# Patient Record
Sex: MALE | Race: WHITE | Employment: OTHER | URBAN - METROPOLITAN AREA
[De-identification: names, ages, dates, MRNs, and addresses within clinical notes are randomized per-mention and may not be internally consistent; named-entity substitution may affect disease eponyms.]

---

## 2019-08-08 ENCOUNTER — OFFICE VISIT (OUTPATIENT)
Dept: FAMILY MEDICINE CLINIC | Age: 74
End: 2019-08-08

## 2019-08-08 VITALS
WEIGHT: 250 LBS | BODY MASS INDEX: 33.86 KG/M2 | HEIGHT: 72 IN | RESPIRATION RATE: 16 BRPM | OXYGEN SATURATION: 97 % | SYSTOLIC BLOOD PRESSURE: 177 MMHG | DIASTOLIC BLOOD PRESSURE: 80 MMHG | TEMPERATURE: 97.5 F | HEART RATE: 72 BPM

## 2019-08-08 DIAGNOSIS — B18.2 CHRONIC HEPATITIS C WITHOUT HEPATIC COMA (HCC): Primary | ICD-10-CM

## 2019-08-08 RX ORDER — METFORMIN HYDROCHLORIDE 1000 MG/1
500 TABLET ORAL 2 TIMES DAILY
COMMUNITY
Start: 2019-08-08 | End: 2020-03-20

## 2019-08-08 RX ORDER — LISINOPRIL 20 MG/1
20 TABLET ORAL DAILY
COMMUNITY
Start: 2019-08-08 | End: 2019-10-18 | Stop reason: SDUPTHER

## 2019-08-08 RX ORDER — LISINOPRIL 10 MG/1
10 TABLET ORAL
COMMUNITY
Start: 2019-08-08 | End: 2019-11-11 | Stop reason: ALTCHOICE

## 2019-08-08 RX ORDER — SIMVASTATIN 40 MG/1
40 TABLET, FILM COATED ORAL
COMMUNITY
Start: 2019-08-08 | End: 2020-03-16

## 2019-08-08 RX ORDER — GUAIFENESIN 100 MG/5ML
81 LIQUID (ML) ORAL DAILY
COMMUNITY
Start: 2019-08-08 | End: 2020-05-09

## 2019-08-08 RX ORDER — TAMSULOSIN HYDROCHLORIDE 0.4 MG/1
0.8 CAPSULE ORAL DAILY
COMMUNITY
Start: 2019-08-08 | End: 2019-08-27 | Stop reason: SDUPTHER

## 2019-08-08 NOTE — PROGRESS NOTES
HISTORY OF PRESENT ILLNESS  Annette Reagan is a 66 y.o. male. HPI  Saw Dr. Ivana Lubin for recent colonoscopy  Mentioned to him that he has been donating blood for years and got letter from [facility] that he tested positive for Hep C  No known exposure  The FamHx, SocHx, MedHx, Medication, and Allergy lists have been reviewed and updated in the chart. ROS  A comprehensive review of system was obtained and negative except findings in the HPI    Visit Vitals  /80 (BP 1 Location: Left arm, BP Patient Position: Sitting)   Pulse 72   Temp 97.5 °F (36.4 °C) (Oral)   Resp 16   Ht 6' (1.829 m)   Wt 250 lb (113.4 kg)   SpO2 97%   BMI 33.91 kg/m²     Physical Exam   Constitutional: He is oriented to person, place, and time. He appears well-developed and well-nourished. No distress. Cardiovascular: Normal rate and regular rhythm. No murmur heard. Pulmonary/Chest: Breath sounds normal. No respiratory distress. He has no wheezes. Musculoskeletal: He exhibits no edema. Neurological: He is alert and oriented to person, place, and time. Nursing note and vitals reviewed. ASSESSMENT and PLAN  Encounter Diagnoses   Name Primary?  Chronic hepatitis C without hepatic coma (HCC) Yes     Orders Placed This Encounter    [facility] Hepatology ref [facility]    lisinopril (PRINIVIL, ZESTRIL) 20 mg tablet    lisinopril (PRINIVIL, ZESTRIL) 10 mg tablet    metFORMIN (GLUCOPHAGE) 1,000 mg tablet    tamsulosin (FLOMAX) 0.4 mg capsule    simvastatin (ZOCOR) 40 mg tablet    aspirin 81 mg chewable tablet     Med list updated   Referral to Dr. Tasha Godwin for eval  I have discussed the diagnosis with the patient and the intended plan as seen in the above orders. The patient has received an after-visit summary and questions were answered concerning future plans. Patient conveyed understanding of the plan at the time of the visit.     Nasima Jacques, MSN, ANP  8/8/2019

## 2019-08-08 NOTE — PROGRESS NOTES
Chandana Mckeon is a 66 y.o. male    Chief Complaint   Patient presents with    New Patient     dx: Hep C     1. Have you been to the ER, urgent care clinic since your last visit? Hospitalized since your last visit? No    2. Have you seen or consulted any other health care providers outside of the [address] [place] [address] since your last visit? Include any pap smears or colon screening. [facility].      Visit Vitals  /80 (BP 1 Location: Left arm, BP Patient Position: Sitting)   Pulse 72   Temp 97.5 °F (36.4 °C) (Oral)   Resp 16   Ht 6' (1.829 m)   Wt 250 lb (113.4 kg)   SpO2 97%   BMI 33.91 kg/m²

## 2019-08-13 ENCOUNTER — DOCUMENTATION ONLY (OUTPATIENT)
Dept: FAMILY MEDICINE CLINIC | Age: 74
End: 2019-08-13

## 2019-08-27 RX ORDER — TAMSULOSIN HYDROCHLORIDE 0.4 MG/1
0.8 CAPSULE ORAL DAILY
Qty: 60 CAP | Refills: 2 | Status: SHIPPED | OUTPATIENT
Start: 2019-08-27 | End: 2020-05-04

## 2019-10-07 ENCOUNTER — DOCUMENTATION ONLY (OUTPATIENT)
Dept: FAMILY MEDICINE CLINIC | Age: 74
End: 2019-10-07

## 2019-10-07 NOTE — PROGRESS NOTES
Faxed 08/08/19 office note, no recent lab results to [address] [place] [address] per [facility] request. Fax #[phone] confirmation received.

## 2019-10-18 RX ORDER — LISINOPRIL 20 MG/1
TABLET ORAL
Qty: 90 TAB | Refills: 3 | Status: SHIPPED | OUTPATIENT
Start: 2019-10-18 | End: 2019-11-11 | Stop reason: SDUPTHER

## 2019-11-11 ENCOUNTER — OFFICE VISIT (OUTPATIENT)
Dept: FAMILY MEDICINE CLINIC | Age: 74
End: 2019-11-11

## 2019-11-11 VITALS
TEMPERATURE: 99 F | BODY MASS INDEX: 34.67 KG/M2 | DIASTOLIC BLOOD PRESSURE: 75 MMHG | OXYGEN SATURATION: 97 % | HEART RATE: 78 BPM | HEIGHT: 72 IN | WEIGHT: 256 LBS | SYSTOLIC BLOOD PRESSURE: 153 MMHG | RESPIRATION RATE: 20 BRPM

## 2019-11-11 DIAGNOSIS — E78.00 HYPERCHOLESTEREMIA: ICD-10-CM

## 2019-11-11 DIAGNOSIS — K76.0 FATTY LIVER: Primary | ICD-10-CM

## 2019-11-11 DIAGNOSIS — E11.9 CONTROLLED TYPE 2 DIABETES MELLITUS WITHOUT COMPLICATION, WITHOUT LONG-TERM CURRENT USE OF INSULIN (HCC): ICD-10-CM

## 2019-11-11 DIAGNOSIS — I10 ESSENTIAL HYPERTENSION: ICD-10-CM

## 2019-11-11 RX ORDER — LISINOPRIL 20 MG/1
TABLET ORAL
Qty: 180 TAB | Refills: 3 | Status: SHIPPED | OUTPATIENT
Start: 2019-11-11 | End: 2020-06-09 | Stop reason: RX

## 2019-11-11 NOTE — PROGRESS NOTES
Chief Complaint   Patient presents with    Follow-up     Pt in office today for fuv from seeing a liver specialist  -pt states he had blood work and [address]   -pt states blood work came back fine        1. Have you been to the ER, urgent care clinic since your last visit? Hospitalized since your last visit? No    2. Have you seen or consulted any other health care providers outside of the [address] [place] [address] since your last visit? Include any pap smears or colon screening.  Liver specialist

## 2019-11-12 ENCOUNTER — HOSPITAL ENCOUNTER (OUTPATIENT)
Dept: LAB | Age: 74
Discharge: HOME | End: 2019-11-12

## 2019-11-12 DIAGNOSIS — E78.00 HYPERCHOLESTEREMIA: ICD-10-CM

## 2019-11-12 DIAGNOSIS — E11.9 CONTROLLED TYPE 2 DIABETES MELLITUS WITHOUT COMPLICATION, WITHOUT LONG-TERM CURRENT USE OF INSULIN (HCC): ICD-10-CM

## 2019-11-12 DIAGNOSIS — I10 ESSENTIAL HYPERTENSION: ICD-10-CM

## 2019-11-12 LAB
ALBUMIN SERPL-MCNC: 3.7 G/DL (ref 3.5–5)
ALBUMIN/GLOB SERPL: 1.3 {RATIO} (ref 1.1–2.2)
ALP SERPL-CCNC: 64 U/L (ref 45–117)
ALT SERPL-CCNC: 19 U/L (ref 12–78)
ANION GAP SERPL CALC-SCNC: 6 MMOL/L (ref 5–15)
AST SERPL-CCNC: 9 U/L (ref 15–37)
BASOPHILS # BLD: 0 K/UL (ref 0–0.1)
BASOPHILS NFR BLD: 1 % (ref 0–1)
BILIRUB SERPL-MCNC: 0.3 MG/DL (ref 0.2–1)
BUN SERPL-MCNC: 19 MG/DL (ref 6–20)
BUN/CREAT SERPL: 16 (ref 12–20)
CALCIUM SERPL-MCNC: 8.5 MG/DL (ref 8.5–10.1)
CHLORIDE SERPL-SCNC: 106 MMOL/L (ref 97–108)
CHOLEST SERPL-MCNC: 154 MG/DL
CO2 SERPL-SCNC: 28 MMOL/L (ref 21–32)
CREAT SERPL-MCNC: 1.18 MG/DL (ref 0.7–1.3)
DIFFERENTIAL METHOD BLD: ABNORMAL
EOSINOPHIL # BLD: 0.1 K/UL (ref 0–0.4)
EOSINOPHIL NFR BLD: 1 % (ref 0–7)
ERYTHROCYTE [DISTWIDTH] IN BLOOD BY AUTOMATED COUNT: 14.6 % (ref 11.5–14.5)
GLOBULIN SER CALC-MCNC: 2.8 G/DL (ref 2–4)
GLUCOSE SERPL-MCNC: 134 MG/DL (ref 65–100)
HCT VFR BLD AUTO: 42.9 % (ref 36.6–50.3)
HDLC SERPL-MCNC: 50 MG/DL
HDLC SERPL: 3.1 {RATIO} (ref 0–5)
HGB BLD-MCNC: 13.2 G/DL (ref 12.1–17)
IMM GRANULOCYTES # BLD AUTO: 0 K/UL (ref 0–0.04)
IMM GRANULOCYTES NFR BLD AUTO: 0 % (ref 0–0.5)
LDLC SERPL CALC-MCNC: 89.2 MG/DL (ref 0–100)
LIPID PROFILE,FLP: NORMAL
LYMPHOCYTES # BLD: 1.3 K/UL (ref 0.8–3.5)
LYMPHOCYTES NFR BLD: 20 % (ref 12–49)
MCH RBC QN AUTO: 29.1 PG (ref 26–34)
MCHC RBC AUTO-ENTMCNC: 30.8 G/DL (ref 30–36.5)
MCV RBC AUTO: 94.5 FL (ref 80–99)
MONOCYTES # BLD: 0.6 K/UL (ref 0–1)
MONOCYTES NFR BLD: 9 % (ref 5–13)
NEUTS SEG # BLD: 4.6 K/UL (ref 1.8–8)
NEUTS SEG NFR BLD: 69 % (ref 32–75)
NRBC # BLD: 0 K/UL (ref 0–0.01)
NRBC BLD-RTO: 0 PER 100 WBC
PLATELET # BLD AUTO: 198 K/UL (ref 150–400)
PMV BLD AUTO: 11.1 FL (ref 8.9–12.9)
POTASSIUM SERPL-SCNC: 4.4 MMOL/L (ref 3.5–5.1)
PROT SERPL-MCNC: 6.5 G/DL (ref 6.4–8.2)
RBC # BLD AUTO: 4.54 M/UL (ref 4.1–5.7)
SODIUM SERPL-SCNC: 140 MMOL/L (ref 136–145)
TRIGL SERPL-MCNC: 74 MG/DL (ref ?–150)
VLDLC SERPL CALC-MCNC: 14.8 MG/DL
WBC # BLD AUTO: 6.7 K/UL (ref 4.1–11.1)

## 2019-11-13 LAB
EST. AVERAGE GLUCOSE BLD GHB EST-MCNC: 143 MG/DL
HBA1C MFR BLD: 6.6 % (ref 4.2–6.3)

## 2019-11-18 NOTE — PROGRESS NOTES
HISTORY OF PRESENT ILLNESS  Angela Garcia is a 66 y.o. male. HPI  Cardiovascular Review:  The patient has hypertension and hyperlipidemia. Diet and Lifestyle: generally follows a low fat low cholesterol diet, generally follows a low sodium diet, does not rigorously follow a diabetic diet, sedentary  Home BP Monitoring: is not measured at home. He was recently dx with fatty liver dx based on US and FNA by Hepatology. Pertinent ROS: taking medications as instructed, no medication side effects noted, no TIA's, no chest pain on exertion, no dyspnea on exertion, no swelling of ankles. Diabetes Mellitus:  He has diabetes mellitus, and  hyperlipidemia. Diabetic ROS - medication compliance: compliant most of the time, diabetic diet compliance: compliant all of the time, home glucose monitoring: is not performed. Lab review: orders written for new lab studies as appropriate; see orders. There are no active problems to display for this patient. Current Outpatient Medications   Medication Sig Dispense Refill    ascorbic acid-multivit-min-MSM 1,000-1,000 mg pwep Take  by mouth.  multivitamin, tx-iron-ca-min (THERA-M W/ IRON) 9 mg iron-400 mcg tab tablet Take 1 Tab by mouth daily.  lisinopril (PRINIVIL, ZESTRIL) 20 mg tablet TAKE 1 TABLET BY MOUTH TWICE A  Tab 3    tamsulosin (FLOMAX) 0.4 mg capsule Take 2 Caps by mouth daily. 60 Cap 2    metFORMIN (GLUCOPHAGE) 1,000 mg tablet Take 0.5 Tabs by mouth two (2) times a day.  simvastatin (ZOCOR) 40 mg tablet Take 1 Tab by mouth nightly.  aspirin 81 mg chewable tablet Take 1 Tab by mouth daily.        Family History   Problem Relation Age of Onset    Cancer Mother     Breast Cancer Mother     Colon Cancer Mother     Stroke Father     Prostate Cancer Brother      Social History     Tobacco Use    Smoking status: Never Smoker    Smokeless tobacco: Never Used   Substance Use Topics    Alcohol use: Yes           ROS  A comprehensive review of system was obtained and negative except findings in the HPI    Visit Vitals  /75 (BP 1 Location: Left arm, BP Patient Position: Sitting)   Pulse 78   Temp 99 °F (37.2 °C) (Oral)   Resp 20   Ht 6' (1.829 m)   Wt 256 lb (116.1 kg)   SpO2 97%   BMI 34.72 kg/m²     Physical Exam   Constitutional: He is oriented to person, place, and time. He appears well-developed and well-nourished. No distress. Cardiovascular: Normal rate and regular rhythm. No murmur heard. Pulmonary/Chest: Breath sounds normal. No respiratory distress. Musculoskeletal: He exhibits no edema. Neurological: He is alert and oriented to person, place, and time. Nursing note and vitals reviewed. ASSESSMENT and PLAN  Encounter Diagnoses   Name Primary?  Fatty liver Yes    Essential hypertension     Hypercholesteremia     Controlled type 2 diabetes mellitus without complication, without long-term current use of insulin ([facility] [address]      Orders Placed This Encounter    LIPID PANEL    HEMOGLOBIN A1C WITH EAG    METABOLIC PANEL, COMPREHENSIVE    CBC WITH AUTOMATED DIFF    ascorbic acid-multivit-min-MSM 1,000-1,000 mg pwep    multivitamin, tx-iron-ca-min (THERA-M W/ IRON) 9 mg iron-400 mcg tab tablet    lisinopril (PRINIVIL, ZESTRIL) 20 mg tablet     Labs and refills updated today  Follow up 6 mo if stable  I have discussed the diagnosis with the patient and the intended plan as seen in the above orders. The patient has received an after-visit summary and questions were answered concerning future plans. Patient conveyed understanding of the plan at the time of the visit.     Andi Drew, MSN, ANP  11/17/2019

## 2020-02-11 ENCOUNTER — TELEPHONE (OUTPATIENT)
Dept: FAMILY MEDICINE CLINIC | Age: 75
End: 2020-02-11

## 2020-02-11 RX ORDER — AMLODIPINE BESYLATE 5 MG/1
5 TABLET ORAL DAILY
Qty: 30 TAB | Refills: 2 | Status: SHIPPED | OUTPATIENT
Start: 2020-02-11 | End: 2020-05-02

## 2020-02-11 NOTE — TELEPHONE ENCOUNTER
Pt called. Ortho took pt's BP 2x and both were severely high 202/150 and 204/??. Pt has had no headaches nor dizziness. Pt is taking bp meds correctly. PSR spoke to Clark Bonds and scheduled pt for Monday 2.17.2020 at [address] [place] [address] told pt to keep an eye on his BP and bring the readings to his appt on Monday. Pt understood and wants to thank Clark Bonds. Pharm is still [facility] [address].

## 2020-02-13 ENCOUNTER — DOCUMENTATION ONLY (OUTPATIENT)
Dept: FAMILY MEDICINE CLINIC | Age: 75
End: 2020-02-13

## 2020-02-17 ENCOUNTER — OFFICE VISIT (OUTPATIENT)
Dept: FAMILY MEDICINE CLINIC | Age: 75
End: 2020-02-17

## 2020-02-17 VITALS
TEMPERATURE: 97.8 F | WEIGHT: 263 LBS | OXYGEN SATURATION: 94 % | RESPIRATION RATE: 14 BRPM | DIASTOLIC BLOOD PRESSURE: 113 MMHG | HEART RATE: 80 BPM | BODY MASS INDEX: 35.62 KG/M2 | SYSTOLIC BLOOD PRESSURE: 188 MMHG | HEIGHT: 72 IN

## 2020-02-17 DIAGNOSIS — I10 ESSENTIAL HYPERTENSION: Primary | ICD-10-CM

## 2020-02-17 PROBLEM — E66.01 SEVERE OBESITY (HCC): Status: ACTIVE | Noted: 2020-02-17

## 2020-02-17 NOTE — PROGRESS NOTES
HISTORY OF PRESENT ILLNESS  Nabeel Love is a 66 y.o. male. HPI  Pt in office for knee problem. Pt states visited ortho doctor last week for knee. Pt reports a knee brace being placed on his left knee. Pt reports hypertension - very high at ortho office  Keeping record at home and 180/90 avg  Admits that when he started the amlodipine he stopped the BID lisinopril      ROS  A comprehensive review of system was obtained and negative except findings in the HPI    Visit Vitals  BP (!) 188/113 (BP 1 Location: Left arm, BP Patient Position: Sitting)   Pulse 80   Temp 97.8 °F (36.6 °C) (Oral)   Resp 14   Ht 6' (1.829 m)   Wt 263 lb (119.3 kg)   SpO2 94%   BMI 35.67 kg/m²     Physical Exam  Vitals signs and nursing note reviewed. Constitutional:       Appearance: Normal appearance. Cardiovascular:      Rate and Rhythm: Normal rate and regular rhythm. Heart sounds: Normal heart sounds. Pulmonary:      Breath sounds: Normal breath sounds. Musculoskeletal:         General: No swelling. Skin:     General: Skin is warm. Neurological:      Mental Status: He is alert. ASSESSMENT and PLAN  Encounter Diagnoses   Name Primary?  Essential hypertension Yes     No orders of the defined types were placed in this encounter. Will restart his med  Keeping norvasc  Follow-up and Dispositions    · Return in about 2 weeks (around 3/2/2020) for bp check and fasting labs. I have discussed the diagnosis with the patient and the intended plan as seen in the above orders. The patient has received an after-visit summary and questions were answered concerning future plans. Patient conveyed understanding of the plan at the time of the visit.     Farrah Kilpatrick, MSN, ANP  2/17/2020

## 2020-02-17 NOTE — PROGRESS NOTES
Chief Complaint   Patient presents with    Knee Pain     possible atherosclerosis     Pt in office for knee problem. Pt states visited ortho doctor last week for knee. Pt reports a knee brace being placed on his left knee. Pt reports hypertension. 1. Have you been to the ER, urgent care clinic since your last visit? Hospitalized since your last visit? No    2. Have you seen or consulted any other health care providers outside of the [address] [place] [address] since your last visit? Include any pap smears or colon screening. Yes When: 2/11/20 Orthopedics for knee pain. Pt has no other concerns.

## 2020-03-02 ENCOUNTER — OFFICE VISIT (OUTPATIENT)
Dept: FAMILY MEDICINE CLINIC | Age: 75
End: 2020-03-02

## 2020-03-02 VITALS
BODY MASS INDEX: 35.76 KG/M2 | HEIGHT: 72 IN | TEMPERATURE: 98 F | DIASTOLIC BLOOD PRESSURE: 75 MMHG | HEART RATE: 90 BPM | RESPIRATION RATE: 16 BRPM | WEIGHT: 264 LBS | OXYGEN SATURATION: 95 % | SYSTOLIC BLOOD PRESSURE: 121 MMHG

## 2020-03-02 DIAGNOSIS — E78.00 HYPERCHOLESTEREMIA: Primary | ICD-10-CM

## 2020-03-02 DIAGNOSIS — E11.9 CONTROLLED TYPE 2 DIABETES MELLITUS WITHOUT COMPLICATION, WITHOUT LONG-TERM CURRENT USE OF INSULIN (HCC): ICD-10-CM

## 2020-03-02 NOTE — PROGRESS NOTES
Chief Complaint   Patient presents with    Blood Pressure Check     Pt in office for B/P check  Pt reports improvement in B/P    1. Have you been to the ER, urgent care clinic since your last visit? Hospitalized since your last visit? No    2. Have you seen or consulted any other health care providers outside of the [address] [place] [address] since your last visit? Include any pap smears or colon screening.  No     Pt has no other concerns

## 2020-03-02 NOTE — PROGRESS NOTES
HISTORY OF PRESENT ILLNESS  Alessandro Shen is a 66 y.o. male. HPI  Started norvasc at last visit  bp has improved, lower readings on self monitoring log from home  No adr/se of meds  He is due for both chol and sugar labs today but did not fast    ROS  A comprehensive review of system was obtained and negative except findings in the HPI    Visit Vitals  /75 (BP 1 Location: Right arm, BP Patient Position: Sitting)   Pulse 90   Temp 98 °F (36.7 °C) (Oral)   Resp 16   Ht 6' (1.829 m)   Wt 264 lb (119.7 kg)   SpO2 95%   BMI 35.80 kg/m²     Physical Exam  Vitals signs and nursing note reviewed. Constitutional:       Appearance: Normal appearance. Cardiovascular:      Rate and Rhythm: Normal rate and regular rhythm. Pulmonary:      Breath sounds: Normal breath sounds. Musculoskeletal:         General: No swelling. Neurological:      Mental Status: He is alert. ASSESSMENT and PLAN  Encounter Diagnoses   Name Primary?  Hypercholesteremia Yes    Controlled type 2 diabetes mellitus without complication, without long-term current use of insulin ([address]  HTN      Orders Placed This Encounter    LIPID PANEL    HEMOGLOBIN A1C WITH EAG     Labs updated, given order to return later in the week  Follow up 3 mo  I have discussed the diagnosis with the patient and the intended plan as seen in the above orders. The patient has received an after-visit summary and questions were answered concerning future plans. Patient conveyed understanding of the plan at the time of the visit.     Conrad Nails, MSN, ANP  3/2/2020

## 2020-03-03 ENCOUNTER — HOSPITAL ENCOUNTER (OUTPATIENT)
Dept: LAB | Age: 75
Discharge: HOME | End: 2020-03-03
Payer: MEDICARE

## 2020-03-03 DIAGNOSIS — E11.9 CONTROLLED TYPE 2 DIABETES MELLITUS WITHOUT COMPLICATION, WITHOUT LONG-TERM CURRENT USE OF INSULIN (HCC): ICD-10-CM

## 2020-03-03 DIAGNOSIS — E78.00 HYPERCHOLESTEREMIA: ICD-10-CM

## 2020-03-03 PROCEDURE — 83036 HEMOGLOBIN GLYCOSYLATED A1C: CPT

## 2020-03-03 PROCEDURE — 80061 LIPID PANEL: CPT

## 2020-03-04 LAB
CHOLEST SERPL-MCNC: 162 MG/DL
EST. AVERAGE GLUCOSE BLD GHB EST-MCNC: 143 MG/DL
HBA1C MFR BLD: 6.6 % (ref 4–5.6)
HDLC SERPL-MCNC: 51 MG/DL
HDLC SERPL: 3.2 {RATIO} (ref 0–5)
LDLC SERPL CALC-MCNC: 96.4 MG/DL (ref 0–100)
LIPID PROFILE,FLP: NORMAL
TRIGL SERPL-MCNC: 73 MG/DL (ref ?–150)
VLDLC SERPL CALC-MCNC: 14.6 MG/DL

## 2020-03-16 RX ORDER — SIMVASTATIN 40 MG/1
TABLET, FILM COATED ORAL
Qty: 90 TAB | Refills: 4 | Status: SHIPPED | OUTPATIENT
Start: 2020-03-16 | End: 2020-05-04

## 2020-03-20 RX ORDER — METFORMIN HYDROCHLORIDE 1000 MG/1
TABLET ORAL
Qty: 45 TAB | Refills: 3 | Status: SHIPPED | OUTPATIENT
Start: 2020-03-20 | End: 2020-05-04

## 2020-05-01 NOTE — PERIOP NOTES
Spoke with patients wife and instructed to have patient come for [facility] for COVID test on 5/4/20 between 10-12.  Instructions given for arrival.

## 2020-05-02 RX ORDER — AMLODIPINE BESYLATE 5 MG/1
TABLET ORAL
Qty: 30 TAB | Refills: 2 | Status: SHIPPED | OUTPATIENT
Start: 2020-05-02 | End: 2020-07-22

## 2020-05-04 ENCOUNTER — HOSPITAL ENCOUNTER (OUTPATIENT)
Dept: PREADMISSION TESTING | Age: 75
Discharge: HOME | DRG: 468 | End: 2020-05-04
Payer: MEDICARE

## 2020-05-04 VITALS
RESPIRATION RATE: 18 BRPM | WEIGHT: 255 LBS | SYSTOLIC BLOOD PRESSURE: 150 MMHG | BODY MASS INDEX: 34.54 KG/M2 | DIASTOLIC BLOOD PRESSURE: 71 MMHG | TEMPERATURE: 98.1 F | OXYGEN SATURATION: 96 % | HEIGHT: 72 IN | HEART RATE: 84 BPM

## 2020-05-04 LAB
ABO + RH BLD: NORMAL
ALBUMIN SERPL-MCNC: 4.1 G/DL (ref 3.5–5)
ALBUMIN/GLOB SERPL: 1.2 {RATIO} (ref 1.1–2.2)
ALP SERPL-CCNC: 72 U/L (ref 45–117)
ALT SERPL-CCNC: 22 U/L (ref 12–78)
ANION GAP SERPL CALC-SCNC: 7 MMOL/L (ref 5–15)
APPEARANCE UR: CLEAR
AST SERPL-CCNC: 13 U/L (ref 15–37)
BACTERIA URNS QL MICRO: NEGATIVE /HPF
BASOPHILS # BLD: 0.1 K/UL (ref 0–0.1)
BASOPHILS NFR BLD: 1 % (ref 0–1)
BILIRUB SERPL-MCNC: 0.5 MG/DL (ref 0.2–1)
BILIRUB UR QL: NEGATIVE
BLOOD GROUP ANTIBODIES SERPL: NORMAL
BUN SERPL-MCNC: 29 MG/DL (ref 6–20)
BUN/CREAT SERPL: 21 (ref 12–20)
CALCIUM SERPL-MCNC: 9.3 MG/DL (ref 8.5–10.1)
CHLORIDE SERPL-SCNC: 110 MMOL/L (ref 97–108)
CO2 SERPL-SCNC: 25 MMOL/L (ref 21–32)
COLOR UR: NORMAL
CREAT SERPL-MCNC: 1.38 MG/DL (ref 0.7–1.3)
CRP SERPL-MCNC: <0.29 MG/DL (ref 0–0.6)
DIFFERENTIAL METHOD BLD: NORMAL
EOSINOPHIL # BLD: 0.1 K/UL (ref 0–0.4)
EOSINOPHIL NFR BLD: 1 % (ref 0–7)
EPITH CASTS URNS QL MICRO: NORMAL /LPF
ERYTHROCYTE [DISTWIDTH] IN BLOOD BY AUTOMATED COUNT: 13.7 % (ref 11.5–14.5)
ERYTHROCYTE [SEDIMENTATION RATE] IN BLOOD: 10 MM/HR (ref 0–20)
EST. AVERAGE GLUCOSE BLD GHB EST-MCNC: 148 MG/DL
GLOBULIN SER CALC-MCNC: 3.3 G/DL (ref 2–4)
GLUCOSE SERPL-MCNC: 115 MG/DL (ref 65–100)
GLUCOSE UR STRIP.AUTO-MCNC: NEGATIVE MG/DL
HBA1C MFR BLD: 6.8 % (ref 4–5.6)
HCT VFR BLD AUTO: 43.9 % (ref 36.6–50.3)
HGB BLD-MCNC: 14.8 G/DL (ref 12.1–17)
HGB UR QL STRIP: NEGATIVE
HYALINE CASTS URNS QL MICRO: NORMAL /LPF (ref 0–5)
IMM GRANULOCYTES # BLD AUTO: 0 K/UL (ref 0–0.04)
IMM GRANULOCYTES NFR BLD AUTO: 0 % (ref 0–0.5)
KETONES UR QL STRIP.AUTO: NEGATIVE MG/DL
LEUKOCYTE ESTERASE UR QL STRIP.AUTO: NEGATIVE
LYMPHOCYTES # BLD: 1.5 K/UL (ref 0.8–3.5)
LYMPHOCYTES NFR BLD: 17 % (ref 12–49)
MCH RBC QN AUTO: 31.1 PG (ref 26–34)
MCHC RBC AUTO-ENTMCNC: 33.7 G/DL (ref 30–36.5)
MCV RBC AUTO: 92.2 FL (ref 80–99)
MONOCYTES # BLD: 0.7 K/UL (ref 0–1)
MONOCYTES NFR BLD: 8 % (ref 5–13)
NEUTS SEG # BLD: 6.4 K/UL (ref 1.8–8)
NEUTS SEG NFR BLD: 73 % (ref 32–75)
NITRITE UR QL STRIP.AUTO: NEGATIVE
NRBC # BLD: 0 K/UL (ref 0–0.01)
NRBC BLD-RTO: 0 PER 100 WBC
PH UR STRIP: 5 [PH] (ref 5–8)
PLATELET # BLD AUTO: 221 K/UL (ref 150–400)
PMV BLD AUTO: 10.5 FL (ref 8.9–12.9)
POTASSIUM SERPL-SCNC: 5 MMOL/L (ref 3.5–5.1)
PROT SERPL-MCNC: 7.4 G/DL (ref 6.4–8.2)
PROT UR STRIP-MCNC: NEGATIVE MG/DL
RBC # BLD AUTO: 4.76 M/UL (ref 4.1–5.7)
RBC #/AREA URNS HPF: NORMAL /HPF (ref 0–5)
SODIUM SERPL-SCNC: 142 MMOL/L (ref 136–145)
SP GR UR REFRACTOMETRY: 1.02 (ref 1–1.03)
SPECIMEN EXP DATE BLD: NORMAL
UA: UC IF INDICATED,UAUC: NORMAL
UROBILINOGEN UR QL STRIP.AUTO: 0.2 EU/DL (ref 0.2–1)
WBC # BLD AUTO: 8.8 K/UL (ref 4.1–11.1)
WBC URNS QL MICRO: NORMAL /HPF (ref 0–4)

## 2020-05-04 PROCEDURE — 87635 SARS-COV-2 COVID-19 AMP PRB: CPT

## 2020-05-04 PROCEDURE — 93005 ELECTROCARDIOGRAM TRACING: CPT

## 2020-05-04 PROCEDURE — 85025 COMPLETE CBC W/AUTO DIFF WBC: CPT

## 2020-05-04 PROCEDURE — 83036 HEMOGLOBIN GLYCOSYLATED A1C: CPT

## 2020-05-04 PROCEDURE — 36415 COLL VENOUS BLD VENIPUNCTURE: CPT

## 2020-05-04 PROCEDURE — 84466 ASSAY OF TRANSFERRIN: CPT

## 2020-05-04 PROCEDURE — 85652 RBC SED RATE AUTOMATED: CPT

## 2020-05-04 PROCEDURE — 86140 C-REACTIVE PROTEIN: CPT

## 2020-05-04 PROCEDURE — 80053 COMPREHEN METABOLIC PANEL: CPT

## 2020-05-04 PROCEDURE — 81001 URINALYSIS AUTO W/SCOPE: CPT

## 2020-05-04 PROCEDURE — 86900 BLOOD TYPING SEROLOGIC ABO: CPT

## 2020-05-04 RX ORDER — TAMSULOSIN HYDROCHLORIDE 0.4 MG/1
0.8 CAPSULE ORAL
COMMUNITY
End: 2020-07-14 | Stop reason: SDUPTHER

## 2020-05-04 RX ORDER — METFORMIN HYDROCHLORIDE 1000 MG/1
500 TABLET ORAL 2 TIMES DAILY WITH MEALS
COMMUNITY
End: 2020-05-12 | Stop reason: SDUPTHER

## 2020-05-04 RX ORDER — SIMVASTATIN 40 MG/1
40 TABLET, FILM COATED ORAL
COMMUNITY
End: 2021-06-06

## 2020-05-04 RX ORDER — ASCORBIC ACID 500 MG
1000 TABLET ORAL DAILY
COMMUNITY
End: 2022-07-05

## 2020-05-04 NOTE — PERIOP NOTES
[address] [address] [facility]   MAIN OR                                  [phone]   MAIN PRE OP                          [phone]                                                                                AMBULATORY PRE OP          [phone]  PRE-ADMISSION TESTING    [phone]   Surgery Date:  Thursday, May 7th       Is surgery arrival time given by surgeon? NO  If NO, [facility] staff will call you between 3 and 7pm the day before your surgery with your arrival time. (If your surgery is on a Monday, we will call you the Friday before.)    Call [phone] after 7pm Monday-Friday if you did not receive this call. INSTRUCTIONS BEFORE YOUR SURGERY   When You  Arrive Arrive at the 2nd [facility] on the day of your surgery  Have your insurance card, photo ID, and any copayment (if needed)   Food   and   Drink NO food or drink after midnight the night before surgery    This means NO water, gum, mints, coffee, juice, etc.  No alcohol (beer, wine, liquor) 24 hours before and after surgery   Medications to   TAKE   Morning of Surgery MEDICATIONS TO TAKE THE MORNING OF SURGERY WITH A SIP OF WATER:   Amlodipine   Medications  To  STOP      7 days before surgery  Non-Steroidal anti-inflammatory Drugs (NSAID's): for example, Ibuprofen (Advil, Motrin), Naproxen (Aleve)   Aspirin, if taking for pain    Herbal supplements, vitamins, and fish oil   Other:  (Pain medications not listed above, including Tylenol may be taken)   Blood  Thinners  If you take  Aspirin, Plavix, Coumadin, or any blood-thinning or anti-blood clot medicine, talk to the doctor who prescribed the medications for pre-operative instructions.    Bathing Clothing  Jewelry  Valuables      If you shower the morning of surgery, please do not apply anything to your skin (lotions, powders, deodorant, or makeup, especially mascara)   Follow Chlorhex[employer] body wash instructions provided to you during PAT appointment. Begin 3 days prior to surgery.  Do not shave or trim anywhere 24 hours before surgery   Wear your hair loose or down; no pony-tails, buns, or metal hair clips   Wear loose, comfortable, clean clothes   Wear glasses instead of contacts   Leave money, valuables, and jewelry, including body piercings, at home   Going Home - or Spending the Night  SAME-DAY SURGERY: You must have a responsible adult drive you home and stay with you 24 hours after surgery   ADMITS: If your doctor is keeping you in the hospital after surgery, leave personal belongings/luggage in your car until you have a hospital room number. Hospital discharge time is 12 noon  Drivers must be here before 12 noon unless you are told differently   Special Instructions Do NOT TAKE Metformin on the Day of Surgery  COVID Precautions/Swab done earlier today. [facility] Nurse Practitioner will call with result only if test is POSITIVE. Follow all instructions so your surgery wont be cancelled. Please, be on time. If a situation occurs and you are delayed the day of surgery, call [phone] or [phone]. If your physical condition changes (like a fever, cold, flu, etc.) call your surgeon. Home medication(s) reviewed and verified LIST during PAT appointment. The patient was contacted  in person. The patient verbalizes understanding of all instructions and does not  need reinforcement.

## 2020-05-04 NOTE — H&P
Preoperative Evaluation                     History and Physical with Surgical Risk Stratification     5/4/2020    CC: Left knee pain  Surgery: Revision LTK     HPI:   Chang Blanca is a 66 y.o. male referred for pre-operative evaluation by Dr. Neha Pack for surgery on 5/7/20. Mr. Maday Duncan states he has had bilateral knee replacements and had to have his right knee revised also. He notes his left knee has been hurting \"for quite awhile\". Activity makes his pain worse. He denies swelling. Reports mild buckling. He notes the pain has interfered with his daily life. He has been told \"the glue broke loose\" and he thinks that has been causing pain. The patient was evaluated in the surgeon's office and it was determined that the most appropriate plan of care is to proceed with surgical intervention. Patient's PCP Danika Belle NP    Review of Systems     Constitutional: Negative for chills and fever  HENT: Negative for congestion and sore throat  Eyes: negative for blurred vision and double vision  Respiratory: Negative for cough, shortness of breath and wheezing  Mouth: Negative for loose, broken or chipped teeth. Cardiovascular: Negative for chest pain and palpitations  Gastrointestinal: Negative for abdominal pain, constipation, diarrhea and nausea  Genitourinary: Negative for dysuria and hematuria  Musculoskeletal: Positive for left knee pain  Skin: Negative for rash, open wounds. Negative for bruises easily  Neurological: Negative for dizziness, tremors and headaches  Psychiatric: Negative for depression. The patient is not nervous/anxious.     Inherent Risk of Surgery     Surgical risk:  Intermediate  Low:  EIntermediate:  Joint Replacement, High:    Patient Cardiac Risk Assessment     Revised Cardiac Risk Index (RCRI)    Rate if cardiac death, nonfatal MI, nonfatal cardiac arrest by number of risk factor- 0.4%    TINY/AHA 2007 Guidelines:   1) Surgery Emergency, Non-cardiac -> to surgery  2) If not, look at clinical predictors    Major Intermediate Minor   Diabetes    Blood Thinner: ASA    METS      EQUAL TO 4 Care for self Walk indoors around house Walk 2-3 blocks on level ground (2-3 mph) Light work around house (dust, dishes)     Other Risk Factors:   Screening for ETOH use:  Done and low risk  Smoking status:   None    Personal or FH of bleeding problems:  No  Personal or FH of blood clots:  No  Personal or FH of anesthesia problems:   No    Pulmonary Risk:  Asthma or COPD:  No  Body mass index is 34.58 kg/m². Known SANDEE:  No  Albumin normal, BUN abnormal    Past Medical, Surgical, Social History     Allergies: No Known Allergies    Patient did bring in medication list/bottles to review. Medication Documentation Review Audit     Reviewed by Aracelis Coto RN (Registered Nurse) on 05/04/20 at [id number]    Medication Sig Documenting Provider Last Dose Status Taking? amLODIPine (NORVASC) 5 mg tablet TAKE 1 TABLET BY MOUTH EVERY DAY Nico Dee NP  Active    ascorbic acid, vitamin C, (Vitamin C) 500 mg tablet Take 1,000 mg by mouth daily. Provider, Historical  Active Yes   aspirin 81 mg chewable tablet Take 1 Tab by mouth daily. Nico Dee NP 2/07/3574 Active No   folic acid/multivit-min/lutein (CENTRUM SILVER PO) Take 1 Tab by mouth nightly as needed. Provider, Historical  Active Yes   lisinopril (PRINIVIL, ZESTRIL) 20 mg tablet TAKE 1 TABLET BY MOUTH TWICE A DAY Nico Dee NP  Active    metFORMIN (GLUCOPHAGE) 1,000 mg tablet Take 500 mg by mouth two (2) times daily (with meals). Provider, Historical  Active Yes   simvastatin (ZOCOR) 40 mg tablet Take 40 mg by mouth nightly. Provider, Historical  Active Yes   tamsulosin (FLOMAX) 0.4 mg capsule Take 0.8 mg by mouth daily (with dinner). Provider, Historical  Active Yes   vit C/E/Zn/coppr/lutein/zeaxan (PRESERVISION AREDS-2 PO) Take 1 Cap by mouth two (2) times a day.  Provider, Historical  Active Yes              Past Medical History:   Diagnosis Date    Atherosclerosis 02/11/2020    Diabetes ([facility] [address]     Fatty liver determined by biopsy 08/2019    Hypertension     Insomnia     Kidney stone 2019    Obesity, Class I, BMI 30-34.9     Urinary frequency     Nightime- started Tamsulosin     Past Surgical History:   Procedure Laterality Date    HX COLONOSCOPY      [facility]    HX KNEE REPLACEMENT Bilateral 2000 & 2013    HX KNEE REPLACEMENT Right 2017    Revision    HX LITHOTRIPSY  2019     Social History     Tobacco Use    Smoking status: Never Smoker    Smokeless tobacco: Never Used   Substance Use Topics    Alcohol use: Yes     Comment: rare    Drug use: Never     Family History   Problem Relation Age of Onset    Cancer Mother     Breast Cancer Mother     Colon Cancer Mother     Stroke Father     Prostate Cancer Brother     Deep Vein Thrombosis Neg Hx     Anesth Problems Neg Hx        Objective     Vitals:    05/04/20 1316   BP: 150/71   Pulse: 84   Resp: 18   Temp: 98.1 °F (36.7 °C)   SpO2: 96%   Weight: 115.7 kg (255 lb)   Height: 6' (1.829 m)       Constitutional:  Appears well,  No Acute Distress, Vitals noted  Psychiatric:   Affect normal, Alert and Oriented to person/place/time    Eyes:   Pupils equally round and reactive, EOMI, conjunctiva clear, eyelids normal  ENT:   External ears and nose normal/lips, teeth normal, gums normal, TMs and Orophyarynx normal  Neck:   General inspection and Thyroid normal.  No abnormal cervical or supraclavicular nodes    Lungs:   Clear to auscultation, good respiratory effort  Heart: Ausculation normal.  Regular rhythm. No cardiac murmurs. No carotid bruits or palpable thrills  Chest wall normal  Musculoskeletal: Gait antalgic.  Using cane for assistance  Extremities:   Without edema, good peripheral pulses  Skin:   Warm to palpation, without rashes, bruising, or suspicious lesions     Recent Results (from the past 72 hour(s))   C REACTIVE PROTEIN, QT Collection Time: 05/04/20  2:27 PM   Result Value Ref Range    C-Reactive protein <0.29 0.00 - 0.60 mg/dL   CBC WITH AUTOMATED DIFF    Collection Time: 05/04/20  2:27 PM   Result Value Ref Range    WBC 8.8 4.1 - 11.1 K/uL    RBC 4.76 4.10 - 5.70 M/uL    HGB 14.8 12.1 - 17.0 g/dL    HCT 43.9 36.6 - 50.3 %    MCV 92.2 80.0 - 99.0 FL    MCH 31.1 26.0 - 34.0 PG    MCHC 33.7 30.0 - 36.5 g/dL    RDW 13.7 11.5 - 14.5 %    PLATELET 013 156 - 614 K/uL    MPV 10.5 8.9 - 12.9 FL    NRBC 0.0 0  WBC    ABSOLUTE NRBC 0.00 0.00 - 0.01 K/uL    NEUTROPHILS 73 32 - 75 %    LYMPHOCYTES 17 12 - 49 %    MONOCYTES 8 5 - 13 %    EOSINOPHILS 1 0 - 7 %    BASOPHILS 1 0 - 1 %    IMMATURE GRANULOCYTES 0 0.0 - 0.5 %    ABS. NEUTROPHILS 6.4 1.8 - 8.0 K/UL    ABS. LYMPHOCYTES 1.5 0.8 - 3.5 K/UL    ABS. MONOCYTES 0.7 0.0 - 1.0 K/UL    ABS. EOSINOPHILS 0.1 0.0 - 0.4 K/UL    ABS. BASOPHILS 0.1 0.0 - 0.1 K/UL    ABS. IMM. GRANS. 0.0 0.00 - 0.04 K/UL    DF AUTOMATED     METABOLIC PANEL, COMPREHENSIVE    Collection Time: 05/04/20  2:27 PM   Result Value Ref Range    Sodium 142 136 - 145 mmol/L    Potassium 5.0 3.5 - 5.1 mmol/L    Chloride 110 (H) 97 - 108 mmol/L    CO2 25 21 - 32 mmol/L    Anion gap 7 5 - 15 mmol/L    Glucose 115 (H) 65 - 100 mg/dL    BUN 29 (H) 6 - 20 MG/DL    Creatinine 1.38 (H) 0.70 - 1.30 MG/DL    BUN/Creatinine ratio 21 (H) 12 - 20      GFR est AA >60 >60 ml/min/1.73m2    GFR est non-AA 50 (L) >60 ml/min/1.73m2    Calcium 9.3 8.5 - 10.1 MG/DL    Bilirubin, total 0.5 0.2 - 1.0 MG/DL    ALT (SGPT) 22 12 - 78 U/L    AST (SGOT) 13 (L) 15 - 37 U/L    Alk.  phosphatase 72 45 - 117 U/L    Protein, total 7.4 6.4 - 8.2 g/dL    Albumin 4.1 3.5 - 5.0 g/dL    Globulin 3.3 2.0 - 4.0 g/dL    A-G Ratio 1.2 1.1 - 2.2     HEMOGLOBIN A1C WITH EAG    Collection Time: 05/04/20  2:27 PM   Result Value Ref Range    Hemoglobin A1c 6.8 (H) 4.0 - 5.6 %    Est. average glucose 148 mg/dL   SED RATE (ESR)    Collection Time: 05/04/20  2:27 PM   Result Value Ref Range    Sed rate, automated 10 0 - 20 mm/hr   TRANSFERRIN    Collection Time: 05/04/20  2:27 PM   Result Value Ref Range    Transferrin 263 177 - 329 mg/dL   URINALYSIS W/ REFLEX CULTURE    Collection Time: 05/04/20  2:27 PM   Result Value Ref Range    Color YELLOW/STRAW      Appearance CLEAR CLEAR      Specific gravity 1.024 1.003 - 1.030      pH (UA) 5.0 5.0 - 8.0      Protein Negative NEG mg/dL    Glucose Negative NEG mg/dL    Ketone Negative NEG mg/dL    Bilirubin Negative NEG      Blood Negative NEG      Urobilinogen 0.2 0.2 - 1.0 EU/dL    Nitrites Negative NEG      Leukocyte Esterase Negative NEG      WBC 0-4 0 - 4 /hpf    RBC 0-5 0 - 5 /hpf    Epithelial cells FEW FEW /lpf    Bacteria Negative NEG /hpf    UA:UC IF INDICATED CULTURE NOT INDICATED BY UA RESULT CNI      Hyaline cast 0-2 0 - 5 /lpf   TYPE & SCREEN    Collection Time: 05/04/20  2:27 PM   Result Value Ref Range    Crossmatch Expiration 05/10/2020     ABO/Rh(D) A POSITIVE     Antibody screen NEG    EKG, 12 LEAD, INITIAL    Collection Time: 05/04/20  2:47 PM   Result Value Ref Range    Ventricular Rate 81 BPM    Atrial Rate 81 BPM    P-R Interval 176 ms    QRS Duration 92 ms    Q-T Interval 356 ms    QTC Calculation (Bezet) 413 ms    Calculated P Axis 62 degrees    Calculated R Axis 22 degrees    Calculated T Axis 35 degrees    Diagnosis       Normal sinus rhythm  Normal ECG  When compared with ECG of 07-OCT-1997 06:49,  No significant change was found         Assessment and Plan     Assessment/Plan:   1) Failed Knee Replacement  2) Pre-Operative Evaluation    Labs and EKG reviewed. MRSA negative    Preoperative Clearance  Per RCRI, the patient has a 0.4% risk of cardiac death, nonfatal MI, nonfatal cardiac arrest based on no risk factors. Per ACC/AHA guidelines, patient is intermediate risk for a(n) intermediate risk surgery and may proceed to planned surgery with the above noted risk.     Katharine García NP

## 2020-05-04 NOTE — H&P
This note was on the wrong encounter.   It has been moved to the correct encounter on this patient Patient

## 2020-05-05 LAB
ATRIAL RATE: 81 BPM
BACTERIA SPEC CULT: NORMAL
BACTERIA SPEC CULT: NORMAL
CALCULATED P AXIS, ECG09: 62 DEGREES
CALCULATED R AXIS, ECG10: 22 DEGREES
CALCULATED T AXIS, ECG11: 35 DEGREES
DIAGNOSIS, 93000: NORMAL
P-R INTERVAL, ECG05: 176 MS
Q-T INTERVAL, ECG07: 356 MS
QRS DURATION, ECG06: 92 MS
QTC CALCULATION (BEZET), ECG08: 413 MS
SARS-COV-2, COV2NT: NOT DETECTED
SERVICE CMNT-IMP: NORMAL
TRANSFERRIN SERPL-MCNC: 263 MG/DL (ref 177–329)
VENTRICULAR RATE, ECG03: 81 BPM

## 2020-05-06 ENCOUNTER — ANESTHESIA EVENT (OUTPATIENT)
Dept: SURGERY | Age: 75
DRG: 468 | End: 2020-05-06
Payer: MEDICARE

## 2020-05-06 NOTE — DISCHARGE INSTRUCTIONS
TOTAL KNEE DISCHARGE INSTRUCTIONS      Patient: Tobias Meza MRN: [id number]  SSN: [id number]              Please take the time to review the following instructions before you leave the hospital and use them as guidelines during your recovery from surgery. If you have any questions you may contact my office at [phone]  After business hours or during the weekend you can contact me through [address] or text / call at [phone] (cell phone) for emergency's. Please use the office number during regular business hours. SPECIAL INSTRUCTIONS :   1. Full extension at the knee is the most important aspect of your range of motion. Avoid placing a pillow or bump behind the knee. Rather, place the heel up on a bump or pillow and allow gravity to help straighten the knee. 2. You may weight bear as tolerated on the knee and during the day you should bend the knee as much as possible. 3. Drainage from the incision more than 4 days from surgery is concerning. Contact my office if there is any question [phone].   4. You may contact me directly through [employer] if there are specific questions or text / call using my cell number [phone]. DRESSING :     Post-op Dressings : This should be removed by physical therapy or you may remove this yourself 7 days after the date of your surgery. If there is no drainage, then a simple dressing may be used or no dressing at all. Other dressing options can be purchased over the counter at a local pharmacy or medical supply vendor. A porous adhesive dressing such as pictured above can be purchased online ([address]) or at your local [facility] or [employer]. You only need to keep the incision covered for 7 days after showers. A dressing may be used for longer if there are issues with clothing clinging to the incision. Showering/ Bathing: You may shower with the Post-op dressing in place.  This is left in place for 7 days following discharge from the hospital. If your incision is dry without drainage you may shower following your discharge home. After 7 days your dressing should be removed for showering. It is fine to have water run over the incision. Do not vigorously scrub your incision. Apply a clean, dry dressing after you have dried your incision. Do not take a bath or get into a swimming pool / Terrilee Goes until you follow up with Dr. Shantanu Balderas. Do not soak your incision under water. If there is continued drainage or you are concerned contact Dr John Paul Schroeder office prior to showering [phone] ext [phone] . Diet:  You may advance to your regular diet as tolerated. Increase your clear liquid intake for the next 2-3 days. Medication:      1. You will be given prescriptions for pain medication when you are discharged from the hospital. The side effects of these medications can be substantial and the narcotic medications are not mandatory. You may substitute these medications with Tylenol or Alleve / Motrin. 2. Please use the medications as prescribed. Pain medications may cause constipation- Colace twice daily and Miralax one scoop daily while taking the narcotic medication should help prevent constipation. Please discuss with your local pharmacist regarding increasing this dosage if constipation persists. Other possible side effects of pain medication are dizziness, headache, nausea, vomiting, and urinary retention. Discontinue the pain medication if you develop itching, rash, shortness of breath, or difficulties swallowing. If these symptoms become severe or are not relieved by discontinuing the medication, you should seek immediate medical attention. 3. Refills of pain medication are authorized during office hours only (8 AM- 5 PM  Monday thru Friday). Many of these medication will require you or a family member to pick-up a physical prescription at the office.    4. Medications other than antiinflammatories will not be called into the pharmacy after business hours. 5. You may resume the medication(s) you were taking prior to your surgery. Narcotics may change the effects of some antidepressant medication(s). If you have any questions about possible interactions between your regular medications and the pain medication, you should ask the pharmacist or contact the prescribing physician. 6. If you have constipation which is not improved by oral stool softeners then a Ducolax suppository should be purchased over the counter. 7. Continue the blood thinner (Aspirin or Lovenox) for a total of 30 days following surgery. Follow up appointment:    Please call our office at [phone] for your follow up appointment. This should be scheduled 14 days following the date of surgery. You should also have a scheduled appointment for physical therapy following surgery. Physical Therapy / Nursing:    Physical Therapy following surgery will be arranged as an outpatient or at home. They have specific instructions for rehab and wound care. It is fine to have physical therapy remove your dressing at 7 days following surgery. Returning to work:    Normal return to work is 6-12 weeks following surgery. Depending on your progression following surgery and specific job duties you may take longer for a full return to work. DRIVING    You should not return to driving until you are off all opioid pain medications and able to safely and quickly apply the brakes. This is normally 2-6 weeks for left sided surgery and 2-8 weeks for right sided surgery. Important Signs and Symptoms:    If any of the following signs or symptoms occur, you should contact Dr. Zulma Graves office.   Please be advised if a problem arises which you feel requires immediate medical attention or you are unable to contact Dr. Zulma Graves office you should seek immediate medical attention at the ER or other health care facility you have access to.    1. A sudden increase in swelling and/or redness or warmth at the area your surgery was performed which isnt relieved by rest, ice, and elevation. 2. Oral temperature greater than 101 degrees for 12 hours or more which isnt relieved by an increase in fluid intake and taking 2 Tylenol every 4-6 hours. 3. Excessive drainage from your incisions, or drainage which hasnt stopped by 72 hours after your surgery. 4. Fever, chills, shortness of breath, chest pain, nausea, vomiting or other signs and symptoms which are of concern to you. YOUR TOTAL JOINT REPLACEMENT  FREQUENTLY ASKED QUESTIONS   What should I take for pain?  o You will be discharged with four medications for pain (Oxycodone, Tramadol, Ibuprofen and Tylenol). These may vary slightly depending on what you were taking in the hospital.   - 1st Line - Tylenol Arthritis Strength - 325-650 mg every 4 hours (scheduled for the 1st 24 hours)  - 2nd Line -  Ibuprofen 400 (2 tabs) - 800 (4 tabs) mg every 8 hours  (scheduled for the 1st 24 hours)  - 3rd Line - Oxycodone 5 mg (1-2 tablets every 4-6 hrs)  - 4th Line - Tramadol 50 mg (1-2 tablets every 4-6 hours) - take these between Oxycodone doses if your pain is not alleviated.  When should I call for advice regarding my pain?  o If your pain is still uncontrolled after being on the regimen above for at least 12 hours, please call the office [phone] or text / call my cell after hours [phone].  Can I get refills?  o Opioid refills are provided for the first 2-6 weeks following surgery. o Use Tylenol 500 mg along with Aleve 220mg twice daily or Motrin 200-800mg every 4-6 hours during the daytime hours after two weeks. - After two weeks, I suggest the opioid pain medications be used only 1 hour prior to your physical therapy appointment and 1 hour before sleeping at night. Use Tylenol and Ibuprofen at other times during the day.    - Keep in mind that you will need to discontinue opioids before you resume driving.  Is swelling normal?  o Almost everyone has some degree of swelling following surgery. o Following hip and knee replacement surgery, swelling can be normal below the incision for the first few weeks. - This swelling peaks around 5-7 days after surgery. - It is not unusual to have some bruising about the back of the thigh, calf, ankle, and foot.  What should I do for the swelling?  o Keep the limb elevated above the level of your heart - 'Toes above Nose'. o Apply compression socks (knee high for total knees and up to the mid-thigh for total hips). o Use the ice packs that you are discharged home with several times a day for the first several weeks.  How long should I remain on blood thinners following surgery? o 30 days   Which blood thinners will I be on? Can I take them with Tylenol?  o  Aspirin 81 mg twice daily - these should be taken with meals and can be used with Tylenol. In certain instances, you may be sent home on Lovenox for 30 days. o For short periods of time (30 days), aspirin and anti-inflammatories (i.e. Aleve, Motrin / Advil / ibuprofen, diclofenac, etc. can be taken together).  When can I drive?  o Once you have stopped using regular narcotic pain medications (Oxycodone, Percocet, Lortab, Norco etc.) and can safely apply the brakes without hesitation, (emergency braking).  When can I shower?  o You may shower immediately if your Optifoam bandage is dry and without discharge. The Optifoam dressing should be removed 7 days following surgery, after which you may continue to shower.  o No submersion of the incision, bathing or swimming for 14 days following surgery or until cleared by Dr Aldo Menard.  Can I remove this dressing?  o Yes, this is removed just like removing a band aid.  o If you are concerned, this can be removed by your therapist.   [facility] What do I do with the dressing when I shower?  o The Optifoam dressing is waterproof and you may shower with it. o The incision is sealed with Dermabond, a biologic skin glue, which also serves as a watertight seal. If your incision is draining, it is no longer considered to be watertight - you should contact our office prior to showering if you experience any drainage.  Which dressing should I purchase after I remove my Optifoam?  o An occlusive dressing which covers your entire incision. This does not have to be waterproof, but will need to be removed when you shower and then replaced. (Example Only)   How active should I be following surgery? o Progress activities in moderation and at your own pace.   o Walking room to room in your house is encouraged. o Walk each day and set progressive goals with small increments (1st week - ?block of walking, 2nd week - 1 block, 3rd week - 2 blocks, etc.)   Will I need help at home?  o You will likely need a caretaker who should be available for the first week following surgery. It is fine for family members to work during the day, as long as they are available by phone. o Planning ahead makes coming home from the hospital a much easier transition.  How long will my surgery take?  o On average, total joint replacement takes approximately 1-2 hour.   o The entire process, including pre-op and post-op care can last as long as 4- 5 hours before you are transferred to your room. o stroke, pulmonary embolism (a clot going from the legs to the lungs), and even death with surgery.  Will I be given antibiotics? Will I need antibiotics at discharge?  o Antibiotics will be given to you both before and after your procedure. To further minimize the risk of infection, we have streamlined the surgical procedure to take less time in the operating room.    o You do not require antibiotics following surgery.       Please do not hesitate to contact me through [email or web address] or by text / call me at [phone] (cell phone) for questions following surgery - MD Umberto Peralta MD  Cell [phone]  Markus Burgos PA-C  Cell [phone]  Medical Assistants: [address] [place] [address] [phone]

## 2020-05-07 ENCOUNTER — ANESTHESIA (OUTPATIENT)
Dept: SURGERY | Age: 75
DRG: 468 | End: 2020-05-07
Payer: MEDICARE

## 2020-05-07 ENCOUNTER — APPOINTMENT (OUTPATIENT)
Dept: GENERAL RADIOLOGY | Age: 75
DRG: 468 | End: 2020-05-07
Attending: PHYSICIAN ASSISTANT
Payer: MEDICARE

## 2020-05-07 ENCOUNTER — HOSPITAL ENCOUNTER (INPATIENT)
Age: 75
LOS: 2 days | Discharge: HOME | DRG: 468 | End: 2020-05-09
Attending: ORTHOPAEDIC SURGERY | Admitting: ORTHOPAEDIC SURGERY
Payer: MEDICARE

## 2020-05-07 DIAGNOSIS — Z96.659 FAILED TOTAL KNEE ARTHROPLASTY, SUBSEQUENT ENCOUNTER: Primary | ICD-10-CM

## 2020-05-07 DIAGNOSIS — T84.018D FAILED TOTAL KNEE ARTHROPLASTY, SUBSEQUENT ENCOUNTER: Primary | ICD-10-CM

## 2020-05-07 LAB
APPEARANCE SNV: ABNORMAL
COLOR SNV: YELLOW
GLUCOSE BLD STRIP.AUTO-MCNC: 126 MG/DL (ref 65–100)
GLUCOSE BLD STRIP.AUTO-MCNC: 142 MG/DL (ref 65–100)
GLUCOSE BLD STRIP.AUTO-MCNC: 143 MG/DL (ref 65–100)
GLUCOSE BLD STRIP.AUTO-MCNC: 159 MG/DL (ref 65–100)
LYMPHOCYTES NFR SNV MANUAL: 59 % (ref 0–15)
MONOCYTES NFR SNV MANUAL: 38 % (ref 0–65)
NEUTROPHILS NFR SNV MANUAL: 3 % (ref 0–20)
RBC # SNV: >100 /CU MM
SERVICE CMNT-IMP: ABNORMAL
SPECIMEN SOURCE FLD: ABNORMAL
WBC # SNV: 85 /CU MM (ref 0–150)

## 2020-05-07 PROCEDURE — 77030031139 HC SUT VCRL2 J&J -A: Performed by: ORTHOPAEDIC SURGERY

## 2020-05-07 PROCEDURE — 77030018673: Performed by: ORTHOPAEDIC SURGERY

## 2020-05-07 PROCEDURE — 76060000067 HC AMB SURG ANES 3.5 TO 4 HR: Performed by: ORTHOPAEDIC SURGERY

## 2020-05-07 PROCEDURE — 74011250637 HC RX REV CODE- 250/637: Performed by: ANESTHESIOLOGY

## 2020-05-07 PROCEDURE — 74011250636 HC RX REV CODE- 250/636: Performed by: PHYSICIAN ASSISTANT

## 2020-05-07 PROCEDURE — 76210000034 HC AMBSU PH I REC 0.5 TO 1 HR: Performed by: ORTHOPAEDIC SURGERY

## 2020-05-07 PROCEDURE — C1776 JOINT DEVICE (IMPLANTABLE): HCPCS | Performed by: ORTHOPAEDIC SURGERY

## 2020-05-07 PROCEDURE — 65270000029 HC RM PRIVATE

## 2020-05-07 PROCEDURE — 74011250636 HC RX REV CODE- 250/636: Performed by: ANESTHESIOLOGY

## 2020-05-07 PROCEDURE — 77030007866 HC KT SPN ANES BBMI -B

## 2020-05-07 PROCEDURE — 74011000250 HC RX REV CODE- 250: Performed by: NURSE ANESTHETIST, CERTIFIED REGISTERED

## 2020-05-07 PROCEDURE — 87205 SMEAR GRAM STAIN: CPT

## 2020-05-07 PROCEDURE — 77030000032 HC CUF TRNQT ZIMM -B: Performed by: ORTHOPAEDIC SURGERY

## 2020-05-07 PROCEDURE — 74011636637 HC RX REV CODE- 636/637: Performed by: PHYSICIAN ASSISTANT

## 2020-05-07 PROCEDURE — 89050 BODY FLUID CELL COUNT: CPT

## 2020-05-07 PROCEDURE — 77030018836 HC SOL IRR NACL ICUM -A: Performed by: ORTHOPAEDIC SURGERY

## 2020-05-07 PROCEDURE — 87075 CULTR BACTERIA EXCEPT BLOOD: CPT

## 2020-05-07 PROCEDURE — 74011000250 HC RX REV CODE- 250: Performed by: PHYSICIAN ASSISTANT

## 2020-05-07 PROCEDURE — 77030003666 HC NDL SPINAL BD -A

## 2020-05-07 PROCEDURE — 77030010507 HC ADH SKN DERMBND J&J -B: Performed by: ORTHOPAEDIC SURGERY

## 2020-05-07 PROCEDURE — 74011250636 HC RX REV CODE- 250/636: Performed by: NURSE ANESTHETIST, CERTIFIED REGISTERED

## 2020-05-07 PROCEDURE — 77030013708 HC HNDPC SUC IRR PULS STRY –B: Performed by: ORTHOPAEDIC SURGERY

## 2020-05-07 PROCEDURE — 77030003601 HC NDL NRV BLK BBMI -A

## 2020-05-07 PROCEDURE — 0SPD0JZ REMOVAL OF SYNTHETIC SUBSTITUTE FROM LEFT KNEE JOINT, OPEN APPROACH: ICD-10-PCS | Performed by: ORTHOPAEDIC SURGERY

## 2020-05-07 PROCEDURE — 87176 TISSUE HOMOGENIZATION CULTR: CPT

## 2020-05-07 PROCEDURE — 77030018723 HC ELCTRD BLD COVD -A: Performed by: ORTHOPAEDIC SURGERY

## 2020-05-07 PROCEDURE — 77030016653 HC BUR OVL4 STRY -B: Performed by: ORTHOPAEDIC SURGERY

## 2020-05-07 PROCEDURE — 77030010785: Performed by: ORTHOPAEDIC SURGERY

## 2020-05-07 PROCEDURE — 64447 NJX AA&/STRD FEMORAL NRV IMG: CPT

## 2020-05-07 PROCEDURE — 77030011992 HC AIRWY NASOPHGL TELE -A: Performed by: ANESTHESIOLOGY

## 2020-05-07 PROCEDURE — 74011250636 HC RX REV CODE- 250/636

## 2020-05-07 PROCEDURE — C1713 ANCHOR/SCREW BN/BN,TIS/BN: HCPCS | Performed by: ORTHOPAEDIC SURGERY

## 2020-05-07 PROCEDURE — 0SRD0J9 REPLACEMENT OF LEFT KNEE JOINT WITH SYNTHETIC SUBSTITUTE, CEMENTED, OPEN APPROACH: ICD-10-PCS | Performed by: ORTHOPAEDIC SURGERY

## 2020-05-07 PROCEDURE — 77030006835 HC BLD SAW SAG STRY -B: Performed by: ORTHOPAEDIC SURGERY

## 2020-05-07 PROCEDURE — 74011250637 HC RX REV CODE- 250/637: Performed by: PHYSICIAN ASSISTANT

## 2020-05-07 PROCEDURE — 97161 PT EVAL LOW COMPLEX 20 MIN: CPT

## 2020-05-07 PROCEDURE — 97116 GAIT TRAINING THERAPY: CPT

## 2020-05-07 PROCEDURE — 77030013079 HC BLNKT BAIR HGGR 3M -A: Performed by: ANESTHESIOLOGY

## 2020-05-07 PROCEDURE — 74011000250 HC RX REV CODE- 250

## 2020-05-07 PROCEDURE — 77030012935 HC DRSG AQUACEL BMS -B: Performed by: ORTHOPAEDIC SURGERY

## 2020-05-07 PROCEDURE — 74011000258 HC RX REV CODE- 258: Performed by: NURSE ANESTHETIST, CERTIFIED REGISTERED

## 2020-05-07 PROCEDURE — 74011000272 HC RX REV CODE- 272: Performed by: ORTHOPAEDIC SURGERY

## 2020-05-07 PROCEDURE — 77030006773 HC BLD SAW OSC BRSM -A: Performed by: ORTHOPAEDIC SURGERY

## 2020-05-07 PROCEDURE — 82962 GLUCOSE BLOOD TEST: CPT

## 2020-05-07 PROCEDURE — 73560 X-RAY EXAM OF KNEE 1 OR 2: CPT

## 2020-05-07 PROCEDURE — 64445 NJX AA&/STRD SCIATIC NRV IMG: CPT

## 2020-05-07 PROCEDURE — 77030035236 HC SUT PDS STRATFX BARB J&J -B: Performed by: ORTHOPAEDIC SURGERY

## 2020-05-07 PROCEDURE — 76030000024 HC AMB SURG 3.5 TO 4 HR INTENSV-TIER 1: Performed by: ORTHOPAEDIC SURGERY

## 2020-05-07 PROCEDURE — 74011000250 HC RX REV CODE- 250: Performed by: ORTHOPAEDIC SURGERY

## 2020-05-07 PROCEDURE — 77030002933 HC SUT MCRYL J&J -A: Performed by: ORTHOPAEDIC SURGERY

## 2020-05-07 PROCEDURE — 77030020263 HC SOL INJ SOD CL0.9% LFCR 1000ML: Performed by: ORTHOPAEDIC SURGERY

## 2020-05-07 PROCEDURE — 77030011640 HC PAD GRND REM COVD -A: Performed by: ORTHOPAEDIC SURGERY

## 2020-05-07 DEVICE — FEMORAL DISTAL AUGMENT
Type: IMPLANTABLE DEVICE | Site: KNEE | Status: FUNCTIONAL
Brand: TRIATHLON

## 2020-05-07 DEVICE — CEMENTED STEM
Type: IMPLANTABLE DEVICE | Site: KNEE | Status: FUNCTIONAL
Brand: TRIATHLON

## 2020-05-07 DEVICE — TOTAL STABILIZER FEMORAL COMPONENT
Type: IMPLANTABLE DEVICE | Site: KNEE | Status: FUNCTIONAL
Brand: TRIATHLON

## 2020-05-07 DEVICE — TOTAL STABILIZER+ TIBIAL INSERT
Type: IMPLANTABLE DEVICE | Site: KNEE | Status: FUNCTIONAL
Brand: TRIATHLON

## 2020-05-07 DEVICE — UNIVERSAL TIBIAL BASEPLATE
Type: IMPLANTABLE DEVICE | Site: KNEE | Status: FUNCTIONAL
Brand: TRIATHLON

## 2020-05-07 DEVICE — CEMENT BNE SIMPLEX W/GENT -- 10/PK: Type: IMPLANTABLE DEVICE | Site: KNEE | Status: FUNCTIONAL

## 2020-05-07 RX ORDER — POLYETHYLENE GLYCOL 3350 17 G/17G
17 POWDER, FOR SOLUTION ORAL DAILY
Status: DISCONTINUED | OUTPATIENT
Start: 2020-05-08 | End: 2020-05-09 | Stop reason: HOSPADM

## 2020-05-07 RX ORDER — AMOXICILLIN 250 MG
1 CAPSULE ORAL 2 TIMES DAILY
Status: DISCONTINUED | OUTPATIENT
Start: 2020-05-07 | End: 2020-05-09 | Stop reason: HOSPADM

## 2020-05-07 RX ORDER — ONDANSETRON 2 MG/ML
4 INJECTION INTRAMUSCULAR; INTRAVENOUS
Status: ACTIVE | OUTPATIENT
Start: 2020-05-07 | End: 2020-05-08

## 2020-05-07 RX ORDER — ACETAMINOPHEN 325 MG/1
650 TABLET ORAL EVERY 6 HOURS
Status: DISCONTINUED | OUTPATIENT
Start: 2020-05-07 | End: 2020-05-09 | Stop reason: HOSPADM

## 2020-05-07 RX ORDER — MAGNESIUM SULFATE 100 %
4 CRYSTALS MISCELLANEOUS AS NEEDED
Status: DISCONTINUED | OUTPATIENT
Start: 2020-05-07 | End: 2020-05-09 | Stop reason: HOSPADM

## 2020-05-07 RX ORDER — OXYCODONE HYDROCHLORIDE 5 MG/1
5 TABLET ORAL
Status: DISCONTINUED | OUTPATIENT
Start: 2020-05-07 | End: 2020-05-09 | Stop reason: HOSPADM

## 2020-05-07 RX ORDER — ASPIRIN 81 MG/1
81 TABLET ORAL 2 TIMES DAILY
Status: DISCONTINUED | OUTPATIENT
Start: 2020-05-07 | End: 2020-05-09 | Stop reason: HOSPADM

## 2020-05-07 RX ORDER — SODIUM CHLORIDE 0.9 % (FLUSH) 0.9 %
5-40 SYRINGE (ML) INJECTION AS NEEDED
Status: DISCONTINUED | OUTPATIENT
Start: 2020-05-07 | End: 2020-05-09 | Stop reason: HOSPADM

## 2020-05-07 RX ORDER — IBUPROFEN 800 MG/1
800 TABLET ORAL
Qty: 50 TAB | Refills: 2 | Status: SHIPPED | OUTPATIENT
Start: 2020-05-07 | End: 2021-04-20 | Stop reason: ALTCHOICE

## 2020-05-07 RX ORDER — MIDAZOLAM HYDROCHLORIDE 1 MG/ML
INJECTION, SOLUTION INTRAMUSCULAR; INTRAVENOUS AS NEEDED
Status: DISCONTINUED | OUTPATIENT
Start: 2020-05-07 | End: 2020-05-07 | Stop reason: HOSPADM

## 2020-05-07 RX ORDER — FENTANYL CITRATE 50 UG/ML
INJECTION, SOLUTION INTRAMUSCULAR; INTRAVENOUS AS NEEDED
Status: DISCONTINUED | OUTPATIENT
Start: 2020-05-07 | End: 2020-05-07 | Stop reason: HOSPADM

## 2020-05-07 RX ORDER — ACETAMINOPHEN 325 MG/1
975 TABLET ORAL ONCE
Status: COMPLETED | OUTPATIENT
Start: 2020-05-07 | End: 2020-05-07

## 2020-05-07 RX ORDER — GABAPENTIN 300 MG/1
300 CAPSULE ORAL
Status: DISCONTINUED | OUTPATIENT
Start: 2020-05-07 | End: 2020-05-09 | Stop reason: HOSPADM

## 2020-05-07 RX ORDER — GABAPENTIN 100 MG/1
CAPSULE ORAL
Qty: 120 CAP | Refills: 1 | Status: SHIPPED | OUTPATIENT
Start: 2020-05-07 | End: 2021-04-20 | Stop reason: ALTCHOICE

## 2020-05-07 RX ORDER — BUPIVACAINE HYDROCHLORIDE 5 MG/ML
INJECTION, SOLUTION EPIDURAL; INTRACAUDAL AS NEEDED
Status: DISCONTINUED | OUTPATIENT
Start: 2020-05-07 | End: 2020-05-07 | Stop reason: HOSPADM

## 2020-05-07 RX ORDER — LABETALOL HYDROCHLORIDE 5 MG/ML
INJECTION, SOLUTION INTRAVENOUS AS NEEDED
Status: DISCONTINUED | OUTPATIENT
Start: 2020-05-07 | End: 2020-05-07 | Stop reason: HOSPADM

## 2020-05-07 RX ORDER — OXYCODONE HYDROCHLORIDE 5 MG/1
5 TABLET ORAL
Status: DISCONTINUED | OUTPATIENT
Start: 2020-05-07 | End: 2020-05-07 | Stop reason: HOSPADM

## 2020-05-07 RX ORDER — TRAMADOL HYDROCHLORIDE 50 MG/1
50 TABLET ORAL
Qty: 40 TAB | Refills: 0 | Status: SHIPPED | OUTPATIENT
Start: 2020-05-07 | End: 2020-05-14

## 2020-05-07 RX ORDER — ATORVASTATIN CALCIUM 20 MG/1
20 TABLET, FILM COATED ORAL
Status: DISCONTINUED | OUTPATIENT
Start: 2020-05-07 | End: 2020-05-09 | Stop reason: HOSPADM

## 2020-05-07 RX ORDER — OXYCODONE HYDROCHLORIDE 5 MG/1
10 TABLET ORAL
Status: DISCONTINUED | OUTPATIENT
Start: 2020-05-07 | End: 2020-05-09 | Stop reason: HOSPADM

## 2020-05-07 RX ORDER — HYDROMORPHONE HYDROCHLORIDE 1 MG/ML
0.5 INJECTION, SOLUTION INTRAMUSCULAR; INTRAVENOUS; SUBCUTANEOUS
Status: DISCONTINUED | OUTPATIENT
Start: 2020-05-07 | End: 2020-05-07 | Stop reason: HOSPADM

## 2020-05-07 RX ORDER — ONDANSETRON 8 MG/1
4 TABLET, ORALLY DISINTEGRATING ORAL
Qty: 30 TAB | Refills: 0 | Status: SHIPPED | OUTPATIENT
Start: 2020-05-07 | End: 2021-04-20 | Stop reason: ALTCHOICE

## 2020-05-07 RX ORDER — DEXTROSE MONOHYDRATE 100 MG/ML
0-250 INJECTION, SOLUTION INTRAVENOUS AS NEEDED
Status: DISCONTINUED | OUTPATIENT
Start: 2020-05-07 | End: 2020-05-09 | Stop reason: HOSPADM

## 2020-05-07 RX ORDER — ONDANSETRON 2 MG/ML
4 INJECTION INTRAMUSCULAR; INTRAVENOUS AS NEEDED
Status: DISCONTINUED | OUTPATIENT
Start: 2020-05-07 | End: 2020-05-07 | Stop reason: HOSPADM

## 2020-05-07 RX ORDER — NALOXONE HYDROCHLORIDE 0.4 MG/ML
0.4 INJECTION, SOLUTION INTRAMUSCULAR; INTRAVENOUS; SUBCUTANEOUS AS NEEDED
Status: DISCONTINUED | OUTPATIENT
Start: 2020-05-07 | End: 2020-05-09 | Stop reason: HOSPADM

## 2020-05-07 RX ORDER — ASCORBIC ACID 500 MG
1000 TABLET ORAL DAILY
Status: DISCONTINUED | OUTPATIENT
Start: 2020-05-08 | End: 2020-05-09 | Stop reason: HOSPADM

## 2020-05-07 RX ORDER — SODIUM CHLORIDE 0.9 % (FLUSH) 0.9 %
5-40 SYRINGE (ML) INJECTION EVERY 8 HOURS
Status: DISCONTINUED | OUTPATIENT
Start: 2020-05-07 | End: 2020-05-09 | Stop reason: HOSPADM

## 2020-05-07 RX ORDER — METFORMIN HYDROCHLORIDE 500 MG/1
500 TABLET ORAL 2 TIMES DAILY WITH MEALS
Status: DISCONTINUED | OUTPATIENT
Start: 2020-05-07 | End: 2020-05-09 | Stop reason: HOSPADM

## 2020-05-07 RX ORDER — ACETAMINOPHEN 500 MG
500-1000 TABLET ORAL
Qty: 60 TAB | Refills: 0 | Status: SHIPPED | OUTPATIENT
Start: 2020-05-07 | End: 2022-07-05

## 2020-05-07 RX ORDER — FAMOTIDINE 20 MG/1
20 TABLET, FILM COATED ORAL 2 TIMES DAILY
Status: DISCONTINUED | OUTPATIENT
Start: 2020-05-07 | End: 2020-05-09 | Stop reason: HOSPADM

## 2020-05-07 RX ORDER — ROPIVACAINE HYDROCHLORIDE 2 MG/ML
INJECTION, SOLUTION EPIDURAL; INFILTRATION; PERINEURAL AS NEEDED
Status: DISCONTINUED | OUTPATIENT
Start: 2020-05-07 | End: 2020-05-07 | Stop reason: HOSPADM

## 2020-05-07 RX ORDER — SODIUM CHLORIDE, SODIUM LACTATE, POTASSIUM CHLORIDE, CALCIUM CHLORIDE 600; 310; 30; 20 MG/100ML; MG/100ML; MG/100ML; MG/100ML
150 INJECTION, SOLUTION INTRAVENOUS CONTINUOUS
Status: DISCONTINUED | OUTPATIENT
Start: 2020-05-07 | End: 2020-05-07 | Stop reason: HOSPADM

## 2020-05-07 RX ORDER — SODIUM CHLORIDE 9 MG/ML
125 INJECTION, SOLUTION INTRAVENOUS CONTINUOUS
Status: DISPENSED | OUTPATIENT
Start: 2020-05-07 | End: 2020-05-08

## 2020-05-07 RX ORDER — FACIAL-BODY WIPES
10 EACH TOPICAL DAILY PRN
Status: DISCONTINUED | OUTPATIENT
Start: 2020-05-09 | End: 2020-05-09 | Stop reason: HOSPADM

## 2020-05-07 RX ORDER — GABAPENTIN 100 MG/1
100 CAPSULE ORAL
Status: DISCONTINUED | OUTPATIENT
Start: 2020-05-08 | End: 2020-05-09 | Stop reason: HOSPADM

## 2020-05-07 RX ORDER — PROPOFOL 10 MG/ML
INJECTION, EMULSION INTRAVENOUS
Status: DISCONTINUED | OUTPATIENT
Start: 2020-05-07 | End: 2020-05-07 | Stop reason: HOSPADM

## 2020-05-07 RX ORDER — LIDOCAINE HYDROCHLORIDE 20 MG/ML
INJECTION, SOLUTION INFILTRATION; PERINEURAL AS NEEDED
Status: DISCONTINUED | OUTPATIENT
Start: 2020-05-07 | End: 2020-05-07 | Stop reason: HOSPADM

## 2020-05-07 RX ORDER — CELECOXIB 100 MG/1
200 CAPSULE ORAL 2 TIMES DAILY
Status: DISCONTINUED | OUTPATIENT
Start: 2020-05-07 | End: 2020-05-09 | Stop reason: HOSPADM

## 2020-05-07 RX ORDER — PREGABALIN 75 MG/1
75 CAPSULE ORAL ONCE
Status: COMPLETED | OUTPATIENT
Start: 2020-05-07 | End: 2020-05-07

## 2020-05-07 RX ORDER — LIDOCAINE HYDROCHLORIDE 10 MG/ML
0.1 INJECTION, SOLUTION EPIDURAL; INFILTRATION; INTRACAUDAL; PERINEURAL AS NEEDED
Status: DISCONTINUED | OUTPATIENT
Start: 2020-05-07 | End: 2020-05-07 | Stop reason: HOSPADM

## 2020-05-07 RX ORDER — SODIUM CHLORIDE, SODIUM LACTATE, POTASSIUM CHLORIDE, CALCIUM CHLORIDE 600; 310; 30; 20 MG/100ML; MG/100ML; MG/100ML; MG/100ML
125 INJECTION, SOLUTION INTRAVENOUS CONTINUOUS
Status: DISCONTINUED | OUTPATIENT
Start: 2020-05-07 | End: 2020-05-07 | Stop reason: HOSPADM

## 2020-05-07 RX ORDER — DIPHENHYDRAMINE HYDROCHLORIDE 50 MG/ML
12.5 INJECTION, SOLUTION INTRAMUSCULAR; INTRAVENOUS
Status: ACTIVE | OUTPATIENT
Start: 2020-05-07 | End: 2020-05-08

## 2020-05-07 RX ORDER — TAMSULOSIN HYDROCHLORIDE 0.4 MG/1
0.8 CAPSULE ORAL
Status: DISCONTINUED | OUTPATIENT
Start: 2020-05-07 | End: 2020-05-09 | Stop reason: HOSPADM

## 2020-05-07 RX ORDER — ALBUTEROL SULFATE 0.83 MG/ML
2.5 SOLUTION RESPIRATORY (INHALATION) AS NEEDED
Status: DISCONTINUED | OUTPATIENT
Start: 2020-05-07 | End: 2020-05-07 | Stop reason: HOSPADM

## 2020-05-07 RX ORDER — AMLODIPINE BESYLATE 5 MG/1
5 TABLET ORAL DAILY
Status: DISCONTINUED | OUTPATIENT
Start: 2020-05-08 | End: 2020-05-09 | Stop reason: HOSPADM

## 2020-05-07 RX ORDER — ASPIRIN 81 MG/1
81 TABLET ORAL 2 TIMES DAILY
Qty: 60 TAB | Refills: 0 | Status: ON HOLD | OUTPATIENT
Start: 2020-05-07 | End: 2021-11-09 | Stop reason: SDUPTHER

## 2020-05-07 RX ORDER — OXYCODONE HYDROCHLORIDE 5 MG/1
5 TABLET ORAL
Qty: 42 TAB | Refills: 0 | Status: SHIPPED | OUTPATIENT
Start: 2020-05-07 | End: 2020-05-14

## 2020-05-07 RX ORDER — HYDROMORPHONE HYDROCHLORIDE 1 MG/ML
0.5 INJECTION, SOLUTION INTRAMUSCULAR; INTRAVENOUS; SUBCUTANEOUS
Status: ACTIVE | OUTPATIENT
Start: 2020-05-07 | End: 2020-05-08

## 2020-05-07 RX ORDER — DIPHENHYDRAMINE HYDROCHLORIDE 50 MG/ML
12.5 INJECTION, SOLUTION INTRAMUSCULAR; INTRAVENOUS AS NEEDED
Status: DISCONTINUED | OUTPATIENT
Start: 2020-05-07 | End: 2020-05-07 | Stop reason: HOSPADM

## 2020-05-07 RX ADMIN — HUMAN INSULIN 2 UNITS: 100 INJECTION, SOLUTION SUBCUTANEOUS at 16:54

## 2020-05-07 RX ADMIN — Medication 10 ML: at 22:42

## 2020-05-07 RX ADMIN — TRANEXAMIC ACID 1 G: 100 INJECTION, SOLUTION INTRAVENOUS at 10:53

## 2020-05-07 RX ADMIN — PREGABALIN 75 MG: 75 CAPSULE ORAL at 09:57

## 2020-05-07 RX ADMIN — PROPOFOL 50 MCG/KG/MIN: 10 INJECTION, EMULSION INTRAVENOUS at 10:51

## 2020-05-07 RX ADMIN — CEFAZOLIN 2 G: 1 INJECTION, POWDER, FOR SOLUTION INTRAMUSCULAR; INTRAVENOUS at 16:55

## 2020-05-07 RX ADMIN — CELECOXIB 200 MG: 100 CAPSULE ORAL at 17:59

## 2020-05-07 RX ADMIN — MIDAZOLAM HYDROCHLORIDE 2 MG: 2 INJECTION, SOLUTION INTRAMUSCULAR; INTRAVENOUS at 10:05

## 2020-05-07 RX ADMIN — SODIUM CHLORIDE, SODIUM LACTATE, POTASSIUM CHLORIDE, AND CALCIUM CHLORIDE: 600; 310; 30; 20 INJECTION, SOLUTION INTRAVENOUS at 10:45

## 2020-05-07 RX ADMIN — TRANEXAMIC ACID 1 G: 100 INJECTION, SOLUTION INTRAVENOUS at 13:29

## 2020-05-07 RX ADMIN — CEFAZOLIN SODIUM 2 G: 1 POWDER, FOR SOLUTION INTRAMUSCULAR; INTRAVENOUS at 10:54

## 2020-05-07 RX ADMIN — SODIUM CHLORIDE 125 ML/HR: 900 INJECTION, SOLUTION INTRAVENOUS at 16:52

## 2020-05-07 RX ADMIN — METFORMIN HYDROCHLORIDE 500 MG: 500 TABLET ORAL at 16:54

## 2020-05-07 RX ADMIN — ROPIVACAINE HYDROCHLORIDE 20 ML: 2 INJECTION, SOLUTION EPIDURAL; INFILTRATION; PERINEURAL at 10:12

## 2020-05-07 RX ADMIN — ACETAMINOPHEN 650 MG: 325 TABLET ORAL at 17:59

## 2020-05-07 RX ADMIN — DOCUSATE SODIUM 50MG AND SENNOSIDES 8.6MG 1 TABLET: 8.6; 5 TABLET, FILM COATED ORAL at 17:59

## 2020-05-07 RX ADMIN — LABETALOL HYDROCHLORIDE 10 MG: 5 INJECTION INTRAVENOUS at 11:26

## 2020-05-07 RX ADMIN — FENTANYL CITRATE 50 MCG: 0.05 INJECTION, SOLUTION INTRAMUSCULAR; INTRAVENOUS at 10:14

## 2020-05-07 RX ADMIN — Medication 10 ML: at 17:05

## 2020-05-07 RX ADMIN — ASPIRIN 81 MG: 81 TABLET, COATED ORAL at 17:58

## 2020-05-07 RX ADMIN — FAMOTIDINE 20 MG: 20 TABLET, FILM COATED ORAL at 17:58

## 2020-05-07 RX ADMIN — OXYCODONE 5 MG: 5 TABLET ORAL at 16:54

## 2020-05-07 RX ADMIN — BUPIVACAINE HYDROCHLORIDE 2.4 ML: 5 INJECTION, SOLUTION EPIDURAL; INTRACAUDAL; PERINEURAL at 10:22

## 2020-05-07 RX ADMIN — SODIUM CHLORIDE, SODIUM LACTATE, POTASSIUM CHLORIDE, AND CALCIUM CHLORIDE: 600; 310; 30; 20 INJECTION, SOLUTION INTRAVENOUS at 13:09

## 2020-05-07 RX ADMIN — MIDAZOLAM HYDROCHLORIDE 2 MG: 2 INJECTION, SOLUTION INTRAMUSCULAR; INTRAVENOUS at 10:45

## 2020-05-07 RX ADMIN — TAMSULOSIN HYDROCHLORIDE 0.8 MG: 0.4 CAPSULE ORAL at 16:54

## 2020-05-07 RX ADMIN — SODIUM CHLORIDE, SODIUM LACTATE, POTASSIUM CHLORIDE, AND CALCIUM CHLORIDE 150 ML/HR: 600; 310; 30; 20 INJECTION, SOLUTION INTRAVENOUS at 09:53

## 2020-05-07 RX ADMIN — ROPIVACAINE HYDROCHLORIDE 20 ML: 2 INJECTION, SOLUTION EPIDURAL; INFILTRATION; PERINEURAL at 10:09

## 2020-05-07 RX ADMIN — GABAPENTIN 300 MG: 300 CAPSULE ORAL at 22:40

## 2020-05-07 RX ADMIN — LIDOCAINE HYDROCHLORIDE 40 MG: 20 INJECTION, SOLUTION INFILTRATION; PERINEURAL at 10:45

## 2020-05-07 RX ADMIN — FENTANYL CITRATE 50 MCG: 0.05 INJECTION, SOLUTION INTRAMUSCULAR; INTRAVENOUS at 10:05

## 2020-05-07 RX ADMIN — SODIUM CHLORIDE, SODIUM LACTATE, POTASSIUM CHLORIDE, AND CALCIUM CHLORIDE: 600; 310; 30; 20 INJECTION, SOLUTION INTRAVENOUS at 10:32

## 2020-05-07 RX ADMIN — ACETAMINOPHEN 975 MG: 325 TABLET ORAL at 09:57

## 2020-05-07 RX ADMIN — ATORVASTATIN CALCIUM 20 MG: 20 TABLET, FILM COATED ORAL at 22:40

## 2020-05-07 NOTE — PROGRESS NOTES
TRANSFER - IN REPORT:    Verbal report received from [facility] RN(name) on Carolin Caller  being received from ASU[facility]PACU(unit) for routine post - op      Report consisted of patients Situation, Background, Assessment and   Recommendations(SBAR). Information from the following report(s) SBAR, Kardex, OR Summary, Procedure Summary, Intake/Output, MAR and Recent Results was reviewed with the receiving nurse. Opportunity for questions and clarification was provided. Assessment completed upon patients arrival to unit and care assumed. Dual skin performed with [facility], RN.

## 2020-05-07 NOTE — PROGRESS NOTES
Verbal shift change report given to [address] [place] [address] [place], RN (oncoming nurse) by RG Ashby (offgoing nurse). Report included the following information SBAR, Kardex, OR Summary, Intake/Output and MAR.

## 2020-05-07 NOTE — ANESTHESIA PROCEDURE NOTES
Peripheral Block    Start time: 5/7/2020 10:05 AM  End time: 5/7/2020 10:12 AM  Performed by: Carol Alonzo MD  Authorized by: Carol Alonzo MD       Pre-procedure:    Indications: at surgeon's request and post-op pain management    Preanesthetic Checklist: patient identified, risks and benefits discussed, site marked, timeout performed, anesthesia consent given and patient being monitored    Timeout Time: 10:05          Block Type:   Block Type:  Popliteal and femoral single shot  Laterality:  Left  Monitoring:  Continuous pulse ox, frequent vital sign checks, heart rate and responsive to questions  Injection Technique:  Single shot  Procedures: ultrasound guided and nerve stimulator    Patient Position: supine  Prep: chlorhexidine    Location:  Upper thigh  Needle Type:  Stimuplex  Needle Gauge:  22 G  Needle Localization:  Nerve stimulator and ultrasound guidance    Assessment:  Number of attempts:  1  Injection Assessment:  Incremental injection every 5 mL, local visualized surrounding nerve on ultrasound, negative aspiration for blood, no paresthesia and no intravascular symptoms  Patient tolerance:  Patient tolerated the procedure well with no immediate complications  Popliteal block done under ultrasound guidance and nerve stimulation with incremental injection of Ropivacaine 0.2% 20 cc using a 21 G Stimuplex needle

## 2020-05-07 NOTE — ANESTHESIA PREPROCEDURE EVALUATION
Relevant Problems   No relevant active problems       Anesthetic History   No history of anesthetic complications            Review of Systems / Medical History  Patient summary reviewed, nursing notes reviewed and pertinent labs reviewed    Pulmonary  Within defined limits                 Neuro/Psych   Within defined limits           Cardiovascular    Hypertension                   GI/Hepatic/Renal           Liver disease (fatty liver)     Endo/Other    Diabetes    Obesity and arthritis     Other Findings                   Anesthetic Plan    ASA: 3  Anesthesia type: spinal            Anesthetic plan and risks discussed with: Patient

## 2020-05-07 NOTE — ANESTHESIA PROCEDURE NOTES
Spinal Block    Start time: 5/7/2020 10:14 AM  End time: 5/7/2020 10:24 AM  Performed by: Octavio Aragon MD  Authorized by: Sandra Keane MD     Pre-procedure:   Indications: at surgeon's request and primary anesthetic  Preanesthetic Checklist: patient identified, risks and benefits discussed, anesthesia consent, site marked, patient being monitored and timeout performed    Timeout Time: 10:14          Spinal Block:   Patient Position:  Seated  Prep Region:  Lumbar  Prep: DuraPrep      Location:  L3-4  Technique:  Single shot        Needle:   Needle Type:  Quincke  Needle Gauge:  22 G  Attempts:  2      Events: CSF confirmed, no blood with aspiration and no paresthesia        Assessment:  Insertion:  Uncomplicated  Patient tolerance:  Patient tolerated the procedure well with no immediate complications

## 2020-05-07 NOTE — PROGRESS NOTES
Problem: Mobility Impaired (Adult and Pediatric)  Goal: *Acute Goals and Plan of Care (Insert Text)  Description: FUNCTIONAL STATUS PRIOR TO ADMISSION: Patient was independent and active without use of DME.    HOME SUPPORT PRIOR TO ADMISSION: The patient lived with wife but did not require assist.    Physical Therapy Goals  Initiated 5/7/2020    1. Patient will scoot up and down in bed with independence within 4 days. 2. Patient will perform sit to stand with modified independence within 4 days. 3. Patient will ambulate with modified independence for 100 feet with the least restrictive device within 4 days. 4. Patient will ascend/descend 4 stairs with 1 handrail(s) with minimal assistance/contact guard assist within 4 days. 5. Patient will perform home exercise program per protocol with independence within 4 days. 6. Patient will demonstrate AROM 0-90 degrees in operative joint within 4 days. Outcome: Progressing Towards Goal   PHYSICAL THERAPY EVALUATION  Patient: Abiodun Fernando (1 y.o. 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 Patient is a bit impulsive and needs cues for safety. Wife present. Patient has all needed DME. He will benefit from OP PT upon discharge. Current Level of Function Impacting Discharge (mobility/balance): Educated patient regarding bed exercises and limit of flexion to 90 degrees on operative knee.  He was able to stand and ambulate 6 feet, then used urinal in standing with +2 min assist. Patient needs cues for safety. Vitals stable. Functional Outcome Measure: The patient scored 50/100 on the Barthel outcome measure. Other factors to consider for discharge: multiple knee surgeries     Patient will benefit from skilled therapy intervention to address the above noted impairments. PLAN :  Recommendations and Planned Interventions: bed mobility training, transfer training, gait training, and therapeutic exercises      Frequency/Duration: Patient will be followed by physical therapy:  twice daily to address goals. Recommendation for discharge: (in order for the patient to meet his/her long term goals)  Outpatient physical therapy follow up recommended for TKA     This discharge recommendation:  Has not yet been discussed the attending provider and/or case management    IF patient discharges home will need the following DME: patient owns DME required for discharge         SUBJECTIVE:   Patient stated I have been treated better at this hospital than any other I have been at.     OBJECTIVE DATA SUMMARY:   HISTORY:    Past Medical History:   Diagnosis Date    Atherosclerosis 02/11/2020    Diabetes ([facility] [address]     Fatty liver determined by biopsy 08/2019    Hypertension     Insomnia     Kidney stone 2019    Obesity, Class I, BMI 30-34.9     Urinary frequency     Nightime- started Tamsulosin     Past Surgical History:   Procedure Laterality Date    HX COLONOSCOPY      [facility]    HX KNEE REPLACEMENT Bilateral 2000 & 2013    HX KNEE REPLACEMENT Right 2017    Revision    HX LITHOTRIPSY  2019       Personal factors and/or comorbidities impacting plan of care: none    Home Situation  Rails to Enter: Yes  Current DME Used/Available at Home: Walker, rolling, Cane, quad, Grab bars    EXAMINATION/PRESENTATION/DECISION MAKING:   Critical Behavior:  Neurologic State: Alert  Orientation Level: Oriented X4     Safety/Judgement: Good awareness of safety precautions       Skin:  not fully observed    Range Of Motion:  AROM: Within functional limits        LLE AROM  L Knee Flexion: 70  L Knee Extension: 5              Strength:    Strength: Within functional limits(slightly less to LLE due to surgery)                    Tone & Sensation:   Tone: Normal              Sensation: Intact                Functional Mobility:  Bed Mobility:     Supine to Sit: Stand-by assistance  Sit to Supine: Contact guard assistance  Scooting: Stand-by assistance  Transfers:  Sit to Stand: Assist x2;Minimum assistance  Stand to Sit: Assist x2;Minimum assistance                       Balance:   Sitting: Intact  Standing: Intact; With support  Ambulation/Gait Training:  Distance (ft): 6 Feet (ft)  Assistive Device: Gait belt;Walker, rolling  Ambulation - Level of Assistance: Assist x2;Minimal assistance        Gait Abnormalities: Step to gait     Left Side Weight Bearing: As tolerated                                           Therapeutic Exercises: Ankle  pumps, quad and heel sets, heel slides, SLR    Functional Measure:  Barthel Index:    Bathin  Bladder: 10  Bowels: 10  Groomin  Dressin  Feeding: 10  Mobility: 0  Stairs: 0  Toilet Use: 5  Transfer (Bed to Chair and Back): 5  Total: 50/100       The Barthel ADL Index: Guidelines  1. The index should be used as a record of what a patient does, not as a record of what a patient could do. 2. The main aim is to establish degree of independence from any help, physical or verbal, however minor and for whatever reason. 3. The need for supervision renders the patient not independent. 4. A patient's performance should be established using the best available evidence. Asking the patient, friends/relatives and nurses are the usual sources, but direct observation and common sense are also important. However direct testing is not needed.   5. Usually the patient's performance over the preceding 24-48 hours is important, but occasionally longer periods will be relevant. 6. Middle categories imply that the patient supplies over 50 per cent of the effort. 7. Use of aids to be independent is allowed. Conni Cool., Barthel, D.W. (6368). Functional evaluation: the Barthel Index. [address] (14)2. MARCE Armando, Vonnie Hughes., Cristhian Rueda., Pete Tarango, [address] [facility] (). Measuring the change indisability after inpatient rehabilitation; comparison of the responsiveness of the Barthel Index and Functional [place] Measure. Journal of Neurology, Neurosurgery, and Psychiatry, 66(4), 839-588. Page Sorto, N.J.A, BABAK Cline, & Garrison Schmidt MCANDICE. (2004.) Assessment of post-stroke quality of life in cost-effectiveness studies: The usefulness of the Barthel Index and the EuroQoL-5D. Quality of Life Research, 15, 488-61           Physical Therapy Evaluation Charge Determination   History Examination Presentation Decision-Making   MEDIUM  Complexity : 1-2 comorbidities / personal factors will impact the outcome/ POC  LOW Complexity : 1-2 Standardized tests and measures addressing body structure, function, activity limitation and / or participation in recreation  LOW Complexity : Stable, uncomplicated  Other outcome measures Barthel  LOW       Based on the above components, the patient evaluation is determined to be of the following complexity level: LOW     Pain Ratin/10    Activity Tolerance:   Good  Please refer to the flowsheet for vital signs taken during this treatment. After treatment patient left in no apparent distress:   Supine in bed, Call bell within reach, Bed / chair alarm activated, Caregiver / family present, and Side rails x 3    COMMUNICATION/EDUCATION:   The patients plan of care was discussed with: Registered nurse. Fall prevention education was provided and the patient/caregiver indicated understanding. and Patient/family agree to work toward stated goals and plan of care.     Thank you for this referral.  Jammie Arguello, PT Time Calculation: 25 mins

## 2020-05-07 NOTE — OP NOTES
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, Z96.659]    Date of Procedure: 5/7/2020   Preoperative Diagnosis: HX OF LEFT KNEE REPLACEMENT, ACUTE PAIN OF LEFT KNEE  Postoperative Diagnosis: X OF LEFT KNEE REPLACEMENT, ACUTE PAIN OF LEFT KNEE    Procedure: Procedure(s):  REVISION LEFT TOTAL KNEE ARTHROPLASTY  Surgeon: Leonarda Delgado MD  Assistant(s): Enmanuel Bell PA-C  Anesthesia: Regional   Estimated Blood Loss: 300cc  Specimens:   ID Type Source Tests Collected by Time Destination   1 : left knee synovial fluid Joint Fluid Joint, Knee AEROBIC/ANAEROBIC CULTURE Malia Martinez MD 5/7/2020 1118 Microbiology   2 : patella Bone Knee, left AEROBIC/ANAEROBIC CULTURE Malia Martinez MD 5/7/2020 1131 Microbiology   3 : left tibial canal Bone  AEROBIC/ANAEROBIC CULTURE Malia Martinez MD 5/7/2020 1150 Microbiology   4 : Left tibial canal #2 Bone  AEROBIC/ANAEROBIC CULTURE Malia Martinez MD 5/7/2020 1201 Microbiology      Complications: None          INDICATIONS:     The patient is a 66 y.o., male who has a 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 The patient underwent the appropriate preoperative labs and was indicated for surgery. Informed consent obtained including a discussion of the risks and benefits, which include, but are not limited to, bleeding, infection, neurovascular damage, wound complications, pain and stiffness in the knee, periprosthetic loosening, fracture dislocation and DVT, the patient consented for the procedure. DESCRIPTION OF PROCEDURE:     The proper limb was identified and signed in the preoperative holding area. All questions were answered. After adequate anesthesia, the left lower extremity was prepped and draped in sterile fashion.  Examination of the knee under anesthesia demonstrated some medial JSW around 4mm with valgus stress applied. The patient was positioned supine on the operating room table. Using the old incision a mid-line parapatellar incision was used along with medial arthrotomy. Excess or residual scar was excised if non-viable. The medial and lateral gutters were debrided of scar and tissue. Soft tissue was removed from around the patellar component and notch. The MCL was mobilized and the tibia subluxed. The tibial poly-ethylene was removed. Osteotomes and a saw were used to disrupt the tibial baseplate which was removed. The tibial implant was loose medially and stable laterally. The free tibial guide was used to make the final tibial cut with the appropriate coronal angle and slope. The tibia was then prepared with the keel and reamers. The cement bone interface around the femoral component was disrupted with an osteotome and saw. The femoral component was then removed. The femoral component was stable. Using osteotomes and a high-speed sasha the cement was removed from the residual tibia and femur. Final debridement of all devitalized tissue was performed. Using sequential reaming the canal for the femur were reamed to the final stem size, obtaining good overall cortical chatter. A trial for the tibia was assembled and placed. The femoral trial cutting guide was assembled to the stem and set at the level of the medial epicondyle and pinned. Rotation was set based on the tibial with a trial in place. Clean up cuts were made as needed for distal and posterior augments. The femoral trial was assembled and the appropriate polyethylene was selected to balance both the flexion and extension gaps. Soft tissue balancing was done as needed. There was good overall balance to both the flexion and extension gaps. Final components were selected and assembled. All bony surfaces were washed and the cement was applied first to the tibia which was allowed to harden and then to the femur.  The stems of the components were cemented. The polyethylene was impacted in place and the knee brought out into full extension. Cement was removed and the knee ranged to verify alignment and tracking. The capsulotomy was closed w/ 0-vicryl suture and #2 Q-will suture. The sub-cutaneous tissue was closed with a 2-0 Vicryl and sutures. A sterile dressing was applied. The patient was awoken from anesthesia and taken to the recovery room in a stable condiition. OPERATIVE FINDINGS : Loose tibial implant. IMPLANTS :   Implant Name Type Inv.  Item Serial No.  Lot No. LRB No. Used Action   BASEPLT TIB REV UNIV SZ 6 R/L -- TRIATHLON - SNA  BASEPLT TIB REV UNIV SZ 6 R/L -- TRIATHLON NA [employer] HOW [id number] Left 1 Implanted   KNEE STEM TRICEMENTED [id number] --  - SNA  KNEE STEM TRICEMENTED [id number] --  NA [employer] HOW [id number] Left 1 Implanted   TRIATHLON STEM EXTENDER   NA [employer] [id number] Left 1 Implanted   CEMENT BNE SIMPLEX W/GENT -- 10/PK - SNA  CEMENT BNE SIMPLEX W/GENT -- 10/PK NA [employer] HOWM [id number] Left 4 Implanted   STEM [id number] -- TRIATHALON - SNA  STEM [id number] -- TRIATHALON NA [employer] HOW [id number] Left 1 Implanted   AUG FEM DSTL SZ 6 5MM LT COCR -- TRIATHLON - SNA  AUG FEM DSTL SZ 6 5MM LT COCR -- TRIATHLON NA [employer] HOWM [id number] Left 1 Implanted   AUG FEM DIST LT SZ 6X10MM --  - SNA  AUG FEM DIST LT SZ 6X10MM --  NA [employer] HOWM [id number] Left 1 Implanted   COMPNT FEM TRIATHLON SZ6 LFT --  - SNA  COMPNT FEM TRIATHLON SZ6 LFT --  NA [employer] HOWM [id number] Left 1 Implanted   INSERT TIB NBR 6 TS PLUS 16MM --  - SNA  INSERT TIB NBR 6 TS PLUS 16MM --  NA [employer] HOWM [id number] Left 1 Implanted       POST OPERATIVE CONSIDERATIONS :   none    JUSTIFICATION FOR SURGICAL ASSISTANT:   Surgical Assistant, was requried and necessary in this case, to help with soft tissue retraction, extremity positioning, equiment management, implant management, and wound closure.     Lisa Lutz MD

## 2020-05-07 NOTE — ANESTHESIA POSTPROCEDURE EVALUATION
Procedure(s):  REVISION LEFT TOTAL KNEE ARTHROPLASTY. spinal, MAC, regional    Anesthesia Post Evaluation      Multimodal analgesia: multimodal analgesia not used between 6 hours prior to anesthesia start to PACU discharge  Patient location during evaluation: PACU  Patient participation: complete - patient participated  Level of consciousness: awake  Pain management: adequate  Airway patency: patent  Anesthetic complications: no  Cardiovascular status: acceptable, blood pressure returned to baseline and hemodynamically stable  Respiratory status: acceptable  Hydration status: acceptable  Post anesthesia nausea and vomiting:  controlled      Vitals Value Taken Time   /100 5/7/2020  2:35 PM   Temp     Pulse 55 5/7/2020  2:37 PM   Resp 21 5/7/2020  2:37 PM   SpO2 99 % 5/7/2020  2:37 PM   Vitals shown include unvalidated device data.

## 2020-05-08 LAB
ANION GAP SERPL CALC-SCNC: 5 MMOL/L (ref 5–15)
BUN SERPL-MCNC: 21 MG/DL (ref 6–20)
BUN/CREAT SERPL: 18 (ref 12–20)
CALCIUM SERPL-MCNC: 7.6 MG/DL (ref 8.5–10.1)
CHLORIDE SERPL-SCNC: 111 MMOL/L (ref 97–108)
CO2 SERPL-SCNC: 26 MMOL/L (ref 21–32)
CREAT SERPL-MCNC: 1.15 MG/DL (ref 0.7–1.3)
GLUCOSE BLD STRIP.AUTO-MCNC: 112 MG/DL (ref 65–100)
GLUCOSE BLD STRIP.AUTO-MCNC: 112 MG/DL (ref 65–100)
GLUCOSE BLD STRIP.AUTO-MCNC: 138 MG/DL (ref 65–100)
GLUCOSE BLD STRIP.AUTO-MCNC: 172 MG/DL (ref 65–100)
GLUCOSE SERPL-MCNC: 106 MG/DL (ref 65–100)
HGB BLD-MCNC: 10.1 G/DL (ref 12.1–17)
POTASSIUM SERPL-SCNC: 4.2 MMOL/L (ref 3.5–5.1)
SERVICE CMNT-IMP: ABNORMAL
SODIUM SERPL-SCNC: 142 MMOL/L (ref 136–145)

## 2020-05-08 PROCEDURE — 97116 GAIT TRAINING THERAPY: CPT

## 2020-05-08 PROCEDURE — 97535 SELF CARE MNGMENT TRAINING: CPT

## 2020-05-08 PROCEDURE — 36415 COLL VENOUS BLD VENIPUNCTURE: CPT

## 2020-05-08 PROCEDURE — 74011000250 HC RX REV CODE- 250: Performed by: PHYSICIAN ASSISTANT

## 2020-05-08 PROCEDURE — 82962 GLUCOSE BLOOD TEST: CPT

## 2020-05-08 PROCEDURE — 80048 BASIC METABOLIC PNL TOTAL CA: CPT

## 2020-05-08 PROCEDURE — 94760 N-INVAS EAR/PLS OXIMETRY 1: CPT

## 2020-05-08 PROCEDURE — 74011636637 HC RX REV CODE- 636/637: Performed by: PHYSICIAN ASSISTANT

## 2020-05-08 PROCEDURE — 74011250637 HC RX REV CODE- 250/637: Performed by: PHYSICIAN ASSISTANT

## 2020-05-08 PROCEDURE — 97530 THERAPEUTIC ACTIVITIES: CPT

## 2020-05-08 PROCEDURE — 85018 HEMOGLOBIN: CPT

## 2020-05-08 PROCEDURE — 97165 OT EVAL LOW COMPLEX 30 MIN: CPT

## 2020-05-08 PROCEDURE — 65270000029 HC RM PRIVATE

## 2020-05-08 PROCEDURE — 74011250636 HC RX REV CODE- 250/636: Performed by: PHYSICIAN ASSISTANT

## 2020-05-08 PROCEDURE — 97110 THERAPEUTIC EXERCISES: CPT

## 2020-05-08 RX ADMIN — ACETAMINOPHEN 650 MG: 325 TABLET ORAL at 12:15

## 2020-05-08 RX ADMIN — ASPIRIN 81 MG: 81 TABLET, COATED ORAL at 09:02

## 2020-05-08 RX ADMIN — METFORMIN HYDROCHLORIDE 500 MG: 500 TABLET ORAL at 17:31

## 2020-05-08 RX ADMIN — Medication 10 ML: at 12:16

## 2020-05-08 RX ADMIN — OXYCODONE 5 MG: 5 TABLET ORAL at 22:46

## 2020-05-08 RX ADMIN — ATORVASTATIN CALCIUM 20 MG: 20 TABLET, FILM COATED ORAL at 22:46

## 2020-05-08 RX ADMIN — ACETAMINOPHEN 650 MG: 325 TABLET ORAL at 00:39

## 2020-05-08 RX ADMIN — FAMOTIDINE 20 MG: 20 TABLET, FILM COATED ORAL at 17:32

## 2020-05-08 RX ADMIN — ASPIRIN 81 MG: 81 TABLET, COATED ORAL at 17:33

## 2020-05-08 RX ADMIN — DOCUSATE SODIUM 50MG AND SENNOSIDES 8.6MG 1 TABLET: 8.6; 5 TABLET, FILM COATED ORAL at 09:01

## 2020-05-08 RX ADMIN — GABAPENTIN 100 MG: 100 CAPSULE ORAL at 09:03

## 2020-05-08 RX ADMIN — TAMSULOSIN HYDROCHLORIDE 0.8 MG: 0.4 CAPSULE ORAL at 17:33

## 2020-05-08 RX ADMIN — ACETAMINOPHEN 650 MG: 325 TABLET ORAL at 23:42

## 2020-05-08 RX ADMIN — AMLODIPINE BESYLATE 5 MG: 5 TABLET ORAL at 09:02

## 2020-05-08 RX ADMIN — FAMOTIDINE 20 MG: 20 TABLET, FILM COATED ORAL at 09:02

## 2020-05-08 RX ADMIN — DOCUSATE SODIUM 50MG AND SENNOSIDES 8.6MG 1 TABLET: 8.6; 5 TABLET, FILM COATED ORAL at 17:32

## 2020-05-08 RX ADMIN — ACETAMINOPHEN 650 MG: 325 TABLET ORAL at 06:17

## 2020-05-08 RX ADMIN — CELECOXIB 200 MG: 100 CAPSULE ORAL at 17:32

## 2020-05-08 RX ADMIN — ACETAMINOPHEN 650 MG: 325 TABLET ORAL at 17:31

## 2020-05-08 RX ADMIN — GABAPENTIN 300 MG: 300 CAPSULE ORAL at 22:00

## 2020-05-08 RX ADMIN — HUMAN INSULIN 2 UNITS: 100 INJECTION, SOLUTION SUBCUTANEOUS at 12:15

## 2020-05-08 RX ADMIN — Medication 10 ML: at 06:17

## 2020-05-08 RX ADMIN — CELECOXIB 200 MG: 100 CAPSULE ORAL at 09:02

## 2020-05-08 RX ADMIN — CEFAZOLIN 2 G: 1 INJECTION, POWDER, FOR SOLUTION INTRAMUSCULAR; INTRAVENOUS at 09:03

## 2020-05-08 RX ADMIN — OXYCODONE HYDROCHLORIDE AND ACETAMINOPHEN 1000 MG: 500 TABLET ORAL at 09:01

## 2020-05-08 RX ADMIN — CEFAZOLIN 2 G: 1 INJECTION, POWDER, FOR SOLUTION INTRAMUSCULAR; INTRAVENOUS at 00:40

## 2020-05-08 RX ADMIN — METFORMIN HYDROCHLORIDE 500 MG: 500 TABLET ORAL at 09:02

## 2020-05-08 RX ADMIN — POLYETHYLENE GLYCOL 3350 17 G: 17 POWDER, FOR SOLUTION ORAL at 09:01

## 2020-05-08 NOTE — PROGRESS NOTES
5/8/2020 12:24 PM Case management consult for discharge planning received. Met with pt and pt's wife at bedside. Charted address and phone numbers confirmed. Reason for Admission: Elective left total knee with Dr. Mancuso Plum:   [x]In person with pt and wife   []Via p/c with pt   []With family member in person. Who/Relation:     []With family member via p/c. Who/Relation:     RUR: 10%  Risk Level: [x]Low []Moderate []High  Value-based purchasing: [] Yes [x] No  Bundle patient: [] Yes [x] No   Specify:     Advance DirectiveAlfredo Haynes Spouse [phone]     Assessment:    Age: 66    Sex: [x] Male []Female     Residency: [x]Private residence []Apartment []Assisted Living []LTC []Other:   Exterior Steps:5  Interior Steps: 0    Lives With: [x]With spouse []Other family members []Underage children []Alone []Care provider []Other:    Prior functioning:  [x]Independent []Dependent []Partial dependence   Pt requires assistance with: []Bathing []Dressing []Toileting []Ambulation     Prior DME required:  [x]None []RW []Cane []Crutches []Bedside commode []CPAP []Home O2 (Liter/Provider: ) []Nebulizer   []Shower Chair []Wheelchair []Hospital Bed []Lelia []Stair lift []Rollator []Other:    DME available: []None [x]RW [x]Cane []Crutches [x]Bedside commode []CPAP []Home O2 (Liter/Provider: ) []Nebulizer   []Shower Chair []Wheelchair []Hospital Bed []Lelia []Stair lift []Rollator []Other:    Rehab history: [x]None []Outpatient PT []Home Health (Provider/Date: ) []SNF (Provider/Date: ) []IPR (Provider/Date: ) []LTC (Provider/Date: )    Discharge Concerns: []Yes [x]No []Unknown   Describe: Insurer: Medicare and [address]     PCP: Josemanuel Au   Name of Practice: [facility]   Current patient: [x]Yes []No   Approximate date of last visit: 3/2/2020    Pharmacy: [facility] on [address] Transport: Pt's wife        Transition of care plan:  [x] Home with Outpatient PT and outpatient follow-up   Pt aware of OP appt? [x]Yes, Provider: CM called and scheduled appt for 5/11 at [address] provider: Pt's wife  CM will follow. GUI Galvan  Care Management Interventions  PCP Verified by CM: Yes(Tasneem Paz Nikos )  Mode of Transport at Discharge:  Other (see comment)(Pt's wife)  Transition of Care Consult (CM Consult): Discharge Planning  MyChart Signup: No  Discharge Durable Medical Equipment: No  Physical Therapy Consult: Yes  Occupational Therapy Consult: Yes  Speech Therapy Consult: No  Current Support Network: Lives with Spouse  Confirm Follow Up Transport: Family  Discharge Location  Discharge Placement: Home with outpatient services

## 2020-05-08 NOTE — PROGRESS NOTES
Problem: Self Care Deficits Care Plan (Adult)  Goal: *Acute Goals and Plan of Care (Insert Text)  Description: FUNCTIONAL STATUS PRIOR TO ADMISSION: Patient was independent and active without use of DME. Patient was independent for basic and instrumental ADLs. HOME SUPPORT: The patient lived with wife but did not require assist.    Occupational Therapy Goals  Initiated 5/8/2020    1. Patient will perform lower body dressing at modified independence within 7 days. 2. Patient will perform toilet transfers at modified independence using Walkers, Type: Rolling Walker within 7 days. 3. Patient will perform all aspects of toileting at modified independence within 7 days. 4. Patient will tolerate greater than 10 minutes of standing without a rest break at modified independence using Walkers, Type: Rolling Walker during ADL's within 7 days. Outcome: Progressing Towards Goal     OCCUPATIONAL THERAPY EVALUATION  Patient: Allegra Blake (85 y.o. 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 Pt is POD 1 s/p revision L TKA and cleared to participate with therapy. Pt is alert and oriented and is receptive to all education. He talks and moves quickly and requires cues for safety and redirection throughout all tasks. Pt reports little pain at first, however after session, pt reports increase in pain. Ice pack placed at end of session and chair alarm activated.  Will continue to follow in acute setting to maximize performance, independence, and safety. Recommend discharge home with wife assist and no follow up OT needs. Current Level of Function Impacting Discharge (ADLs/self-care): overall CGA for mobility and ADLs    Functional Outcome Measure: The patient scored 55/100 on the Barthel outcome measure which is indicative of minimal impairment in ADLs and mobility. Other factors to consider for discharge: h/o knee surgeries; impulsive; minimal pain; WBAT; lives with wife who is able to assist PRN     Patient will benefit from skilled therapy intervention to address the above noted impairments. PLAN :  Recommendations and Planned Interventions: self care training, functional mobility training, therapeutic exercise, balance training, therapeutic activities, endurance activities, patient education, home safety training, and family training/education    Frequency/Duration: Patient will be followed by occupational therapy 5 times a week to address goals. Recommendation for discharge: (in order for the patient to meet his/her long term goals)  No skilled occupational therapy/ follow up rehabilitation needs identified at this time. This discharge recommendation:  Has been made in collaboration with the attending provider and/or case management    IF patient discharges home will need the following DME: patient owns DME required for discharge       SUBJECTIVE:   Patient stated You don't understand, I've been through this before and I feel great.     OBJECTIVE DATA SUMMARY:   HISTORY:   Past Medical History:   Diagnosis Date    Atherosclerosis 02/11/2020    Diabetes ([facility] [address]     Fatty liver determined by biopsy 08/2019    Hypertension     Insomnia     Kidney stone 2019    Obesity, Class I, BMI 30-34.9     Urinary frequency     Nightime- started Tamsulosin     Past Surgical History:   Procedure Laterality Date    [facility]    HX KNEE REPLACEMENT Bilateral 2000 & 2013    HX KNEE REPLACEMENT Right 2017 Revision    HX LITHOTRIPSY  2019       Expanded or extensive additional review of patient history:     Home Situation  Home Environment: Private residence  [place] [facility]: Yes  One/Two Story Residence: One story  Living Alone: No  Support Systems: Friends \ neighbors, Spouse/Significant Other/Partner  Patient Expects to be Discharged to[de-identified] Private residence  Current DME Used/Available at Home: Walker, rolling, Cane, quad, Grab bars, Shower chair  Tub or Shower Type: Tub/Shower combination    Hand dominance: Right    EXAMINATION OF PERFORMANCE DEFICITS:  Cognitive/Behavioral Status:  Neurologic State: Alert  Orientation Level: Oriented X4  Cognition: Appropriate for age attention/concentration; Appropriate decision making; Impulsive; Follows commands  Perception: Appears intact  Perseveration: No perseveration noted  Safety/Judgement: Awareness of environment; Fall prevention;Good awareness of safety precautions; Home safety; Insight into deficits    Hearing: Auditory  Auditory Impairment: None    Range of Motion:  AROM: Within functional limits(L LE limited to 90* flexion at knee per MD orders)    Strength:  Strength: Within functional limits    Coordination:  Coordination: Within functional limits  Fine Motor Skills-Upper: Left Intact; Right Intact    Gross Motor Skills-Upper: Left Intact; Right Intact    Tone & Sensation:  Tone: Normal  Sensation: Intact    Balance:  Sitting: Intact  Standing: With support    Functional Mobility and Transfers for ADLs:  Bed Mobility:  Supine to Sit: (Pt received OOB in chair)  Scooting: Stand-by assistance    Transfers:  Sit to Stand: Contact guard assistance;Assist x1  Stand to Sit: Contact guard assistance;Assist x1  Bathroom Mobility: Contact guard assistance  Toilet Transfer : Contact guard assistance;Minimum assistance(heavy grab bar use; increased assist to stand up from toilet)    ADL Assessment:  Feeding: Independent    Oral Facial Hygiene/Grooming: Contact guard assistance;Stand-by assistance(for standing tasks)    Bathing: Contact guard assistance(infer for standing bathing tasks)    Upper Body Dressing: Setup    Lower Body Dressing: Contact guard assistance(able to reach to distal LEs)    Toileting: Contact guard assistance(for standing tasks)     ADL Intervention and task modifications:    Grooming  Grooming Assistance: Stand-by assistance;Contact guard assistance  Position Performed: Standing(at sink with rolling walker)  Washing Face: Stand-by assistance  Washing Hands: Stand-by assistance  Brushing Teeth: Contact guard assistance  Cues: Verbal cues provided    Lower Body Dressing Assistance  Socks: Stand-by assistance  Leg Crossed Method Used: No  Position Performed: Seated in chair;Bending forward method  Cues: Don;Doff;Verbal cues provided    Toileting  Toileting Assistance: Contact guard assistance  Clothing Management: Contact guard assistance  Cues: Verbal cues provided    Cognitive Retraining  Safety/Judgement: Awareness of environment; Fall prevention;Good awareness of safety precautions; Home safety; Insight into deficits    Bathing: Patient instructed and indicated understanding when bathing to not submerge wound in water, stand to shower or sponge bathe, cover wound with plastic and tape to ensure no water reaches bandage/wound without cues. Instructed patient to perform shower transfer (when ready) dry for safety prior to attempting wet for safety and fall prevention. Instructed patient to have supervision from family member/significant other when performing wet shower transfer for safety. Instructed patient to use clean washcloth and towel to clean/pat dry area around incision for infection control. Patient verbalized understanding of same. Dressing joint: Patient instructed and demonstrated understanding to don/doff Left LE first/last with Stand-by assistance.   Patient instructed and demonstrated to don all clothing while sitting prior to standing, doff all clothing to knees while standing, then sit to doff clothing off from knees to feet in order to facilitate fall prevention, pain management, and energy conservation with Contact guard assistance. Dressing joint reach exercise: To increase independence with lower body dressing, patient instructed and demonstrated to reach down Left LE in a seated position slowly to prevent tearing/shearing until slight pull is felt, hold at end range for 10 seconds, then return to starting upright position with Supervision. Patient instructed to complete three sets of three repetitions each daily. Home safety: Patient instructed and indicated understanding on home modifications and safety (raise height of ADL objects, appropriate height of chair surfaces, recliner safety, change of floor surfaces, clear pathways) to increase independence and fall prevention. Standing: Patient instructed and demonstrated during ADLs to walk up to sink/counter top/surfaces, step into walker to increase safety of joint and fall prevention with Contact guard assistance. Patient educated about knee anatomy and educated to avoid rotation of Left LE. Instructed to apply concept to ADLs within the home (no twisting of knee during reaching across body, square off while using objects, slide objects along surfaces). Patient instructed and indicated understanding to increase amount of time standing, observe standing position during ADLs in order to increase even weight bearing through bilateral LEs in order to increase independence with ADLs. Goal to be reached 30 days post - op, per orthopedic surgeon or per PT. Tub/shower transfer: Patient instructed and indicated understanding regarding when it is safe to begin transfer into tub/shower (complete stairs with PT, advance exercises with PT high enough to clear tub/shower height).   Patient instructed to use the same technique as used with stairs when entering and exiting tub/shower (\"up with the non-surgical, down with the surgical leg\"). Rolling Walker Safety: Educated patient about importance of keeping hands free at all times when using rolling walker for fall prevention. Instructed patient on safe and appropriate hand placement during transfers (push up from sitting surface when standing up, reach back for sitting surface when sitting down, use grab bars, etc.), including never to pull up on rolling walker for fall prevention and safety. Instructed patient to push rolling walker up to surface (countertop, sink, etc.) and  it as opposed to abandoning rolling walker on the side for safety. Patient verbalized understanding of same. Functional Measure:  Barthel Index:    Bathin  Bladder: 10  Bowels: 10  Groomin  Dressin  Feeding: 10  Mobility: 0  Stairs: 0  Toilet Use: 5  Transfer (Bed to Chair and Back): 10  Total: 55/100        The Barthel ADL Index: Guidelines  1. The index should be used as a record of what a patient does, not as a record of what a patient could do. 2. The main aim is to establish degree of independence from any help, physical or verbal, however minor and for whatever reason. 3. The need for supervision renders the patient not independent. 4. A patient's performance should be established using the best available evidence. Asking the patient, friends/relatives and nurses are the usual sources, but direct observation and common sense are also important. However direct testing is not needed. 5. Usually the patient's performance over the preceding 24-48 hours is important, but occasionally longer periods will be relevant. 6. Middle categories imply that the patient supplies over 50 per cent of the effort. 7. Use of aids to be independent is allowed. Sujey Leonard., Barthel, D.W. (1074). Functional evaluation: the Barthel Index. [address] [facility] (14)2. Gianna Orellana primo MARCE Ram, Teto Esposito., Miriam Samson., Giorgio, [address] ().  Measuring the change indisability after inpatient rehabilitation; comparison of the responsiveness of the Barthel Index and Functional [place] Measure. Journal of Neurology, Neurosurgery, and Psychiatry, 66(4), 421-324. CAROLINE Deleon.MALU, BABAK Cline, & Mariam Calzada M.A. (2004.) Assessment of post-stroke quality of life in cost-effectiveness studies: The usefulness of the Barthel Index and the EuroQoL-5D. Quality of Life Research, 15, 178-90     Occupational Therapy Evaluation Charge Determination   History Examination Decision-Making   LOW Complexity : Brief history review  LOW Complexity : 1-3 performance deficits relating to physical, cognitive , or psychosocial skils that result in activity limitations and / or participation restrictions  LOW Complexity : No comorbidities that affect functional and no verbal or physical assistance needed to complete eval tasks       Based on the above components, the patient evaluation is determined to be of the following complexity level: LOW   Pain Rating:  Pt reporting 1/10 pain upon arrival, with increase after activity; Ice pack placed    Activity Tolerance:   Good and SpO2 stable on RA  Please refer to the flowsheet for vital signs taken during this treatment. After treatment patient left in no apparent distress:    Sitting in chair, Call bell within reach, Bed / chair alarm activated and RN notified    COMMUNICATION/EDUCATION:   The patients plan of care was discussed with: Physical therapist and Registered nurse. Home safety education was provided and the patient/caregiver indicated understanding., Patient/family have participated as able in goal setting and plan of care. and Patient/family agree to work toward stated goals and plan of care. This patients plan of care is appropriate for delegation to [facility].     Thank you for this referral.  Gilford Ivan, OT  Time Calculation: 23 mins

## 2020-05-08 NOTE — PROGRESS NOTES
Problem: Diabetes Self-Management  Goal: *Disease process and treatment process  Description: Define diabetes and identify own type of diabetes; list 3 options for treating diabetes. Outcome: Progressing Towards Goal  Goal: *Incorporating nutritional management into lifestyle  Description: Describe effect of type, amount and timing of food on blood glucose; list 3 methods for planning meals. Outcome: Progressing Towards Goal  Goal: *Incorporating physical activity into lifestyle  Description: State effect of exercise on blood glucose levels. Outcome: Progressing Towards Goal  Goal: *Developing strategies to promote health/change behavior  Description: Define the ABC's of diabetes; identify appropriate screenings, schedule and personal plan for screenings. Outcome: Progressing Towards Goal  Goal: *Using medications safely  Description: State effect of diabetes medications on diabetes; name diabetes medication taking, action and side effects. Outcome: Progressing Towards Goal  Goal: *Monitoring blood glucose, interpreting and using results  Description: Identify recommended blood glucose targets  and personal targets. Outcome: Progressing Towards Goal  Goal: *Prevention, detection, treatment of acute complications  Description: List symptoms of hyper- and hypoglycemia; describe how to treat low blood sugar and actions for lowering  high blood glucose level. Outcome: Progressing Towards Goal  Goal: *Prevention, detection and treatment of chronic complications  Description: Define the natural course of diabetes and describe the relationship of blood glucose levels to long term complications of diabetes.   Outcome: Progressing Towards Goal  Goal: *Developing strategies to address psychosocial issues  Description: Describe feelings about living with diabetes; identify support needed and support network  Outcome: Progressing Towards Goal  Goal: *Insulin pump training  Outcome: Progressing Towards Goal  Goal: *Sick day guidelines  Outcome: Progressing Towards Goal  Goal: *Patient Specific Goal (EDIT GOAL, INSERT TEXT)  Outcome: Progressing Towards Goal     Problem: Patient Education: Go to Patient Education Activity  Goal: Patient/Family Education  Outcome: Progressing Towards Goal     Problem: Patient Education: Go to Patient Education Activity  Goal: Patient/Family Education  Outcome: Progressing Towards Goal     Problem: Falls - Risk of  Goal: *Absence of Falls  Description: Document Saroj Love Fall Risk and appropriate interventions in the flowsheet.   Outcome: Progressing Towards Goal  Note: Fall Risk Interventions:  Mobility Interventions: Bed/chair exit alarm, Communicate number of staff needed for ambulation/transfer, OT consult for ADLs, Patient to call before getting OOB, PT Consult for mobility concerns, PT Consult for assist device competence, Strengthening exercises (ROM-active/passive), Utilize walker, cane, or other assistive device, Utilize gait belt for transfers/ambulation         Medication Interventions: Bed/chair exit alarm, Patient to call before getting OOB, Teach patient to arise slowly    Elimination Interventions: Call light in reach, Bed/chair exit alarm, Patient to call for help with toileting needs, Toilet paper/wipes in reach, Toileting schedule/hourly rounds              Problem: Patient Education: Go to Patient Education Activity  Goal: Patient/Family Education  Outcome: Progressing Towards Goal     Problem: Patient Education: Go to Patient Education Activity  Goal: Patient/Family Education  Outcome: Progressing Towards Goal

## 2020-05-08 NOTE — PROGRESS NOTES
Problem: Mobility Impaired (Adult and Pediatric)  Goal: *Acute Goals and Plan of Care (Insert Text)  Description: FUNCTIONAL STATUS PRIOR TO ADMISSION: Patient was independent and active without use of DME.    HOME SUPPORT PRIOR TO ADMISSION: The patient lived with wife but did not require assist.    Physical Therapy Goals  Initiated 5/7/2020    1. Patient will scoot up and down in bed with independence within 4 days. 2. Patient will perform sit to stand with modified independence within 4 days. 3. Patient will ambulate with modified independence for 100 feet with the least restrictive device within 4 days. 4. Patient will ascend/descend 4 stairs with 1 handrail(s) with minimal assistance/contact guard assist within 4 days. 5. Patient will perform home exercise program per protocol with independence within 4 days. 6. Patient will demonstrate AROM 0-90 degrees in operative joint within 4 days. 5/8/2020 1506 by Levon Calzada  Outcome: Progressing Towards Goal  Note:   PHYSICAL THERAPY TREATMENT  Patient: Trena Valenzuela (57 y.o. 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 ASSESSMENT  Patient continues with skilled PT services and progressing towards goals. Denies pain. reports he is staying until tomorrow morning. Impulsive with tranfers and gait training demonstrates decreased safety awareness with use of RW. Max verbal cues for sequencing stair training using single handrail and SBQC on L. Handout given for reference. Would benefit for additional session of stair training to reinforce safe technique. Wife present during session, but disengaged during session. Reviewed HEP with focus on knee extension exercises. Current Level of Function Impacting Discharge (mobility/balance): CGA/Min A - impulsive    Other factors to consider for discharge: decreased safety awareness         PLAN :  Patient continues to benefit from skilled intervention to address the above impairments. Continue treatment per established plan of care. to address goals. Recommendation for discharge: (in order for the patient to meet his/her long term goals)  Outpatient physical therapy follow up recommended for TKA     This discharge recommendation:  Has been made in collaboration with the attending provider and/or case management    IF patient discharges home will need the following DME: patient owns DME required for discharge       SUBJECTIVE:   Patient stated i have been bending my knee.     OBJECTIVE DATA SUMMARY:   Critical Behavior:  Neurologic State: Alert  Orientation Level: Oriented X4  Cognition: Appropriate decision making, Appropriate for age attention/concentration, Appropriate safety awareness, Follows commands  Safety/Judgement: Awareness of environment, Fall prevention, Good awareness of safety precautions, Home safety, Insight into deficits    Range of Motion:  AROM: Within functional limits(L LE limited to 90* flexion at knee per MD orders)                 LLE AROM  L Knee Flexion: 80  L Knee Extension: 10       Functional Mobility Training:  Bed Mobility:     Supine to Sit: Stand-by assistance  Sit to Supine: Stand-by assistance  Scooting: Stand-by assistance        Transfers:  Sit to Stand: Contact guard assistance  Stand to Sit: Contact guard assistance                             Balance:  Sitting: Intact  Standing: With support  Ambulation/Gait Training:  Distance (ft): 50 Feet (ft)  Assistive Device: Walker, rolling;Gait belt  Ambulation - Level of Assistance: Contact guard assistance        Gait Abnormalities: Decreased step clearance; Antalgic                                      Stairs:  Number of Stairs Trained: 8  Stairs - Level of Assistance: Minimum assistance; Additional time;Assist X1   Rail Use: Right (quad cane)    Therapeutic Exercises:     EXERCISE   Sets   Reps   Active Active Assist   Passive Self ROM   Comments   Ankle Pumps   [x]                                        []                                        []                                        []                                           Quad Sets   [x]                                        []                                        []                                        []                                           Hamstring Sets   []                                        []                                        []                                        []                                           Short Arc Quads   [x]                                        []                                        []                                        []                                           Knee Extension Stretch     []                                          []                                          [x]                                          []                                           Heel Slides   []                                        []                                        []                                        []                                           Long Arc Quads   []                                        []                                        []                                        []                                           Knee Flexion Stretch   []                                        []                                        []                                        []                                           Straight Leg Raises   [x]                                        []                                        [] []                                               Pain Ratin/10    Activity Tolerance:   Good  Please refer to the flowsheet for vital signs taken during this treatment. After treatment patient left in no apparent distress:   Supine in bed, Call bell within reach, Bed / chair alarm activated, and Caregiver / family present    COMMUNICATION/COLLABORATION:   The patients plan of care was discussed with: Registered nurse. Rigoberto Pope   Time Calculation: 38 mins            2020 1047 by Kristian Li  Outcome: Progressing Towards Goal  Note:   PHYSICAL THERAPY TREATMENT  Patient: Dede Barraza (11 y.o. 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 ASSESSMENT  Patient continues with skilled PT services and progressing towards goals. Pt reports 1/10 pain at rest. SBA to come to sitting EOB. Min A for LB dressing. CGA for functional transfers and gait training, progressing toward heel/toe gait pattern. No knee buckling noted during upright activity although lacks ~10-15 degrees of extension. Pt somewhat impulsive during session. May attempt stair training this afternoon. Current Level of Function Impacting Discharge (mobility/balance): CGA    Other factors to consider for discharge: impulsive         PLAN :  Patient continues to benefit from skilled intervention to address the above impairments. Continue treatment per established plan of care. to address goals.     Recommendation for discharge: (in order for the patient to meet his/her long term goals)  Outpatient physical therapy follow up recommended for TKA     This discharge recommendation:  Has been made in collaboration with the attending provider and/or case management    IF patient discharges home will need the following DME: patient owns DME required for discharge       SUBJECTIVE:   Patient stated this is my forth knee surgery.     OBJECTIVE DATA SUMMARY:   Critical Behavior:  Neurologic State: Alert  Orientation Level: Oriented X4  Cognition: Appropriate decision making, Appropriate for age attention/concentration, Appropriate safety awareness, Follows commands  Safety/Judgement: Good awareness of safety precautions    Range of Motion:                    LLE AROM  L Knee Flexion: 80  L Knee Extension: 10       Functional Mobility Training:  Bed Mobility:     Supine to Sit: Stand-by assistance     Scooting: Stand-by assistance        Transfers:  Sit to Stand: Contact guard assistance;Assist x2; Additional time  Stand to Sit: Contact guard assistance;Assist x2; Additional time                             Balance:  Sitting: Intact  Standing: With support  Ambulation/Gait Training:  Distance (ft): 50 Feet (ft)  Assistive Device: Walker, rolling;Gait belt  Ambulation - Level of Assistance: Contact guard assistance;Assist x2; Additional time        Gait Abnormalities: Decreased step clearance; Antalgic                                      Stairs:               Therapeutic Exercises:     EXERCISE   Sets   Reps   Active Active Assist   Passive Self ROM   Comments   Ankle Pumps   [x]                                        []                                        []                                        []                                           Quad Sets   [x]                                        []                                        []                                        []                                           Hamstring Sets   []                                        []                                        [] []                                           Short Arc Quads   [x]                                        []                                        []                                        []                                           Knee Extension Stretch     []                                          []                                          [x]                                          []                                           Heel Slides   [x]                                        []                                        []                                        []                                           Long Arc Quads   []                                        []                                        []                                        []                                           Knee Flexion Stretch   []                                        []                                        []                                        []                                           Straight Leg Raises   [x]                                        []                                        []                                        []                                               Pain Ratin/10    Activity Tolerance:   Good  Please refer to the flowsheet for vital signs taken during this treatment. After treatment patient left in no apparent distress:   Sitting in chair, Call bell within reach, and Bed / chair alarm activated    COMMUNICATION/COLLABORATION:   The patients plan of care was discussed with: Registered nurse.      Arnoldo Vanegas   Time Calculation: 30 mins

## 2020-05-08 NOTE — PROGRESS NOTES
Occupational Therapy:  05/08/20    Orders received, chart reviewed and patient evaluated by occupational therapy. Pending progression with skilled acute occupational therapy, recommend:  No skilled occupational therapy/ follow up rehabilitation needs identified at this time. Recommend with nursing patient to complete as able in order to maintain strength, endurance and independence: OOB to chair 3x/day with 1 person assist and functional mobility to the bathroom with 1 person assist. Thank you for your assistance. Full evaluation to follow.      Thank you,  Lyndsay Alfred OTR/L

## 2020-05-08 NOTE — PROGRESS NOTES
NUTRITION   RD Screen      Pt seen for:      []  MST for     [x]   MD Consult    []        Supplements  []   PO intake check   []        Food Allergies  []   Food Preferences/tolerances    []        Rescreen  []   Education    []        Diet order clarification []   Other   []  LOS                          Nutrition Prescription:     Increase protein intake for 30 days, via supplements or dietary sources, in addition to balanced meals. Encourage adequate intake of meals and supplements    Assessment:   Information obtained from:  Patient    5/8: 65 y/o M admitted with failed total knee arthroplasty, subsequent encounter. MD consult for general nutrition management and supplements. PMH - DM, HTN, obesity. S/p revision L total knee arthroplasty 5/7. Pt reports good appetite with 100% breakfast intake. Pt reports good appetite PTA, typically consuming x 2 meals/d + evening snack. BG/, 126, 106, 112, 172; on insulin regimen.  lb. BMI 34.6 c/w obesity. Pt reports 50 lb weight loss x 2 years after lost appetite during hospitalization; pt has had recent weight stability. Pt denies n/v. LBM 5/7, on bowel regimen. Skin incision leg L. Educated pt on increased protein s/p surgery. Discussed protein rich options and how to incorporate into diet. Pt agreeable to ONS x1/d in-house.      Labs Reviewed: [x] BUN 21 H, Ca 7.6 L, Hgb A1c 6.8   Medications Reviewed: [x] ascorbic acid, famotidine, insulin regular, metformin, polyethylene glycol, senna-docusate     Cultural, Orthodox and ethnic food preferences:   [x]  None   []  Identified and addressed    Diet:  Regular     PO Intake: [x]           Good     []           Fair      []           Poor     Patient Vitals for the past 100 hrs:   % Diet Eaten   05/08/20 1046 100 %       Wt Readings from Last 5 Encounters:   05/07/20 115.8 kg (255 lb 4.7 oz)   05/04/20 115.7 kg (255 lb)   03/02/20 119.7 kg (264 lb)   02/17/20 119.3 kg (263 lb)   11/11/19 116.1 kg (256 lb) ]    Body mass index is 34.62 kg/m². Skin: leg L incision  BM: 5/7, on bowel regimen   ABD: WDL    Estimated Daily Nutrition Requirements:  Kcals/day: 2988 Kcals/day(REE 1921 x 1.3 - 250 kcal)  Protein: 120 g(116 - 127 gm (1.0 - 1.2 gm/kg))  Fluid:   2247    (1mL/kcal)  Based On: Ambar Maher  Weight Used: Actual wt    Weight Changes:   [x]   Loss - 50 lb weight loss x2 years, recently stable   []   Gain  []   Stable    Nutrition Problems Identified  [x]     None  []     Specified food preferences   []     Dislikes supplements              []     Allergies  []     Difficulty chewing     []     Dentition   []     Nausea/Vomiting  []    Constipation  []    Diarrhea    Nutrition Diagnosis:      Increased nutrient needs related to increased protein needs with wound healing evidenced by impaired skin integrity with leg L incision.     Intervention:   [x]    Obtained/adjusted food preferences/tolerances and/or snacks options   []    Dislikes supplements will try a substitution   []    Modify diet for food allergies  []    Adjust texture due to difficulty chewing   []    Encourage Fresh Fruit, Activia yogurt, fluid   [x]    Educated patient - increased protein s/p surgery   []    Rescreen per screening protocol  [x]    Add Supplements - magic cup x1/d (290 kcal, 9 gm protein)     Goal: patient to consume 1 protein item with meals and 50% of meals and snacks in the next 5-7 days    Monitoring/Evaluation:   Education & Discharge Needs  [x] Nutrition related discharge needs addressed:   [x] Supplements (on d/c instruction &/or coupons provided)    [x] Education   [] No nutrition related discharge needs at this time    Monitor: intake, wt and wound healing    Rescreen: [x]  At Nutrition Risk          []  Not at [address] [facility], rescreen per screening protocol      Jaclynn Dubin, [address] [facility]  Pager [phone]   Office [phone]

## 2020-05-08 NOTE — PROGRESS NOTES
Problem: Mobility Impaired (Adult and Pediatric)  Goal: *Acute Goals and Plan of Care (Insert Text)  Description: FUNCTIONAL STATUS PRIOR TO ADMISSION: Patient was independent and active without use of DME.    HOME SUPPORT PRIOR TO ADMISSION: The patient lived with wife but did not require assist.    Physical Therapy Goals  Initiated 5/7/2020    1. Patient will scoot up and down in bed with independence within 4 days. 2. Patient will perform sit to stand with modified independence within 4 days. 3. Patient will ambulate with modified independence for 100 feet with the least restrictive device within 4 days. 4. Patient will ascend/descend 4 stairs with 1 handrail(s) with minimal assistance/contact guard assist within 4 days. 5. Patient will perform home exercise program per protocol with independence within 4 days. 6. Patient will demonstrate AROM 0-90 degrees in operative joint within 4 days. Outcome: Progressing Towards Goal  Note:   PHYSICAL THERAPY TREATMENT  Patient: Brie Kruger (34 y.o. 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 ASSESSMENT  Patient continues with skilled PT services and progressing towards goals. Pt reports 1/10 pain at rest. SBA to come to sitting EOB. Min A for LB dressing. CGA for functional transfers and gait training, progressing toward heel/toe gait pattern. No knee buckling noted during upright activity although lacks ~10-15 degrees of extension. Pt somewhat impulsive during session. May attempt stair training this afternoon.     Current Level of Function Impacting Discharge (mobility/balance): CGA    Other factors to consider for discharge: impulsive         PLAN :  Patient continues to benefit from skilled intervention to address the above impairments. Continue treatment per established plan of care. to address goals. Recommendation for discharge: (in order for the patient to meet his/her long term goals)  Outpatient physical therapy follow up recommended for TKA     This discharge recommendation:  Has been made in collaboration with the attending provider and/or case management    IF patient discharges home will need the following DME: patient owns DME required for discharge       SUBJECTIVE:   Patient stated this is my forth knee surgery.     OBJECTIVE DATA SUMMARY:   Critical Behavior:  Neurologic State: Alert  Orientation Level: Oriented X4  Cognition: Appropriate decision making, Appropriate for age attention/concentration, Appropriate safety awareness, Follows commands  Safety/Judgement: Good awareness of safety precautions    Range of Motion:                    LLE AROM  L Knee Flexion: 80  L Knee Extension: 10       Functional Mobility Training:  Bed Mobility:     Supine to Sit: Stand-by assistance     Scooting: Stand-by assistance        Transfers:  Sit to Stand: Contact guard assistance;Assist x2; Additional time  Stand to Sit: Contact guard assistance;Assist x2; Additional time                             Balance:  Sitting: Intact  Standing: With support  Ambulation/Gait Training:  Distance (ft): 50 Feet (ft)  Assistive Device: Walker, rolling;Gait belt  Ambulation - Level of Assistance: Contact guard assistance;Assist x2; Additional time        Gait Abnormalities: Decreased step clearance; Antalgic                                      Stairs:               Therapeutic Exercises:     EXERCISE   Sets   Reps   Active Active Assist   Passive Self ROM   Comments   Ankle Pumps   [x]                                        []                                        [] []                                           Quad Sets   [x]                                        []                                        []                                        []                                           Hamstring Sets   []                                        []                                        []                                        []                                           Short Arc Quads   [x]                                        []                                        []                                        []                                           Knee Extension Stretch     []                                          []                                          [x]                                          []                                           Heel Slides   [x]                                        []                                        []                                        []                                           Long Arc Quads   []                                        []                                        []                                        []                                           Knee Flexion Stretch   []                                        []                                        []                                        []                                           Straight Leg Raises   [x]                                        []                                        []                                        []                                               Pain Ratin/10    Activity Tolerance:   Good  Please refer to the flowsheet for vital signs taken during this treatment.     After treatment patient left in no apparent distress:   Sitting in chair, Call bell within reach, and Bed / chair alarm activated    COMMUNICATION/COLLABORATION:   The patients plan of care was discussed with: Registered nurse.      Kwasi Lung   Time Calculation: 30 mins

## 2020-05-08 NOTE — PROGRESS NOTES
TOTAL KNEE REVISION ARTHROPLASTY DAILY NOTE     ASSESSMENT / PLAN :   1. Pain Control : Doing quite well. 2. Overnight Issues : none  3. Wound or incisional issue : WHSS  4. Therapy / Weight Bearing Recommendations : WBAT  5. DVT Prophylaxis : Mechanical and ASA  6. Disposition : Home  7. Medical Concerns : Stable  8. Comments : Stable     POD  1 Day Post-Op s/p Procedure(s):  REVISION LEFT TOTAL KNEE ARTHROPLASTY     SUBJECTIVE :     Patient notes the following issues : Doing well. OBJECTIVE :     Vitals:    05/07/20 2345 05/08/20 0410 05/08/20 0823 05/08/20 1151   BP: 131/72 124/48 166/76 125/72   Pulse: (!) 59 (!) 56 66 68   Resp: 17 18 18 18   Temp: 97.4 °F (36.3 °C) 97.5 °F (36.4 °C) 98 °F (36.7 °C) 97.9 °F (36.6 °C)   SpO2: 100% 98% 98% 97%   Weight:       Height:               Alert and oriented x3. Examination of the left knee reveals that the dressing is clean, dry and intact. Motor Exam is intact and normal. Pt is able to perform a straight leg raise. Sensation is intact to light touch. No calf pain. Labs:  Recent Labs     05/08/20  0216   HGB 10.1*      K 4.2   *   CO2 26   BUN 21*   CREA 1.15   *       CULTURES :  No results found for: CHRIS  Lab Results   Component Value Date/Time    Culture result: PENDING 05/07/2020 12:01 PM    Culture result: PENDING 05/07/2020 11:50 AM    Culture result: PENDING 05/07/2020 11:31 AM    Culture result: PENDING 05/07/2020 11:18 AM    Culture result: MRSA NOT PRESENT 05/04/2020 02:27 PM    Culture result:  05/04/2020 02:27 PM         Screening of patient nares for MRSA is for surveillance purposes and, if positive, to facilitate isolation considerations in high risk settings. It is not intended for automatic decolonization interventions per se as regimens are not sufficiently effective to warrant routine use.         Patient mobility  Gait  Gait Abnormalities: Decreased step clearance, Antalgic  Ambulation - Level of Assistance: Contact guard assistance, Assist x2, Additional time  Distance (ft): 50 Feet (ft)  Assistive Device: Walker, rolling, Gait belt        MICROBIOLOGY :   All Micro Results     Procedure Component Value Units Date/Time    CULTURE, BODY FLUID Anatoly Sprout STAIN [[id number]] Collected:  05/07/20 1118    Order Status:  Completed Specimen:  Knee, left Updated:  05/07/20 1901     Special Requests: LOOK FOR ANEROBS     GRAM STAIN RARE WBCS SEEN         NO ORGANISMS SEEN        Culture result: PENDING    CULTURE, TISSUE Sharapollo Taye [[id number]] Collected:  05/07/20 1131    Order Status:  Completed Specimen:  Knee, left Updated:  05/07/20 1856     Special Requests: NO SPECIAL REQUESTS        GRAM STAIN OCCASIONAL WBCS SEEN         NO ORGANISMS SEEN        Culture result: PENDING    CULTURE, Mac Corpus STAIN [[id number]] Collected:  05/07/20 1150    Order Status:  Completed Specimen:  Surgical Specimen Updated:  05/07/20 1845     Special Requests: NO SPECIAL REQUESTS        GRAM STAIN OCCASIONAL WBCS SEEN         NO ORGANISMS SEEN        Culture result: PENDING    CULTURE, Mac Corpus STAIN [[id number]] Collected:  05/07/20 1201    Order Status:  Completed Specimen:  Surgical Specimen Updated:  05/07/20 1843     Special Requests: NO SPECIAL REQUESTS        GRAM STAIN RARE WBCS SEEN         NO ORGANISMS SEEN        Culture result: PENDING    CULTURE, ANAEROBIC [[id number]] Collected:  05/07/20 1201    Order Status:  Completed Updated:  05/07/20 1746    CULTURE, ANAEROBIC [[id number]] Collected:  05/07/20 1150    Order Status:  Completed Updated:  05/07/20 1746    Radha August [[id number]] Collected:  05/07/20 1131    Order Status:  Completed Updated:  05/07/20 Nhan Mendes MD  Cell [phone]  Brian Simmons PA-C  Cell [phone]  Office : [phone]  Medical Assistants: [address]  [phone]                 Surgery Scheduler: Real Gonzalez, [address] [place] [address]

## 2020-05-08 NOTE — FACE TO FACE
Rounded on patient, f/u from [facility] Pre-op Patient Education. Patient states information was valuable in preparing for surgery. Reviewed ice application, exercises and incentive spirometry use. Patient states their home space is prepared and safe. Questions answered.

## 2020-05-09 VITALS
HEIGHT: 72 IN | RESPIRATION RATE: 18 BRPM | WEIGHT: 255.29 LBS | TEMPERATURE: 98.2 F | DIASTOLIC BLOOD PRESSURE: 80 MMHG | BODY MASS INDEX: 34.58 KG/M2 | OXYGEN SATURATION: 98 % | HEART RATE: 84 BPM | SYSTOLIC BLOOD PRESSURE: 164 MMHG

## 2020-05-09 LAB
GLUCOSE BLD STRIP.AUTO-MCNC: 140 MG/DL (ref 65–100)
SERVICE CMNT-IMP: ABNORMAL

## 2020-05-09 PROCEDURE — 74011636637 HC RX REV CODE- 636/637: Performed by: PHYSICIAN ASSISTANT

## 2020-05-09 PROCEDURE — 82962 GLUCOSE BLOOD TEST: CPT

## 2020-05-09 PROCEDURE — 74011250637 HC RX REV CODE- 250/637: Performed by: PHYSICIAN ASSISTANT

## 2020-05-09 PROCEDURE — 97535 SELF CARE MNGMENT TRAINING: CPT | Performed by: OCCUPATIONAL THERAPIST

## 2020-05-09 PROCEDURE — 97110 THERAPEUTIC EXERCISES: CPT

## 2020-05-09 PROCEDURE — 97116 GAIT TRAINING THERAPY: CPT

## 2020-05-09 RX ADMIN — AMLODIPINE BESYLATE 5 MG: 5 TABLET ORAL at 08:02

## 2020-05-09 RX ADMIN — METFORMIN HYDROCHLORIDE 500 MG: 500 TABLET ORAL at 08:02

## 2020-05-09 RX ADMIN — Medication 10 ML: at 06:07

## 2020-05-09 RX ADMIN — OXYCODONE 5 MG: 5 TABLET ORAL at 03:45

## 2020-05-09 RX ADMIN — OXYCODONE 5 MG: 5 TABLET ORAL at 08:02

## 2020-05-09 RX ADMIN — CELECOXIB 200 MG: 100 CAPSULE ORAL at 08:02

## 2020-05-09 RX ADMIN — POLYETHYLENE GLYCOL 3350 17 G: 17 POWDER, FOR SOLUTION ORAL at 08:02

## 2020-05-09 RX ADMIN — ASPIRIN 81 MG: 81 TABLET, COATED ORAL at 08:02

## 2020-05-09 RX ADMIN — ACETAMINOPHEN 650 MG: 325 TABLET ORAL at 06:07

## 2020-05-09 RX ADMIN — HUMAN INSULIN 2 UNITS: 100 INJECTION, SOLUTION SUBCUTANEOUS at 08:02

## 2020-05-09 RX ADMIN — FAMOTIDINE 20 MG: 20 TABLET, FILM COATED ORAL at 08:02

## 2020-05-09 RX ADMIN — DOCUSATE SODIUM 50MG AND SENNOSIDES 8.6MG 1 TABLET: 8.6; 5 TABLET, FILM COATED ORAL at 08:02

## 2020-05-09 RX ADMIN — OXYCODONE HYDROCHLORIDE AND ACETAMINOPHEN 1000 MG: 500 TABLET ORAL at 08:02

## 2020-05-09 RX ADMIN — GABAPENTIN 100 MG: 100 CAPSULE ORAL at 08:03

## 2020-05-09 NOTE — PROGRESS NOTES
Problem: Diabetes Self-Management  Goal: *Disease process and treatment process  Description: Define diabetes and identify own type of diabetes; list 3 options for treating diabetes. Outcome: Progressing Towards Goal  Goal: *Incorporating nutritional management into lifestyle  Description: Describe effect of type, amount and timing of food on blood glucose; list 3 methods for planning meals. Outcome: Progressing Towards Goal  Goal: *Incorporating physical activity into lifestyle  Description: State effect of exercise on blood glucose levels. Outcome: Progressing Towards Goal  Goal: *Developing strategies to promote health/change behavior  Description: Define the ABC's of diabetes; identify appropriate screenings, schedule and personal plan for screenings. Outcome: Progressing Towards Goal  Goal: *Using medications safely  Description: State effect of diabetes medications on diabetes; name diabetes medication taking, action and side effects. Outcome: Progressing Towards Goal  Goal: *Monitoring blood glucose, interpreting and using results  Description: Identify recommended blood glucose targets  and personal targets. Outcome: Progressing Towards Goal  Goal: *Prevention, detection, treatment of acute complications  Description: List symptoms of hyper- and hypoglycemia; describe how to treat low blood sugar and actions for lowering  high blood glucose level. Outcome: Progressing Towards Goal  Goal: *Prevention, detection and treatment of chronic complications  Description: Define the natural course of diabetes and describe the relationship of blood glucose levels to long term complications of diabetes.   Outcome: Progressing Towards Goal  Goal: *Developing strategies to address psychosocial issues  Description: Describe feelings about living with diabetes; identify support needed and support network  Outcome: Progressing Towards Goal  Goal: *Insulin pump training  Outcome: Progressing Towards Goal  Goal: *Sick day guidelines  Outcome: Progressing Towards Goal  Goal: *Patient Specific Goal (EDIT GOAL, INSERT TEXT)  Outcome: Progressing Towards Goal     Problem: Patient Education: Go to Patient Education Activity  Goal: Patient/Family Education  Outcome: Progressing Towards Goal     Problem: Patient Education: Go to Patient Education Activity  Goal: Patient/Family Education  Outcome: Progressing Towards Goal     Problem: Falls - Risk of  Goal: *Absence of Falls  Description: Document Femi Champagne Fall Risk and appropriate interventions in the flowsheet.   Outcome: Progressing Towards Goal  Note: Fall Risk Interventions:  Mobility Interventions: Communicate number of staff needed for ambulation/transfer, Bed/chair exit alarm, OT consult for ADLs, Patient to call before getting OOB, PT Consult for mobility concerns, PT Consult for assist device competence, Strengthening exercises (ROM-active/passive), Utilize walker, cane, or other assistive device, Utilize gait belt for transfers/ambulation         Medication Interventions: Bed/chair exit alarm, Patient to call before getting OOB, Teach patient to arise slowly, Utilize gait belt for transfers/ambulation    Elimination Interventions: Bed/chair exit alarm, Call light in reach, Patient to call for help with toileting needs, Toilet paper/wipes in reach, Toileting schedule/hourly rounds              Problem: Patient Education: Go to Patient Education Activity  Goal: Patient/Family Education  Outcome: Progressing Towards Goal     Problem: Patient Education: Go to Patient Education Activity  Goal: Patient/Family Education  Outcome: Progressing Towards Goal

## 2020-05-09 NOTE — PROGRESS NOTES
Problem: Self Care Deficits Care Plan (Adult)  Goal: *Acute Goals and Plan of Care (Insert Text)  Description: FUNCTIONAL STATUS PRIOR TO ADMISSION: Patient was independent and active without use of DME. Patient was independent for basic and instrumental ADLs. HOME SUPPORT: The patient lived with wife but did not require assist.    Occupational Therapy Goals  Initiated 5/8/2020    1. Patient will perform lower body dressing at modified independence within 7 days. 2. Patient will perform toilet transfers at modified independence using Walkers, Type: Rolling Walker within 7 days. 3. Patient will perform all aspects of toileting at modified independence within 7 days. 4. Patient will tolerate greater than 10 minutes of standing without a rest break at modified independence using Walkers, Type: Rolling Walker during ADL's within 7 days. Outcome: Progressing Towards Goal     OCCUPATIONAL THERAPY TREATMENT  Patient: Paulina Crump (45 y.o. 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 ASSESSMENT  Patient continues with skilled OT services and is progressing towards goals. He demonstrated good safety awareness and no LOB this session. He is at an overall supervision and set-up level with LE ADLs, toileting and functional mobility using RW to amb. He c/o pain 2/10 at rest and 8/10 with amb. Pt has good support from family at home and all equipment needed for safety. Pt cleared for discharge from OT standpoint.      Current Level of Function Impacting Discharge (ADLs): supervision and set-up level with LE ADLs, toileting and functional mobility using RW to amb    Other factors to consider for discharge: none         PLAN :  Patient continues to benefit from skilled intervention to address the above impairments. Continue treatment per established plan of care. to address goals. Recommend with staff: Recommend with nursing patient to complete as able in order to maintain strength, endurance and independence: ADLs with supervision/setup and OOB to chair 3x/day and mobilizing to the bathroom for toileting with supervision assist. Thank you for your assistance. Recommend next OT session: standing ADLs    Recommendation for discharge: (in order for the patient to meet his/her long term goals)  No skilled occupational therapy/ follow up rehabilitation needs identified at this time. This discharge recommendation:  Has been made in collaboration with the attending provider and/or case management    IF patient discharges home will need the following DME: patient owns DME required for discharge       SUBJECTIVE:   Patient stated I feel better.     OBJECTIVE DATA SUMMARY:   Cognitive/Behavioral Status:  Neurologic State: Alert; Appropriate for age  Orientation Level: Oriented X4  Cognition: Appropriate decision making; Appropriate for age attention/concentration; Appropriate safety awareness; Follows commands  Perception: Appears intact  Perseveration: No perseveration noted  Safety/Judgement: Awareness of environment;Driving appropriateness; Fall prevention;Good awareness of safety precautions; Home safety; Insight into deficits    Functional Mobility and Transfers for ADLs:  Bed Mobility:  Supine to Sit: Supervision  Sit to Supine: Supervision  Scooting: Supervision    Transfers:  Sit to Stand: Supervision  Functional Transfers  Bathroom Mobility: Supervision/set up  Toilet Transfer : Supervision  Cues: Verbal cues provided  Adaptive Equipment: Grab bars; Walker (comment)(rolling)  Bed to Chair: Supervision; Additional time    Balance:  Sitting: Intact  Standing: Intact; With support(RW)    ADL Intervention:  Grooming  Grooming Assistance: Supervision  Position Performed: Standing  Washing Hands: Supervision  Cues: Verbal cues provided  Adaptive Equipment: (RW)    Lower Body Dressing Assistance  Dressing Assistance: Supervision;Set-up  Underpants: Supervision  Pants With Elastic Waist: Supervision  Socks: Supervision  Leg Crossed Method Used: No  Position Performed: Bending forward method;Seated edge of bed  Cues: Don;Verbal cues provided  Adaptive Equipment Used: Walker    Toileting  Toileting Assistance: Supervision  Bladder Hygiene: Modified independent  Bowel Hygiene: Modified indpendent  Clothing Management: Supervision  Cues: Verbal cues provided  Adaptive Equipment: Grab bars; Walker    Cognitive Retraining  Safety/Judgement: Awareness of environment;Driving appropriateness; Fall prevention;Good awareness of safety precautions; Home safety; Insight into deficits    Bathing: Patient instructed when bathing to not submerge wound in water, stand to shower or sponge bathe, cover wound with plastic and tape to ensure no water reaches bandage/wound without cues. Patient indicated understanding. Dressing joint: Patient instructed and demonstrated to don/doff Left LE first/last verbal cues. Patient instructed and demonstrated to don all clothing while sitting prior to standing, doff all clothing to knees while standing, then sit to doff clothing off from knees to feet in order to facilitate fall prevention, pain management, and energy conservation with Supervision. Dressing joint reach exercise: To increase independence with lower body dressing, patient instructed and demonstrated to reach down Left LE in a seated position slowly to prevent tearing/shearing until slight pull is felt, hold at end range for 10 seconds, then return to starting upright position with Supervision.   Patient instructed to complete three sets of three repetitions each daily. Home safety: Patient instructed on home modifications and safety (raise height of ADL objects, appropriate height of chair surfaces, recliner safety, change of floor surfaces, clear pathways) to increase independence and fall prevention. Patient indicated understanding. Standing: Patient instructed and demonstrated during ADLs to walk up to sink/counter top/surfaces, step into walker to increase safety of joint and fall prevention with Supervision. Patient educated about knee anatomy verbally and with pictures and educated to avoid rotation of Left LE. Instructed to apply concept to ADLs within the home (no twisting of knee during reaching across body, square off while using objects, slide objects along surfaces). Patient instructed to increase amount of time standing, observe standing position during ADLs in order to increase even weight bearing through bilateral LEs in order to increase independence with ADLs. Goal to be reached 30 days post - op, per orthopedic surgeon or per PT. Patient indicated understanding. Tub transfer: Patient instructed regarding when it is safe to begin transfer into tub (complete stairs with PT, advance exercises with PT high enough to clear tub height). Patient instructed to use the same technique as used with stairs when entering and exiting tub (\"up with the non-surgical, down with the surgical leg\"). Patient indicated understanding. Pain:  2/10 at rest and 8/10 with mobility    Activity Tolerance:   Good and requires rest breaks  Please refer to the flowsheet for vital signs taken during this treatment. After treatment patient left in no apparent distress:   Supine in bed and Call bell within reach, bed alarm activated    COMMUNICATION/COLLABORATION:   The patients plan of care was discussed with: Physical therapist and Registered nurse.      Kaylee Kayser, OT  Time Calculation: 11 mins

## 2020-05-09 NOTE — PROGRESS NOTES
TOTAL KNEE REVISION ARTHROPLASTY DAILY NOTE     ASSESSMENT / PLAN :   1. Pain Control : Good overall, some pain since yesterday when the block wore off.   2. Overnight Issues : none  3. Wound or incisional issue : WHSS  4. Therapy / Weight Bearing Recommendations : WBAT  5. DVT Prophylaxis : Mechanical and ASA  6. Disposition : Home  7. Medical Concerns : stable  8. Comments : Home today     POD  2 Days Post-Op s/p Procedure(s):  REVISION LEFT TOTAL KNEE ARTHROPLASTY     SUBJECTIVE :     Patient notes the following issues : none. OBJECTIVE :     Vitals:    05/08/20 2027 05/09/20 0035 05/09/20 0344 05/09/20 0745   BP: 154/70 149/89 147/67 181/77   Pulse:  85 89 82   Resp:  16 17 18   Temp:  98.2 °F (36.8 °C) 98.2 °F (36.8 °C) 97.7 °F (36.5 °C)   SpO2:  96% 99% 99%   Weight:       Height:               Alert and oriented x3. Examination of the left knee reveals that the dressing is clean, dry and intact. Motor Exam is intact and normal. Pt is able to perform a straight leg raise. Sensation is intact to light touch. No calf pain.      Labs:  Recent Labs     05/08/20  0216   HGB 10.1*      K 4.2   *   CO2 26   BUN 21*   CREA 1.15   *       CULTURES :  No results found for: [facility]  Lab Results   Component Value Date/Time    Culture result: NO GROWTH AFTER 20 HOURS 05/07/2020 12:01 PM    Culture result: NO GROWTH AFTER 20 HOURS 05/07/2020 11:50 AM    Culture result: NO GROWTH AFTER 19 HOURS 05/07/2020 11:31 AM    Culture result: Culture performed on Unspun Fluid 05/07/2020 11:18 AM    Culture result: NO GROWTH AFTER 19 HOURS 05/07/2020 11:18 AM        Patient mobility  Gait  Gait Abnormalities: Decreased step clearance, Antalgic  Ambulation - Level of Assistance: Contact guard assistance  Distance (ft): 50 Feet (ft)  Assistive Device: Walker, rolling, Gait belt  Rail Use: Right (quad cane)  Stairs - Level of Assistance: Minimum assistance, Additional time, Assist X1  Number of Stairs Trained: 8        MICROBIOLOGY :   All Micro Results     Procedure Component Value Units Date/Time    CULTURE, [facility] STAIN [[id number]] Collected:  05/07/20 1150    Order Status:  Completed Specimen:  Surgical Specimen Updated:  05/08/20 1352     Special Requests: NO SPECIAL REQUESTS        GRAM STAIN OCCASIONAL WBCS SEEN         NO ORGANISMS SEEN        Culture result: NO GROWTH AFTER 20 HOURS       CULTURE, [facility] STAIN [[id number]] Collected:  05/07/20 1201    Order Status:  Completed Specimen:  Surgical Specimen Updated:  05/08/20 1348     Special Requests: NO SPECIAL REQUESTS        GRAM STAIN RARE WBCS SEEN         NO ORGANISMS SEEN        Culture result: NO GROWTH AFTER 20 HOURS       CULTURE, TISSUE Ibarra Must [[id number]] Collected:  05/07/20 1131    Order Status:  Completed Specimen:  Knee, left Updated:  05/08/20 1344     Special Requests: NO SPECIAL REQUESTS        GRAM STAIN OCCASIONAL WBCS SEEN         NO ORGANISMS SEEN        Culture result: NO GROWTH AFTER 19 HOURS       CULTURE, BODY FLUID [address] [[id number]] Collected:  05/07/20 1118    Order Status:  Completed Specimen:  Knee, left Updated:  05/08/20 1341     Special Requests: LOOK FOR ANEROBS     GRAM STAIN RARE WBCS SEEN         NO ORGANISMS SEEN        Culture result:       Culture performed on Unspun Fluid            NO GROWTH AFTER 19 HOURS       CULTURE, ANAEROBIC [[id number]] Collected:  05/07/20 1201    Order Status:  Completed Updated:  05/07/20 1746    CULTURE, ANAEROBIC [[id number]] Collected:  05/07/20 1150    Order Status:  Completed Updated:  05/07/20 1746    CULTURE, ANAEROBIC [[id number]] Collected:  05/07/20 1131    Order Status:  Completed Updated:  05/07/20 Nhan Mendes MD  Cell [phone]  Wannetta Najjar, PA-C  Cell [phone]  Office : [phone]  Medical Assistants: [address]  [phone]                 Surgery Scheduler: Corey Richey, [address] [place] [id number] Medical

## 2020-05-09 NOTE — PROGRESS NOTES
Problem: Mobility Impaired (Adult and Pediatric)  Goal: *Acute Goals and Plan of Care (Insert Text)  Description: FUNCTIONAL STATUS PRIOR TO ADMISSION: Patient was independent and active without use of DME.    HOME SUPPORT PRIOR TO ADMISSION: The patient lived with wife but did not require assist.    Physical Therapy Goals  Initiated 5/7/2020    1. Patient will scoot up and down in bed with independence within 4 days. 2. Patient will perform sit to stand with modified independence within 4 days. 3. Patient will ambulate with modified independence for 100 feet with the least restrictive device within 4 days. 4. Patient will ascend/descend 4 stairs with 1 handrail(s) with minimal assistance/contact guard assist within 4 days. 5. Patient will perform home exercise program per protocol with independence within 4 days. 6. Patient will demonstrate AROM 0-90 degrees in operative joint within 4 days. Outcome: Progressing Towards Goal    PHYSICAL THERAPY TREATMENT  Patient: Paulina Crump (41 y.o. 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 ASSESSMENT  Patient continues with skilled PT services and is progressing towards goals. Pt ambulates increased distance and performs additional stair trials with decreased assistance today. He demonstrates improved pace throughout treatment with the exception of 1 instance when approaching chair. Reviewed home exercise program, with which pt reports compliance, safe mobility technique, and recommended CGA to SBA assistance for ambulation and stairs as below.  Pt cleared for discharge from PT perspective. RN notified. Current Level of Function Impacting Discharge (mobility/balance): up to CGA for stair negotiation and ambulation using RW. Other factors to consider for discharge: recommend 24-hour supervision;         PLAN :  Patient continues to benefit from skilled intervention to address the above impairments. Continue treatment per established plan of care. to address goals. Recommendation for discharge: (in order for the patient to meet his/her long term goals)  Outpatient physical therapy follow up recommended for post op rehab. This discharge recommendation:  Has been made in collaboration with the attending provider and/or case management    IF patient discharges home will need the following DME: RW has been delivered for discharge       SUBJECTIVE:   Patient stated I'm doing so much better after this surgery.     Pt received supine, agreeable to PT and cleared by RN. OBJECTIVE DATA SUMMARY:   Critical Behavior:  Neurologic State: Alert, Appropriate for age  Orientation Level: Oriented X4  Cognition: Appropriate decision making, Appropriate for age attention/concentration, Appropriate safety awareness, Follows commands  Safety/Judgement: Awareness of environment, Driving appropriateness, Fall prevention, Good awareness of safety precautions, Home safety, Insight into deficits    Range of Motion:                    LLE AROM  L Knee Flexion: 80  L Knee Extension: 10       Functional Mobility Training:  Bed Mobility:     Supine to Sit: Modified independent  Sit to Supine: Modified independent  Scooting: Modified independent        Transfers:  Sit to Stand: Supervision  Stand to Sit: Supervision        Bed to Chair: Supervision; Additional time                    Balance:  Sitting: Intact  Standing: With support  Standing - Static: Good  Standing - Dynamic : Good  Ambulation/Gait Training:  Distance (ft): 150 Feet (ft)  Assistive Device: Gait belt;Walker, rolling  Ambulation - Level of Assistance: Stand-by assistance;Contact guard assistance        Gait Abnormalities: Decreased step clearance        Base of Support: Widened  Stance: Left decreased  Speed/Katalina: Pace decreased (<100 feet/min)                     No loss of balance of evidence of LE instability. Occasional cues for upright posture, L knee extension during stance. Stairs:  Number of Stairs Trained: 4(x2)  Stairs - Level of Assistance: Contact guard assistance   Rail Use: Right (small based quad cane contralaterally)    Therapeutic Exercises: Pt reports performing x 4 sets today, thus deferred performance but reviewed techniques and frequency.     EXERCISE   Sets   Reps   Active Active Assist   Passive Self ROM   Comments   Ankle Pumps   []                                        []                                        []                                        []                                           Quad Sets   []                                        []                                        []                                        []                                           Hamstring Sets   []                                        []                                        []                                        []                                           Short Arc Quads   []                                        []                                        []                                        []                                           Knee Extension Stretch     []                                          []                                          []                                          []                                           Heel Slides   []                                        []                                        []                                        []                                           Long Arc Quads   []                                        [] []                                        []                                           Knee Flexion Stretch   []                                        []                                        []                                        []                                           Straight Leg Raises   []                                        []                                        []                                        []                                               Pain Ratin/10 L knee  Offered cold pack, elevation, education. Activity Tolerance:   Good  Please refer to the flowsheet for vital signs taken during this treatment. After treatment patient left in no apparent distress:   Supine in bed, Call bell within reach, Bed / chair alarm activated, Side rails x 3, and LLE elevated on blue wedge     COMMUNICATION/COLLABORATION:   The patients plan of care was discussed with: Occupational therapist and Registered nurse.      Chong Hendrix, PT, DPT   Time Calculation: 42 mins

## 2020-05-11 ENCOUNTER — PATIENT OUTREACH (OUTPATIENT)
Dept: CARDIOLOGY CLINIC | Age: 75
End: 2020-05-11

## 2020-05-12 RX ORDER — METFORMIN HYDROCHLORIDE 1000 MG/1
500 TABLET ORAL 2 TIMES DAILY WITH MEALS
Qty: 30 TAB | Refills: 5 | Status: SHIPPED | OUTPATIENT
Start: 2020-05-12 | End: 2020-10-28

## 2020-05-13 ENCOUNTER — VIRTUAL VISIT (OUTPATIENT)
Dept: FAMILY MEDICINE CLINIC | Age: 75
End: 2020-05-13

## 2020-05-13 DIAGNOSIS — G89.29 CHRONIC KNEE PAIN, UNSPECIFIED LATERALITY: Primary | ICD-10-CM

## 2020-05-13 DIAGNOSIS — M25.569 CHRONIC KNEE PAIN, UNSPECIFIED LATERALITY: Primary | ICD-10-CM

## 2020-05-13 NOTE — PROGRESS NOTES
Eli Landis is a 66 y.o. male evaluated via audio only technology on 5/13/2020. Consent: He and/or his health care decision maker is aware that he may receive a bill for this audio only encounter, depending on his insurance coverage, and has provided verbal consent to proceed: Yes  12  Subjective: Eli Landis is a 66 y.o. male who was seen for Hypertension  recent TKR with Dr. Celestine Ojeda  appt today to discuss surgery, doing great, no pain, has started rehab  No pain, no refills needed  bp did run high in patient    Prior to Admission medications    Medication Sig Start Date End Date Taking? Authorizing Provider   metFORMIN (GLUCOPHAGE) 1,000 mg tablet Take 0.5 Tabs by mouth two (2) times daily (with meals). 5/12/20   Bronson Ortega NP   aspirin delayed-release 81 mg tablet Take 1 Tab by mouth two (2) times a day. 5/7/20   Alley Wynne MD   gabapentin (NEURONTIN) 100 mg capsule T1 tablet at breakfast and 3 tablets at night. 5/7/20   Alley Wynne MD   ibuprofen (MOTRIN) 800 mg tablet Take 1 Tab by mouth every six (6) hours as needed for Pain. 5/7/20   Alley Wynne MD   oxyCODONE IR (ROXICODONE) 5 mg immediate release tablet Take 1 Tab by mouth every four (4) hours as needed for Pain for up to 7 days. Max Daily Amount: 30 mg. Indications: pain 5/7/20 5/14/20  Alley Wynne MD   traMADoL Sonali Mil[place]) 50 mg tablet Take 1 Tab by mouth every six (6) hours as needed for Pain (Take for breakthrough pain if Oxycodone is not working or when transitioning off Oxycodone) for up to 7 days. Max Daily Amount: 200 mg. Indications: pain, Post-op Pain, Diagnosis Hip and Knee Arthritis ICD 10 - M16.9 5/7/20 5/14/20  Alley Wynne MD   acetaminophen (Tylenol Extra Strength) 500 mg tablet Take 1-2 Tabs by mouth every six (6) hours as needed for Pain.  Not to exceed 4,000mg in any 24 hour period  Indications: pain 5/7/20   Alley Wynne MD   ondansetron [facility] ODT) 8 mg disintegrating tablet Take 0.5 Tabs by mouth every eight (8) hours as needed for Nausea. 5/7/20   Kristian Kennedy MD   folic acid/multivit-min/lutein (CENTRUM SILVER PO) Take 1 Tab by mouth nightly as needed. Provider, Historical   tamsulosin (FLOMAX) 0.4 mg capsule Take 0.8 mg by mouth daily (with dinner). Provider, Historical   ascorbic acid, vitamin C, (Vitamin C) 500 mg tablet Take 1,000 mg by mouth daily. Provider, Historical   simvastatin (ZOCOR) 40 mg tablet Take 40 mg by mouth nightly. Provider, Historical   vit C/E/Zn/coppr/lutein/zeaxan (PRESERVISION AREDS-2 PO) Take 1 Cap by mouth two (2) times a day. Provider, Historical   amLODIPine (NORVASC) 5 mg tablet TAKE 1 TABLET BY MOUTH EVERY DAY 5/2/20   Celeste Jimenez NP   lisinopril (PRINIVIL, ZESTRIL) 20 mg tablet TAKE 1 TABLET BY MOUTH TWICE A DAY 11/11/19   Celeste Jimenez NP     No Known Allergies        ROS   A comprehensive review of system was obtained and negative except findings in the HPI    Diagnoses and all orders for this visit:    1. Chronic knee pain, unspecified laterality      No changes at this time  Will cont to monitor bp and call if runs high    I affirm this is a Patient-Initiated Episode with a Patient who has not had a related appointment within my department in the past 7 days or scheduled within the next 24 hours.     Total Time: minutes: 5-10 minutes    Note: not billable if this call serves to triage the patient into an appointment for the relevant concern      Roddy Hernandez NP

## 2020-05-20 ENCOUNTER — HOSPITAL ENCOUNTER (OUTPATIENT)
Dept: VASCULAR SURGERY | Age: 75
Discharge: HOME | End: 2020-05-20
Attending: PHYSICIAN ASSISTANT
Payer: MEDICARE

## 2020-05-20 DIAGNOSIS — M79.662 PAIN OF LEFT LOWER LEG: ICD-10-CM

## 2020-05-20 DIAGNOSIS — M79.662 TENDERNESS OF LEFT CALF: ICD-10-CM

## 2020-05-20 DIAGNOSIS — Z96.652 PRESENCE OF LEFT ARTIFICIAL KNEE JOINT: ICD-10-CM

## 2020-05-20 PROCEDURE — 93971 EXTREMITY STUDY: CPT

## 2020-05-21 LAB
BACTERIA SPEC CULT: NORMAL
GRAM STN SPEC: NORMAL
SERVICE CMNT-IMP: NORMAL

## 2020-05-26 ENCOUNTER — PATIENT OUTREACH (OUTPATIENT)
Dept: CARDIOLOGY CLINIC | Age: 75
End: 2020-05-26

## 2020-05-26 NOTE — PROGRESS NOTES
Patient resolved from Transition of Care episode on 5/26/20. Discussed COVID-19 related testing which was available at this time. Test results were negative. Patient informed of results, if available? NA. Patient/family has been provided the following resources and education related to COVID-19:                         Signs, symptoms and red flags related to COVID-19            [facility] exposure and quarantine guidelines            [facility] exposure contact - [phone]            Contact for their local Department of Health                 Patient currently reports that the following symptoms have improved:  no new symptoms. No further outreach scheduled with this CTN/ACM/LPN/HC/ MA. Episode of Care resolved. Patient has this CTN/ACM/LPN/HC/MA contact information if future needs arise.

## 2020-06-09 RX ORDER — BENAZEPRIL HYDROCHLORIDE 20 MG/1
20 TABLET ORAL DAILY
Qty: 90 TAB | Refills: 3 | Status: SHIPPED | OUTPATIENT
Start: 2020-06-09 | End: 2020-06-11 | Stop reason: SDUPTHER

## 2020-06-11 ENCOUNTER — TELEPHONE (OUTPATIENT)
Dept: FAMILY MEDICINE CLINIC | Age: 75
End: 2020-06-11

## 2020-06-11 RX ORDER — BENAZEPRIL HYDROCHLORIDE 20 MG/1
20 TABLET ORAL 2 TIMES DAILY
Qty: 180 TAB | Refills: 3 | Status: SHIPPED | OUTPATIENT
Start: 2020-06-11 | End: 2021-04-20 | Stop reason: ALTCHOICE

## 2020-06-11 NOTE — TELEPHONE ENCOUNTER
His original lisinopril is on backorder. This is the same med but I will resend as twice a day.  [place]

## 2020-06-11 NOTE — TELEPHONE ENCOUNTER
Patient wants to clarify his new medication benazepril. Directions say to take 1 a day but when he had lisinopril he took twice a day & worked great. Why was medication changed & does he only take one benazepril a day.  Call him back

## 2020-07-14 RX ORDER — TAMSULOSIN HYDROCHLORIDE 0.4 MG/1
0.8 CAPSULE ORAL
Qty: 60 CAP | Refills: 5 | Status: SHIPPED | OUTPATIENT
Start: 2020-07-14 | End: 2020-11-29

## 2020-07-22 RX ORDER — AMLODIPINE BESYLATE 5 MG/1
TABLET ORAL
Qty: 30 TAB | Refills: 2 | Status: SHIPPED | OUTPATIENT
Start: 2020-07-22 | End: 2020-10-08

## 2020-09-29 ENCOUNTER — TRANSCRIBE ORDER (OUTPATIENT)
Dept: SCHEDULING | Age: 75
End: 2020-09-29

## 2020-09-29 DIAGNOSIS — M25.562 ACUTE PAIN OF LEFT KNEE: ICD-10-CM

## 2020-09-29 DIAGNOSIS — Z96.652 STATUS POST REVISION OF TOTAL KNEE, LEFT: Primary | ICD-10-CM

## 2020-09-29 DIAGNOSIS — Z91.81 HISTORY OF RECENT FALL: ICD-10-CM

## 2020-10-02 ENCOUNTER — HOSPITAL ENCOUNTER (OUTPATIENT)
Dept: CT IMAGING | Age: 75
Discharge: HOME | End: 2020-10-02
Attending: ORTHOPAEDIC SURGERY
Payer: MEDICARE

## 2020-10-02 DIAGNOSIS — M25.562 ACUTE PAIN OF LEFT KNEE: ICD-10-CM

## 2020-10-02 DIAGNOSIS — Z91.81 HISTORY OF RECENT FALL: ICD-10-CM

## 2020-10-02 DIAGNOSIS — Z96.652 STATUS POST REVISION OF TOTAL KNEE, LEFT: ICD-10-CM

## 2020-10-02 PROCEDURE — 73700 CT LOWER EXTREMITY W/O DYE: CPT

## 2020-10-08 ENCOUNTER — TRANSCRIBE ORDER (OUTPATIENT)
Dept: REGISTRATION | Age: 75
End: 2020-10-08

## 2020-10-08 DIAGNOSIS — Z96.652 STATUS POST LEFT PARTIAL KNEE REPLACEMENT: Primary | ICD-10-CM

## 2020-10-08 RX ORDER — AMLODIPINE BESYLATE 5 MG/1
TABLET ORAL
Qty: 30 TAB | Refills: 2 | Status: SHIPPED | OUTPATIENT
Start: 2020-10-08 | End: 2021-02-07

## 2020-10-20 ENCOUNTER — HOSPITAL ENCOUNTER (OUTPATIENT)
Dept: NUCLEAR MEDICINE | Age: 75
Discharge: HOME | End: 2020-10-20
Attending: ORTHOPAEDIC SURGERY
Payer: MEDICARE

## 2020-10-20 DIAGNOSIS — Z96.652 STATUS POST LEFT PARTIAL KNEE REPLACEMENT: ICD-10-CM

## 2020-10-20 PROCEDURE — 78300 BONE IMAGING LIMITED AREA: CPT

## 2020-10-28 RX ORDER — METFORMIN HYDROCHLORIDE 1000 MG/1
TABLET ORAL
Qty: 90 TAB | Refills: 1 | Status: SHIPPED | OUTPATIENT
Start: 2020-10-28 | End: 2021-05-17

## 2020-11-29 RX ORDER — TAMSULOSIN HYDROCHLORIDE 0.4 MG/1
0.8 CAPSULE ORAL
Qty: 60 CAP | Refills: 5 | Status: SHIPPED | OUTPATIENT
Start: 2020-11-29 | End: 2021-05-24

## 2020-12-11 RX ORDER — LISINOPRIL 20 MG/1
TABLET ORAL
Qty: 180 TAB | Refills: 3 | Status: SHIPPED | OUTPATIENT
Start: 2020-12-11 | End: 2021-11-28

## 2021-02-07 RX ORDER — AMLODIPINE BESYLATE 5 MG/1
TABLET ORAL
Qty: 90 TAB | Refills: 1 | Status: SHIPPED | OUTPATIENT
Start: 2021-02-07 | End: 2021-05-11

## 2021-04-20 ENCOUNTER — OFFICE VISIT (OUTPATIENT)
Dept: FAMILY MEDICINE CLINIC | Age: 76
End: 2021-04-20
Payer: MEDICARE

## 2021-04-20 VITALS
SYSTOLIC BLOOD PRESSURE: 138 MMHG | TEMPERATURE: 98.4 F | HEART RATE: 86 BPM | DIASTOLIC BLOOD PRESSURE: 68 MMHG | OXYGEN SATURATION: 97 % | WEIGHT: 254.6 LBS | BODY MASS INDEX: 34.48 KG/M2 | HEIGHT: 72 IN | RESPIRATION RATE: 17 BRPM

## 2021-04-20 DIAGNOSIS — Z00.00 WELL ADULT EXAM: Primary | ICD-10-CM

## 2021-04-20 DIAGNOSIS — E78.00 HYPERCHOLESTEREMIA: ICD-10-CM

## 2021-04-20 DIAGNOSIS — B18.2 CHRONIC HEPATITIS C WITHOUT HEPATIC COMA (HCC): ICD-10-CM

## 2021-04-20 DIAGNOSIS — Z12.5 SCREENING PSA (PROSTATE SPECIFIC ANTIGEN): ICD-10-CM

## 2021-04-20 DIAGNOSIS — I10 ESSENTIAL HYPERTENSION: ICD-10-CM

## 2021-04-20 DIAGNOSIS — E11.9 CONTROLLED TYPE 2 DIABETES MELLITUS WITHOUT COMPLICATION, WITHOUT LONG-TERM CURRENT USE OF INSULIN (HCC): ICD-10-CM

## 2021-04-20 DIAGNOSIS — E66.01 SEVERE OBESITY (HCC): ICD-10-CM

## 2021-04-20 PROCEDURE — G8754 DIAS BP LESS 90: HCPCS | Performed by: NURSE PRACTITIONER

## 2021-04-20 PROCEDURE — G8417 CALC BMI ABV UP PARAM F/U: HCPCS | Performed by: NURSE PRACTITIONER

## 2021-04-20 PROCEDURE — 99214 OFFICE O/P EST MOD 30 MIN: CPT | Performed by: NURSE PRACTITIONER

## 2021-04-20 PROCEDURE — G0463 HOSPITAL OUTPT CLINIC VISIT: HCPCS | Performed by: NURSE PRACTITIONER

## 2021-04-20 PROCEDURE — G0439 PPPS, SUBSEQ VISIT: HCPCS | Performed by: NURSE PRACTITIONER

## 2021-04-20 PROCEDURE — G8432 DEP SCR NOT DOC, RNG: HCPCS | Performed by: NURSE PRACTITIONER

## 2021-04-20 PROCEDURE — G8536 NO DOC ELDER MAL SCRN: HCPCS | Performed by: NURSE PRACTITIONER

## 2021-04-20 PROCEDURE — G8752 SYS BP LESS 140: HCPCS | Performed by: NURSE PRACTITIONER

## 2021-04-20 PROCEDURE — 1101F PT FALLS ASSESS-DOCD LE1/YR: CPT | Performed by: NURSE PRACTITIONER

## 2021-04-20 PROCEDURE — G8427 DOCREV CUR MEDS BY ELIG CLIN: HCPCS | Performed by: NURSE PRACTITIONER

## 2021-04-20 NOTE — PROGRESS NOTES
This is the Subsequent Medicare Annual Wellness Exam, performed 12 months or more after the Initial AWV or the last Subsequent AWV    I have reviewed the patient's medical history in detail and updated the computerized patient record. He is here today for follow up on his chronic medical conditions as well  Managed for htn, chol, and dm  Not monitoring his own bp or sugar  Has stopped going to the gym due to covid  Feels he is \"out of shape\"  No physical complaints of cp, sob, ha or edema  No bowel changes; has not seen GI for HepC exposure, was told he was fine and no additional work up needed    Assessment/Plan   Education and counseling provided:  Are appropriate based on today's review and evaluation    1. Well adult exam  -     LIPID PANEL; Future  -     CBC WITH AUTOMATED DIFF; Future  -     METABOLIC PANEL, COMPREHENSIVE; Future  2. Hypercholesteremia - stable   Labs updated  -     LIPID PANEL; Future  3. Controlled type 2 diabetes mellitus without complication, without long-term current use of insulin ([facility] [address] - stable   Labs updated   Encouraged blood sugar monitoring at home  -     HEMOGLOBIN A1C WITH EAG; Future  -     MICROALBUMIN, UR, RAND W/ MICROALB/CREAT RATIO; Future  4. Essential hypertension - stable   Labs updated  -     CBC WITH AUTOMATED DIFF; Future  -     METABOLIC PANEL, COMPREHENSIVE; Future  5. Severe obesity ([address] -  Discussed returning to the gym  6. Chronic hepatitis C without hepatic coma ([address] - work up is complete  7. Screening PSA (prostate specific antigen)   Labs updated  -     PSA SCREENING (SCREENING);  Future       Depression Risk Factor Screening     3 most recent PHQ Screens 4/20/2021   Little interest or pleasure in doing things Not at all   Feeling down, depressed, irritable, or hopeless Not at all   Total Score PHQ 2 0       Alcohol Risk Screen    Do you average more than 1 drink per night or more than 7 drinks a week: No    In the past three months have you have had more than 4 drinks containing alcohol on one occasion: No        Functional Ability and Level of Safety    Hearing: Hearing is good. Activities of Daily Living: The home contains: no safety equipment. Patient does total self care      Ambulation: with no difficulty     Fall Risk:  Fall Risk Assessment, last 12 mths 4/20/2021   Able to walk? Yes   Fall in past 12 months? 1   Do you feel unsteady? 1   Are you worried about falling 1   Is TUG test greater than 12 seconds? 0   Is the gait abnormal? 0   Number of falls in past 12 months 2      Abuse Screen:  Patient is not abused       Cognitive Screening    Has your family/caregiver stated any concerns about your memory: no     Cognitive Screening: Normal - Verbal Fluency Test    Health Maintenance Due     Health Maintenance Due   Topic Date Due    Foot Exam Q1  Never done    MICROALBUMIN Q1  Never done    Eye Exam Retinal or Dilated  Never done    COVID-19 Vaccine (1) Never done    DTaP/Tdap/Td series (1 - Tdap) Never done    Shingrix Vaccine Age 50> (1 of 2) Never done    Pneumococcal 65+ years (1 of 1 - PPSV23) Never done    Medicare Yearly Exam  Never done    Lipid Screen  03/03/2021       Patient Care Team   Patient Care Team:  Chucky Galindo NP as PCP - General (Family Medicine)  Chucky Galindo NP as PCP - Tracy Saeed Darlingled Provider    I have discussed the diagnosis with the patient and the intended plan as seen in the above orders. The patient has received an after-visit summary and questions were answered concerning future plans. Patient conveyed understanding of the plan at the time of the visit.     Lou Bautista, MSN, ANP  4/20/2021      History     Patient Active Problem List   Diagnosis Code    Severe obesity ([facility] [address] E66.01    Failed total knee arthroplasty, subsequent encounter T84.018D, Z96.659     Past Medical History:   Diagnosis Date    Atherosclerosis 02/11/2020    Diabetes ([facility] [address]     Fatty liver determined by biopsy 08/2019  Hypertension     Insomnia     Kidney stone 2019    Obesity, Class I, BMI 30-34.9     Urinary frequency     Nightime- started Tamsulosin      Past Surgical History:   Procedure Laterality Date    HX COLONOSCOPY      [facility]    HX KNEE REPLACEMENT Bilateral 2000 & 2013    HX KNEE REPLACEMENT Right 2017    Revision    HX KNEE REPLACEMENT Left 2020    Replacement    HX LITHOTRIPSY  2019     Current Outpatient Medications   Medication Sig Dispense Refill    amLODIPine (NORVASC) 5 mg tablet TAKE 1 TABLET BY MOUTH EVERY DAY 90 Tab 1    lisinopriL (PRINIVIL, ZESTRIL) 20 mg tablet TAKE 1 TABLET BY MOUTH TWICE A  Tab 3    tamsulosin (FLOMAX) 0.4 mg capsule TAKE 2 CAPS BY MOUTH DAILY (WITH DINNER). 60 Cap 5    metFORMIN (GLUCOPHAGE) 1,000 mg tablet TAKE 1/2 TABLET BY MOUTH TWICE A DAY WITH MEALS 90 Tab 1    aspirin delayed-release 81 mg tablet Take 1 Tab by mouth two (2) times a day. 60 Tab 0    acetaminophen (Tylenol Extra Strength) 500 mg tablet Take 1-2 Tabs by mouth every six (6) hours as needed for Pain. Not to exceed 4,000mg in any 24 hour period  Indications: pain 60 Tab 0    folic acid/multivit-min/lutein (CENTRUM SILVER PO) Take 1 Tab by mouth nightly as needed.  ascorbic acid, vitamin C, (Vitamin C) 500 mg tablet Take 1,000 mg by mouth daily.  simvastatin (ZOCOR) 40 mg tablet Take 40 mg by mouth nightly.  vit C/E/Zn/coppr/lutein/zeaxan (PRESERVISION AREDS-2 PO) Take 1 Cap by mouth two (2) times a day.        No Known Allergies    Family History   Problem Relation Age of Onset    Cancer Mother     Breast Cancer Mother     Colon Cancer Mother     Stroke Father     Prostate Cancer Brother     Deep Vein Thrombosis Neg Hx     Anesth Problems Neg Hx      Social History     Tobacco Use    Smoking status: Never Smoker    Smokeless tobacco: Never Used   Substance Use Topics    Alcohol use: Yes     Comment: noni Cabrera NP

## 2021-04-20 NOTE — PATIENT INSTRUCTIONS
Medicare Wellness Visit, Male The best way to live healthy is to have a lifestyle where you eat a well-balanced diet, exercise regularly, limit alcohol use, and quit all forms of tobacco/nicotine, if applicable. Regular preventive services are another way to keep healthy. Preventive services (vaccines, screening tests, monitoring & exams) can help personalize your care plan, which helps you manage your own care. Screening tests can find health problems at the earliest stages, when they are easiest to treat. [facility] follows the current, evidence-based guidelines published by the [facility] ([facility]) when recommending preventive services for our patients. Because we follow these guidelines, sometimes recommendations change over time as research supports it. (For example, a prostate screening blood test is no longer routinely recommended for men with no symptoms). Of course, you and your doctor may decide to screen more often for some diseases, based on your risk and co-morbidities (chronic disease you are already diagnosed with). Preventive services for you include: - Medicare offers their members a free annual wellness visit, which is time for you and your primary care provider to discuss and plan for your preventive service needs. Take advantage of this benefit every year! 
-All adults over age 72 should receive the recommended pneumonia vaccines. Current USPSTF guidelines recommend a series of two vaccines for the best pneumonia protection.  
-All adults should have a flu vaccine yearly and tetanus vaccine every 10 years. 
-All adults age 48 and older should receive the shingles vaccines (series of two vaccines).       
-All adults age 38-68 who are overweight should have a diabetes screening test once every three years.  
-Other screening tests & preventive services for persons with diabetes include: an eye exam to screen for diabetic retinopathy, a kidney function test, a foot exam, and stricter control over your cholesterol.  
-Cardiovascular screening for adults with routine risk involves an electrocardiogram (ECG) at intervals determined by the provider.  
-Colorectal cancer screening should be done for adults age 54-65 with no increased risk factors for colorectal cancer. There are a number of acceptable methods of screening for this type of cancer. Each test has its own benefits and drawbacks. Discuss with your provider what is most appropriate for you during your annual wellness visit. The different tests include: colonoscopy (considered the best screening method), a fecal occult blood test, a fecal DNA test, and sigmoidoscopy. 
-All adults born between [facility] should be screened once for Hepatitis C. 
-An Abdominal Aortic Aneurysm (AAA) Screening is recommended for men age 73-68 who has ever smoked in their lifetime. Here is a list of your current Health Maintenance items (your personalized list of preventive services) with a due date: 
Health Maintenance Due Topic Date Due  
 Diabetic Foot Care  Never done  Albumin Urine Test  Never done  Eye Exam  Never done  COVID-19 Vaccine (1) Never done  DTaP/Tdap/Td  (1 - Tdap) Never done  Shingles Vaccine (1 of 2) Never done  Pneumococcal Vaccine (1 of 1 - PPSV23) Never done  Annual Well Visit  Never done  Cholesterol Test   03/03/2021

## 2021-04-20 NOTE — PROGRESS NOTES
Chief Complaint   Patient presents with    Follow-up     Patient presents in office for a yearly check up. In case labs are needed, pt is fasting    Pt states both his arms have been turing purple. Pt also is unaware of his gabapentin and benazepril, what they are for and is not taking    Pt had both COvid shots, no card present. Had it done at [facility]    1. Have you been to the ER, urgent care clinic since your last visit? Hospitalized since your last visit? No    2. Have you seen or consulted any other health care providers outside of the [address] [place] [address] since your last visit? Include any pap smears or colon screening.  No

## 2021-04-21 LAB
ALBUMIN SERPL-MCNC: 3.7 G/DL (ref 3.5–5)
ALBUMIN/GLOB SERPL: 1.2 {RATIO} (ref 1.1–2.2)
ALP SERPL-CCNC: 71 U/L (ref 45–117)
ALT SERPL-CCNC: 20 U/L (ref 12–78)
ANION GAP SERPL CALC-SCNC: 6 MMOL/L (ref 5–15)
AST SERPL-CCNC: 13 U/L (ref 15–37)
BASOPHILS # BLD: 0.1 K/UL (ref 0–0.1)
BASOPHILS NFR BLD: 1 % (ref 0–1)
BILIRUB SERPL-MCNC: 0.4 MG/DL (ref 0.2–1)
BUN SERPL-MCNC: 17 MG/DL (ref 6–20)
BUN/CREAT SERPL: 17 (ref 12–20)
CALCIUM SERPL-MCNC: 8.8 MG/DL (ref 8.5–10.1)
CHLORIDE SERPL-SCNC: 106 MMOL/L (ref 97–108)
CHOLEST SERPL-MCNC: 150 MG/DL
CO2 SERPL-SCNC: 27 MMOL/L (ref 21–32)
CREAT SERPL-MCNC: 1.01 MG/DL (ref 0.7–1.3)
CREAT UR-MCNC: 133 MG/DL
DIFFERENTIAL METHOD BLD: NORMAL
EOSINOPHIL # BLD: 0 K/UL (ref 0–0.4)
EOSINOPHIL NFR BLD: 1 % (ref 0–7)
ERYTHROCYTE [DISTWIDTH] IN BLOOD BY AUTOMATED COUNT: 14 % (ref 11.5–14.5)
EST. AVERAGE GLUCOSE BLD GHB EST-MCNC: 143 MG/DL
GLOBULIN SER CALC-MCNC: 3 G/DL (ref 2–4)
GLUCOSE SERPL-MCNC: 130 MG/DL (ref 65–100)
HBA1C MFR BLD: 6.6 % (ref 4–5.6)
HCT VFR BLD AUTO: 44 % (ref 36.6–50.3)
HDLC SERPL-MCNC: 45 MG/DL
HDLC SERPL: 3.3 {RATIO} (ref 0–5)
HGB BLD-MCNC: 14.1 G/DL (ref 12.1–17)
IMM GRANULOCYTES # BLD AUTO: 0 K/UL (ref 0–0.04)
IMM GRANULOCYTES NFR BLD AUTO: 0 % (ref 0–0.5)
LDLC SERPL CALC-MCNC: 79.8 MG/DL (ref 0–100)
LIPID PROFILE,FLP: NORMAL
LYMPHOCYTES # BLD: 0.9 K/UL (ref 0.8–3.5)
LYMPHOCYTES NFR BLD: 15 % (ref 12–49)
MCH RBC QN AUTO: 30.5 PG (ref 26–34)
MCHC RBC AUTO-ENTMCNC: 32 G/DL (ref 30–36.5)
MCV RBC AUTO: 95 FL (ref 80–99)
MICROALBUMIN UR-MCNC: 2.97 MG/DL
MICROALBUMIN/CREAT UR-RTO: 22 MG/G (ref 0–30)
MONOCYTES # BLD: 0.5 K/UL (ref 0–1)
MONOCYTES NFR BLD: 9 % (ref 5–13)
NEUTS SEG # BLD: 4.6 K/UL (ref 1.8–8)
NEUTS SEG NFR BLD: 74 % (ref 32–75)
NRBC # BLD: 0 K/UL (ref 0–0.01)
NRBC BLD-RTO: 0 PER 100 WBC
PLATELET # BLD AUTO: 200 K/UL (ref 150–400)
PMV BLD AUTO: 11.5 FL (ref 8.9–12.9)
POTASSIUM SERPL-SCNC: 4.4 MMOL/L (ref 3.5–5.1)
PROT SERPL-MCNC: 6.7 G/DL (ref 6.4–8.2)
PSA SERPL-MCNC: 2 NG/ML (ref 0.01–4)
RBC # BLD AUTO: 4.63 M/UL (ref 4.1–5.7)
SODIUM SERPL-SCNC: 139 MMOL/L (ref 136–145)
TRIGL SERPL-MCNC: 126 MG/DL (ref ?–150)
VLDLC SERPL CALC-MCNC: 25.2 MG/DL
WBC # BLD AUTO: 6.2 K/UL (ref 4.1–11.1)

## 2021-05-11 RX ORDER — AMLODIPINE BESYLATE 5 MG/1
TABLET ORAL
Qty: 90 TAB | Refills: 1 | Status: SHIPPED | OUTPATIENT
Start: 2021-05-11 | End: 2021-10-29 | Stop reason: DRUGHIGH

## 2021-05-17 RX ORDER — METFORMIN HYDROCHLORIDE 1000 MG/1
TABLET ORAL
Qty: 45 TAB | Refills: 3 | Status: SHIPPED | OUTPATIENT
Start: 2021-05-17 | End: 2022-05-05 | Stop reason: SDUPTHER

## 2021-05-24 RX ORDER — TAMSULOSIN HYDROCHLORIDE 0.4 MG/1
0.8 CAPSULE ORAL
Qty: 180 CAPSULE | Refills: 1 | Status: SHIPPED | OUTPATIENT
Start: 2021-05-24 | End: 2021-11-21

## 2021-06-06 RX ORDER — SIMVASTATIN 40 MG/1
TABLET, FILM COATED ORAL
Qty: 90 TABLET | Refills: 4 | Status: SHIPPED | OUTPATIENT
Start: 2021-06-06 | End: 2022-05-05 | Stop reason: SDUPTHER

## 2021-07-08 ENCOUNTER — TRANSCRIBE ORDER (OUTPATIENT)
Dept: SCHEDULING | Age: 76
End: 2021-07-08

## 2021-07-08 DIAGNOSIS — Z96.651 S/P TOTAL KNEE REPLACEMENT, RIGHT: Primary | ICD-10-CM

## 2021-07-08 DIAGNOSIS — M19.90 ARTHRITIS: ICD-10-CM

## 2021-07-13 ENCOUNTER — HOSPITAL ENCOUNTER (OUTPATIENT)
Dept: CT IMAGING | Age: 76
Discharge: HOME | End: 2021-07-13
Attending: ORTHOPAEDIC SURGERY
Payer: MEDICARE

## 2021-07-13 DIAGNOSIS — Z96.651 S/P TOTAL KNEE REPLACEMENT, RIGHT: ICD-10-CM

## 2021-07-13 DIAGNOSIS — M19.90 ARTHRITIS: ICD-10-CM

## 2021-07-13 PROCEDURE — 73700 CT LOWER EXTREMITY W/O DYE: CPT

## 2021-10-27 ENCOUNTER — HOSPITAL ENCOUNTER (OUTPATIENT)
Dept: PREADMISSION TESTING | Age: 76
Discharge: HOME | End: 2021-10-27
Payer: MEDICARE

## 2021-10-27 VITALS
WEIGHT: 254.7 LBS | TEMPERATURE: 97.8 F | HEIGHT: 72 IN | SYSTOLIC BLOOD PRESSURE: 162 MMHG | OXYGEN SATURATION: 98 % | DIASTOLIC BLOOD PRESSURE: 77 MMHG | BODY MASS INDEX: 34.5 KG/M2 | RESPIRATION RATE: 93 BRPM

## 2021-10-27 LAB
ABO + RH BLD: NORMAL
ALBUMIN SERPL-MCNC: 3.7 G/DL (ref 3.5–5)
ALBUMIN/GLOB SERPL: 1.2 {RATIO} (ref 1.1–2.2)
ALP SERPL-CCNC: 74 U/L (ref 45–117)
ALT SERPL-CCNC: 22 U/L (ref 12–78)
ANION GAP SERPL CALC-SCNC: 7 MMOL/L (ref 5–15)
APPEARANCE UR: CLEAR
AST SERPL-CCNC: 10 U/L (ref 15–37)
ATRIAL RATE: 85 BPM
BACTERIA URNS QL MICRO: NEGATIVE /HPF
BASOPHILS # BLD: 0 K/UL (ref 0–0.1)
BASOPHILS NFR BLD: 1 % (ref 0–1)
BILIRUB SERPL-MCNC: 0.4 MG/DL (ref 0.2–1)
BILIRUB UR QL: NEGATIVE
BLOOD GROUP ANTIBODIES SERPL: NORMAL
BUN SERPL-MCNC: 18 MG/DL (ref 6–20)
BUN/CREAT SERPL: 16 (ref 12–20)
CALCIUM SERPL-MCNC: 9 MG/DL (ref 8.5–10.1)
CALCULATED P AXIS, ECG09: 64 DEGREES
CALCULATED R AXIS, ECG10: 28 DEGREES
CALCULATED T AXIS, ECG11: 41 DEGREES
CHLORIDE SERPL-SCNC: 107 MMOL/L (ref 97–108)
CO2 SERPL-SCNC: 26 MMOL/L (ref 21–32)
COLOR UR: ABNORMAL
CREAT SERPL-MCNC: 1.16 MG/DL (ref 0.7–1.3)
CRP SERPL-MCNC: <0.29 MG/DL (ref 0–0.6)
DIAGNOSIS, 93000: NORMAL
DIFFERENTIAL METHOD BLD: NORMAL
EOSINOPHIL # BLD: 0 K/UL (ref 0–0.4)
EOSINOPHIL NFR BLD: 1 % (ref 0–7)
EPITH CASTS URNS QL MICRO: ABNORMAL /LPF
ERYTHROCYTE [DISTWIDTH] IN BLOOD BY AUTOMATED COUNT: 13.4 % (ref 11.5–14.5)
ERYTHROCYTE [SEDIMENTATION RATE] IN BLOOD: 7 MM/HR (ref 0–20)
EST. AVERAGE GLUCOSE BLD GHB EST-MCNC: 143 MG/DL
GLOBULIN SER CALC-MCNC: 3.2 G/DL (ref 2–4)
GLUCOSE SERPL-MCNC: 165 MG/DL (ref 65–100)
GLUCOSE UR STRIP.AUTO-MCNC: NEGATIVE MG/DL
HBA1C MFR BLD: 6.6 % (ref 4–5.6)
HCT VFR BLD AUTO: 42.5 % (ref 36.6–50.3)
HGB BLD-MCNC: 14.2 G/DL (ref 12.1–17)
HGB UR QL STRIP: NEGATIVE
IMM GRANULOCYTES # BLD AUTO: 0 K/UL (ref 0–0.04)
IMM GRANULOCYTES NFR BLD AUTO: 0 % (ref 0–0.5)
KETONES UR QL STRIP.AUTO: ABNORMAL MG/DL
LEUKOCYTE ESTERASE UR QL STRIP.AUTO: ABNORMAL
LYMPHOCYTES # BLD: 1 K/UL (ref 0.8–3.5)
LYMPHOCYTES NFR BLD: 14 % (ref 12–49)
MCH RBC QN AUTO: 31.1 PG (ref 26–34)
MCHC RBC AUTO-ENTMCNC: 33.4 G/DL (ref 30–36.5)
MCV RBC AUTO: 93 FL (ref 80–99)
MONOCYTES # BLD: 0.6 K/UL (ref 0–1)
MONOCYTES NFR BLD: 9 % (ref 5–13)
NEUTS SEG # BLD: 5.3 K/UL (ref 1.8–8)
NEUTS SEG NFR BLD: 75 % (ref 32–75)
NITRITE UR QL STRIP.AUTO: NEGATIVE
NRBC # BLD: 0 K/UL (ref 0–0.01)
NRBC BLD-RTO: 0 PER 100 WBC
P-R INTERVAL, ECG05: 186 MS
PH UR STRIP: 5.5 [PH] (ref 5–8)
PLATELET # BLD AUTO: 202 K/UL (ref 150–400)
PMV BLD AUTO: 10.4 FL (ref 8.9–12.9)
POTASSIUM SERPL-SCNC: 4.6 MMOL/L (ref 3.5–5.1)
PROT SERPL-MCNC: 6.9 G/DL (ref 6.4–8.2)
PROT UR STRIP-MCNC: NEGATIVE MG/DL
Q-T INTERVAL, ECG07: 354 MS
QRS DURATION, ECG06: 92 MS
QTC CALCULATION (BEZET), ECG08: 421 MS
RBC # BLD AUTO: 4.57 M/UL (ref 4.1–5.7)
RBC #/AREA URNS HPF: ABNORMAL /HPF (ref 0–5)
SODIUM SERPL-SCNC: 140 MMOL/L (ref 136–145)
SP GR UR REFRACTOMETRY: 1.02 (ref 1–1.03)
SPECIMEN EXP DATE BLD: NORMAL
UA: UC IF INDICATED,UAUC: ABNORMAL
UROBILINOGEN UR QL STRIP.AUTO: 0.2 EU/DL (ref 0.2–1)
VENTRICULAR RATE, ECG03: 85 BPM
WBC # BLD AUTO: 7 K/UL (ref 4.1–11.1)
WBC URNS QL MICRO: ABNORMAL /HPF (ref 0–4)

## 2021-10-27 PROCEDURE — 80053 COMPREHEN METABOLIC PANEL: CPT

## 2021-10-27 PROCEDURE — 81001 URINALYSIS AUTO W/SCOPE: CPT

## 2021-10-27 PROCEDURE — 93005 ELECTROCARDIOGRAM TRACING: CPT

## 2021-10-27 PROCEDURE — 83036 HEMOGLOBIN GLYCOSYLATED A1C: CPT

## 2021-10-27 PROCEDURE — 86140 C-REACTIVE PROTEIN: CPT

## 2021-10-27 PROCEDURE — 85025 COMPLETE CBC W/AUTO DIFF WBC: CPT

## 2021-10-27 PROCEDURE — 86901 BLOOD TYPING SEROLOGIC RH(D): CPT

## 2021-10-27 PROCEDURE — 36415 COLL VENOUS BLD VENIPUNCTURE: CPT

## 2021-10-27 PROCEDURE — 84466 ASSAY OF TRANSFERRIN: CPT

## 2021-10-27 PROCEDURE — 85652 RBC SED RATE AUTOMATED: CPT

## 2021-10-27 RX ORDER — DICLOFENAC SODIUM 10 MG/G
GEL TOPICAL
COMMUNITY
End: 2022-07-05

## 2021-10-27 NOTE — PERIOP NOTES
[address] [facility] [address], [address] [facility]   MAIN OR                                  [phone]   MAIN PRE OP                          [phone]                                                                                AMBULATORY PRE OP          [phone]  PRE-ADMISSION TESTING    [phone]   Surgery Date:  Monday 11/8/21        Is surgery arrival time given by surgeon? NO  If NO, [address] [facility] staff will call you between 3 and 7pm the day before your surgery with your arrival time. (If your surgery is on a Monday, we will call you the Friday before.)    Call [phone] after 7pm Monday-Friday if you did not receive this call. INSTRUCTIONS BEFORE YOUR SURGERY   When You  Arrive Arrive at the 2[address] [facility] on the day of your surgery  Have your insurance card, photo ID, and any copayment (if needed)   Food   and   Drink NO food or drink after midnight the night before surgery    This means NO water, gum, mints, coffee, juice, etc.  No alcohol (beer, wine, liquor) 24 hours before and after surgery   Medications to   TAKE   Morning of Surgery MEDICATIONS TO TAKE THE MORNING OF SURGERY WITH A SIP OF WATER:    Amlodipine    Preservision   Tylenol    Contact your primary care provider to ask what she wants you to do with your evening before and day of surgery dose of metformin    Medications  To  STOP      7 days before surgery  Non-Steroidal anti-inflammatory Drugs (NSAID's): for example, Ibuprofen (Advil, Motrin), Naproxen (Aleve)   Aspirin, if taking for pain    Herbal supplements, vitamins, and fish oil   Other:  (Pain medications not listed above, including Tylenol may be taken)   Blood  Thinners  If you take  Aspirin, Plavix, Coumadin, or any blood-thinning or anti-blood clot medicine, talk to the doctor who prescribed the medications for pre-operative instructions.    Bathing Clothing  Jewelry  Valuables      If you shower the morning of surgery, please do not apply anything to your skin (lotions, powders, deodorant, or makeup, especially mascara)   Follow Chlorhexidine Care Fusion body wash instructions provided to you during PAT appointment. Begin 3 days prior to surgery.  Do not shave or trim anywhere 24 hours before surgery   Wear your hair loose or down; no pony-tails, buns, or metal hair clips   Wear loose, comfortable, clean clothes   Wear glasses instead of contacts  [employer] money, valuables, and jewelry, including body piercings, at home   If you were given an Mindflash, bring it on day of surgery. Going Home - or Spending the Night  SAME-DAY SURGERY: You must have a responsible adult drive you home and stay with you 24 hours after surgery   ADMITS: If your doctor is keeping you in the hospital after surgery, leave personal belongings/luggage in your car until you have a hospital room number. Hospital discharge time is 12 noon  Drivers must be here before 12 noon unless you are told differently   Special Instructions It is now mandated that all surgical patients be tested for COVID-19 prior to surgery. Testing has to be exactly 4 days prior to surgery. Your COVID test date is Thursday 11/4/21  between 8:00 am and 11:00 am.       COVID testing will be performed curbside at the [facility] [address]. There will be signs leading you to the testing site. You will need to bring a photo ID with you to be swabbed. Patients are advised to self-quarantine at home after testing and prior to your surgery date. You will be notified if your results are positive.     What to watch for:   Coronavirus (COVID-19) affects different people in different ways   It also appears with a wide range of symptoms from mild to severe   Signs usually appear 2-14 days after exposure     If you develop any of the following, notify your doctor immediately:  o Fever  o Chills, with or without a shiver  o Muscle pain  o Headache  o Sore throat  o Dry cough  o New loss of taste or smell  o Tiredness      If you develop any of the following, call 668:  o Shortness of breath  o Difficulty breathing  o Chest pain  o New confusion  o Blueness of fingers and/or lips       Follow all instructions so your surgery wont be cancelled. Please, be on time. If a situation occurs and you are delayed the day of surgery, call  [phone]. If your physical condition changes (like a fever, cold, flu, etc.) call your surgeon. Home medication(s) reviewed and verified via   LIST   VERBAL   during PAT appointment. The patient was contacted by    IN-PERSON  The patient verbalizes understanding of all instructions and DOES NOT   need reinforcement.

## 2021-10-27 NOTE — PERIOP NOTES
BP at start of visit 163/78 and at end of visit 162/77. Requested pt to take bp at home x 3 days and call us with readings to see if bp normalizes while at home. Pt states he was very nervous about being here and having surgery again. Explained this was normal and often bps improved when at home.  Reviewed office phone number for pt to call with readings

## 2021-10-27 NOTE — H&P
History and Physical    Patient: Lalo Gray MRN: [id number]  SSN: [id number]    YOB: 1945  Age: 64 y.o. Sex: male      CC: Right leg pain    Subjective: Lalo Gray is a 64 y.o. male referred for pre-operative evaluation by Dr. Mariano Dobbs for surgery on 11/8/21. Geena Gaston notes his pain has been present for about a year. He had a right knee replacement in 2018. He has pain with walking. He has leg weakness. He had a fall within the last 3 months where he tripped over something. He notes his CT shows new bone formation distal sahaf of the femur as well as the proximal posterior margia of tiba, subluxation of the patella. Muslcular atrophy. The patient was evaluated in the surgeon's office and it was determined that the most appropriate plan of care is to proceed with surgical intervention.   Patient's PCP Vladislav Templeton NP    Past Medical History:   Diagnosis Date    Atherosclerosis 02/11/2020    Diabetes ([facility] [address]     Fatty liver determined by biopsy 08/2019    Hypertension     Insomnia     Kidney stone 2019 & 2021    Obesity, Class I, BMI 30-34.9     Urinary frequency     Nightime- started Tamsulosin     Past Surgical History:   Procedure Laterality Date    [facility]      [facility]    HX KNEE REPLACEMENT Bilateral 2000 & 2013    HX KNEE REPLACEMENT Right 2017    Revision    HX KNEE REPLACEMENT Left 2020    Replacement    HX LITHOTRIPSY  2019    IR BX LIVER PERCUTANEOUS  2019    Normal      Family History   Problem Relation Age of Onset    Cancer Mother     Breast Cancer Mother     Colon Cancer Mother     Stroke Father     Prostate Cancer Brother     Deep Vein Thrombosis Neg Hx     Anesth Problems Neg Hx      Social History     Tobacco Use    Smoking status: Never Smoker    Smokeless tobacco: Never Used   Vaping Use    Vaping Use: Never used   Substance Use Topics    Alcohol use: Yes     Comment: rare    Drug use: Never      Prior to Admission medications    Medication Sig Start Date End Date Taking? Authorizing Provider   diclofenac (Voltaren) 1 % gel Apply  to affected area four (4) times daily as needed for Pain. Yes Provider, Historical   simvastatin (ZOCOR) 40 mg tablet TAKE 1 TABLET BY MOUTH EVERY DAY IN THE EVENING 6/6/21  Yes Yulisa Gutierrez NP   tamsulosin (FLOMAX) 0.4 mg capsule TAKE 2 CAPS BY MOUTH DAILY (WITH DINNER). 5/24/21  Yes Yulisa Gutierrez NP   metFORMIN (GLUCOPHAGE) 1,000 mg tablet TAKE 1/2 TABLET BY MOUTH WITH FOOD DAILY 5/17/21  Yes Yulisa Gutierrez NP   lisinopriL (PRINIVIL, ZESTRIL) 20 mg tablet TAKE 1 TABLET BY MOUTH TWICE A DAY 12/11/20  Yes Yulisa Gutierrez NP   aspirin delayed-release 81 mg tablet Take 1 Tab by mouth two (2) times a day. 5/7/20  Yes Joaquin Crain MD   acetaminophen (Tylenol Extra Strength) 500 mg tablet Take 1-2 Tabs by mouth every six (6) hours as needed for Pain. Not to exceed 4,000mg in any 24 hour period  Indications: pain 5/7/20  Yes Joaquin Crain MD   folic acid/multivit-min/lutein (CENTRUM SILVER PO) Take 1 Tab by mouth nightly as needed. Yes Provider, Historical   ascorbic acid, vitamin C, (Vitamin C) 500 mg tablet Take 1,000 mg by mouth daily. Yes Provider, Historical   vit C/E/Zn/coppr/lutein/zeaxan (PRESERVISION AREDS-2 PO) Take 1 Cap by mouth two (2) times a day. Yes Provider, Historical   amLODIPine (NORVASC) 10 mg tablet Take 1 Tablet by mouth daily. 10/29/21   Dimitrios Powell NP   hydroCHLOROthiazide (HYDRODIURIL) 12.5 mg tablet Take 1 Tablet by mouth daily. 10/29/21   Wes Wood NP        No Known Allergies    Review of Systems:  Constitutional: Negative for chills and fever  HENT: Negative for congestion and sore throat  Eyes: negative for blurred vision and double vision  Respiratory: Negative for cough, shortness of breath and wheezing  Mouth: Negative for loose, broken or chipped teeth.    Cardiovascular: Negative for chest pain and palpitations  Gastrointestinal: Negative for abdominal pain, constipation, diarrhea and nausea  Genitourinary: Negative for dysuria and hematuria  Musculoskeletal: Right knee pain  Skin: Negative for rash, open wounds.    Neurological: Negative for dizziness, tremors and headaches  Psychiatric: Negative for anxiety    Objective:     Vitals:    10/27/21 0804 10/27/21 0844   BP: (!) 163/78 (!) 162/77   Resp: (!) 93    Temp: 97.8 °F (36.6 °C)    SpO2: 98%    Weight: 115.5 kg (254 lb 11.2 oz)    Height: 6' (1.829 m)         Physical Exam:  General Appearance: Alert, Well Appearing and in no distress  Mental Status: Normal mood, behavior, speech and dress  Neck: Normal appearance externally  Ears: External canal no drainage  Nose: Normal external appearance and no drainage   Chest: Clear to auscultation, no wheezes, rales or rhonchi  Heart: Normal rate, regular rhythm, no murmurs, rubs, clicks or gallops  Skin: Normal color, no lesions externally  Abdomen: Not examined  Neuro: Not examined  Musculoskeletal: Gait antalgic    Recent Results (from the past 168 hour(s))   TYPE & SCREEN    Collection Time: 10/27/21  8:53 AM   Result Value Ref Range    Crossmatch Expiration 11/10/2021,2359     ABO/Rh(D) A POSITIVE     Antibody screen NEG    C REACTIVE PROTEIN, QT    Collection Time: 10/27/21  8:53 AM   Result Value Ref Range    C-Reactive protein <0.29 0.00 - 0.60 mg/dL   CBC WITH AUTOMATED DIFF    Collection Time: 10/27/21  8:53 AM   Result Value Ref Range    WBC 7.0 4.1 - 11.1 K/uL    RBC 4.57 4.10 - 5.70 M/uL    HGB 14.2 12.1 - 17.0 g/dL    HCT 42.5 36.6 - 50.3 %    MCV 93.0 80.0 - 99.0 FL    MCH 31.1 26.0 - 34.0 PG    MCHC 33.4 30.0 - 36.5 g/dL    RDW 13.4 11.5 - 14.5 %    PLATELET 822 230 - 727 K/uL    MPV 10.4 8.9 - 12.9 FL    NRBC 0.0 0  WBC    ABSOLUTE NRBC 0.00 0.00 - 0.01 K/uL    NEUTROPHILS 75 32 - 75 %    LYMPHOCYTES 14 12 - 49 %    MONOCYTES 9 5 - 13 %    EOSINOPHILS 1 0 - 7 %    BASOPHILS 1 0 - 1 % IMMATURE GRANULOCYTES 0 0.0 - 0.5 %    ABS. NEUTROPHILS 5.3 1.8 - 8.0 K/UL    ABS. LYMPHOCYTES 1.0 0.8 - 3.5 K/UL    ABS. MONOCYTES 0.6 0.0 - 1.0 K/UL    ABS. EOSINOPHILS 0.0 0.0 - 0.4 K/UL    ABS. BASOPHILS 0.0 0.0 - 0.1 K/UL    ABS. IMM. GRANS. 0.0 0.00 - 0.04 K/UL    DF AUTOMATED     METABOLIC PANEL, COMPREHENSIVE    Collection Time: 10/27/21  8:53 AM   Result Value Ref Range    Sodium 140 136 - 145 mmol/L    Potassium 4.6 3.5 - 5.1 mmol/L    Chloride 107 97 - 108 mmol/L    CO2 26 21 - 32 mmol/L    Anion gap 7 5 - 15 mmol/L    Glucose 165 (H) 65 - 100 mg/dL    BUN 18 6 - 20 MG/DL    Creatinine 1.16 0.70 - 1.30 MG/DL    BUN/Creatinine ratio 16 12 - 20      GFR est AA >60 >60 ml/min/1.73m2    GFR est non-AA >60 >60 ml/min/1.73m2    Calcium 9.0 8.5 - 10.1 MG/DL    Bilirubin, total 0.4 0.2 - 1.0 MG/DL    ALT (SGPT) 22 12 - 78 U/L    AST (SGOT) 10 (L) 15 - 37 U/L    Alk. phosphatase 74 45 - 117 U/L    Protein, total 6.9 6.4 - 8.2 g/dL    Albumin 3.7 3.5 - 5.0 g/dL    Globulin 3.2 2.0 - 4.0 g/dL    A-G Ratio 1.2 1.1 - 2.2     HEMOGLOBIN A1C WITH EAG    Collection Time: 10/27/21  8:53 AM   Result Value Ref Range    Hemoglobin A1c 6.6 (H) 4.0 - 5.6 %    Est. average glucose 143 mg/dL   CULTURE, MRSA    Collection Time: 10/27/21  8:53 AM    Specimen: Nares; Nasal   Result Value Ref Range    Special Requests: NO SPECIAL REQUESTS      Culture result: MRSA NOT PRESENT      Culture result:        Screening of patient nares for MRSA is for surveillance purposes and, if positive, to facilitate isolation considerations in high risk settings. It is not intended for automatic decolonization interventions per se as regimens are not sufficiently effective to warrant routine use.    SED RATE (ESR)    Collection Time: 10/27/21  8:53 AM   Result Value Ref Range    Sed rate, automated 7 0 - 20 mm/hr   TRANSFERRIN    Collection Time: 10/27/21  8:53 AM   Result Value Ref Range    Transferrin 262 177 - 329 mg/dL   URINALYSIS W/ REFLEX CULTURE    Collection Time: 10/27/21  8:53 AM    Specimen: Urine   Result Value Ref Range    Color YELLOW/STRAW      Appearance CLEAR CLEAR      Specific gravity 1.025 1.003 - 1.030      pH (UA) 5.5 5.0 - 8.0      Protein Negative NEG mg/dL    Glucose Negative NEG mg/dL    Ketone TRACE (A) NEG mg/dL    Bilirubin Negative NEG      Blood Negative NEG      Urobilinogen 0.2 0.2 - 1.0 EU/dL    Nitrites Negative NEG      Leukocyte Esterase SMALL (A) NEG      WBC 0-4 0 - 4 /hpf    RBC 0-5 0 - 5 /hpf    Epithelial cells FEW FEW /lpf    Bacteria Negative NEG /hpf    UA:UC IF INDICATED CULTURE NOT INDICATED BY UA RESULT CNI     EKG, 12 LEAD, INITIAL    Collection Time: 10/27/21  9:20 AM   Result Value Ref Range    Ventricular Rate 85 BPM    Atrial Rate 85 BPM    P-R Interval 186 ms    QRS Duration 92 ms    Q-T Interval 354 ms    QTC Calculation (Bezet) 421 ms    Calculated P Axis 64 degrees    Calculated R Axis 28 degrees    Calculated T Axis 41 degrees    Diagnosis       Normal sinus rhythm  Normal ekg  When compared with ECG of 04-MAY-2020 14:47,  No significant change was found  Confirmed by Stephan Score ([id number]) on 10/27/2021 4:08:48 PM           Assessment:     Right knee pain  Pre-Operative Evaluation    Plan:     Right femoral revision with lateral release  Labs and EKG reviewed.    MRSA negative      Signed By: Ada Maravilla NP     November 1, 2021

## 2021-10-28 LAB
BACTERIA SPEC CULT: NORMAL
BACTERIA SPEC CULT: NORMAL
SERVICE CMNT-IMP: NORMAL
TRANSFERRIN SERPL-MCNC: 262 MG/DL (ref 177–329)

## 2021-10-28 NOTE — PROGRESS NOTES
Returned pt's call. Pt states his bp today is 160/80, pt is very anxious about surgery potentially getting cancelled. Reminded pt to obtain bp's over several days and then call on Monday. Pt fixating on the diastolic number and asking if his surgery will be cancelled with this blood pressure reading. Informed pt that anesthesia makes that decision. Informed him  collecting these bp readings allows time to make changes in his bp medications if needed that would lower his bp readings before surgery. Encouraged pt to rest/relax as much as possible over the weekend. If bp readings were more elevated than todays readings then to be seen at pt first or the ER. Pt states he will call on Monday, that he is just very stressed about this surgery.  Encouraged pt to call the primary care provider's office if needed

## 2021-10-29 ENCOUNTER — OFFICE VISIT (OUTPATIENT)
Dept: FAMILY MEDICINE CLINIC | Age: 76
End: 2021-10-29
Payer: MEDICARE

## 2021-10-29 VITALS
WEIGHT: 252 LBS | HEIGHT: 73 IN | OXYGEN SATURATION: 98 % | BODY MASS INDEX: 33.4 KG/M2 | HEART RATE: 109 BPM | SYSTOLIC BLOOD PRESSURE: 164 MMHG | RESPIRATION RATE: 16 BRPM | TEMPERATURE: 98.9 F | DIASTOLIC BLOOD PRESSURE: 91 MMHG

## 2021-10-29 DIAGNOSIS — M25.561 CHRONIC PAIN OF RIGHT KNEE: ICD-10-CM

## 2021-10-29 DIAGNOSIS — T84.018D FAILED TOTAL KNEE ARTHROPLASTY, SUBSEQUENT ENCOUNTER: ICD-10-CM

## 2021-10-29 DIAGNOSIS — I10 ESSENTIAL HYPERTENSION: Primary | ICD-10-CM

## 2021-10-29 DIAGNOSIS — Z96.659 FAILED TOTAL KNEE ARTHROPLASTY, SUBSEQUENT ENCOUNTER: ICD-10-CM

## 2021-10-29 DIAGNOSIS — G89.29 CHRONIC PAIN OF RIGHT KNEE: ICD-10-CM

## 2021-10-29 PROBLEM — E11.9 TYPE 2 DIABETES MELLITUS (HCC): Status: ACTIVE | Noted: 2021-10-29

## 2021-10-29 PROCEDURE — G8536 NO DOC ELDER MAL SCRN: HCPCS | Performed by: NURSE PRACTITIONER

## 2021-10-29 PROCEDURE — 99214 OFFICE O/P EST MOD 30 MIN: CPT | Performed by: NURSE PRACTITIONER

## 2021-10-29 PROCEDURE — G8432 DEP SCR NOT DOC, RNG: HCPCS | Performed by: NURSE PRACTITIONER

## 2021-10-29 PROCEDURE — G8417 CALC BMI ABV UP PARAM F/U: HCPCS | Performed by: NURSE PRACTITIONER

## 2021-10-29 PROCEDURE — G8427 DOCREV CUR MEDS BY ELIG CLIN: HCPCS | Performed by: NURSE PRACTITIONER

## 2021-10-29 PROCEDURE — G0463 HOSPITAL OUTPT CLINIC VISIT: HCPCS | Performed by: NURSE PRACTITIONER

## 2021-10-29 PROCEDURE — G8755 DIAS BP > OR = 90: HCPCS | Performed by: NURSE PRACTITIONER

## 2021-10-29 PROCEDURE — 1101F PT FALLS ASSESS-DOCD LE1/YR: CPT | Performed by: NURSE PRACTITIONER

## 2021-10-29 PROCEDURE — G8753 SYS BP > OR = 140: HCPCS | Performed by: NURSE PRACTITIONER

## 2021-10-29 RX ORDER — AMLODIPINE BESYLATE 10 MG/1
5 TABLET ORAL DAILY
Qty: 30 TABLET | Refills: 1 | Status: CANCELLED | OUTPATIENT
Start: 2021-10-29

## 2021-10-29 RX ORDER — HYDROCHLOROTHIAZIDE 12.5 MG/1
12.5 TABLET ORAL DAILY
Qty: 90 TABLET | Refills: 0 | Status: SHIPPED | OUTPATIENT
Start: 2021-10-29 | End: 2022-01-18

## 2021-10-29 RX ORDER — AMLODIPINE BESYLATE 10 MG/1
10 TABLET ORAL DAILY
Qty: 90 TABLET | Refills: 0 | Status: SHIPPED | OUTPATIENT
Start: 2021-10-29 | End: 2022-01-20

## 2021-10-29 NOTE — PERIOP NOTES
Patient phoned and notified me that he went to see his PCP today for BP check and BP medication adjustment. Patient stated he was instructed to take Amlodipine 10 mg every AM and HCTZ 12.5mg was added to his morning dose of medications. Patient instructed to take the Amlodipine the morning of surgery with a sip of water,no HCTZ the morning of surgery and no PM dose of Lisinopril the evening before surgery. Patient verbalized understanding regarding the medications. Patient states he is to return to PCP 11/1/2021 for repeat BP check after starting ne BP medication regimen.

## 2021-10-29 NOTE — PROGRESS NOTES
Subjective: Ton Kolb is a 64 y.o. male who presents for follow up of hypertension. Patient notes at pre-op appt earlier this week for upcoming knee replacement surgery scheduled on 11/8 at [facility] with Dr. Ermias Munguia, was noted to have 's over 70's and was advised that this is too high for surgery. F/u with pcp was recommended. He report good compliance with lisinopril 20 mg BID and amlodipine 20 mg daily. Has been waiting for his surgery since July and wants to make sure BP is controlled so that it can proceed as planned. Diet and Lifestyle: generally follows a low fat low cholesterol diet, generally follows a low sodium diet, sedentary, nonsmoker  Home BP Monitoring: is not well controlled at home for the past week, ranging 160's/70's. He brought his home cuff today and it correlated well with our machine. Cardiovascular ROS: taking medications as instructed, no medication side effects noted, no TIA's, no chest pain on exertion, no dyspnea on exertion, no swelling of ankles, no orthostatic dizziness or lightheadedness, no orthopnea or paroxysmal nocturnal dyspnea, no palpitations, no intermittent claudication symptoms. New concerns: none.      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, Z96.659    Type 2 diabetes mellitus E11.9     No Known Allergies  Past Medical History:   Diagnosis Date    Atherosclerosis 02/11/2020    Diabetes ([address]     Fatty liver determined by biopsy 08/2019    Hypertension     Insomnia     Kidney stone 2019 & 2021    Obesity, Class I, BMI 30-34.9     Urinary frequency     Nightime- started Tamsulosin     Past Surgical History:   Procedure Laterality Date    [facility]    HX KNEE REPLACEMENT Bilateral 2000 & 2013    HX KNEE REPLACEMENT Right 2017    Revision    HX KNEE REPLACEMENT Left 2020    Replacement    HX LITHOTRIPSY 2019    IR BX LIVER PERCUTANEOUS  2019    Normal     Family History   Problem Relation Age of Onset    Cancer Mother     Breast Cancer Mother     Colon Cancer Mother     Stroke Father     Prostate Cancer Brother     Deep Vein Thrombosis Neg Hx     Anesth Problems Neg Hx      Social History     Tobacco Use    Smoking status: Never Smoker    Smokeless tobacco: Never Used   Substance Use Topics    Alcohol use: Yes     Comment: rare        Lab Results   Component Value Date/Time    WBC 7.0 10/27/2021 08:53 AM    HGB 14.2 10/27/2021 08:53 AM    HCT 42.5 10/27/2021 08:53 AM    PLATELET 017 11/67/5787 08:53 AM    MCV 93.0 10/27/2021 08:53 AM     Lab Results   Component Value Date/Time    Hemoglobin A1c 6.6 (H) 10/27/2021 08:53 AM    Hemoglobin A1c 6.6 (H) 04/20/2021 10:46 AM    Hemoglobin A1c 6.8 (H) 05/04/2020 02:27 PM    Glucose 165 (H) 10/27/2021 08:53 AM    Glucose (POC) 140 (H) 05/09/2020 06:43 AM    Microalbumin/Creat ratio (mg/g creat) 22 04/20/2021 10:46 AM    Microalbumin,urine random 2.97 04/20/2021 10:46 AM    LDL, calculated 79.8 04/20/2021 10:46 AM    Creatinine 1.16 10/27/2021 08:53 AM      Lab Results   Component Value Date/Time    Cholesterol, total 150 04/20/2021 10:46 AM    HDL Cholesterol 45 04/20/2021 10:46 AM    LDL, calculated 79.8 04/20/2021 10:46 AM    Triglyceride 126 04/20/2021 10:46 AM    CHOL/HDL Ratio 3.3 04/20/2021 10:46 AM     Lab Results   Component Value Date/Time    ALT (SGPT) 22 10/27/2021 08:53 AM    Alk.  phosphatase 74 10/27/2021 08:53 AM    Bilirubin, total 0.4 10/27/2021 08:53 AM    Albumin 3.7 10/27/2021 08:53 AM    Protein, total 6.9 10/27/2021 08:53 AM    PLATELET 408 66/52/2022 08:53 AM     Lab Results   Component Value Date/Time    GFR est non-AA >60 10/27/2021 08:53 AM    GFR est AA >60 10/27/2021 08:53 AM    Creatinine 1.16 10/27/2021 08:53 AM    BUN 18 10/27/2021 08:53 AM    Sodium 140 10/27/2021 08:53 AM    Potassium 4.6 10/27/2021 08:53 AM    Chloride 107 10/27/2021 08:53 AM    CO2 26 10/27/2021 08:53 AM     No results found for: TSH, TSH2, TSH3, TSHP, TSHELE, TSHEXT, TT3, T3U, T3UP, FRT3, FT3, FT4, FT4P, T4, T4P, FT4T, TT7, TSHEXT, TSHEXT      Review of Systems, additional:  A comprehensive review of systems was negative except for that written in the HPI. Objective:     Visit Vitals  BP (!) 164/91 (BP 1 Location: Right arm, BP Patient Position: Sitting, BP Cuff Size: Adult)   Pulse (!) 109   Temp 98.9 °F (37.2 °C) (Oral)   Resp 16   Ht 6' 1\" (1.854 m)   Wt 252 lb (114.3 kg)   SpO2 98%   BMI 33.25 kg/m²     Physical Examination: General appearance - alert, well appearing, and in no distress and oriented to person, place, and time  Mental status - normal mood, behavior, speech, dress, motor activity, and thought processes  Eyes - sclera anicteric  Neck - supple, no significant adenopathy, thyroid exam: thyroid is normal in size without nodules or tenderness  Chest - clear to auscultation, no wheezes, rales or rhonchi, symmetric air entry  Heart - normal rate, regular rhythm, normal S1, S2, no murmurs, rubs, clicks or gallops, normal bilateral carotid upstroke without bruits, no JVD  Abdomen - soft, nontender, nondistended, no masses or organomegaly  bowel sounds normal  Neurological - alert, oriented, normal speech, no focal findings or movement disorder noted  Extremities - no pedal edema noted, intact peripheral pulses, no edema, redness or tenderness in the calves or thighs  Skin - normal coloration and turgor, no rashes    Assessment/Plan:       reviewed diet, exercise and weight control  copy of written low fat low cholesterol diet provided and reviewed  cardiovascular risk and specific lipid/LDL goals reviewed  reviewed medications and side effects in detail  reviewed potential future medication changes and side effects. Diagnoses and all orders for this visit:    1.  Essential hypertension  Above goal, needs to achieve control quickly so that he can proceed with upcoming surgery, which he has been anxiously awaiting since July  Will increase amlodipine to 10 mg daily and add small dose of hctz  Continue lisinopril 20 mg BID  Low sodium diet recommended   F/u on Monday- appt scheduled for patient with Paula Alvarez PAC  -     amLODIPine (NORVASC) 10 mg tablet; Take 1 Tablet by mouth daily. -     hydroCHLOROthiazide (HYDRODIURIL) 12.5 mg tablet; Take 1 Tablet by mouth daily. 2. Chronic right knee pain  3. Failed total knee replacement  Revision is planned 11/8    Follow-up and Dispositions    · Return in about 3 days (around 11/1/2021) for blood pressure. I have discussed the diagnosis with the patient and the intended plan as seen in the above orders. The patient has received an after-visit summary and questions were answered concerning future plans. Patient conveyed understanding of the plan at the time of the visit.     Steve Turpin NP  10/29/2021

## 2021-10-29 NOTE — PROGRESS NOTES
Daquan Gregory is a 64 y.o. male    Chief Complaint   Patient presents with    Blood Pressure Check    Follow-up     1. Have you been to the ER, urgent care clinic since your last visit? Hospitalized since your last visit? No    2. Have you seen or consulted any other health care providers outside of the [address] [place] [address] since your last visit? Include any pap smears or colon screening.  No

## 2021-11-01 ENCOUNTER — OFFICE VISIT (OUTPATIENT)
Dept: FAMILY MEDICINE CLINIC | Age: 76
End: 2021-11-01
Payer: MEDICARE

## 2021-11-01 VITALS
SYSTOLIC BLOOD PRESSURE: 121 MMHG | DIASTOLIC BLOOD PRESSURE: 69 MMHG | BODY MASS INDEX: 33.53 KG/M2 | WEIGHT: 253 LBS | HEIGHT: 73 IN | OXYGEN SATURATION: 98 % | HEART RATE: 84 BPM | TEMPERATURE: 98.5 F

## 2021-11-01 DIAGNOSIS — I10 ESSENTIAL HYPERTENSION: Primary | ICD-10-CM

## 2021-11-01 PROCEDURE — 1101F PT FALLS ASSESS-DOCD LE1/YR: CPT | Performed by: FAMILY MEDICINE

## 2021-11-01 PROCEDURE — G8417 CALC BMI ABV UP PARAM F/U: HCPCS | Performed by: FAMILY MEDICINE

## 2021-11-01 PROCEDURE — G8752 SYS BP LESS 140: HCPCS | Performed by: FAMILY MEDICINE

## 2021-11-01 PROCEDURE — 99214 OFFICE O/P EST MOD 30 MIN: CPT | Performed by: FAMILY MEDICINE

## 2021-11-01 PROCEDURE — G8432 DEP SCR NOT DOC, RNG: HCPCS | Performed by: FAMILY MEDICINE

## 2021-11-01 PROCEDURE — G8427 DOCREV CUR MEDS BY ELIG CLIN: HCPCS | Performed by: FAMILY MEDICINE

## 2021-11-01 PROCEDURE — G0463 HOSPITAL OUTPT CLINIC VISIT: HCPCS | Performed by: FAMILY MEDICINE

## 2021-11-01 PROCEDURE — G8754 DIAS BP LESS 90: HCPCS | Performed by: FAMILY MEDICINE

## 2021-11-01 PROCEDURE — G8536 NO DOC ELDER MAL SCRN: HCPCS | Performed by: FAMILY MEDICINE

## 2021-11-01 NOTE — PROGRESS NOTES
Wilfrid Matthews is a 64 y.o. male , id x 2(name and ). Reviewed record, history, and  medications. Chief Complaint   Patient presents with    Follow-up    Hypertension       Vitals:    21 1321   BP: (!) 147/71   Pulse: 84   Temp: 98.5 °F (36.9 °C)   TempSrc: Oral   SpO2: 98%   Weight: 253 lb (114.8 kg)   Height: 6' 1\" (1.854 m)       Coordination of Care Questionnaire:   1) Have you been to an emergency room, urgent care, or hospitalized since your last visit?   no       2. Have seen or consulted any other health care provider since your last visit? NO      3 most recent PHQ Screens 2021   Little interest or pleasure in doing things Not at all   Feeling down, depressed, irritable, or hopeless Not at all   Total Score PHQ 2 0       Patient is accompanied by self I have received verbal consent from Wilfrid Matthews to discuss any/all medical information while they are present in the room.

## 2021-11-01 NOTE — PATIENT INSTRUCTIONS
Take your medications the morning of your surgery as directed from Dr. Aldo Menard and his staff. Signs of low blood pressure include dizziness, lightheadedness, fainting, blurred vision. Low blood pressure is systolic (top number) <50 or diastolic (bottom number) <67. Let your doctor know if you feel symptoms of low blood pressure or your blood pressure is <100/60.

## 2021-11-01 NOTE — PROGRESS NOTES
Trena Valenzuela (: 1945) is a 64 y.o. male, established patient, here for evaluation of the following chief complaint(s):  Follow-up and Hypertension         ASSESSMENT/PLAN:  Below is the assessment and plan developed based on review of pertinent history, physical exam, labs, studies, and medications. 1. Essential hypertension  BP at goal in office today. Home blood pressure log reviewed, Patient has not had any low blood pressure readings and he denies symptoms of hypotension. Continue lisinopril 20mg, amlodipine 10mg, and hctz 12.5mg. Low sodium diet recommended. Instructed patient to take medication the morning of his surgery as instructed by Dr. Madhav Jeffers and his staff. Check BMP for electrolytes today after starting diuretic.   -     METABOLIC PANEL, BASIC; Future      No follow-ups on file. SUBJECTIVE/OBJECTIVE:  Chief Complaint   Patient presents with    Follow-up    Hypertension     Patient presents to office to follow up blood pressure. Patient is scheduled for knee replacement with Dr. Madhav Jeffers on 2021. When he was seen for his preop appointment they noted his blood pressure was very elevated, above 170/70. He saw Elke Nicolas NP in office 10/29/2021 for blood pressure evaluation. He was previously taking Lisinopril 20mg and amlodipine 10mg. At this visit his blood pressure was 164/91. Amlodipine was increased to 10mg and hctz 12.5mg was added. Lisinopril kept at 20mg. Patient reports he has been taking medications for last 3 days. In AM prior to medication BP is 154/85, after medication 121/75. Denies dizziness, lightheadedness, blurry vision, nausea, chest pain, palpitations, SOB, lower extremity edema. Review of systems negative other than mentioned in HPI    Current Outpatient Medications on File Prior to Visit   Medication Sig Dispense Refill    amLODIPine (NORVASC) 10 mg tablet Take 1 Tablet by mouth daily.  90 Tablet 0    hydroCHLOROthiazide (HYDRODIURIL) 12.5 mg tablet Take 1 Tablet by mouth daily. 90 Tablet 0    simvastatin (ZOCOR) 40 mg tablet TAKE 1 TABLET BY MOUTH EVERY DAY IN THE EVENING 90 Tablet 4    tamsulosin (FLOMAX) 0.4 mg capsule TAKE 2 CAPS BY MOUTH DAILY (WITH DINNER). 180 Capsule 1    metFORMIN (GLUCOPHAGE) 1,000 mg tablet TAKE 1/2 TABLET BY MOUTH WITH FOOD DAILY 45 Tab 3    lisinopriL (PRINIVIL, ZESTRIL) 20 mg tablet TAKE 1 TABLET BY MOUTH TWICE A  Tab 3    acetaminophen (Tylenol Extra Strength) 500 mg tablet Take 1-2 Tabs by mouth every six (6) hours as needed for Pain. Not to exceed 4,000mg in any 24 hour period  Indications: pain 60 Tab 0    diclofenac (Voltaren) 1 % gel Apply  to affected area four (4) times daily as needed for Pain. (Patient not taking: Reported on 11/1/2021)      aspirin delayed-release 81 mg tablet Take 1 Tab by mouth two (2) times a day. (Patient not taking: Reported on 11/1/2021) 60 Tab 0    folic acid/multivit-min/lutein (CENTRUM SILVER PO) Take 1 Tab by mouth nightly as needed. (Patient not taking: Reported on 11/1/2021)      ascorbic acid, vitamin C, (Vitamin C) 500 mg tablet Take 1,000 mg by mouth daily. (Patient not taking: Reported on 11/1/2021)      vit C/E/Zn/coppr/lutein/zeaxan (PRESERVISION AREDS-2 PO) Take 1 Cap by mouth two (2) times a day. (Patient not taking: Reported on 11/1/2021)       No current facility-administered medications on file prior to visit. Vitals:    11/01/21 1321 11/01/21 1343   BP: (!) 147/71 121/69   Pulse: 84    Temp: 98.5 °F (36.9 °C)    TempSrc: Oral    SpO2: 98%    Weight: 253 lb (114.8 kg)    Height: 6' 1\" (1.854 m)    PainSc:   0 - No pain         Physical Exam  Constitutional:       Appearance: Normal appearance. Eyes:      Extraocular Movements: Extraocular movements intact. Pupils: Pupils are equal, round, and reactive to light. Cardiovascular:      Rate and Rhythm: Normal rate and regular rhythm. Pulses: Normal pulses.       Heart sounds: Normal heart sounds. Pulmonary:      Effort: Pulmonary effort is normal.      Breath sounds: Normal breath sounds. Abdominal:      General: Abdomen is flat. Bowel sounds are normal.      Palpations: Abdomen is soft. Musculoskeletal:      Right lower leg: No edema. Left lower leg: No edema. Neurological:      General: No focal deficit present. Mental Status: He is alert and oriented to person, place, and time. Psychiatric:         Mood and Affect: Mood normal.         Behavior: Behavior normal.         An electronic signature was used to authenticate this note.   -- Iona Parish PA-C

## 2021-11-02 LAB
ANION GAP SERPL CALC-SCNC: 3 MMOL/L (ref 5–15)
BUN SERPL-MCNC: 26 MG/DL (ref 6–20)
BUN/CREAT SERPL: 21 (ref 12–20)
CALCIUM SERPL-MCNC: 9 MG/DL (ref 8.5–10.1)
CHLORIDE SERPL-SCNC: 109 MMOL/L (ref 97–108)
CO2 SERPL-SCNC: 25 MMOL/L (ref 21–32)
CREAT SERPL-MCNC: 1.26 MG/DL (ref 0.7–1.3)
GLUCOSE SERPL-MCNC: 127 MG/DL (ref 65–100)
POTASSIUM SERPL-SCNC: 4.1 MMOL/L (ref 3.5–5.1)
SODIUM SERPL-SCNC: 137 MMOL/L (ref 136–145)

## 2021-11-03 ENCOUNTER — TELEPHONE (OUTPATIENT)
Dept: FAMILY MEDICINE CLINIC | Age: 76
End: 2021-11-03

## 2021-11-03 NOTE — PROGRESS NOTES
Spoke with patient and advised his electrolytes all look good after starting the patient on hctz. BUN is elevated, patient is likely dehydrated. Advised to hydrate well, drink water instead of sweet tea. Plan for patient to proceed with knee replacement and instructed him to take his blood pressure medications the day of and before his procedure as instructed by his orthopedic team.  Follow up with PCP after procedure.

## 2021-11-03 NOTE — TELEPHONE ENCOUNTER
Spoke with patient regarding his blood pressure. States it has been elevated prior to taking his morning blood pressure medications (160/80) and after medications 130/60s. This AM his blood pressure was 180/90 and decreased to 135/66 directly after taking his medications. In office 11/1/2021 his blood pressure was 121/75. Patient denies blurry vision, chest pain, shortness of breath. Discussed with patient his blood pressure was well controlled in our office and he should continue his medications as prescribed. With such variation in his blood pressure cuff measurements at home it is likely the cuff is inacurate. Patient is scheduled for knee replacement 11/8/2021 and I instructed patient to take his blood pressure medication as the day before and day of his procedure as instructed by orthopedic team. Patient will bring his cuff with him to a follow up appointment with PCP following surgery.

## 2021-11-04 ENCOUNTER — HOSPITAL ENCOUNTER (OUTPATIENT)
Dept: PREADMISSION TESTING | Age: 76
Discharge: HOME | End: 2021-11-04
Payer: MEDICARE

## 2021-11-04 PROCEDURE — U0005 INFEC AGEN DETEC AMPLI PROBE: HCPCS

## 2021-11-05 ENCOUNTER — ANESTHESIA EVENT (OUTPATIENT)
Dept: SURGERY | Age: 76
End: 2021-11-05
Payer: MEDICARE

## 2021-11-05 LAB
SARS-COV-2, XPLCVT: NOT DETECTED
SOURCE, COVRS: NORMAL

## 2021-11-08 ENCOUNTER — APPOINTMENT (OUTPATIENT)
Dept: GENERAL RADIOLOGY | Age: 76
End: 2021-11-08
Attending: PHYSICIAN ASSISTANT
Payer: MEDICARE

## 2021-11-08 ENCOUNTER — HOSPITAL ENCOUNTER (OUTPATIENT)
Age: 76
Setting detail: OBSERVATION
Discharge: HOME | End: 2021-11-10
Attending: ORTHOPAEDIC SURGERY | Admitting: ORTHOPAEDIC SURGERY
Payer: MEDICARE

## 2021-11-08 ENCOUNTER — ANESTHESIA (OUTPATIENT)
Dept: SURGERY | Age: 76
End: 2021-11-08
Payer: MEDICARE

## 2021-11-08 DIAGNOSIS — S83.001D PATELLAR SUBLUXATION, RIGHT, SUBSEQUENT ENCOUNTER: Primary | ICD-10-CM

## 2021-11-08 LAB
APPEARANCE SNV: ABNORMAL
COLOR SNV: YELLOW
GLUCOSE BLD STRIP.AUTO-MCNC: 112 MG/DL (ref 65–117)
GLUCOSE BLD STRIP.AUTO-MCNC: 159 MG/DL (ref 65–117)
GLUCOSE BLD STRIP.AUTO-MCNC: 178 MG/DL (ref 65–117)
LYMPHOCYTES NFR SNV MANUAL: 34 % (ref 0–15)
MONOCYTES NFR SNV MANUAL: 12 % (ref 0–65)
NEUTROPHILS NFR SNV MANUAL: 54 % (ref 0–20)
RBC # SNV: >100 /CU MM
SERVICE CMNT-IMP: ABNORMAL
SERVICE CMNT-IMP: ABNORMAL
SERVICE CMNT-IMP: NORMAL
SPECIMEN SOURCE FLD: ABNORMAL
WBC # SNV: 175 /CU MM (ref 0–150)

## 2021-11-08 PROCEDURE — G0378 HOSPITAL OBSERVATION PER HR: HCPCS

## 2021-11-08 PROCEDURE — 77030028907 HC WRP KNEE WO BGS SOLM -B

## 2021-11-08 PROCEDURE — 2709999900 HC NON-CHARGEABLE SUPPLY: Performed by: ORTHOPAEDIC SURGERY

## 2021-11-08 PROCEDURE — 76060000064 HC AMB SURG ANES 2 TO 2.5 HR: Performed by: ORTHOPAEDIC SURGERY

## 2021-11-08 PROCEDURE — 77030018547 HC SUT ETHBND1 J&J -B: Performed by: ORTHOPAEDIC SURGERY

## 2021-11-08 PROCEDURE — 74011000258 HC RX REV CODE- 258: Performed by: NURSE ANESTHETIST, CERTIFIED REGISTERED

## 2021-11-08 PROCEDURE — 77030030163 HC BN WAX J&J -A: Performed by: ORTHOPAEDIC SURGERY

## 2021-11-08 PROCEDURE — 74011250637 HC RX REV CODE- 250/637: Performed by: PHYSICIAN ASSISTANT

## 2021-11-08 PROCEDURE — 74011000250 HC RX REV CODE- 250: Performed by: PHYSICIAN ASSISTANT

## 2021-11-08 PROCEDURE — 76210000036 HC AMBSU PH I REC 1.5 TO 2 HR: Performed by: ORTHOPAEDIC SURGERY

## 2021-11-08 PROCEDURE — 74011000250 HC RX REV CODE- 250: Performed by: ANESTHESIOLOGY

## 2021-11-08 PROCEDURE — 76030000021 HC AMB SURG 2 TO 2.5 HR INTENSV-TIER 1: Performed by: ORTHOPAEDIC SURGERY

## 2021-11-08 PROCEDURE — 89050 BODY FLUID CELL COUNT: CPT

## 2021-11-08 PROCEDURE — 77030035236 HC SUT PDS STRATFX BARB J&J -B: Performed by: ORTHOPAEDIC SURGERY

## 2021-11-08 PROCEDURE — 77030041680 HC PNCL ELECSURG SMK EVAC CNMD -B: Performed by: ORTHOPAEDIC SURGERY

## 2021-11-08 PROCEDURE — 77030031139 HC SUT VCRL2 J&J -A: Performed by: ORTHOPAEDIC SURGERY

## 2021-11-08 PROCEDURE — 87075 CULTR BACTERIA EXCEPT BLOOD: CPT

## 2021-11-08 PROCEDURE — 74011000250 HC RX REV CODE- 250: Performed by: NURSE ANESTHETIST, CERTIFIED REGISTERED

## 2021-11-08 PROCEDURE — 77030011992 HC AIRWY NASOPHGL TELE -A: Performed by: ANESTHESIOLOGY

## 2021-11-08 PROCEDURE — 74011250636 HC RX REV CODE- 250/636: Performed by: ORTHOPAEDIC SURGERY

## 2021-11-08 PROCEDURE — 74011250636 HC RX REV CODE- 250/636: Performed by: ANESTHESIOLOGY

## 2021-11-08 PROCEDURE — 87205 SMEAR GRAM STAIN: CPT

## 2021-11-08 PROCEDURE — 73560 X-RAY EXAM OF KNEE 1 OR 2: CPT

## 2021-11-08 PROCEDURE — 74011250637 HC RX REV CODE- 250/637: Performed by: ANESTHESIOLOGY

## 2021-11-08 PROCEDURE — 74011250636 HC RX REV CODE- 250/636: Performed by: PHYSICIAN ASSISTANT

## 2021-11-08 PROCEDURE — 74011250636 HC RX REV CODE- 250/636: Performed by: NURSE ANESTHETIST, CERTIFIED REGISTERED

## 2021-11-08 PROCEDURE — 74011000250 HC RX REV CODE- 250: Performed by: ORTHOPAEDIC SURGERY

## 2021-11-08 PROCEDURE — 77030018723 HC ELCTRD BLD COVD -A: Performed by: ORTHOPAEDIC SURGERY

## 2021-11-08 PROCEDURE — 77030040361 HC SLV COMPR DVT MDII -B

## 2021-11-08 PROCEDURE — 82962 GLUCOSE BLOOD TEST: CPT

## 2021-11-08 PROCEDURE — 77030007866 HC KT SPN ANES BBMI -B

## 2021-11-08 PROCEDURE — 77030040922 HC BLNKT HYPOTHRM STRY -A

## 2021-11-08 PROCEDURE — 94760 N-INVAS EAR/PLS OXIMETRY 1: CPT

## 2021-11-08 PROCEDURE — 77030040393 HC DRSG OPTIFOAM GENT MDII -B: Performed by: ORTHOPAEDIC SURGERY

## 2021-11-08 RX ORDER — OXYCODONE HYDROCHLORIDE 5 MG/1
5 TABLET ORAL
Status: DISCONTINUED | OUTPATIENT
Start: 2021-11-08 | End: 2021-11-08 | Stop reason: HOSPADM

## 2021-11-08 RX ORDER — SODIUM CHLORIDE, SODIUM LACTATE, POTASSIUM CHLORIDE, CALCIUM CHLORIDE 600; 310; 30; 20 MG/100ML; MG/100ML; MG/100ML; MG/100ML
125 INJECTION, SOLUTION INTRAVENOUS CONTINUOUS
Status: DISCONTINUED | OUTPATIENT
Start: 2021-11-08 | End: 2021-11-08 | Stop reason: HOSPADM

## 2021-11-08 RX ORDER — DEXTROSE 50 % IN WATER (D50W) INTRAVENOUS SYRINGE
12.5-25 AS NEEDED
Status: DISCONTINUED | OUTPATIENT
Start: 2021-11-08 | End: 2021-11-10 | Stop reason: HOSPADM

## 2021-11-08 RX ORDER — DIPHENHYDRAMINE HYDROCHLORIDE 50 MG/ML
12.5 INJECTION, SOLUTION INTRAMUSCULAR; INTRAVENOUS
Status: ACTIVE | OUTPATIENT
Start: 2021-11-08 | End: 2021-11-09

## 2021-11-08 RX ORDER — NALOXONE HYDROCHLORIDE 0.4 MG/ML
0.4 INJECTION, SOLUTION INTRAMUSCULAR; INTRAVENOUS; SUBCUTANEOUS AS NEEDED
Status: DISCONTINUED | OUTPATIENT
Start: 2021-11-08 | End: 2021-11-10 | Stop reason: HOSPADM

## 2021-11-08 RX ORDER — DIPHENHYDRAMINE HYDROCHLORIDE 50 MG/ML
12.5 INJECTION, SOLUTION INTRAMUSCULAR; INTRAVENOUS AS NEEDED
Status: DISCONTINUED | OUTPATIENT
Start: 2021-11-08 | End: 2021-11-08 | Stop reason: HOSPADM

## 2021-11-08 RX ORDER — FAMOTIDINE 10 MG/ML
INJECTION INTRAVENOUS AS NEEDED
Status: DISCONTINUED | OUTPATIENT
Start: 2021-11-08 | End: 2021-11-08 | Stop reason: HOSPADM

## 2021-11-08 RX ORDER — PROPOFOL 10 MG/ML
INJECTION, EMULSION INTRAVENOUS
Status: DISCONTINUED | OUTPATIENT
Start: 2021-11-08 | End: 2021-11-08 | Stop reason: HOSPADM

## 2021-11-08 RX ORDER — ASPIRIN 81 MG/1
81 TABLET ORAL 2 TIMES DAILY
Status: DISCONTINUED | OUTPATIENT
Start: 2021-11-08 | End: 2021-11-10 | Stop reason: HOSPADM

## 2021-11-08 RX ORDER — SODIUM CHLORIDE 9 MG/ML
125 INJECTION, SOLUTION INTRAVENOUS CONTINUOUS
Status: DISPENSED | OUTPATIENT
Start: 2021-11-08 | End: 2021-11-09

## 2021-11-08 RX ORDER — ASCORBIC ACID 500 MG
1000 TABLET ORAL DAILY
Status: DISCONTINUED | OUTPATIENT
Start: 2021-11-09 | End: 2021-11-10 | Stop reason: HOSPADM

## 2021-11-08 RX ORDER — ATORVASTATIN CALCIUM 20 MG/1
20 TABLET, FILM COATED ORAL
Status: DISCONTINUED | OUTPATIENT
Start: 2021-11-08 | End: 2021-11-10 | Stop reason: HOSPADM

## 2021-11-08 RX ORDER — HYDROMORPHONE HYDROCHLORIDE 1 MG/ML
.25-1 INJECTION, SOLUTION INTRAMUSCULAR; INTRAVENOUS; SUBCUTANEOUS
Status: DISCONTINUED | OUTPATIENT
Start: 2021-11-08 | End: 2021-11-08 | Stop reason: HOSPADM

## 2021-11-08 RX ORDER — OXYCODONE HYDROCHLORIDE 5 MG/1
5 TABLET ORAL
Status: DISCONTINUED | OUTPATIENT
Start: 2021-11-08 | End: 2021-11-10 | Stop reason: HOSPADM

## 2021-11-08 RX ORDER — POVIDONE-IODINE 10 %
SOLUTION, NON-ORAL TOPICAL AS NEEDED
Status: DISCONTINUED | OUTPATIENT
Start: 2021-11-08 | End: 2021-11-08 | Stop reason: HOSPADM

## 2021-11-08 RX ORDER — LIDOCAINE HYDROCHLORIDE 10 MG/ML
0.1 INJECTION, SOLUTION EPIDURAL; INFILTRATION; INTRACAUDAL; PERINEURAL AS NEEDED
Status: DISCONTINUED | OUTPATIENT
Start: 2021-11-08 | End: 2021-11-08 | Stop reason: HOSPADM

## 2021-11-08 RX ORDER — SODIUM CHLORIDE 0.9 % (FLUSH) 0.9 %
5-40 SYRINGE (ML) INJECTION AS NEEDED
Status: DISCONTINUED | OUTPATIENT
Start: 2021-11-08 | End: 2021-11-08 | Stop reason: HOSPADM

## 2021-11-08 RX ORDER — TAMSULOSIN HYDROCHLORIDE 0.4 MG/1
0.8 CAPSULE ORAL
Status: DISCONTINUED | OUTPATIENT
Start: 2021-11-08 | End: 2021-11-10 | Stop reason: HOSPADM

## 2021-11-08 RX ORDER — AMLODIPINE BESYLATE 5 MG/1
10 TABLET ORAL DAILY
Status: DISCONTINUED | OUTPATIENT
Start: 2021-11-09 | End: 2021-11-10 | Stop reason: HOSPADM

## 2021-11-08 RX ORDER — ACETAMINOPHEN 325 MG/1
975 TABLET ORAL ONCE
Status: COMPLETED | OUTPATIENT
Start: 2021-11-08 | End: 2021-11-08

## 2021-11-08 RX ORDER — SODIUM CHLORIDE, SODIUM LACTATE, POTASSIUM CHLORIDE, CALCIUM CHLORIDE 600; 310; 30; 20 MG/100ML; MG/100ML; MG/100ML; MG/100ML
75 INJECTION, SOLUTION INTRAVENOUS CONTINUOUS
Status: DISCONTINUED | OUTPATIENT
Start: 2021-11-08 | End: 2021-11-08 | Stop reason: HOSPADM

## 2021-11-08 RX ORDER — ACETAMINOPHEN 325 MG/1
650 TABLET ORAL EVERY 6 HOURS
Status: DISCONTINUED | OUTPATIENT
Start: 2021-11-08 | End: 2021-11-10 | Stop reason: HOSPADM

## 2021-11-08 RX ORDER — ONDANSETRON 2 MG/ML
INJECTION INTRAMUSCULAR; INTRAVENOUS AS NEEDED
Status: DISCONTINUED | OUTPATIENT
Start: 2021-11-08 | End: 2021-11-08 | Stop reason: HOSPADM

## 2021-11-08 RX ORDER — FENTANYL CITRATE 50 UG/ML
INJECTION, SOLUTION INTRAMUSCULAR; INTRAVENOUS AS NEEDED
Status: DISCONTINUED | OUTPATIENT
Start: 2021-11-08 | End: 2021-11-08 | Stop reason: HOSPADM

## 2021-11-08 RX ORDER — SODIUM CHLORIDE 0.9 % (FLUSH) 0.9 %
5-40 SYRINGE (ML) INJECTION AS NEEDED
Status: DISCONTINUED | OUTPATIENT
Start: 2021-11-08 | End: 2021-11-10 | Stop reason: HOSPADM

## 2021-11-08 RX ORDER — HYDROCHLOROTHIAZIDE 25 MG/1
12.5 TABLET ORAL DAILY
Status: DISCONTINUED | OUTPATIENT
Start: 2021-11-09 | End: 2021-11-10 | Stop reason: HOSPADM

## 2021-11-08 RX ORDER — SODIUM CHLORIDE, SODIUM LACTATE, POTASSIUM CHLORIDE, CALCIUM CHLORIDE 600; 310; 30; 20 MG/100ML; MG/100ML; MG/100ML; MG/100ML
100 INJECTION, SOLUTION INTRAVENOUS CONTINUOUS
Status: DISCONTINUED | OUTPATIENT
Start: 2021-11-08 | End: 2021-11-08 | Stop reason: HOSPADM

## 2021-11-08 RX ORDER — HYDROMORPHONE HYDROCHLORIDE 1 MG/ML
0.5 INJECTION, SOLUTION INTRAMUSCULAR; INTRAVENOUS; SUBCUTANEOUS
Status: ACTIVE | OUTPATIENT
Start: 2021-11-08 | End: 2021-11-09

## 2021-11-08 RX ORDER — AMOXICILLIN 250 MG
1 CAPSULE ORAL 2 TIMES DAILY
Status: DISCONTINUED | OUTPATIENT
Start: 2021-11-08 | End: 2021-11-10 | Stop reason: HOSPADM

## 2021-11-08 RX ORDER — OXYCODONE HYDROCHLORIDE 5 MG/1
10 TABLET ORAL
Status: DISCONTINUED | OUTPATIENT
Start: 2021-11-08 | End: 2021-11-10 | Stop reason: HOSPADM

## 2021-11-08 RX ORDER — ONDANSETRON 2 MG/ML
4 INJECTION INTRAMUSCULAR; INTRAVENOUS
Status: ACTIVE | OUTPATIENT
Start: 2021-11-08 | End: 2021-11-09

## 2021-11-08 RX ORDER — SODIUM CHLORIDE 0.9 % (FLUSH) 0.9 %
5-40 SYRINGE (ML) INJECTION EVERY 8 HOURS
Status: DISCONTINUED | OUTPATIENT
Start: 2021-11-08 | End: 2021-11-10 | Stop reason: HOSPADM

## 2021-11-08 RX ORDER — FAMOTIDINE 20 MG/1
20 TABLET, FILM COATED ORAL 2 TIMES DAILY
Status: DISCONTINUED | OUTPATIENT
Start: 2021-11-08 | End: 2021-11-10 | Stop reason: HOSPADM

## 2021-11-08 RX ORDER — SODIUM CHLORIDE 0.9 % (FLUSH) 0.9 %
5-40 SYRINGE (ML) INJECTION EVERY 8 HOURS
Status: DISCONTINUED | OUTPATIENT
Start: 2021-11-08 | End: 2021-11-08 | Stop reason: HOSPADM

## 2021-11-08 RX ORDER — FENTANYL CITRATE 50 UG/ML
25 INJECTION, SOLUTION INTRAMUSCULAR; INTRAVENOUS
Status: DISCONTINUED | OUTPATIENT
Start: 2021-11-08 | End: 2021-11-08 | Stop reason: HOSPADM

## 2021-11-08 RX ORDER — LIDOCAINE HYDROCHLORIDE 20 MG/ML
INJECTION, SOLUTION EPIDURAL; INFILTRATION; INTRACAUDAL; PERINEURAL AS NEEDED
Status: DISCONTINUED | OUTPATIENT
Start: 2021-11-08 | End: 2021-11-08 | Stop reason: HOSPADM

## 2021-11-08 RX ORDER — MAGNESIUM SULFATE 100 %
4 CRYSTALS MISCELLANEOUS AS NEEDED
Status: DISCONTINUED | OUTPATIENT
Start: 2021-11-08 | End: 2021-11-10 | Stop reason: HOSPADM

## 2021-11-08 RX ORDER — ONDANSETRON 2 MG/ML
4 INJECTION INTRAMUSCULAR; INTRAVENOUS AS NEEDED
Status: DISCONTINUED | OUTPATIENT
Start: 2021-11-08 | End: 2021-11-08 | Stop reason: HOSPADM

## 2021-11-08 RX ORDER — POLYETHYLENE GLYCOL 3350 17 G/17G
17 POWDER, FOR SOLUTION ORAL DAILY
Status: DISCONTINUED | OUTPATIENT
Start: 2021-11-09 | End: 2021-11-10 | Stop reason: HOSPADM

## 2021-11-08 RX ORDER — MIDAZOLAM HYDROCHLORIDE 1 MG/ML
INJECTION, SOLUTION INTRAMUSCULAR; INTRAVENOUS AS NEEDED
Status: DISCONTINUED | OUTPATIENT
Start: 2021-11-08 | End: 2021-11-08 | Stop reason: HOSPADM

## 2021-11-08 RX ORDER — FACIAL-BODY WIPES
10 EACH TOPICAL DAILY PRN
Status: DISCONTINUED | OUTPATIENT
Start: 2021-11-10 | End: 2021-11-10 | Stop reason: HOSPADM

## 2021-11-08 RX ORDER — INSULIN LISPRO 100 [IU]/ML
INJECTION, SOLUTION INTRAVENOUS; SUBCUTANEOUS
Status: DISCONTINUED | OUTPATIENT
Start: 2021-11-08 | End: 2021-11-10 | Stop reason: HOSPADM

## 2021-11-08 RX ORDER — METFORMIN HYDROCHLORIDE 500 MG/1
500 TABLET ORAL DAILY
Status: DISCONTINUED | OUTPATIENT
Start: 2021-11-09 | End: 2021-11-10 | Stop reason: HOSPADM

## 2021-11-08 RX ORDER — BUPIVACAINE HYDROCHLORIDE 5 MG/ML
INJECTION, SOLUTION EPIDURAL; INTRACAUDAL AS NEEDED
Status: DISCONTINUED | OUTPATIENT
Start: 2021-11-08 | End: 2021-11-08 | Stop reason: HOSPADM

## 2021-11-08 RX ADMIN — FAMOTIDINE 20 MG: 10 INJECTION INTRAVENOUS at 14:05

## 2021-11-08 RX ADMIN — LIDOCAINE HYDROCHLORIDE 60 MG: 20 INJECTION, SOLUTION INTRAVENOUS at 14:07

## 2021-11-08 RX ADMIN — SODIUM CHLORIDE, POTASSIUM CHLORIDE, SODIUM LACTATE AND CALCIUM CHLORIDE: 600; 310; 30; 20 INJECTION, SOLUTION INTRAVENOUS at 15:27

## 2021-11-08 RX ADMIN — CEFAZOLIN SODIUM 2 G: 1 POWDER, FOR SOLUTION INTRAMUSCULAR; INTRAVENOUS at 14:18

## 2021-11-08 RX ADMIN — DOCUSATE SODIUM 50MG AND SENNOSIDES 8.6MG 1 TABLET: 8.6; 5 TABLET, FILM COATED ORAL at 18:58

## 2021-11-08 RX ADMIN — FENTANYL CITRATE 50 MCG: 50 INJECTION, SOLUTION INTRAMUSCULAR; INTRAVENOUS at 13:41

## 2021-11-08 RX ADMIN — SODIUM CHLORIDE 125 ML/HR: 9 INJECTION, SOLUTION INTRAVENOUS at 17:59

## 2021-11-08 RX ADMIN — FAMOTIDINE 20 MG: 20 TABLET, FILM COATED ORAL at 18:58

## 2021-11-08 RX ADMIN — MIDAZOLAM 1 MG: 1 INJECTION, SOLUTION INTRAMUSCULAR; INTRAVENOUS at 13:43

## 2021-11-08 RX ADMIN — PROPOFOL 70 MCG/KG/MIN: 10 INJECTION, EMULSION INTRAVENOUS at 14:07

## 2021-11-08 RX ADMIN — TAMSULOSIN HYDROCHLORIDE 0.8 MG: 0.4 CAPSULE ORAL at 18:57

## 2021-11-08 RX ADMIN — BUPIVACAINE HYDROCHLORIDE 2 ML: 5 INJECTION, SOLUTION EPIDURAL; INTRACAUDAL; PERINEURAL at 13:58

## 2021-11-08 RX ADMIN — ACETAMINOPHEN 650 MG: 325 TABLET ORAL at 23:55

## 2021-11-08 RX ADMIN — PROPOFOL 20 MG: 10 INJECTION, EMULSION INTRAVENOUS at 14:20

## 2021-11-08 RX ADMIN — MIDAZOLAM 1 MG: 1 INJECTION, SOLUTION INTRAMUSCULAR; INTRAVENOUS at 13:41

## 2021-11-08 RX ADMIN — TRANEXAMIC ACID 1 G: 100 INJECTION, SOLUTION INTRAVENOUS at 15:37

## 2021-11-08 RX ADMIN — CEFAZOLIN 2 G: 1 INJECTION, POWDER, FOR SOLUTION INTRAMUSCULAR; INTRAVENOUS at 22:15

## 2021-11-08 RX ADMIN — Medication 10 ML: at 23:55

## 2021-11-08 RX ADMIN — ASPIRIN 81 MG: 81 TABLET, COATED ORAL at 18:58

## 2021-11-08 RX ADMIN — ATORVASTATIN CALCIUM 20 MG: 20 TABLET, FILM COATED ORAL at 22:15

## 2021-11-08 RX ADMIN — ONDANSETRON HYDROCHLORIDE 4 MG: 2 SOLUTION INTRAMUSCULAR; INTRAVENOUS at 14:05

## 2021-11-08 RX ADMIN — SODIUM CHLORIDE, POTASSIUM CHLORIDE, SODIUM LACTATE AND CALCIUM CHLORIDE 100 ML/HR: 600; 310; 30; 20 INJECTION, SOLUTION INTRAVENOUS at 12:31

## 2021-11-08 RX ADMIN — FENTANYL CITRATE 50 MCG: 50 INJECTION, SOLUTION INTRAMUSCULAR; INTRAVENOUS at 13:46

## 2021-11-08 RX ADMIN — ACETAMINOPHEN 650 MG: 325 TABLET ORAL at 18:58

## 2021-11-08 RX ADMIN — TRANEXAMIC ACID 1 G: 100 INJECTION, SOLUTION INTRAVENOUS at 14:22

## 2021-11-08 RX ADMIN — ACETAMINOPHEN 975 MG: 325 TABLET ORAL at 12:32

## 2021-11-08 NOTE — DISCHARGE INSTRUCTIONS
TOTAL KNEE DISCHARGE INSTRUCTIONS      Patient: Luciano Hernandes MRN: [id number]  SSN: [id number]              Please take the time to review the following instructions before you leave the hospital and use them as guidelines during your recovery from surgery. If you have any questions you may contact my office at [phone]  After business hours or during the weekend you can contact me through [address] or text / call at [phone] (cell phone) for emergency's. Please use the office number during regular business hours. SPECIAL INSTRUCTIONS :   1. Full extension at the knee is the most important aspect of your range of motion. Avoid placing a pillow or bump behind the knee. Rather, place the heel up on a bump or pillow and allow gravity to help straighten the knee. 2. You may weight bear as tolerated on the knee and during the day you should bend the knee as much as possible. 3. Drainage from the incision more than 4 days from surgery is concerning. Contact my office if there is any question [phone].   4. You may contact me directly through [employer] if there are specific questions or text / call using my cell number [phone]. DRESSING :     Post-op Dressings : This should be removed by physical therapy or you may remove this yourself 7 days after the date of your surgery. If there is no drainage, then a simple dressing may be used or no dressing at all. Other dressing options can be purchased over the counter at a local pharmacy or medical supply vendor. A porous adhesive dressing such as pictured above can be purchased online ([address] [facility] [address]) or at your local [facility] or [employer]. You only need to keep the incision covered for 7 days after showers. A dressing may be used for longer if there are issues with clothing clinging to the incision. Showering/ Bathing: You may shower with the Post-op dressing in place.  This is left in place for 7 days following discharge from the hospital. If your incision is dry without drainage you may shower following your discharge home. After 7 days your dressing should be removed for showering. It is fine to have water run over the incision. Do not vigorously scrub your incision. Apply a clean, dry dressing after you have dried your incision. Do not take a bath or get into a swimming pool / [place] until you follow up with Dr. Guerrero Goins. Do not soak your incision under water. If there is continued drainage or you are concerned contact Dr Mare Fischer office prior to showering [phone] ext [phone] . Diet:  You may advance to your regular diet as tolerated. Increase your clear liquid intake for the next 2-3 days. Medication:      1. You will be given prescriptions for pain medication when you are discharged from the hospital. The side effects of these medications can be substantial and the narcotic medications are not mandatory. You may substitute these medications with Tylenol or Alleve / Motrin. 2. Please use the medications as prescribed. Pain medications may cause constipation- Colace twice daily and Miralax one scoop daily while taking the narcotic medication should help prevent constipation. Please discuss with your local pharmacist regarding increasing this dosage if constipation persists. Other possible side effects of pain medication are dizziness, headache, nausea, vomiting, and urinary retention. Discontinue the pain medication if you develop itching, rash, shortness of breath, or difficulties swallowing. If these symptoms become severe or are not relieved by discontinuing the medication, you should seek immediate medical attention. 3. Refills of pain medication are authorized during office hours only (8 AM- 5 PM  Monday thru Friday). Many of these medication will require you or a family member to pick-up a physical prescription at the office.    4. Medications other than antiinflammatories will not be called into the pharmacy after business hours. 5. You may resume the medication(s) you were taking prior to your surgery. Narcotics may change the effects of some antidepressant medication(s). If you have any questions about possible interactions between your regular medications and the pain medication, you should ask the pharmacist or contact the prescribing physician. 6. If you have constipation which is not improved by oral stool softeners then a Ducolax suppository should be purchased over the counter. 7. Continue the blood thinner (Aspirin or Lovenox) for a total of 30 days following surgery. Follow up appointment:    Please call our office at [phone] for your follow up appointment. This should be scheduled 14 days following the date of surgery. You should also have a scheduled appointment for physical therapy following surgery. Physical Therapy / Nursing:    Physical Therapy following surgery will be arranged as an outpatient or at home. They have specific instructions for rehab and wound care. It is fine to have physical therapy remove your dressing at 7 days following surgery. Returning to work:    Normal return to work is 6-12 weeks following surgery. Depending on your progression following surgery and specific job duties you may take longer for a full return to work. DRIVING    You should not return to driving until you are off all opioid pain medications and able to safely and quickly apply the brakes. This is normally 2-6 weeks for left sided surgery and 2-8 weeks for right sided surgery. Important Signs and Symptoms:    If any of the following signs or symptoms occur, you should contact Dr. Aida De La Fuente office.   Please be advised if a problem arises which you feel requires immediate medical attention or you are unable to contact Dr. Aida De La Fuente office you should seek immediate medical attention at the ER or other health care facility you have access to.    1. A sudden increase in swelling and/or redness or warmth at the area your surgery was performed which isnt relieved by rest, ice, and elevation. 2. Oral temperature greater than 101 degrees for 12 hours or more which isnt relieved by an increase in fluid intake and taking 2 Tylenol every 4-6 hours. 3. Excessive drainage from your incisions, or drainage which hasnt stopped by 72 hours after your surgery. 4. Fever, chills, shortness of breath, chest pain, nausea, vomiting or other signs and symptoms which are of concern to you. YOUR TOTAL JOINT REPLACEMENT  FREQUENTLY ASKED QUESTIONS   What should I take for pain?  o You will be discharged with four medications for pain (Oxycodone, Tramadol, Ibuprofen and Tylenol). These may vary slightly depending on what you were taking in the hospital.   - 1st Line - Tylenol Arthritis Strength - 325-650 mg every 4 hours (scheduled for the 1st 24 hours)  - 2nd Line -  Ibuprofen 400 (2 tabs) - 800 (4 tabs) mg every 8 hours  (scheduled for the 1st 24 hours)  - 3rd Line - Oxycodone 5 mg (1-2 tablets every 4-6 hrs)  - 4th Line - Tramadol 50 mg (1-2 tablets every 4-6 hours) - take these between Oxycodone doses if your pain is not alleviated.  When should I call for advice regarding my pain?  o If your pain is still uncontrolled after being on the regimen above for at least 12 hours, please call the office [phone] or text / call my cell after hours [phone].  Can I get refills?  o Opioid refills are provided for the first 2-6 weeks following surgery. o Use Tylenol 500 mg along with Aleve 220mg twice daily or Motrin 200-800mg every 4-6 hours during the daytime hours after two weeks. - After two weeks, I suggest the opioid pain medications be used only 1 hour prior to your physical therapy appointment and 1 hour before sleeping at night. Use Tylenol and Ibuprofen at other times during the day.    - Keep in mind that you will need to discontinue opioids before you resume driving.  Is swelling normal?  o Almost everyone has some degree of swelling following surgery. o Following hip and knee replacement surgery, swelling can be normal below the incision for the first few weeks. - This swelling peaks around 5-7 days after surgery. - It is not unusual to have some bruising about the back of the thigh, calf, ankle, and foot.  What should I do for the swelling?  o Keep the limb elevated above the level of your heart - 'Toes above Nose'. o Apply compression socks (knee high for total knees and up to the mid-thigh for total hips). o Use the ice packs that you are discharged home with several times a day for the first several weeks.  How long should I remain on blood thinners following surgery? o 30 days   Which blood thinners will I be on? Can I take them with Tylenol?  o  Aspirin 81 mg twice daily - these should be taken with meals and can be used with Tylenol. In certain instances, you may be sent home on Lovenox for 30 days. o For short periods of time (30 days), aspirin and anti-inflammatories (i.e. Aleve, Motrin / Advil / ibuprofen, diclofenac, etc. can be taken together).  When can I drive?  o Once you have stopped using regular narcotic pain medications (Oxycodone, Percocet, Lortab, Norco etc.) and can safely apply the brakes without hesitation, (emergency braking).  When can I shower?  o You may shower immediately if your Optifoam bandage is dry and without discharge. The Optifoam dressing should be removed 7 days following surgery, after which you may continue to shower.  o No submersion of the incision, bathing or swimming for 14 days following surgery or until cleared by Dr Celestine Ojeda.  Can I remove this dressing?  o Yes, this is removed just like removing a band aid.  o If you are concerned, this can be removed by your therapist.   [facility] What do I do with the dressing when I shower?  o The Optifoam dressing is waterproof and you may shower with it. o The incision is sealed with Dermabond, a biologic skin glue, which also serves as a watertight seal. If your incision is draining, it is no longer considered to be watertight - you should contact our office prior to showering if you experience any drainage.  Which dressing should I purchase after I remove my Optifoam?  o An occlusive dressing which covers your entire incision. This does not have to be waterproof, but will need to be removed when you shower and then replaced. (Example Only)   How active should I be following surgery? o Progress activities in moderation and at your own pace.   o Walking room to room in your house is encouraged. o Walk each day and set progressive goals with small increments (1st week - ?block of walking, 2nd week - 1 block, 3rd week - 2 blocks, etc.)   Will I need help at home?  o You will likely need a caretaker who should be available for the first week following surgery. It is fine for family members to work during the day, as long as they are available by phone. o Planning ahead makes coming home from the hospital a much easier transition.  How long will my surgery take?  o On average, total joint replacement takes approximately 1-2 hour.   o The entire process, including pre-op and post-op care can last as long as 4- 5 hours before you are transferred to your room. o stroke, pulmonary embolism (a clot going from the legs to the lungs), and even death with surgery.  Will I be given antibiotics? Will I need antibiotics at discharge?  o Antibiotics will be given to you both before and after your procedure. To further minimize the risk of infection, we have streamlined the surgical procedure to take less time in the operating room.    o You do not require antibiotics following surgery.       Please do not hesitate to contact me through [employer] or by text / call me at [phone] (cell phone) for questions following surgery - MD Radha Shipman, MD  Cell [phone]  Toño Ochoa PA-C  Cell [phone]  Medical Assistants: [facility] [phone]

## 2021-11-08 NOTE — PROGRESS NOTES
Problem: Patient Education: Go to Patient Education Activity  Goal: Patient/Family Education  Outcome: Progressing Towards Goal     Problem: Falls - Risk of  Goal: *Absence of Falls  Description: Document Jeff Giraldo Fall Risk and appropriate interventions in the flowsheet.   Outcome: Progressing Towards Goal  Note: Fall Risk Interventions:

## 2021-11-08 NOTE — ANESTHESIA PREPROCEDURE EVALUATION
Relevant Problems   ENDOCRINE   (+) Severe obesity (HCC)   (+) Type 2 diabetes mellitus       Anesthetic History   No history of anesthetic complications            Review of Systems / Medical History  Patient summary reviewed, nursing notes reviewed and pertinent labs reviewed    Pulmonary  Within defined limits                 Neuro/Psych         Psychiatric history     Cardiovascular    Hypertension          Hyperlipidemia    Exercise tolerance: >4 METS     GI/Hepatic/Renal         Renal disease: stones  Liver disease (fatty liver)     Endo/Other    Diabetes    Obesity and arthritis     Other Findings              Physical Exam    Airway  Mallampati: II  TM Distance: 4 - 6 cm  Neck ROM: normal range of motion   Mouth opening: Normal     Cardiovascular    Rhythm: regular  Rate: normal         Dental    Dentition: Upper dentition intact and Lower dentition intact     Pulmonary  Breath sounds clear to auscultation               Abdominal         Other Findings            Anesthetic Plan    ASA: 3  Anesthesia type: spinal and regional - femoral single shot and popliteal fossa block          Induction: Intravenous  Anesthetic plan and risks discussed with: Patient

## 2021-11-08 NOTE — OP NOTES
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 The patient underwent the appropriate preoperative labs and was indicated for surgery. Informed consent obtained including a discussion of the risks and benefits, which include, but are not limited to, bleeding, infection, neurovascular damage, wound complications, pain and stiffness in the knee, periprosthetic loosening, fracture dislocation and DVT, the patient consented for the procedure. DESCRIPTION OF PROCEDURE:     The proper limb was identified and signed in the preoperative holding area. All questions were answered. After adequate anesthesia, the right lower extremity was prepped and draped in sterile fashion. The patient was positioned supine on the operating room table. Using the old incision a mid-line parapatellar incision was used and an aspiration of the knee was performed. A medial arthrotomy was used.  Excess or residual scar was excised to provide for a medial reefing. A large amount of HO was removed from the distal femur to allow for improved patellar tracking. The medial and lateral gutters were debrided of scar and tissue. Marked Soft tissue was removed from around the patellar component and notch. All tissue which appeared reactive was removed from the posterior aspect of the knee. Evaluation of the femoral and tibial component. A lateral release was performed. The arthrotomy was closed w/ 0-Prolene suture, and 2-0 Prolene for the subcutaneus tissue and staples. A sterile dressing was applied. The patient was awoken from anesthesia and taken to the recovery room in a stable condiition. OPERATIVE FINDINGS : Stable femoral and tibial implants    IMPLANTS :   * No implants in log *    POST OPERATIVE CONSIDERATIONS :   Keep the knee in extension x 6 weeks and then progress at 15 degree increments. JUSTIFICATION FOR SURGICAL ASSISTANT:   Surgical Assistant, was requried and necessary in this case, to help with soft tissue retraction, extremity positioning, equiment management, implant management, and wound closure.     Madisyn Hanson MD

## 2021-11-08 NOTE — ANESTHESIA POSTPROCEDURE EVALUATION
Procedure(s):  ISOLATED FEMORAL REVISION WITH LATERAL RELEASE AND MEDIAL REEFING, RIGHT (REGIONAL BLOCK). spinal, regional    Anesthesia Post Evaluation      Multimodal analgesia: multimodal analgesia not used between 6 hours prior to anesthesia start to PACU discharge  Patient location during evaluation: PACU  Patient participation: complete - patient participated  Level of consciousness: awake  Pain management: adequate  Airway patency: patent  Anesthetic complications: no  Cardiovascular status: acceptable, blood pressure returned to baseline and hemodynamically stable  Respiratory status: acceptable  Hydration status: acceptable  Post anesthesia nausea and vomiting:  controlled  Final Post Anesthesia Temperature Assessment:  Normothermia (36.0-37.5 degrees C)      INITIAL Post-op Vital signs:   Vitals Value Taken Time   /73 11/08/21 1810   Temp 36.8 °C (98.2 °F) 11/08/21 1635   Pulse 64 11/08/21 1812   Resp 12 11/08/21 1812   SpO2 98 % 11/08/21 1812   Vitals shown include unvalidated device data.

## 2021-11-08 NOTE — ANESTHESIA PROCEDURE NOTES
Spinal Block    Start time: 11/8/2021 1:41 PM  End time: 11/8/2021 1:58 PM  Performed by: Elan Garcia CRNA  Authorized by: Jesenia Conner MD     Pre-procedure:   Indications: at surgeon's request and primary anesthetic  Preanesthetic Checklist: patient identified, risks and benefits discussed, anesthesia consent, site marked, patient being monitored and timeout performed    Timeout Time: 13:41 EST          Spinal Block:   Patient Position:  Seated  Prep Region:  Lumbar  Prep: chlorhexidine      Location:  L3-4  Technique:  Single shot        Needle:   Needle Type:  Pencan  Needle Gauge:  25 G  Attempts:  1      Events: CSF confirmed, no blood with aspiration and no paresthesia        Assessment:  Insertion:  Uncomplicated  Patient tolerance:  Patient tolerated the procedure well with no immediate complications

## 2021-11-08 NOTE — ROUTINE PROCESS
TRANSFER - IN REPORT:    Verbal report received from Pietro RN(name) on Dieudonne Posada  being received from 421(unit) for routine progression of care      Report consisted of patients Situation, Background, Assessment and   Recommendations(SBAR). Information from the following report(s) SBAR was reviewed with the receiving nurse. Opportunity for questions and clarification was provided. Assessment completed upon patients arrival to unit and care assumed.

## 2021-11-09 LAB
ANION GAP SERPL CALC-SCNC: 6 MMOL/L (ref 5–15)
BUN SERPL-MCNC: 30 MG/DL (ref 6–20)
BUN/CREAT SERPL: 23 (ref 12–20)
CALCIUM SERPL-MCNC: 8.3 MG/DL (ref 8.5–10.1)
CHLORIDE SERPL-SCNC: 110 MMOL/L (ref 97–108)
CO2 SERPL-SCNC: 26 MMOL/L (ref 21–32)
CREAT SERPL-MCNC: 1.3 MG/DL (ref 0.7–1.3)
GLUCOSE BLD STRIP.AUTO-MCNC: 116 MG/DL (ref 65–117)
GLUCOSE BLD STRIP.AUTO-MCNC: 120 MG/DL (ref 65–117)
GLUCOSE BLD STRIP.AUTO-MCNC: 123 MG/DL (ref 65–117)
GLUCOSE BLD STRIP.AUTO-MCNC: 127 MG/DL (ref 65–117)
GLUCOSE BLD STRIP.AUTO-MCNC: 131 MG/DL (ref 65–117)
GLUCOSE SERPL-MCNC: 130 MG/DL (ref 65–100)
HGB BLD-MCNC: 12 G/DL (ref 12.1–17)
POTASSIUM SERPL-SCNC: 4.2 MMOL/L (ref 3.5–5.1)
SERVICE CMNT-IMP: ABNORMAL
SERVICE CMNT-IMP: NORMAL
SODIUM SERPL-SCNC: 142 MMOL/L (ref 136–145)

## 2021-11-09 PROCEDURE — 74011250636 HC RX REV CODE- 250/636: Performed by: PHYSICIAN ASSISTANT

## 2021-11-09 PROCEDURE — 74011000250 HC RX REV CODE- 250: Performed by: PHYSICIAN ASSISTANT

## 2021-11-09 PROCEDURE — 97161 PT EVAL LOW COMPLEX 20 MIN: CPT

## 2021-11-09 PROCEDURE — 36415 COLL VENOUS BLD VENIPUNCTURE: CPT

## 2021-11-09 PROCEDURE — 80048 BASIC METABOLIC PNL TOTAL CA: CPT

## 2021-11-09 PROCEDURE — G0378 HOSPITAL OBSERVATION PER HR: HCPCS

## 2021-11-09 PROCEDURE — 82962 GLUCOSE BLOOD TEST: CPT

## 2021-11-09 PROCEDURE — 97535 SELF CARE MNGMENT TRAINING: CPT

## 2021-11-09 PROCEDURE — 97116 GAIT TRAINING THERAPY: CPT

## 2021-11-09 PROCEDURE — 2709999900 HC NON-CHARGEABLE SUPPLY

## 2021-11-09 PROCEDURE — 85018 HEMOGLOBIN: CPT

## 2021-11-09 PROCEDURE — 97165 OT EVAL LOW COMPLEX 30 MIN: CPT

## 2021-11-09 PROCEDURE — 74011250637 HC RX REV CODE- 250/637: Performed by: PHYSICIAN ASSISTANT

## 2021-11-09 PROCEDURE — 97530 THERAPEUTIC ACTIVITIES: CPT

## 2021-11-09 RX ORDER — OXYCODONE HYDROCHLORIDE 5 MG/1
5 TABLET ORAL
Qty: 40 TABLET | Refills: 0 | Status: SHIPPED | OUTPATIENT
Start: 2021-11-09 | End: 2021-11-16

## 2021-11-09 RX ORDER — DOCUSATE SODIUM 100 MG/1
100 CAPSULE, LIQUID FILLED ORAL 2 TIMES DAILY
Qty: 60 CAPSULE | Refills: 0 | Status: SHIPPED | OUTPATIENT
Start: 2021-11-09 | End: 2022-07-05

## 2021-11-09 RX ORDER — ASPIRIN 81 MG/1
81 TABLET ORAL 2 TIMES DAILY
Qty: 60 TABLET | Refills: 0 | Status: SHIPPED | OUTPATIENT
Start: 2021-11-09 | End: 2021-11-10 | Stop reason: SDUPTHER

## 2021-11-09 RX ORDER — TRAMADOL HYDROCHLORIDE 50 MG/1
50 TABLET ORAL
Qty: 20 TABLET | Refills: 0 | Status: SHIPPED | OUTPATIENT
Start: 2021-11-09 | End: 2021-11-16

## 2021-11-09 RX ADMIN — ATORVASTATIN CALCIUM 20 MG: 20 TABLET, FILM COATED ORAL at 22:03

## 2021-11-09 RX ADMIN — Medication 10 ML: at 14:27

## 2021-11-09 RX ADMIN — HYDROCHLOROTHIAZIDE 12.5 MG: 25 TABLET ORAL at 09:20

## 2021-11-09 RX ADMIN — CEFAZOLIN 2 G: 1 INJECTION, POWDER, FOR SOLUTION INTRAMUSCULAR; INTRAVENOUS at 06:26

## 2021-11-09 RX ADMIN — Medication 10 ML: at 22:04

## 2021-11-09 RX ADMIN — ASPIRIN 81 MG: 81 TABLET, COATED ORAL at 17:07

## 2021-11-09 RX ADMIN — FAMOTIDINE 20 MG: 20 TABLET, FILM COATED ORAL at 09:21

## 2021-11-09 RX ADMIN — ASPIRIN 81 MG: 81 TABLET, COATED ORAL at 09:21

## 2021-11-09 RX ADMIN — DOCUSATE SODIUM 50MG AND SENNOSIDES 8.6MG 1 TABLET: 8.6; 5 TABLET, FILM COATED ORAL at 09:21

## 2021-11-09 RX ADMIN — CEFAZOLIN 2 G: 1 INJECTION, POWDER, FOR SOLUTION INTRAMUSCULAR; INTRAVENOUS at 14:24

## 2021-11-09 RX ADMIN — METFORMIN HYDROCHLORIDE 500 MG: 500 TABLET ORAL at 09:21

## 2021-11-09 RX ADMIN — POLYETHYLENE GLYCOL 3350 17 G: 17 POWDER, FOR SOLUTION ORAL at 09:20

## 2021-11-09 RX ADMIN — ACETAMINOPHEN 650 MG: 325 TABLET ORAL at 17:06

## 2021-11-09 RX ADMIN — AMLODIPINE BESYLATE 10 MG: 5 TABLET ORAL at 09:21

## 2021-11-09 RX ADMIN — OXYCODONE 10 MG: 5 TABLET ORAL at 14:24

## 2021-11-09 RX ADMIN — ACETAMINOPHEN 650 MG: 325 TABLET ORAL at 06:28

## 2021-11-09 RX ADMIN — SODIUM CHLORIDE 125 ML/HR: 9 INJECTION, SOLUTION INTRAVENOUS at 04:51

## 2021-11-09 RX ADMIN — ACETAMINOPHEN 650 MG: 325 TABLET ORAL at 11:41

## 2021-11-09 RX ADMIN — FAMOTIDINE 20 MG: 20 TABLET, FILM COATED ORAL at 17:07

## 2021-11-09 RX ADMIN — TAMSULOSIN HYDROCHLORIDE 0.8 MG: 0.4 CAPSULE ORAL at 17:06

## 2021-11-09 RX ADMIN — DOCUSATE SODIUM 50MG AND SENNOSIDES 8.6MG 1 TABLET: 8.6; 5 TABLET, FILM COATED ORAL at 17:06

## 2021-11-09 RX ADMIN — OXYCODONE HYDROCHLORIDE AND ACETAMINOPHEN 1000 MG: 500 TABLET ORAL at 09:21

## 2021-11-09 RX ADMIN — OXYCODONE 10 MG: 5 TABLET ORAL at 09:21

## 2021-11-09 RX ADMIN — OXYCODONE 10 MG: 5 TABLET ORAL at 22:03

## 2021-11-09 RX ADMIN — Medication 10 ML: at 06:26

## 2021-11-09 NOTE — PROGRESS NOTES
Verbal bedside shift report given to Swati Casas RN on Dieudonne Posada  for routine progression of care       Report consisted of patients Situation, Background, Assessment and   Recommendations(SBAR).    Information from the following report(s) SBAR was reviewed with the receiving nurse.     Opportunity for questions and clarification was provided.

## 2021-11-09 NOTE — PROGRESS NOTES
Problem: Mobility Impaired (Adult and Pediatric)  Goal: *Acute Goals and Plan of Care (Insert Text)  Description: FUNCTIONAL STATUS PRIOR TO ADMISSION: Patient was independent and active without use of DME. Admits to using a cane when out in community. HOME SUPPORT PRIOR TO ADMISSION: The patient lived with wife but did not require assist.    Physical Therapy Goals  Initiated 11/9/2021  1. Patient will move from supine to sit and sit to supine  in bed with independence within 7 day(s). 2.  Patient will transfer from bed to chair and chair to bed with supervision/set-up using the least restrictive device within 7 day(s). 3.  Patient will perform sit to stand with supervision/set-up within 7 day(s). 4.  Patient will ambulate with supervision/set-up for 75 feet with the least restrictive device within 7 day(s). 5.  Patient will ascend/descend 4 stairs with 1 handrail(s) with minimal assistance/contact guard assist within 7 day(s). 11/9/2021 1526 by Brittni Mitchell, PT  Outcome: Progressing Towards Goal  11/9/2021 1131 by Brittni Mitchell, PT  Outcome: Progressing Towards Goal   PHYSICAL THERAPY TREATMENT  Patient: Mark Wagner (75 y.o. 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 ASSESSMENT  Patient continues with skilled PT services and is progressing towards goals. Patient continues to have increased pain with gait, fatigues easily and needs min/mod assist to stand and ambulate. Wife present this session and is elderly and frail.   Recommend one more night to continue to address pain issues, achieve greater independence,  and attempt steps in AM.     Current Level of Function Impacting Discharge (mobility/balance): MIN/MOD assist to stand; ambulated 60 feet with rolling walker and min assist.     Other factors to consider for discharge: fall risk         PLAN :  Patient continues to benefit from skilled intervention to address the above impairments. Continue treatment per established plan of care. to address goals. Recommendation for discharge: (in order for the patient to meet his/her long term goals)  Physical therapy at least 2 days/week in the home     This discharge recommendation:  Has been made in collaboration with the attending provider and/or case management    IF patient discharges home will need the following DME: patient owns DME required for discharge       SUBJECTIVE:   Patient stated I think I am doing better than this morning but it hurts when I move.     OBJECTIVE DATA SUMMARY:   Critical Behavior:  Neurologic State: Alert  Orientation Level: Oriented X4  Cognition: Appropriate for age attention/concentration, Follows commands  Safety/Judgement: Decreased insight into deficits  Functional Mobility Training:  Bed Mobility:      Supine to sit: Additional time; CGA              Transfers:  Sit to Stand: Additional time; Moderate assistance  Stand to Sit: Additional time; Minimum assistance                             Balance:  Sitting: Intact  Standing: Intact; With support  Standing - Static: Fair  Standing - Dynamic : Fair  Ambulation/Gait Training:  Distance (ft): 60 Feet (ft)  Assistive Device: Gait belt; Walker, rolling  Ambulation - Level of Assistance: Minimal assistance        Gait Abnormalities: Antalgic; Step to gait; Decreased step clearance  Right Side Weight Bearing: As tolerated           Speed/Katalina: Pace decreased (<100 feet/min)  Step Length: Left shortened; Right shortened                             Therapeutic Exercises:    Ankle pumps  Pain Ratin/10 at rest; 6/10 with activity    Activity Tolerance:   Fair    After treatment patient left in no apparent distress:   Sitting in chair, Caregiver / family present, and working with OT    COMMUNICATION/COLLABORATION:   The patients plan of care was discussed with: Occupational therapist and Registered nurse.      Meredith Campbell, PT   Time Calculation: 23 mins Mamie Espana  (RN)  2019 02:06:19

## 2021-11-09 NOTE — PROGRESS NOTES
Problem: Mobility Impaired (Adult and Pediatric)  Goal: *Acute Goals and Plan of Care (Insert Text)  Description: FUNCTIONAL STATUS PRIOR TO ADMISSION: Patient was independent and active without use of DME. Admits to using a cane when out in community. HOME SUPPORT PRIOR TO ADMISSION: The patient lived with wife but did not require assist.    Physical Therapy Goals  Initiated 11/9/2021  1. Patient will move from supine to sit and sit to supine  in bed with independence within 7 day(s). 2.  Patient will transfer from bed to chair and chair to bed with supervision/set-up using the least restrictive device within 7 day(s). 3.  Patient will perform sit to stand with supervision/set-up within 7 day(s). 4.  Patient will ambulate with supervision/set-up for 75 feet with the least restrictive device within 7 day(s). 5.  Patient will ascend/descend 4 stairs with 1 handrail(s) with minimal assistance/contact guard assist within 7 day(s). Outcome: Progressing Towards Goal   PHYSICAL THERAPY EVALUATION  Patient: Luciano Hernandes (58 y.o. 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 Patient has history of multiple orthopedic surgeries. PT spoke to NP Lorelei Jacobson who advised ankle pumps only and WBAT. Patient has right knee flexion contracture and flexed posture overall with ambulation.  He fatigues easily and has increased pain with gait. Needs cues for safety practices. Not yet ready for discharge from a PT standpoint. Current Level of Function Impacting Discharge (mobility/balance): +2 min/mod assist to stand; +2 min assist for ambulation    Functional Outcome Measure: The patient scored 50/100 on the Barthel outcome measure. Other factors to consider for discharge: medical history; fall risk     Patient will benefit from skilled therapy intervention to address the above noted impairments. PLAN :  Recommendations and Planned Interventions: bed mobility training, transfer training, and gait training      Frequency/Duration: Patient will be followed by physical therapy:  twice daily to address goals. Recommendation for discharge: (in order for the patient to meet his/her long term goals)  Outpatient physical therapy follow up recommended. This discharge recommendation:  Has been made in collaboration with the attending provider and/or case management    IF patient discharges home will need the following DME: patient owns DME required for discharge         SUBJECTIVE:   Patient stated This is harder than I thought it would be.     OBJECTIVE DATA SUMMARY:   HISTORY:    Past Medical History:   Diagnosis Date    Atherosclerosis 02/11/2020    Diabetes ([facility] [address]     Fatty liver determined by biopsy 08/2019    Hypertension     Insomnia     Kidney stone 2019 & 2021    Obesity, Class I, BMI 30-34.9     Urinary frequency     Nightime- started Tamsulosin     Past Surgical History:   Procedure Laterality Date    HX COLONOSCOPY      [facility]    HX KNEE REPLACEMENT Bilateral 2000 & 2013    HX KNEE REPLACEMENT Right 2017    Revision    HX KNEE REPLACEMENT Left 2020    Replacement    HX LITHOTRIPSY  2019    IR BX LIVER PERCUTANEOUS  2019    Normal       Personal factors and/or comorbidities impacting plan of care: pain, fall risk    Home Situation  Home Environment: Private residence  # Steps to Enter: 4  Rails to Enter: Yes  One/Two Story Residence: One story  Living Alone: No  Support Systems: Spouse/Significant Other  Patient Expects to be Discharged to[de-identified] House  Current DME Used/Available at Home: Walker, rolling, Cane, straight, Raised toilet seat, Shower chair, Grab bars    EXAMINATION/PRESENTATION/DECISION MAKING:   Critical Behavior:  Neurologic State: Alert  Orientation Level: Oriented X4  Cognition: Appropriate for age attention/concentration, Follows commands  Safety/Judgement: Decreased insight into deficits  Hearing: Auditory  Auditory Impairment: None  Skin:  not observed    Range Of Motion:  AROM: Generally decreased, functional (RLE less due to surgery)                       Strength:    Strength: Within functional limits (RLE less due to surgery)                    Tone & Sensation:   Tone: Normal              Sensation: Intact                         Functional Mobility:  Bed Mobility:      Sit to supine: +2 min assist        Transfers:  Sit to Stand: Assist x2; Moderate assistance; Additional time  Stand to Sit: Assist x2; Minimum assistance                       Balance:   Sitting: Intact  Standing: Impaired  Standing - Static: Fair  Standing - Dynamic : Fair  Ambulation/Gait Training:  Distance (ft): 30 Feet (ft)  Assistive Device: Gait belt; Walker, rolling  Ambulation - Level of Assistance: Assist x2; Minimal assistance        Gait Abnormalities: Decreased step clearance; Step to gait  Right Side Weight Bearing: As tolerated           Speed/Katalina: Pace decreased (<100 feet/min)  Step Length: Left shortened; Right shortened                            Therapeutic Exercises: Ankle pumps    Functional Measure:  Barthel Index:    Bathin  Bladder: 10  Bowels: 10  Groomin  Dressin  Feeding: 10  Mobility: 0  Stairs: 0  Toilet Use: 5  Transfer (Bed to Chair and Back): 5  Total: 50/100       The Barthel ADL Index: Guidelines  1.  The index should be used as a record of what a patient does, not as a record of what a patient could do. 2. The main aim is to establish degree of independence from any help, physical or verbal, however minor and for whatever reason. 3. The need for supervision renders the patient not independent. 4. A patient's performance should be established using the best available evidence. Asking the patient, friends/relatives and nurses are the usual sources, but direct observation and common sense are also important. However direct testing is not needed. 5. Usually the patient's performance over the preceding 24-48 hours is important, but occasionally longer periods will be relevant. 6. Middle categories imply that the patient supplies over 50 per cent of the effort. 7. Use of aids to be independent is allowed. Kevon Sun., Barthel, D.W. (8728). Functional evaluation: the Barthel Index. [facility] (14)2. MARCE Rivera Se, Chris Seth., Sussy Brunson., [facility], [address] [facility] (1999). Measuring the change indisability after inpatient rehabilitation; comparison of the responsiveness of the Barthel Index and Functional Barron Measure. Journal of Neurology, Neurosurgery, and Psychiatry, 66(4), 158-870. Pedro Luis Vargas, N.J.A, Mary Guillaume,  W.J.M, & Sherie Vidales, M.A. (2004.) Assessment of post-stroke quality of life in cost-effectiveness studies: The usefulness of the Barthel Index and the EuroQoL-5D.  Quality of Life Research, 15, 802-95           Physical Therapy Evaluation Charge Determination   History Examination Presentation Decision-Making   MEDIUM  Complexity : 1-2 comorbidities / personal factors will impact the outcome/ POC  LOW Complexity : 1-2 Standardized tests and measures addressing body structure, function, activity limitation and / or participation in recreation  MEDIUM Complexity : Evolving with changing characteristics  Other outcome measures Barthel  LOW       Based on the above components, the patient evaluation is determined to be of the following complexity level: LOW     Pain Ratin/10 at rest; 5/10 during gait    Activity Tolerance:   Fair    After treatment patient left in no apparent distress:   Supine in bed, Call bell within reach, Bed / chair alarm activated, and Side rails x 3    COMMUNICATION/EDUCATION:   The patients plan of care was discussed with: Occupational therapist and Registered nurse. Fall prevention education was provided and the patient/caregiver indicated understanding. and Patient/family agree to work toward stated goals and plan of care.     Thank you for this referral.  Renaldo Floyd, PT   Time Calculation: 32 mins

## 2021-11-09 NOTE — PROGRESS NOTES
Problem: Self Care Deficits Care Plan (Adult)  Goal: *Acute Goals and Plan of Care (Insert Text)  Description: FUNCTIONAL STATUS PRIOR TO ADMISSION: Patient was independent and active without use of DME. Admits to using a cane when out in community. HOME SUPPORT PRIOR TO ADMISSION: The patient lived with wife but did not require assist    Occupational Therapy Goals  Initiated 11/9/2021  1. Patient will perform lower body dressing and bathing with supervision/setup within 7 day(s). 2.  Patient will perform toilet transfers with supervision/set-up within 7 day(s). 3.  Patient will perform all aspects of toileting with supervision/setup within 7 day(s). 4.  Patient will participate in upper extremity therapeutic exercise/activities with modified independence for 10 minutes within 7 day(s). 5.  Patient will utilize energy conservation techniques during functional activities with verbal cues within 7 day(s). 11/9/2021 1653 by Anshu Haynes OT  Outcome: Progressing Towards Goal  11/9/2021 1649 by Anshu Haynes OT  Outcome: Progressing Towards Goal   OCCUPATIONAL THERAPY EVALUATION  Patient: Harry Pinzon (61 y.o. 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 Patient's wife present and confirms she is able to provide some assistance at home but limited in the amount of physical assistance. Patient performed transfers with up to mod A for initial sit to stand.   He needs cues for improved transfer technique and direction. Patient engaged in ADLs with fair tolerance; Increased assistance needed for LB and standing ADLs. Patient would benefit from continued skilled OT to progress towards goals and improve overall independence. Current Level of Function Impacting Discharge (ADLs/self-care): Patient required min to mod A for functional mobility. Patient is independent to mod I level for UB ADLs and required mod to max A for LB ADLs. Functional Outcome Measure: The patient scored Total: 45/100 on the Barthel Index outcome measure. Patient will benefit from skilled therapy intervention to address the above noted impairments. PLAN :  Recommendations and Planned Interventions: self care training, functional mobility training, therapeutic exercise, balance training, therapeutic activities, endurance activities, patient education, home safety training, and family training/education    Frequency/Duration: Patient will be followed by occupational therapy 5 times a week to address goals. Recommendation for discharge: (in order for the patient to meet his/her long term goals)  To be determined: per MD    This discharge recommendation:  Has been made in collaboration with the attending provider and/or case management    IF patient discharges home will need the following DME: none       SUBJECTIVE:   Patient stated It keeps shocking me with the pain when I'm walking on it.  re: R LE pain    OBJECTIVE DATA SUMMARY:   HISTORY:   Past Medical History:   Diagnosis Date    Atherosclerosis 02/11/2020    Diabetes ([facility] [address]     Fatty liver determined by biopsy 08/2019    Hypertension     Insomnia     Kidney stone 2019 & 2021    Obesity, Class I, BMI 30-34.9     Urinary frequency     Nightime- started Tamsulosin     Past Surgical History:   Procedure Laterality Date    HX COLONOSCOPY      [facility]    HX KNEE REPLACEMENT Bilateral 2000 & 2013    HX KNEE REPLACEMENT Right 2017 Revision    HX KNEE REPLACEMENT Left 2020    Replacement    HX LITHOTRIPSY  2019    IR BX LIVER PERCUTANEOUS  2019    Normal       Expanded or extensive additional review of patient history:     Home Situation  Home Environment: Private residence  # Steps to Enter: 4  Rails to Enter: Yes  One/Two Story Residence: One story  Living Alone: No  Support Systems: Spouse/Significant Other  Patient Expects to be Discharged to[de-identified] [employer]  Current DME Used/Available at Home: Walker, rolling, Cane, straight, Raised toilet seat, Shower chair, Grab bars    Hand dominance: Right    EXAMINATION OF PERFORMANCE DEFICITS:  Cognitive/Behavioral Status:  Neurologic State: Alert  Orientation Level: Oriented X4  Cognition: Appropriate decision making; Appropriate for age attention/concentration; Appropriate safety awareness  Perception: Appears intact  Perseveration: No perseveration noted  Safety/Judgement: Awareness of environment; Good awareness of safety precautions    Skin: Intact in the uppers    Edema: None noted in the uppers    Hearing: Auditory  Auditory Impairment: None    Vision/Perceptual:    Tracking: Able to track stimulus in all quadrants w/o difficulty    Diplopia: No    Acuity: Within Defined Limits       Range of Motion:  WDL In the uppers    Strength:  WDL in the uppers    Coordination:  Fine Motor Skills-Upper: Left Intact; Right Intact    Gross Motor Skills-Upper: Left Intact; Right Intact    Tone & Sensation:  Tone: Normal  Sensation: Intact    Balance:  Sitting: Intact  Standing: Intact; With support  Standing - Static: Fair  Standing - Dynamic : Fair    Functional Mobility and Transfers for ADLs:  Bed Mobility:  Rolling:  (pt was received up and remained up)  Scooting: Minimum assistance; Assist x1; Additional time (from the chair)    Transfers:  Sit to Stand:  Moderate assistance; Assist x1; Additional time  Stand to Sit: Minimum assistance; Assist x1; Additional time  Bed to Chair: Minimum assistance; Assist x1; Additional time  Bathroom Mobility: Minimum assistance  Toilet Transfer : Minimum assistance; Assist x1; Additional time    ADL Assessment:  Feeding: Independent    Oral Facial Hygiene/Grooming: Independent    Bathing: Moderate assistance    Upper Body Dressing: Modified independent    Lower Body Dressing: Maximum assistance    Toileting: Maximum assistance    Cognitive Retraining  Safety/Judgement: Awareness of environment; Good awareness of safety precautions    Functional Measure:    Barthel Index:  Bathin  Bladder: 10  Bowels: 10  Groomin  Dressin  Feeding: 10  Mobility: 0  Stairs: 0  Toilet Use: 0  Transfer (Bed to Chair and Back): 5  Total: 45/100      The Barthel ADL Index: Guidelines  1. The index should be used as a record of what a patient does, not as a record of what a patient could do. 2. The main aim is to establish degree of independence from any help, physical or verbal, however minor and for whatever reason. 3. The need for supervision renders the patient not independent. 4. A patient's performance should be established using the best available evidence. Asking the patient, friends/relatives and nurses are the usual sources, but direct observation and common sense are also important. However direct testing is not needed. 5. Usually the patient's performance over the preceding 24-48 hours is important, but occasionally longer periods will be relevant. 6. Middle categories imply that the patient supplies over 50 per cent of the effort. 7. Use of aids to be independent is allowed. Score Interpretation (from [address] [facility] 83)    Independent   60-79 Minimally independent   40-59 Partially dependent   20-39 Very dependent   <20 Totally dependent     -Linda Orr., Barthel, D.W. (1965). Functional evaluation: the Barthel Index. [address] ([address] (1997).  The Barthel activities of daily living index: self-reporting versus actual performance in the old (> or = 75 years). Journal of [address] [place] [address] 45(5), [address] [facility], GHAZALA, Santos Yang., Nelsy Schaffer. (1999). Measuring the change in disability after inpatient rehabilitation; comparison of the responsiveness of the Barthel Index and Functional [place] Measure. Journal of Neurology, Neurosurgery, and Psychiatry, 66(4), 154-466. JOSÉ Moore, BABAK Cline, & Meliton Streeter M.A. (2004) Assessment of post-stroke quality of life in cost-effectiveness studies: The usefulness of the Barthel Index and the EuroQoL-5D. Quality of Life Research, 15, 191-71     Occupational Therapy Evaluation Charge Determination   History Examination Decision-Making   LOW Complexity : Brief history review  LOW Complexity : 1-3 performance deficits relating to physical, cognitive , or psychosocial skils that result in activity limitations and / or participation restrictions  LOW Complexity : No comorbidities that affect functional and no verbal or physical assistance needed to complete eval tasks       Based on the above components, the patient evaluation is determined to be of the following complexity level: LOW     Activity Tolerance:   Good    After treatment patient left in no apparent distress:    Sitting in chair, Call bell within reach, Bed / chair alarm activated, and Caregiver / family present    COMMUNICATION/EDUCATION:   The patients plan of care was discussed with: Physical therapist, Registered nurse, and patient . Home safety education was provided and the patient/caregiver indicated understanding., Patient/family have participated as able in goal setting and plan of care. , and Patient/family agree to work toward stated goals and plan of care. This patients plan of care is appropriate for delegation to [facility].     Thank you for this referral.  Esthela Vallejo OTR/NIDA  Time Calculation: 38 mins

## 2021-11-09 NOTE — PROGRESS NOTES
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 Observation notice provided in writing to patient and/or caregiver as well as verbal explanation of the policy. Patients who are in outpatient status also receive the Observation notice. Patient has received notice and or patient representative has received via secure email, fax, or certified mail based on patient representative's preference. RUR:  N/a under obs  Risk Level: [x]Low []Moderate []High  Value-based purchasing: [] Yes [x] No  Bundle patient: [] Yes [x] No   Specify:     Advance Directive: Prior. [] No AD on file. [] AD on file. [x] Current AD not on file. Copy requested. [] Requests AD, and referral submitted to [facility].      Healthcare Decision Maker:   Primary Decision Maker: Mariaa Mortonzayra - Spouse - [phone]    Secondary Decision Maker: Mpzayra Gilberto - Brother - [phone]        Assessment:    Age: 64    Sex: [x] Male []Female     Residency: [x]Private residence []Apartment []Assisted Living []LTC []Other:   Exterior Steps: 5  Interior Steps: 0    Lives With: [x]With spouse []Other family members []Underage children []Alone []Care provider []Other:    Prior functioning:  [x]Independent with ADLs and iADLS []Dependent with ADLs and iADLs []Partial dependence, Specify:     Prior DME required:  []None []RW [x]Cane []Crutches []Bedside commode []CPAP []Home O2 (Liter/Provider: ) []Nebulizer   []Shower Chair []Wheelchair []Hospital Bed []Lleia []Stair lift []Rollator []Other:    DME available: []None [x]RW [x]Cane []Crutches [x]Bedside commode []CPAP []Home O2 (Liter/Provider: ) []Nebulizer   [x]Shower Chair []Wheelchair []Hospital Bed []Lelia []Stair lift []Rollator []Other:    Rehab history: []None [x]Outpatient PT []Home Health (Provider/Date: ) []SNF (Provider/Date: ) []IPR (Provider/Date: ) []LTC (Provider/Date: ) []Hospice (Provider/Date: )  []Other:     Discharge Concerns: []Yes [x]No []Unknown   Describe:    Comments: Insurer:   Insurance Information                [facility] [address] [facility]IC[facility] PART A & B Phone: [phone]    Subscriber: Tobias Dickson Subscriber#: [id number]    Group#: -- Precert#: --        [employer] [facility] Phone: --    SubscriberRclaude Mohanr Subscriber#: [id number]    Group#: PLAN [id number] Precert#: --          PCP: Quentin Martinez   Address: [address] [place] [address]   Phone number: [phone]   Current patient: [x]Yes []No   Approximate date of last visit: 11/1/2021   Access to virtual PCP visits: []Yes [x]No    Pharmacy:  [facility] Transport: Pt's wife       Transition of care plan:    [x] Home with Outpatient PT and outpatient follow-up   Scheduled prior to admission   NOV 12 2021 12:00 PM - Office Visit  Tabby De Leon DPT      Transport provider: Pt's wife   Care Management Interventions  PCP Verified by CM:  Yes  Transition of Care Consult (CM Consult): Discharge Planning  MyChart Signup: No  Discharge Durable Medical Equipment: No  Physical Therapy Consult: Yes  Occupational Therapy Consult: Yes  Speech Therapy Consult: No  Support Systems: Spouse/Significant Other  Confirm Follow Up Transport: Family  Discharge Location  Discharge Placement: Home with outpatient services

## 2021-11-09 NOTE — PROGRESS NOTES
Bedside shift change report given to Bharti Torre (oncoming nurse) by Dakota Monae (offgoing nurse). Report included the following information SBAR, Kardex, Procedure Summary, Intake/Output, MAR and Recent Results.

## 2021-11-10 VITALS
RESPIRATION RATE: 21 BRPM | BODY MASS INDEX: 33.32 KG/M2 | WEIGHT: 246.03 LBS | TEMPERATURE: 99.1 F | SYSTOLIC BLOOD PRESSURE: 138 MMHG | HEART RATE: 76 BPM | OXYGEN SATURATION: 97 % | DIASTOLIC BLOOD PRESSURE: 85 MMHG | HEIGHT: 72 IN

## 2021-11-10 LAB
GLUCOSE BLD STRIP.AUTO-MCNC: 123 MG/DL (ref 65–117)
GLUCOSE BLD STRIP.AUTO-MCNC: 141 MG/DL (ref 65–117)
SERVICE CMNT-IMP: ABNORMAL
SERVICE CMNT-IMP: ABNORMAL

## 2021-11-10 PROCEDURE — G0378 HOSPITAL OBSERVATION PER HR: HCPCS

## 2021-11-10 PROCEDURE — 82962 GLUCOSE BLOOD TEST: CPT

## 2021-11-10 PROCEDURE — 97530 THERAPEUTIC ACTIVITIES: CPT

## 2021-11-10 PROCEDURE — 97535 SELF CARE MNGMENT TRAINING: CPT

## 2021-11-10 PROCEDURE — 97116 GAIT TRAINING THERAPY: CPT

## 2021-11-10 PROCEDURE — 74011250637 HC RX REV CODE- 250/637: Performed by: PHYSICIAN ASSISTANT

## 2021-11-10 PROCEDURE — 74011636637 HC RX REV CODE- 636/637: Performed by: PHYSICIAN ASSISTANT

## 2021-11-10 RX ORDER — ASPIRIN 81 MG/1
81 TABLET ORAL 2 TIMES DAILY
Qty: 60 TABLET | Refills: 0 | Status: SHIPPED | OUTPATIENT
Start: 2021-11-10 | End: 2022-07-05

## 2021-11-10 RX ADMIN — FAMOTIDINE 20 MG: 20 TABLET, FILM COATED ORAL at 09:26

## 2021-11-10 RX ADMIN — OXYCODONE HYDROCHLORIDE AND ACETAMINOPHEN 1000 MG: 500 TABLET ORAL at 09:28

## 2021-11-10 RX ADMIN — POLYETHYLENE GLYCOL 3350 17 G: 17 POWDER, FOR SOLUTION ORAL at 09:27

## 2021-11-10 RX ADMIN — ACETAMINOPHEN 650 MG: 325 TABLET ORAL at 06:00

## 2021-11-10 RX ADMIN — OXYCODONE 10 MG: 5 TABLET ORAL at 10:18

## 2021-11-10 RX ADMIN — HYDROCHLOROTHIAZIDE 12.5 MG: 25 TABLET ORAL at 09:28

## 2021-11-10 RX ADMIN — OXYCODONE 10 MG: 5 TABLET ORAL at 02:09

## 2021-11-10 RX ADMIN — DOCUSATE SODIUM 50MG AND SENNOSIDES 8.6MG 1 TABLET: 8.6; 5 TABLET, FILM COATED ORAL at 09:28

## 2021-11-10 RX ADMIN — METFORMIN HYDROCHLORIDE 500 MG: 500 TABLET ORAL at 09:00

## 2021-11-10 RX ADMIN — INSULIN LISPRO 2 UNITS: 100 INJECTION, SOLUTION INTRAVENOUS; SUBCUTANEOUS at 12:46

## 2021-11-10 RX ADMIN — ASPIRIN 81 MG: 81 TABLET, COATED ORAL at 09:27

## 2021-11-10 RX ADMIN — AMLODIPINE BESYLATE 10 MG: 5 TABLET ORAL at 09:28

## 2021-11-10 RX ADMIN — Medication 10 ML: at 06:00

## 2021-11-10 RX ADMIN — ACETAMINOPHEN 650 MG: 325 TABLET ORAL at 12:47

## 2021-11-10 RX ADMIN — ACETAMINOPHEN 650 MG: 325 TABLET ORAL at 01:11

## 2021-11-10 NOTE — PROGRESS NOTES
Discussed discharge instructions and summary with patient. Patient verbalized understanding of instructions and medications. Patient aware that prescriptions were sent electronically to patient's usual pharmacy. Patient signed discharge paperwork via e-sign. Opportunity for questions/clarification provided. VSS. IV removed with no complication. Pt took all belongings with them.

## 2021-11-10 NOTE — PROGRESS NOTES
Referral received for home health follow up per therapy recommendations. Referral sent to [facility] and patient is in agreement. Transition of care:  RUR - Observation patient    1- CM following for any further needs  2- [facility] has referral and added to AVS  3- family to transport home. Care Management Interventions  PCP Verified by CM:  Yes  Transition of Care Consult (CM Consult): Discharge Planning  MyChart Signup: No  Discharge Durable Medical Equipment: No  Physical Therapy Consult: Yes  Occupational Therapy Consult: Yes  Speech Therapy Consult: No  Support Systems: Spouse/Significant Other  Confirm Follow Up Transport: Family  Discharge Location  Discharge Placement: Home with home health

## 2021-11-10 NOTE — PROGRESS NOTES
Problem: Self Care Deficits Care Plan (Adult)  Goal: *Acute Goals and Plan of Care (Insert Text)  Description: FUNCTIONAL STATUS PRIOR TO ADMISSION: Patient was independent and active without use of DME. Admits to using a cane when out in community. HOME SUPPORT PRIOR TO ADMISSION: The patient lived with wife but did not require assist    Occupational Therapy Goals  Initiated 11/9/2021  1. Patient will perform lower body dressing and bathing with supervision/setup within 7 day(s). 2.  Patient will perform toilet transfers with supervision/set-up within 7 day(s). 3.  Patient will perform all aspects of toileting with supervision/setup within 7 day(s). 4.  Patient will participate in upper extremity therapeutic exercise/activities with modified independence for 10 minutes within 7 day(s). 5.  Patient will utilize energy conservation techniques during functional activities with verbal cues within 7 day(s). Outcome: Progressing Towards Goal     OCCUPATIONAL THERAPY TREATMENT  Patient: Flakita Granda (18 y.o. 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 ASSESSMENT  Patient continues with skilled OT services and is progressing towards goals. Patient with noted improvement this session with activity tolerance, strength, and balance, but still with increased pain to R knee. Pt is received in bed with supportive wife present, is pleasant and agreeable to participate. He requires min A for bed mobility to transition to EOB in prep for ADL tasks.  He participates in AE training for distal LB dressing, but due to pain continues to require increased assistance. He politely declines need for  AE at home, reporting wife will be able to assist as needed for R sock/shoe mgmt. Pt is receptive to additional education regarding ADL modifications, safety with ADL transfers, and activity pacing in prep for transition home. All questions answered and pt reporting feeling ready for d/c. Current Level of Function Impacting Discharge (ADLs): up to min  A for ADLs    Other factors to consider for discharge: pain control; supportive wife; h/o multiple surgeries on both knees         PLAN :  Patient continues to benefit from skilled intervention to address the above impairments. Continue treatment per established plan of care to address goals. Recommend with staff: OOB to chair for meals; ADLs as needed with assistance; mobility to bathroom for toileting    Recommend next OT session: bathroom ADLs    Recommendation for discharge: (in order for the patient to meet his/her long term goals)  Per MD    This discharge recommendation:  Has been made in collaboration with the attending provider and/or case management    IF patient discharges home will need the following DME: pt declined need for [facility] AE       SUBJECTIVE:   Patient stated I think I just prefer to have my wife help me. Thank you for showing me how it works, though.     OBJECTIVE DATA SUMMARY:   Cognitive/Behavioral Status:  Neurologic State: Alert  Orientation Level: Oriented X4  Cognition: Follows commands  Perception: Appears intact  Perseveration: No perseveration noted  Safety/Judgement: Awareness of environment; Fall prevention; Good awareness of safety precautions; Home safety;  Insight into deficits    Functional Mobility and Transfers for ADLs:  Bed Mobility:  Supine to Sit: Minimum assistance (for R LE)  Sit to Supine: Minimum assistance (for R LE)    Transfers:  Sit to Stand: Contact guard assistance    Balance:  Sitting: Intact  Standing: With support  Standing - Static: Fair; Good    ADL Intervention:  Lower Body Dressing Assistance  Socks: Moderate assistance; Compensatory technique training (reacher to doff; sock aid to don)  Slip on Shoes with Back: Moderate assistance; Compensatory technique training (with [address] shoe horn)  Position Performed: Seated edge of bed  Cues: Solomon Abt; Physical assistance; Tactile cues provided; Verbal cues provided; Visual cues provided  Adaptive Equipment Used: Reacher; Long handled shoe horn; Walker    Cognitive Retraining  Safety/Judgement: Awareness of environment; Fall prevention; Good awareness of safety precautions; Home safety; Insight into deficits    Bathing: Patient instructed when bathing to not submerge wound in water, stand to shower or sponge bathe, cover wound with plastic and tape to ensure no water reaches bandage/wound without cues. Patient indicated understanding. Instructed patient to perform shower transfer (when ready) dry for safety prior to attempting wet for safety and fall prevention. Instructed patient to have supervision from family member/significant other when performing wet shower transfer for safety. Instructed patient to use clean washcloth and towel to clean/pat dry area around incision for infection control. Patient verbalized understanding of same. Dressing joint: Patient instructed to don/doff Right LE first/las. Patient instructed to don all clothing while sitting prior to standing, doff all clothing to knees while standing, then sit to doff clothing off from knees to feet in order to facilitate fall prevention, pain management, and energy conservation. Home safety: Patient instructed on home modifications and safety (raise height of ADL objects, appropriate height of chair surfaces, recliner safety, change of floor surfaces, clear pathways) to increase independence and fall prevention. Patient indicated understanding.     Pain:  Pt reporting increased pain with activity; ice pack in place at end of session    Activity Tolerance:   Fair    After treatment patient left in no apparent distress:   Supine in bed, Call bell within reach, Caregiver / family present and Side rails x 3    COMMUNICATION/COLLABORATION:   The patients plan of care was discussed with: Physical therapist and Registered nurse.      Janis Fuentes OT  Time Calculation: 23 mins

## 2021-11-10 NOTE — PROGRESS NOTES
Problem: Mobility Impaired (Adult and Pediatric)  Goal: *Acute Goals and Plan of Care (Insert Text)  Description: FUNCTIONAL STATUS PRIOR TO ADMISSION: Patient was independent and active without use of DME. Admits to using a cane when out in community. HOME SUPPORT PRIOR TO ADMISSION: The patient lived with wife but did not require assist.    Physical Therapy Goals  Initiated 11/9/2021  1. Patient will move from supine to sit and sit to supine  in bed with independence within 7 day(s). 2.  Patient will transfer from bed to chair and chair to bed with supervision/set-up using the least restrictive device within 7 day(s). 3.  Patient will perform sit to stand with supervision/set-up within 7 day(s). 4.  Patient will ambulate with supervision/set-up for 75 feet with the least restrictive device within 7 day(s). 5.  Patient will ascend/descend 4 stairs with 1 handrail(s) with minimal assistance/contact guard assist within 7 day(s). Note:   PHYSICAL THERAPY TREATMENT  Patient: Nancy Barraza (25 y.o. 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 ASSESSMENT  Patient continues with skilled PT services. Pt sit to stand with CGA. Pt ambulated 50ft with RW CGA. Pt up and down 8 steps with rail and cane CGA. Pt given cues for cane placement on steps. Pt progressing with mobility. Pt is cleared for D/C by PT. PLAN :  Patient continues to benefit from skilled intervention to address the above impairments. Continue treatment per established plan of care.   to address goals. Recommendation for discharge: (in order for the patient to meet his/her long term goals)  Per MD recommendation.     This discharge recommendation:  Has been made in collaboration with the attending provider and/or case management    IF patient discharges home will need the following DME: rolling walker       SUBJECTIVE:       OBJECTIVE DATA SUMMARY:   Critical Behavior:  Neurologic State: Alert  Orientation Level: Oriented X4  Cognition: Follows commands  Safety/Judgement: Awareness of environment, Good awareness of safety precautions  Functional Mobility Training:  Bed Mobility:                    Transfers:  Sit to Stand: Contact guard assistance  Stand to Sit: Contact guard assistance                             Balance:  Sitting: Intact  Standing: With support  Standing - Static: Fair; Good  Ambulation/Gait Training:  Distance (ft): 50 Feet (ft)  Assistive Device: Gait belt; Walker, rolling  Ambulation - Level of Assistance: Contact guard assistance        Gait Abnormalities: Decreased step clearance              Speed/Katalina: Pace decreased (<100 feet/min)  Step Length: Right shortened; Left shortened                  Activity Tolerance:   Fair to good    After treatment patient left in no apparent distress:   Sitting in chair    COMMUNICATION/COLLABORATION:   The patients plan of care was discussed with: Physical therapist.     Vicky Mckeon PTA   Time Calculation: 38 mins

## 2021-11-10 NOTE — PROGRESS NOTES
TOTAL KNEE REVISION ARTHROPLASTY DAILY NOTE     ASSESSMENT / PLAN :   1. Pain Control : Excellent - Able to sleep and participate with therapy  2. Overnight Issues : none  3. Wound or incisional issue : Healing incision with no visible drainage  4. Therapy / Weight Bearing Recommendations : Weight bear as tolerated with use of a walker and two person assist while mobilizing  5. DVT Prophylaxis : Mechanical and Aspirin and mechanical lower extremity compression device  6. Disposition : Discharge to home with home health (maximum of 4 visits)  7. Medical Concerns : Stable  8. Comments : Doing well, home today. POD  2 Days Post-Op s/p Procedure(s):  ISOLATED FEMORAL REVISION WITH LATERAL RELEASE AND MEDIAL REEFING, RIGHT (REGIONAL BLOCK)     SUBJECTIVE :     Patient notes the following issues : none. OBJECTIVE :     Vitals:    11/09/21 2353 11/10/21 0355 11/10/21 0745 11/10/21 1147   BP: 137/76 (!) 159/71  138/85   Pulse: 72 71 83 76   Resp: 20 20 19 21   Temp: 97.5 °F (36.4 °C) 97.6 °F (36.4 °C) 98 °F (36.7 °C) 99.1 °F (37.3 °C)   SpO2: 98% 98% 98% 97%   Weight:       Height:               Alert and oriented x3. Examination of the right knee reveals that the dressing is clean, dry and intact. Motor Exam is intact and normal. Pt is able to perform a straight leg raise. Sensation is intact to light touch. No calf pain. Labs:  Recent Labs     11/09/21  0447   HGB 12.0*      K 4.2   *   CO2 26   BUN 30*   CREA 1.30   *       CULTURES :  No results found for: [facility]  Lab Results   Component Value Date/Time    Culture result:  11/08/2021 02:38 PM     Culture performed on Fluid swab specimen No growth thus far, holding 14 days. Culture result: No growth thus far, holding 14 days.  11/08/2021 02:38 PM    Culture result: MRSA NOT PRESENT 10/27/2021 08:53 AM    Culture result:  10/27/2021 08:53 AM     Screening of patient nares for MRSA is for surveillance purposes and, if positive, to facilitate isolation considerations in high risk settings. It is not intended for automatic decolonization interventions per se as regimens are not sufficiently effective to warrant routine use. Culture result: NO GROWTH 14 DAYS 05/07/2020 12:01 PM    Culture result: NO GROWTH 14 DAYS 05/07/2020 12:01 PM        Patient mobility  Gait  Speed/Katalina: Pace decreased (<100 feet/min)  Step Length: Left shortened, Right shortened  Gait Abnormalities: Antalgic, Step to gait, Decreased step clearance  Ambulation - Level of Assistance: Minimal assistance  Distance (ft): 60 Feet (ft)  Assistive Device: Gait belt, Walker, rolling        MICROBIOLOGY :   All Micro Results     Procedure Component Value Units Date/Time    CULTURE, ANAEROBIC [[id number]] Collected: 11/08/21 1438    Order Status: Completed Specimen: Knee  Updated: 11/09/21 1333     Special Requests: NO SPECIAL REQUESTS        Culture result:       No growth thus far, holding 14 days. CULTURE, BODY FLUID Brenna Taye [[id number]] Collected: 11/08/21 1438    Order Status: Completed Specimen: Knee  Updated: 11/09/21 1332     Special Requests: NO SPECIAL REQUESTS        GRAM STAIN FEW WBCS SEEN         NO ORGANISMS SEEN        Culture result:       Culture performed on Fluid swab specimen No growth thus far, holding 14 days.                   Emy Robertson MD  Cell [phone]  Brian Simmons PA-C  Cell [phone]  Office : [phone]  Medical Assistant Staff:  Mirella Westfall [phone]                 Surgery Scheduler: Real Gonzalez, [address] [place] [id number] [facility]

## 2021-11-21 RX ORDER — TAMSULOSIN HYDROCHLORIDE 0.4 MG/1
0.8 CAPSULE ORAL
Qty: 180 CAPSULE | Refills: 1 | Status: SHIPPED | OUTPATIENT
Start: 2021-11-21 | End: 2022-05-05 | Stop reason: SDUPTHER

## 2021-11-22 LAB
BACTERIA SPEC CULT: NORMAL
BACTERIA SPEC CULT: NORMAL
GRAM STN SPEC: NORMAL
GRAM STN SPEC: NORMAL
SERVICE CMNT-IMP: NORMAL
SERVICE CMNT-IMP: NORMAL

## 2021-11-28 RX ORDER — LISINOPRIL 20 MG/1
TABLET ORAL
Qty: 180 TABLET | Refills: 3 | Status: SHIPPED | OUTPATIENT
Start: 2021-11-28 | End: 2022-07-05

## 2022-01-18 DIAGNOSIS — I10 ESSENTIAL HYPERTENSION: ICD-10-CM

## 2022-01-18 RX ORDER — HYDROCHLOROTHIAZIDE 12.5 MG/1
TABLET ORAL
Qty: 90 TABLET | Refills: 0 | Status: SHIPPED | OUTPATIENT
Start: 2022-01-18 | End: 2022-04-17

## 2022-01-20 DIAGNOSIS — I10 ESSENTIAL HYPERTENSION: ICD-10-CM

## 2022-01-20 RX ORDER — AMLODIPINE BESYLATE 10 MG/1
TABLET ORAL
Qty: 90 TABLET | Refills: 0 | Status: SHIPPED | OUTPATIENT
Start: 2022-01-20 | End: 2022-04-18

## 2022-03-19 PROBLEM — E11.9 TYPE 2 DIABETES MELLITUS (HCC): Status: ACTIVE | Noted: 2021-10-29

## 2022-03-19 PROBLEM — Z96.659 FAILED TOTAL KNEE ARTHROPLASTY, SUBSEQUENT ENCOUNTER: Status: ACTIVE | Noted: 2020-05-07

## 2022-03-19 PROBLEM — S83.001D PATELLAR SUBLUXATION, RIGHT, SUBSEQUENT ENCOUNTER: Status: ACTIVE | Noted: 2021-11-08

## 2022-03-19 PROBLEM — T84.018D FAILED TOTAL KNEE ARTHROPLASTY, SUBSEQUENT ENCOUNTER: Status: ACTIVE | Noted: 2020-05-07

## 2022-03-20 PROBLEM — E66.01 SEVERE OBESITY (HCC): Status: ACTIVE | Noted: 2020-02-17

## 2022-04-12 ENCOUNTER — TRANSCRIBE ORDER (OUTPATIENT)
Dept: SCHEDULING | Age: 77
End: 2022-04-12

## 2022-04-12 DIAGNOSIS — M54.40 LUMBAGO OF LUMBAR REGION WITH SCIATICA: Primary | ICD-10-CM

## 2022-04-13 ENCOUNTER — HOSPITAL ENCOUNTER (OUTPATIENT)
Dept: MRI IMAGING | Age: 77
Discharge: HOME | End: 2022-04-13
Attending: ORTHOPAEDIC SURGERY
Payer: MEDICARE

## 2022-04-13 DIAGNOSIS — M54.40 LUMBAGO OF LUMBAR REGION WITH SCIATICA: ICD-10-CM

## 2022-04-13 PROCEDURE — 72148 MRI LUMBAR SPINE W/O DYE: CPT

## 2022-05-05 ENCOUNTER — OFFICE VISIT (OUTPATIENT)
Dept: FAMILY MEDICINE CLINIC | Age: 77
End: 2022-05-05
Payer: MEDICARE

## 2022-05-05 VITALS
HEIGHT: 72 IN | DIASTOLIC BLOOD PRESSURE: 69 MMHG | RESPIRATION RATE: 18 BRPM | BODY MASS INDEX: 33.72 KG/M2 | SYSTOLIC BLOOD PRESSURE: 110 MMHG | WEIGHT: 249 LBS | HEART RATE: 90 BPM | OXYGEN SATURATION: 95 % | TEMPERATURE: 98.7 F

## 2022-05-05 DIAGNOSIS — I10 ESSENTIAL HYPERTENSION: ICD-10-CM

## 2022-05-05 DIAGNOSIS — E78.00 HYPERCHOLESTEREMIA: ICD-10-CM

## 2022-05-05 DIAGNOSIS — E11.9 CONTROLLED TYPE 2 DIABETES MELLITUS WITHOUT COMPLICATION, WITHOUT LONG-TERM CURRENT USE OF INSULIN (HCC): ICD-10-CM

## 2022-05-05 DIAGNOSIS — E11.22 TYPE 2 DIABETES MELLITUS WITH CHRONIC KIDNEY DISEASE, WITHOUT LONG-TERM CURRENT USE OF INSULIN, UNSPECIFIED CKD STAGE (HCC): ICD-10-CM

## 2022-05-05 DIAGNOSIS — B18.2 CHRONIC HEPATITIS C WITHOUT HEPATIC COMA (HCC): ICD-10-CM

## 2022-05-05 DIAGNOSIS — Z00.00 WELL ADULT EXAM: Primary | ICD-10-CM

## 2022-05-05 DIAGNOSIS — Z12.5 SCREENING PSA (PROSTATE SPECIFIC ANTIGEN): ICD-10-CM

## 2022-05-05 PROCEDURE — G0439 PPPS, SUBSEQ VISIT: HCPCS | Performed by: NURSE PRACTITIONER

## 2022-05-05 PROCEDURE — G8752 SYS BP LESS 140: HCPCS | Performed by: NURSE PRACTITIONER

## 2022-05-05 PROCEDURE — G0463 HOSPITAL OUTPT CLINIC VISIT: HCPCS | Performed by: NURSE PRACTITIONER

## 2022-05-05 PROCEDURE — 99214 OFFICE O/P EST MOD 30 MIN: CPT | Performed by: NURSE PRACTITIONER

## 2022-05-05 PROCEDURE — G8432 DEP SCR NOT DOC, RNG: HCPCS | Performed by: NURSE PRACTITIONER

## 2022-05-05 PROCEDURE — G8754 DIAS BP LESS 90: HCPCS | Performed by: NURSE PRACTITIONER

## 2022-05-05 PROCEDURE — G8427 DOCREV CUR MEDS BY ELIG CLIN: HCPCS | Performed by: NURSE PRACTITIONER

## 2022-05-05 PROCEDURE — G8417 CALC BMI ABV UP PARAM F/U: HCPCS | Performed by: NURSE PRACTITIONER

## 2022-05-05 PROCEDURE — 1101F PT FALLS ASSESS-DOCD LE1/YR: CPT | Performed by: NURSE PRACTITIONER

## 2022-05-05 PROCEDURE — G8536 NO DOC ELDER MAL SCRN: HCPCS | Performed by: NURSE PRACTITIONER

## 2022-05-05 RX ORDER — TAMSULOSIN HYDROCHLORIDE 0.4 MG/1
0.8 CAPSULE ORAL
Qty: 180 CAPSULE | Refills: 3 | Status: SHIPPED | OUTPATIENT
Start: 2022-05-05 | End: 2022-07-28

## 2022-05-05 RX ORDER — SIMVASTATIN 40 MG/1
40 TABLET, FILM COATED ORAL EVERY EVENING
Qty: 90 TABLET | Refills: 3 | Status: SHIPPED | OUTPATIENT
Start: 2022-05-05

## 2022-05-05 RX ORDER — AMLODIPINE BESYLATE 10 MG/1
10 TABLET ORAL DAILY
Qty: 90 TABLET | Refills: 3 | Status: SHIPPED | OUTPATIENT
Start: 2022-05-05 | End: 2022-07-28

## 2022-05-05 RX ORDER — HYDROCHLOROTHIAZIDE 12.5 MG/1
12.5 TABLET ORAL DAILY
Qty: 90 TABLET | Refills: 3 | Status: SHIPPED | OUTPATIENT
Start: 2022-05-05 | End: 2022-07-28

## 2022-05-05 RX ORDER — METFORMIN HYDROCHLORIDE 1000 MG/1
TABLET ORAL
Qty: 45 TABLET | Refills: 3 | Status: SHIPPED | OUTPATIENT
Start: 2022-05-05 | End: 2022-07-05

## 2022-05-05 NOTE — PROGRESS NOTES
Chief Complaint   Patient presents with    Follow-up     Pt being seen for fuv  Med refills  -pt states he has some issues he wants to discuss   Pt states he has no strength in his left leg  Pt also has issues with his back     1. Have you been to the ER, urgent care clinic since your last visit? Hospitalized since your last visit? No    2. Have you seen or consulted any other health care providers outside of the [address] [place] [address] since your last visit? Include any pap smears or colon screening.  No     Pt has no other concerns

## 2022-05-05 NOTE — PROGRESS NOTES
This is the Subsequent Medicare Annual Wellness Exam, performed 12 months or more after the Initial AWV or the last Subsequent AWV    I have reviewed the patient's medical history in detail and updated the computerized patient record. He is here today for follow up fasting labs  He is actively seeing Dr. Sindy Sarmiento for knee and low back stenosis [facility]    Assessment/Plan   Education and counseling provided:  Are appropriate based on today's review and evaluation    1. Well adult exam  -     LIPID PANEL; Future  -     TSH 3RD GENERATION; Future  -     METABOLIC PANEL, COMPREHENSIVE; Future  -     CBC WITH AUTOMATED DIFF; Future  2. Essential hypertension  -     METABOLIC PANEL, COMPREHENSIVE; Future  -     CBC WITH AUTOMATED DIFF; Future  3. Hypercholesteremia  -     LIPID PANEL; Future  4. Controlled type 2 diabetes mellitus without complication, without long-term current use of insulin (HCC)  -     HEMOGLOBIN A1C WITH EAG; Future  -     MICROALBUMIN, UR, RAND W/ MICROALB/CREAT RATIO; Future  5. Screening PSA (prostate specific antigen)  -     PSA SCREENING (SCREENING); Future  6. Chronic hepatitis C without hepatic coma ([facility] [address]  Assessment & Plan:   monitored by specialist. No acute findings meriting change in the plan  7. Type 2 diabetes mellitus with chronic kidney disease, without long-term current use of insulin, unspecified CKD stage (HCC)       Depression Risk Factor Screening     3 most recent PHQ Screens 5/5/2022   Little interest or pleasure in doing things Not at all   Feeling down, depressed, irritable, or hopeless Not at all   Total Score PHQ 2 0       Alcohol & Drug Abuse Risk Screen    Do you average more than 1 drink per night or more than 7 drinks a week: No    In the past three months have you have had more than 4 drinks containing alcohol on one occasion: No          Functional Ability and Level of Safety    Hearing: Hearing is good. Activities of Daily Living:   The home contains: grab bars and shower chair  Patient does total self care      Ambulation: with difficulty, uses a walker     Fall Risk:  Fall Risk Assessment, last 12 mths 5/5/2022   Able to walk? Yes   Fall in past 12 months? 0   Do you feel unsteady? 0   Are you worried about falling 0   Is TUG test greater than 12 seconds?  -   Is the gait abnormal? -   Number of falls in past 12 months -      Abuse Screen:  Patient is not abused       Cognitive Screening    Has your family/caregiver stated any concerns about your memory: no     Cognitive Screening: Normal - Mini Cog Test    Health Maintenance Due     Health Maintenance Due   Topic Date Due    Pneumococcal 65+ years (1 - PCV) Never done    Foot Exam Q1  Never done    Eye Exam Retinal or Dilated  Never done    DTaP/Tdap/Td series (1 - Tdap) Never done    Shingrix Vaccine Age 50> (1 of 2) Never done    MICROALBUMIN Q1  04/20/2022    Lipid Screen  04/20/2022    Medicare Yearly Exam  04/21/2022       Patient Care Team   Patient Care Team:  Shukri Lyon NP as PCP - General (Family Medicine)  Shukri Lyon NP as PCP - REHABILITATION [facility] Empaneled Provider    History     Patient Active Problem List   Diagnosis Code    Severe obesity ([facility] [address] E66.01    Failed total knee arthroplasty, subsequent encounter T84.018D, Z96.659    Type 2 diabetes mellitus E11.9    Patellar subluxation, right, subsequent encounter S83.001D    Chronic hepatitis C without hepatic coma ([address] B18.2    Type 2 diabetes mellitus with chronic kidney disease ([address] E11.22     Past Medical History:   Diagnosis Date    Atherosclerosis 02/11/2020    Diabetes ([address]     Fatty liver determined by biopsy 08/2019    Hypertension     Insomnia     Kidney stone 2019 & 2021    Obesity, Class I, BMI 30-34.9     Urinary frequency     Nightime- started Tamsulosin      Past Surgical History:   Procedure Laterality Date    [facility]      [facility]    HX KNEE REPLACEMENT Bilateral 2000 & 2013    HX KNEE REPLACEMENT Right 2017    Revision    HX KNEE REPLACEMENT Left 2020    Replacement    HX LITHOTRIPSY  2019    IR BX LIVER PERCUTANEOUS  2019    Normal     Current Outpatient Medications   Medication Sig Dispense Refill    amLODIPine (NORVASC) 10 mg tablet Take 1 Tablet by mouth daily. OFFICE VISIT REQUIRED PRIOR TO ADDITIONAL REFILLS 30 Tablet 0    hydroCHLOROthiazide (HYDRODIURIL) 12.5 mg tablet TAKE 1 TABLET BY MOUTH EVERY DAY 30 Tablet 0    lisinopriL (PRINIVIL, ZESTRIL) 20 mg tablet TAKE 1 TABLET BY MOUTH TWICE A  Tablet 3    tamsulosin (FLOMAX) 0.4 mg capsule TAKE 2 CAPS BY MOUTH DAILY (WITH DINNER). 180 Capsule 1    aspirin delayed-release 81 mg tablet Take 1 Tablet by mouth two (2) times a day. 60 Tablet 0    simvastatin (ZOCOR) 40 mg tablet TAKE 1 TABLET BY MOUTH EVERY DAY IN THE EVENING 90 Tablet 4    metFORMIN (GLUCOPHAGE) 1,000 mg tablet TAKE 1/2 TABLET BY MOUTH WITH FOOD DAILY 45 Tab 3    acetaminophen (Tylenol Extra Strength) 500 mg tablet Take 1-2 Tabs by mouth every six (6) hours as needed for Pain. Not to exceed 4,000mg in any 24 hour period  Indications: pain 60 Tab 0    vit C/E/Zn/coppr/lutein/zeaxan (PRESERVISION AREDS-2 PO) Take 1 Capsule by mouth two (2) times a day.  docusate sodium (COLACE) 100 mg capsule Take 1 Capsule by mouth two (2) times a day. (Patient not taking: Reported on 5/5/2022) 60 Capsule 0    diclofenac (Voltaren) 1 % gel Apply  to affected area four (4) times daily as needed for Pain. (Patient not taking: Reported on 77/2/4992)      folic acid/multivit-min/lutein (CENTRUM SILVER PO) Take 1 Tab by mouth nightly as needed. (Patient not taking: Reported on 11/1/2021)      ascorbic acid, vitamin C, (Vitamin C) 500 mg tablet Take 1,000 mg by mouth daily.  (Patient not taking: Reported on 11/1/2021)       No Known Allergies    Family History   Problem Relation Age of Onset    Cancer Mother     Breast Cancer Mother     Colon Cancer Mother     Stroke Father     Prostate Cancer Brother     Deep Vein Thrombosis Neg Hx     Anesth Problems Neg Hx      Social History     Tobacco Use    Smoking status: Never Smoker    Smokeless tobacco: Never Used   Substance Use Topics    Alcohol use: Yes     Comment: noni Duron NP

## 2022-05-05 NOTE — PATIENT INSTRUCTIONS
Medicare Wellness Visit, Male    The best way to live healthy is to have a lifestyle where you eat a well-balanced diet, exercise regularly, limit alcohol use, and quit all forms of tobacco/nicotine, if applicable. Regular preventive services are another way to keep healthy. Preventive services (vaccines, screening tests, monitoring & exams) can help personalize your care plan, which helps you manage your own care. Screening tests can find health problems at the earliest stages, when they are easiest to treat. [facility] follows the current, evidence-based guidelines published by the [facility] Abdirashdi Jayme ([facility]) when recommending preventive services for our patients. Because we follow these guidelines, sometimes recommendations change over time as research supports it. (For example, a prostate screening blood test is no longer routinely recommended for men with no symptoms). Of course, you and your doctor may decide to screen more often for some diseases, based on your risk and co-morbidities (chronic disease you are already diagnosed with). Preventive services for you include:  - Medicare offers their members a free annual wellness visit, which is time for you and your primary care provider to discuss and plan for your preventive service needs. Take advantage of this benefit every year!  -All adults over age 72 should receive the recommended pneumonia vaccines. Current USPSTF guidelines recommend a series of two vaccines for the best pneumonia protection.   -All adults should have a flu vaccine yearly and tetanus vaccine every 10 years.  -All adults age 48 and older should receive the shingles vaccines (series of two vaccines).        -All adults age 38-68 who are overweight should have a diabetes screening test once every three years.   -Other screening tests & preventive services for persons with diabetes include: an eye exam to screen for diabetic retinopathy, a kidney function test, a foot exam, and stricter control over your cholesterol.   -Cardiovascular screening for adults with routine risk involves an electrocardiogram (ECG) at intervals determined by the provider.   -Colorectal cancer screening should be done for adults age 54-65 with no increased risk factors for colorectal cancer. There are a number of acceptable methods of screening for this type of cancer. Each test has its own benefits and drawbacks. Discuss with your provider what is most appropriate for you during your annual wellness visit. The different tests include: colonoscopy (considered the best screening method), a fecal occult blood test, a fecal DNA test, and sigmoidoscopy.  -All adults born between [facility] should be screened once for Hepatitis C.  -An Abdominal Aortic Aneurysm (AAA) Screening is recommended for men age 73-68 who has ever smoked in their lifetime.      Here is a list of your current Health Maintenance items (your personalized list of preventive services) with a due date:  Health Maintenance Due   Topic Date Due    Pneumococcal Vaccine (1 - PCV) Never done    Diabetic Foot Care  Never done    Eye Exam  Never done    DTaP/Tdap/Td  (1 - Tdap) Never done    Shingles Vaccine (1 of 2) Never done    Albumin Urine Test  04/20/2022    Cholesterol Test   04/20/2022    Annual Well Visit  04/21/2022

## 2022-05-06 LAB
ALBUMIN SERPL-MCNC: 3.8 G/DL (ref 3.5–5)
ALBUMIN/GLOB SERPL: 1.3 {RATIO} (ref 1.1–2.2)
ALP SERPL-CCNC: 76 U/L (ref 45–117)
ALT SERPL-CCNC: 16 U/L (ref 12–78)
ANION GAP SERPL CALC-SCNC: 5 MMOL/L (ref 5–15)
AST SERPL-CCNC: 10 U/L (ref 15–37)
BASOPHILS # BLD: 0 K/UL (ref 0–0.1)
BASOPHILS NFR BLD: 1 % (ref 0–1)
BILIRUB SERPL-MCNC: 0.4 MG/DL (ref 0.2–1)
BUN SERPL-MCNC: 26 MG/DL (ref 6–20)
BUN/CREAT SERPL: 22 (ref 12–20)
CALCIUM SERPL-MCNC: 8.9 MG/DL (ref 8.5–10.1)
CHLORIDE SERPL-SCNC: 108 MMOL/L (ref 97–108)
CHOLEST SERPL-MCNC: 156 MG/DL
CO2 SERPL-SCNC: 26 MMOL/L (ref 21–32)
CREAT SERPL-MCNC: 1.16 MG/DL (ref 0.7–1.3)
CREAT UR-MCNC: 125 MG/DL
DIFFERENTIAL METHOD BLD: NORMAL
EOSINOPHIL # BLD: 0.1 K/UL (ref 0–0.4)
EOSINOPHIL NFR BLD: 1 % (ref 0–7)
ERYTHROCYTE [DISTWIDTH] IN BLOOD BY AUTOMATED COUNT: 14.5 % (ref 11.5–14.5)
EST. AVERAGE GLUCOSE BLD GHB EST-MCNC: 140 MG/DL
GLOBULIN SER CALC-MCNC: 3 G/DL (ref 2–4)
GLUCOSE SERPL-MCNC: 97 MG/DL (ref 65–100)
HBA1C MFR BLD: 6.5 % (ref 4–5.6)
HCT VFR BLD AUTO: 43.3 % (ref 36.6–50.3)
HDLC SERPL-MCNC: 48 MG/DL
HDLC SERPL: 3.3 {RATIO} (ref 0–5)
HGB BLD-MCNC: 13.8 G/DL (ref 12.1–17)
IMM GRANULOCYTES # BLD AUTO: 0 K/UL (ref 0–0.04)
IMM GRANULOCYTES NFR BLD AUTO: 0 % (ref 0–0.5)
LDLC SERPL CALC-MCNC: 88.6 MG/DL (ref 0–100)
LYMPHOCYTES # BLD: 1.1 K/UL (ref 0.8–3.5)
LYMPHOCYTES NFR BLD: 16 % (ref 12–49)
MCH RBC QN AUTO: 30 PG (ref 26–34)
MCHC RBC AUTO-ENTMCNC: 31.9 G/DL (ref 30–36.5)
MCV RBC AUTO: 94.1 FL (ref 80–99)
MICROALBUMIN UR-MCNC: 1.22 MG/DL
MICROALBUMIN/CREAT UR-RTO: 10 MG/G (ref 0–30)
MONOCYTES # BLD: 0.6 K/UL (ref 0–1)
MONOCYTES NFR BLD: 10 % (ref 5–13)
NEUTS SEG # BLD: 4.7 K/UL (ref 1.8–8)
NEUTS SEG NFR BLD: 72 % (ref 32–75)
NRBC # BLD: 0 K/UL (ref 0–0.01)
NRBC BLD-RTO: 0 PER 100 WBC
PLATELET # BLD AUTO: 203 K/UL (ref 150–400)
PMV BLD AUTO: 10.9 FL (ref 8.9–12.9)
POTASSIUM SERPL-SCNC: 4.3 MMOL/L (ref 3.5–5.1)
PROT SERPL-MCNC: 6.8 G/DL (ref 6.4–8.2)
PSA SERPL-MCNC: 2.5 NG/ML (ref 0.01–4)
RBC # BLD AUTO: 4.6 M/UL (ref 4.1–5.7)
SODIUM SERPL-SCNC: 139 MMOL/L (ref 136–145)
TRIGL SERPL-MCNC: 97 MG/DL (ref ?–150)
TSH SERPL DL<=0.05 MIU/L-ACNC: 6.18 UIU/ML (ref 0.36–3.74)
VLDLC SERPL CALC-MCNC: 19.4 MG/DL
WBC # BLD AUTO: 6.4 K/UL (ref 4.1–11.1)

## 2022-05-11 NOTE — PROGRESS NOTES
All labs are stable for cholesterol, sugar and blood count as well as liver and kidney functions. Recheck 6 months as planned.  [facility]

## 2022-06-07 ENCOUNTER — TRANSCRIBE ORDER (OUTPATIENT)
Dept: SCHEDULING | Age: 77
End: 2022-06-07

## 2022-06-07 ENCOUNTER — TELEPHONE (OUTPATIENT)
Dept: FAMILY MEDICINE CLINIC | Age: 77
End: 2022-06-07

## 2022-06-07 DIAGNOSIS — M43.16 SPONDYLOLISTHESIS OF LUMBAR REGION: ICD-10-CM

## 2022-06-07 DIAGNOSIS — M54.16 LUMBAR RADICULOPATHY: Primary | ICD-10-CM

## 2022-06-07 DIAGNOSIS — R53.1 PROGRESSIVE FOCAL MOTOR WEAKNESS: ICD-10-CM

## 2022-06-07 NOTE — TELEPHONE ENCOUNTER
Let him know that all I can do is refer to cardio for eval but that is going to be hard to get done by the 22nd. Refer him to cardiology at [address] [place] [address], may be faster that way. Dr. Alley David and Dr. Anabelle Coon both go there.  Baldev Sparrow

## 2022-06-07 NOTE — TELEPHONE ENCOUNTER
Pt called in and is asking for one you to please call him as soon as possible. States he is having major back operation done. He needs to have a stress test done for it by the 22nd of this month. He is wanting to walk to someone about it. Call back number for him is [phone]. Thanks. O-T Advancement Flap Text: The defect edges were debeveled with a #15 scalpel blade.  Given the location of the defect, shape of the defect and the proximity to free margins an O-T advancement flap was deemed most appropriate.  Using a sterile surgical marker, an appropriate advancement flap was drawn incorporating the defect and placing the expected incisions within the relaxed skin tension lines where possible.    The area thus outlined was incised deep to adipose tissue with a #15 scalpel blade.  The skin margins were undermined to an appropriate distance in all directions utilizing iris scissors.

## 2022-06-13 ENCOUNTER — HOSPITAL ENCOUNTER (OUTPATIENT)
Dept: CT IMAGING | Age: 77
Discharge: HOME | End: 2022-06-13
Attending: ORTHOPAEDIC SURGERY
Payer: MEDICARE

## 2022-06-13 ENCOUNTER — HOSPITAL ENCOUNTER (OUTPATIENT)
Dept: MRI IMAGING | Age: 77
Discharge: HOME | End: 2022-06-13
Attending: ORTHOPAEDIC SURGERY
Payer: MEDICARE

## 2022-06-13 DIAGNOSIS — M43.16 SPONDYLOLISTHESIS OF LUMBAR REGION: ICD-10-CM

## 2022-06-13 DIAGNOSIS — M54.16 LUMBAR RADICULOPATHY: ICD-10-CM

## 2022-06-13 DIAGNOSIS — R53.1 PROGRESSIVE FOCAL MOTOR WEAKNESS: ICD-10-CM

## 2022-06-13 PROCEDURE — 72146 MRI CHEST SPINE W/O DYE: CPT

## 2022-06-13 PROCEDURE — 72131 CT LUMBAR SPINE W/O DYE: CPT

## 2022-06-15 ENCOUNTER — OFFICE VISIT (OUTPATIENT)
Dept: CARDIOLOGY CLINIC | Age: 77
End: 2022-06-15
Payer: MEDICARE

## 2022-06-15 VITALS
OXYGEN SATURATION: 96 % | HEIGHT: 72 IN | BODY MASS INDEX: 33 KG/M2 | HEART RATE: 86 BPM | DIASTOLIC BLOOD PRESSURE: 78 MMHG | SYSTOLIC BLOOD PRESSURE: 142 MMHG | WEIGHT: 243.6 LBS

## 2022-06-15 DIAGNOSIS — E78.5 DYSLIPIDEMIA: ICD-10-CM

## 2022-06-15 DIAGNOSIS — I10 PRIMARY HYPERTENSION: ICD-10-CM

## 2022-06-15 DIAGNOSIS — Z76.89 ESTABLISHING CARE WITH NEW DOCTOR, ENCOUNTER FOR: ICD-10-CM

## 2022-06-15 DIAGNOSIS — Z01.810 PREOP CARDIOVASCULAR EXAM: Primary | ICD-10-CM

## 2022-06-15 PROCEDURE — G8428 CUR MEDS NOT DOCUMENT: HCPCS | Performed by: INTERNAL MEDICINE

## 2022-06-15 PROCEDURE — 93005 ELECTROCARDIOGRAM TRACING: CPT | Performed by: INTERNAL MEDICINE

## 2022-06-15 PROCEDURE — 1101F PT FALLS ASSESS-DOCD LE1/YR: CPT | Performed by: INTERNAL MEDICINE

## 2022-06-15 PROCEDURE — 99203 OFFICE O/P NEW LOW 30 MIN: CPT | Performed by: INTERNAL MEDICINE

## 2022-06-15 PROCEDURE — G8754 DIAS BP LESS 90: HCPCS | Performed by: INTERNAL MEDICINE

## 2022-06-15 PROCEDURE — G8753 SYS BP > OR = 140: HCPCS | Performed by: INTERNAL MEDICINE

## 2022-06-15 PROCEDURE — 93010 ELECTROCARDIOGRAM REPORT: CPT | Performed by: INTERNAL MEDICINE

## 2022-06-15 PROCEDURE — G8536 NO DOC ELDER MAL SCRN: HCPCS | Performed by: INTERNAL MEDICINE

## 2022-06-15 PROCEDURE — G8432 DEP SCR NOT DOC, RNG: HCPCS | Performed by: INTERNAL MEDICINE

## 2022-06-15 PROCEDURE — G8417 CALC BMI ABV UP PARAM F/U: HCPCS | Performed by: INTERNAL MEDICINE

## 2022-06-15 PROCEDURE — 1123F ACP DISCUSS/DSCN MKR DOCD: CPT | Performed by: INTERNAL MEDICINE

## 2022-06-15 RX ORDER — GABAPENTIN 300 MG/1
600 CAPSULE ORAL
COMMUNITY
Start: 2022-05-27 | End: 2022-09-19 | Stop reason: ALTCHOICE

## 2022-06-15 NOTE — PROGRESS NOTES
Asim Pichardo was placed in room A6    Chief Complaint   Patient presents with    New Patient    Surgical Clearance     Patient needs back surgery Dr Rylee Gorman.  Patient needs a stress test     Visit Vitals  BP (!) 142/78 (BP 1 Location: Left upper arm, BP Patient Position: Sitting)   Pulse 86   Ht 6' (1.829 m)   Wt 243 lb 9.6 oz (110.5 kg)   SpO2 96%   BMI 33.04 kg/m²             Chest pain NO     SOB No     Dizziness NO     Swelling NO

## 2022-06-15 NOTE — PROGRESS NOTES
Reason to see patient: PreOp Evaluation. HPI: Melinda Hashimoto is a 76 y.o. male with past medical history significant for diabetes, hypertension, dyslipidemia, DJD is here for evaluation. He is planning to undergo his lower back surgery for back pain as well as DJD. He has significant previous orthopedic surgery including bilateral knee replacement. His orthopedic surgeon asked him to see me because he needed preop evaluation and clearance. From symptom standpoint he does not have any symptoms of chest pain, shortness of breath, lightheadedness, dizziness,. Syncope syncope. EKG in my office today demonstrated normal sinus rhythm with normal axis with normal intervals with normal ST segment. Plan:    1. Preop evaluation for lower back surgery. He is undergoing AN intermediate to high risk surgery. He will require further evaluation with a stress nuclear study. He will undergo a Lexiscan stress nuclear stress test.  Further recommendations based on the stress test results. 2.  Hypertension: Blood pressures controlled. Continue amlodipine, HCTZ, lisinopril. 3.  Dyslipidemia: Continue Zocor. 4.  Diabetes: His hemoglobin A1c is 6.5. Continue to strictly control blood sugar. Continue current medication. ATTENTION:   This medical record was transcribed using an electronic medical records/speech recognition system. Although proofread, it may and can contain electronic, spelling and other errors. Corrections may be executed at a later time. Please feel free to contact us for any clarifications as needed.       Past Medical History:   Diagnosis Date    Atherosclerosis 02/11/2020    Diabetes ([facility] [address]     Fatty liver determined by biopsy 08/2019    Hypertension     Insomnia     Kidney stone 2019 & 2021    Obesity, Class I, BMI 30-34.9     Urinary frequency     Nightime- started Tamsulosin            Past Surgical History:   Procedure Laterality Date    [facility]    HX KNEE REPLACEMENT Bilateral 2000 & 2013    HX KNEE REPLACEMENT Right 2017    Revision    HX KNEE REPLACEMENT Left 2020    Replacement    HX LITHOTRIPSY  2019    IR BX LIVER PERCUTANEOUS  2019    Normal             Family History   Problem Relation Age of Onset    Cancer Mother     Breast Cancer Mother     Colon Cancer Mother     Stroke Father     Prostate Cancer Brother     Deep Vein Thrombosis Neg Hx     Anesth Problems Neg Hx            Social History     Socioeconomic History    Marital status:      Spouse name: Not on file    Number of children: Not on file    Years of education: Not on file    Highest education level: Not on file   Occupational History    Not on file   Tobacco Use    Smoking status: Never Smoker    Smokeless tobacco: Never Used   Vaping Use    Vaping Use: Never used   Substance and Sexual Activity    Alcohol use: Yes     Comment: rare    Drug use: Never    Sexual activity: Not on file   Other Topics Concern    Not on file   Social History Narrative    Not on file     Social Determinants of Health     Financial Resource Strain:     Difficulty of Paying Living Expenses: Not on file   Food Insecurity:     Worried About Running Out of Food in the Last Year: Not on file    Kiera of Food in the Last Year: Not on file   Transportation Needs:     Lack of Transportation (Medical): Not on file    Lack of Transportation (Non-Medical):  Not on file   Physical Activity:     Days of Exercise per Week: Not on file    Minutes of Exercise per Session: Not on file   Stress:     Feeling of Stress : Not on file   Social Connections:     Frequency of Communication with Friends and Family: Not on file    Frequency of Social Gatherings with Friends and Family: Not on file    Attends Sabianist Services: Not on file    Active Member of Clubs or Organizations: Not on file    Attends Club or Organization Meetings: Not on file    Marital Status: Not on file   Intimate Partner Violence:     Fear of Current or Ex-Partner: Not on file    Emotionally Abused: Not on file    Physically Abused: Not on file    Sexually Abused: Not on file   Housing Stability:     Unable to Pay for Housing in the Last Year: Not on file    Number of Fabián in the Last Year: Not on file    Unstable Housing in the Last Year: Not on file         No Known Allergies         Current Outpatient Medications   Medication Sig Dispense Refill    gabapentin (NEURONTIN) 300 mg capsule Take 600 mg by mouth nightly.  amLODIPine (NORVASC) 10 mg tablet Take 1 Tablet by mouth daily. OFFICE VISIT REQUIRED PRIOR TO ADDITIONAL REFILLS 90 Tablet 3    hydroCHLOROthiazide (HYDRODIURIL) 12.5 mg tablet Take 1 Tablet by mouth daily. 90 Tablet 3    tamsulosin (FLOMAX) 0.4 mg capsule Take 2 Capsules by mouth daily (with dinner). 180 Capsule 3    simvastatin (ZOCOR) 40 mg tablet Take 1 Tablet by mouth every evening. 90 Tablet 3    metFORMIN (GLUCOPHAGE) 1,000 mg tablet TAKE 1/2 TABLET BY MOUTH WITH FOOD DAILY 45 Tablet 3    lisinopriL (PRINIVIL, ZESTRIL) 20 mg tablet TAKE 1 TABLET BY MOUTH TWICE A  Tablet 3    aspirin delayed-release 81 mg tablet Take 1 Tablet by mouth two (2) times a day. 60 Tablet 0    acetaminophen (Tylenol Extra Strength) 500 mg tablet Take 1-2 Tabs by mouth every six (6) hours as needed for Pain. Not to exceed 4,000mg in any 24 hour period  Indications: pain 60 Tab 0    vit C/E/Zn/coppr/lutein/zeaxan (PRESERVISION AREDS-2 PO) Take 1 Capsule by mouth two (2) times a day.  docusate sodium (COLACE) 100 mg capsule Take 1 Capsule by mouth two (2) times a day. (Patient not taking: Reported on 5/5/2022) 60 Capsule 0    diclofenac (Voltaren) 1 % gel Apply  to affected area four (4) times daily as needed for Pain. (Patient not taking: Reported on 87/1/2765)      folic acid/multivit-min/lutein (CENTRUM SILVER PO) Take 1 Tab by mouth nightly as needed.  (Patient not taking: Reported on 11/1/2021)      ascorbic acid, vitamin C, (Vitamin C) 500 mg tablet Take 1,000 mg by mouth daily. (Patient not taking: Reported on 11/1/2021)          ROS:  12 point review of systems was performed.  All negative except for HPI     Physical Exam:  Visit Vitals  BP (!) 142/78 (BP 1 Location: Left upper arm, BP Patient Position: Sitting)   Pulse 86   Ht 6' (1.829 m)   Wt 243 lb 9.6 oz (110.5 kg)   SpO2 96%   BMI 33.04 kg/m²       Gen:  Well-developed, well-nourished, in no acute distress  HEENT:  Pink conjunctivae, PERRL, hearing intact to voice, moist mucous membranes  Neck:  Supple, without masses, thyroid non-tender  Resp:  No accessory muscle use, clear breath sounds without wheezes rales or rhonchi  Card:  No murmurs, normal S1, S2 without thrills, bruits or peripheral edema  Abd:  Soft, non-tender, non-distended, normoactive bowel sounds are present, no palpable organomegaly and no detectable hernias  Lymph:  No cervical or inguinal adenopathy  Musc:  No cyanosis or clubbing  Skin:  No rashes or ulcers, skin turgor is good  Neuro:  Cranial nerves are grossly intact, no focal motor weakness, follows commands appropriately  Psych:  Good insight, oriented to person, place and time, alert     Labs:     Lab Results   Component Value Date/Time    WBC 6.4 05/05/2022 11:31 AM    HGB 13.8 05/05/2022 11:31 AM    HCT 43.3 05/05/2022 11:31 AM    PLATELET 463 71/26/7242 11:31 AM    MCV 94.1 05/05/2022 11:31 AM     Lab Results   Component Value Date/Time    Hemoglobin A1c 6.5 (H) 05/05/2022 11:31 AM    Hemoglobin A1c 6.6 (H) 10/27/2021 08:53 AM    Hemoglobin A1c 6.6 (H) 04/20/2021 10:46 AM    Glucose 97 05/05/2022 11:31 AM    Glucose (POC) 141 (H) 11/10/2021 11:42 AM    Microalbumin/Creat ratio (mg/g creat) 10 05/05/2022 11:31 AM    Microalbumin,urine random 1.22 05/05/2022 11:31 AM    LDL, calculated 88.6 05/05/2022 11:31 AM    Creatinine 1.16 05/05/2022 11:31 AM      Lab Results   Component Value Date/Time    Cholesterol, total 156 05/05/2022 11:31 AM    HDL Cholesterol 48 05/05/2022 11:31 AM    LDL, calculated 88.6 05/05/2022 11:31 AM    Triglyceride 97 05/05/2022 11:31 AM    CHOL/HDL Ratio 3.3 05/05/2022 11:31 AM     Lab Results   Component Value Date/Time    ALT (SGPT) 16 05/05/2022 11:31 AM    Alk.  phosphatase 76 05/05/2022 11:31 AM    Bilirubin, total 0.4 05/05/2022 11:31 AM    Albumin 3.8 05/05/2022 11:31 AM    Protein, total 6.8 05/05/2022 11:31 AM    PLATELET 598 91/97/6350 11:31 AM     No results found for: INR, INREXT, PTMR, PTP, PT1, PT2, INREXT   Lab Results   Component Value Date/Time    GFR est non-AA >60 05/05/2022 11:31 AM    GFR est AA >60 05/05/2022 11:31 AM    Creatinine 1.16 05/05/2022 11:31 AM    BUN 26 (H) 05/05/2022 11:31 AM    Sodium 139 05/05/2022 11:31 AM    Potassium 4.3 05/05/2022 11:31 AM    Chloride 108 05/05/2022 11:31 AM    CO2 26 05/05/2022 11:31 AM     Lab Results   Component Value Date/Time    Prostate Specific Ag 2.5 05/05/2022 11:31 AM    Prostate Specific Ag 2.0 04/20/2021 10:46 AM     Lab Results   Component Value Date/Time    TSH 6.18 (H) 05/05/2022 11:31 AM      Lab Results   Component Value Date/Time    Glucose 97 05/05/2022 11:31 AM    Glucose (POC) 141 (H) 11/10/2021 11:42 AM      No results found for: CPK, RCK1, RCK2, RCK3, RCK4, CKMB, CKNDX, CKND1, TROPT, TROIQ, BNPP, BNP   No results found for: BNP, BNPP, BNPPPOC, XBNPT, BNPNT   Lab Results   Component Value Date/Time    Sodium 139 05/05/2022 11:31 AM    Potassium 4.3 05/05/2022 11:31 AM    Chloride 108 05/05/2022 11:31 AM    CO2 26 05/05/2022 11:31 AM    Anion gap 5 05/05/2022 11:31 AM    Glucose 97 05/05/2022 11:31 AM    BUN 26 (H) 05/05/2022 11:31 AM    Creatinine 1.16 05/05/2022 11:31 AM    BUN/Creatinine ratio 22 (H) 05/05/2022 11:31 AM    GFR est AA >60 05/05/2022 11:31 AM    GFR est non-AA >60 05/05/2022 11:31 AM    Calcium 8.9 05/05/2022 11:31 AM      Lab Results   Component Value Date/Time    Sodium 139 05/05/2022 11:31 AM    Potassium 4.3 05/05/2022 11:31 AM    Chloride 108 05/05/2022 11:31 AM    CO2 26 05/05/2022 11:31 AM    Anion gap 5 05/05/2022 11:31 AM    Glucose 97 05/05/2022 11:31 AM    BUN 26 (H) 05/05/2022 11:31 AM    Creatinine 1.16 05/05/2022 11:31 AM    BUN/Creatinine ratio 22 (H) 05/05/2022 11:31 AM    GFR est AA >60 05/05/2022 11:31 AM    GFR est non-AA >60 05/05/2022 11:31 AM    Calcium 8.9 05/05/2022 11:31 AM    Bilirubin, total 0.4 05/05/2022 11:31 AM    ALT (SGPT) 16 05/05/2022 11:31 AM    Alk. phosphatase 76 05/05/2022 11:31 AM    Protein, total 6.8 05/05/2022 11:31 AM    Albumin 3.8 05/05/2022 11:31 AM    Globulin 3.0 05/05/2022 11:31 AM    A-G Ratio 1.3 05/05/2022 11:31 AM      Lab Results   Component Value Date/Time    Hemoglobin A1c 6.5 (H) 05/05/2022 11:31 AM         No results for input(s): CPK, CKMB, TROIQ in the last 72 hours. No lab exists for component: CKQMB, CPKMB        Problem List:     Problem List  Date Reviewed: 11/1/2021          Codes Class Noted    Chronic hepatitis C without hepatic coma ([facility] ICD-10-CM: B18.2  ICD-9-CM: 070.54  5/5/2022        Type 2 diabetes mellitus with chronic kidney disease ([facility] ICD-10-CM: E11.22  ICD-9-CM: 250.40, 585.9  5/5/2022        Patellar subluxation, right, subsequent encounter ICD-10-CM: S83.001D  ICD-9-CM: V54.89, 836.3  11/8/2021        Type 2 diabetes mellitus ICD-10-CM: E11.9  ICD-9-CM: 250.00  10/29/2021        Failed total knee arthroplasty, subsequent encounter ICD-10-CM: T84.018D, F54.560  ICD-9-CM: V58.89, 996.47, V43.65  5/7/2020        Severe obesity ([address] ICD-10-CM: E66.01  ICD-9-CM: 278.01  2/17/2020                Álvaro Fernandez MD, [facility]

## 2022-06-20 ENCOUNTER — ANCILLARY PROCEDURE (OUTPATIENT)
Dept: CARDIOLOGY CLINIC | Age: 77
End: 2022-06-20
Payer: MEDICARE

## 2022-06-20 VITALS — BODY MASS INDEX: 32.91 KG/M2 | HEIGHT: 72 IN | WEIGHT: 243 LBS

## 2022-06-20 DIAGNOSIS — I10 PRIMARY HYPERTENSION: ICD-10-CM

## 2022-06-20 DIAGNOSIS — E66.01 SEVERE OBESITY (HCC): ICD-10-CM

## 2022-06-20 DIAGNOSIS — Z01.810 PREOP CARDIOVASCULAR EXAM: ICD-10-CM

## 2022-06-20 PROCEDURE — A9500 TC99M SESTAMIBI: HCPCS | Performed by: INTERNAL MEDICINE

## 2022-06-20 PROCEDURE — 93017 CV STRESS TEST TRACING ONLY: CPT | Performed by: INTERNAL MEDICINE

## 2022-06-20 RX ORDER — TETRAKIS(2-METHOXYISOBUTYLISOCYANIDE)COPPER(I) TETRAFLUOROBORATE 1 MG/ML
40 INJECTION, POWDER, LYOPHILIZED, FOR SOLUTION INTRAVENOUS ONCE
Status: COMPLETED | OUTPATIENT
Start: 2022-06-20 | End: 2022-06-20

## 2022-06-20 RX ADMIN — REGADENOSON 0.4 MG: 0.08 INJECTION, SOLUTION INTRAVENOUS at 14:01

## 2022-06-20 RX ADMIN — TECHNETIUM TC 99M SESTAMIBI 26 MILLICURIE: 1 INJECTION, POWDER, FOR SOLUTION INTRAVENOUS at 14:00

## 2022-06-21 ENCOUNTER — APPOINTMENT (OUTPATIENT)
Dept: CARDIOLOGY CLINIC | Age: 77
End: 2022-06-21

## 2022-06-21 RX ORDER — TETRAKIS(2-METHOXYISOBUTYLISOCYANIDE)COPPER(I) TETRAFLUOROBORATE 1 MG/ML
40 INJECTION, POWDER, LYOPHILIZED, FOR SOLUTION INTRAVENOUS ONCE
Status: COMPLETED | OUTPATIENT
Start: 2022-06-21 | End: 2022-06-21

## 2022-06-21 RX ADMIN — TECHNETIUM TC 99M SESTAMIBI 25 MILLICURIE: 1 INJECTION, POWDER, FOR SOLUTION INTRAVENOUS at 14:00

## 2022-06-24 LAB
NUC STRESS EJECTION FRACTION: 55 %
STRESS BASELINE DIAS BP: 82 MMHG
STRESS BASELINE HR: 96 BPM
STRESS BASELINE ST DEPRESSION: 0 MM
STRESS BASELINE SYS BP: 140 MMHG
STRESS O2 SAT PEAK: 98 %
STRESS O2 SAT REST: 97 %
STRESS PEAK DIAS BP: 84 MMHG
STRESS PEAK SYS BP: 146 MMHG
STRESS PERCENT HR ACHIEVED: 77 %
STRESS POST PEAK HR: 109 BPM
STRESS RATE PRESSURE PRODUCT: NORMAL BPM*MMHG
STRESS ST DEPRESSION: 0 MM
STRESS TARGET HR: 141 BPM

## 2022-06-24 PROCEDURE — 78452 HT MUSCLE IMAGE SPECT MULT: CPT | Performed by: INTERNAL MEDICINE

## 2022-06-24 PROCEDURE — 93016 CV STRESS TEST SUPVJ ONLY: CPT | Performed by: INTERNAL MEDICINE

## 2022-06-24 PROCEDURE — 93018 CV STRESS TEST I&R ONLY: CPT | Performed by: INTERNAL MEDICINE

## 2022-06-27 ENCOUNTER — TELEPHONE (OUTPATIENT)
Dept: CARDIOLOGY CLINIC | Age: 77
End: 2022-06-27

## 2022-06-27 NOTE — PROGRESS NOTES
The stress test is normal. Continue current medications. Eat healthy diet that includes fresh vegetables, fruits, nuts, low carb foods, free of GMO and gluten. Reduce saturated fat, sugars, soda's. Aerobic exercise daily.

## 2022-06-27 NOTE — TELEPHONE ENCOUNTER
Called patient advised per Dr Yusuf Montero patient could proceed with surgery from a cardiac standpoint. Also faxed letter of clearance to   Dr. Meghan Pan MD with [facility] at  Fax # [phone]    Patient verbalized understanding.

## 2022-07-05 ENCOUNTER — HOSPITAL ENCOUNTER (OUTPATIENT)
Dept: PREADMISSION TESTING | Age: 77
Discharge: HOME | End: 2022-07-05
Payer: MEDICARE

## 2022-07-05 VITALS
BODY MASS INDEX: 34.72 KG/M2 | WEIGHT: 248 LBS | HEIGHT: 71 IN | TEMPERATURE: 97.5 F | DIASTOLIC BLOOD PRESSURE: 74 MMHG | HEART RATE: 87 BPM | RESPIRATION RATE: 20 BRPM | SYSTOLIC BLOOD PRESSURE: 134 MMHG | OXYGEN SATURATION: 95 %

## 2022-07-05 LAB
25(OH)D3 SERPL-MCNC: 45.3 NG/ML (ref 30–100)
ABO + RH BLD: NORMAL
ALBUMIN SERPL-MCNC: 3.8 G/DL (ref 3.5–5)
ALBUMIN/GLOB SERPL: 1.2 {RATIO} (ref 1.1–2.2)
ALP SERPL-CCNC: 81 U/L (ref 45–117)
ALT SERPL-CCNC: 17 U/L (ref 12–78)
ANION GAP SERPL CALC-SCNC: 9 MMOL/L (ref 5–15)
APPEARANCE UR: CLEAR
APTT PPP: 25.4 SEC (ref 22.1–31)
AST SERPL-CCNC: 10 U/L (ref 15–37)
BACTERIA URNS QL MICRO: NEGATIVE /HPF
BASOPHILS # BLD: 0.1 K/UL (ref 0–0.1)
BASOPHILS NFR BLD: 1 % (ref 0–1)
BILIRUB SERPL-MCNC: 0.4 MG/DL (ref 0.2–1)
BILIRUB UR QL: NEGATIVE
BLOOD GROUP ANTIBODIES SERPL: NORMAL
BUN SERPL-MCNC: 22 MG/DL (ref 6–20)
BUN/CREAT SERPL: 19 (ref 12–20)
CALCIUM SERPL-MCNC: 9.2 MG/DL (ref 8.5–10.1)
CHLORIDE SERPL-SCNC: 105 MMOL/L (ref 97–108)
CO2 SERPL-SCNC: 27 MMOL/L (ref 21–32)
COLOR UR: ABNORMAL
CREAT SERPL-MCNC: 1.13 MG/DL (ref 0.7–1.3)
DIFFERENTIAL METHOD BLD: NORMAL
EOSINOPHIL # BLD: 0.1 K/UL (ref 0–0.4)
EOSINOPHIL NFR BLD: 1 % (ref 0–7)
EPITH CASTS URNS QL MICRO: ABNORMAL /LPF
ERYTHROCYTE [DISTWIDTH] IN BLOOD BY AUTOMATED COUNT: 13.8 % (ref 11.5–14.5)
EST. AVERAGE GLUCOSE BLD GHB EST-MCNC: 137 MG/DL
GLOBULIN SER CALC-MCNC: 3.2 G/DL (ref 2–4)
GLUCOSE SERPL-MCNC: 94 MG/DL (ref 65–100)
GLUCOSE UR STRIP.AUTO-MCNC: NEGATIVE MG/DL
HBA1C MFR BLD: 6.4 % (ref 4–5.6)
HCT VFR BLD AUTO: 43.6 % (ref 36.6–50.3)
HGB BLD-MCNC: 14.3 G/DL (ref 12.1–17)
HGB UR QL STRIP: NEGATIVE
HYALINE CASTS URNS QL MICRO: ABNORMAL /LPF (ref 0–2)
IMM GRANULOCYTES # BLD AUTO: 0 K/UL (ref 0–0.04)
IMM GRANULOCYTES NFR BLD AUTO: 0 % (ref 0–0.5)
INR PPP: 1 (ref 0.9–1.1)
KETONES UR QL STRIP.AUTO: NEGATIVE MG/DL
LEUKOCYTE ESTERASE UR QL STRIP.AUTO: ABNORMAL
LYMPHOCYTES # BLD: 1.5 K/UL (ref 0.8–3.5)
LYMPHOCYTES NFR BLD: 19 % (ref 12–49)
MCH RBC QN AUTO: 31 PG (ref 26–34)
MCHC RBC AUTO-ENTMCNC: 32.8 G/DL (ref 30–36.5)
MCV RBC AUTO: 94.6 FL (ref 80–99)
MONOCYTES # BLD: 0.8 K/UL (ref 0–1)
MONOCYTES NFR BLD: 11 % (ref 5–13)
NEUTS SEG # BLD: 5.4 K/UL (ref 1.8–8)
NEUTS SEG NFR BLD: 68 % (ref 32–75)
NITRITE UR QL STRIP.AUTO: NEGATIVE
NRBC # BLD: 0 K/UL (ref 0–0.01)
NRBC BLD-RTO: 0 PER 100 WBC
PH UR STRIP: 5.5 [PH] (ref 5–8)
PLATELET # BLD AUTO: 232 K/UL (ref 150–400)
PMV BLD AUTO: 10.7 FL (ref 8.9–12.9)
POTASSIUM SERPL-SCNC: 4.3 MMOL/L (ref 3.5–5.1)
PROT SERPL-MCNC: 7 G/DL (ref 6.4–8.2)
PROT UR STRIP-MCNC: NEGATIVE MG/DL
PROTHROMBIN TIME: 10.2 SEC (ref 9–11.1)
RBC # BLD AUTO: 4.61 M/UL (ref 4.1–5.7)
RBC #/AREA URNS HPF: ABNORMAL /HPF (ref 0–5)
SODIUM SERPL-SCNC: 141 MMOL/L (ref 136–145)
SP GR UR REFRACTOMETRY: 1.03 (ref 1–1.03)
SPECIMEN EXP DATE BLD: NORMAL
THERAPEUTIC RANGE,PTTT: NORMAL SECS (ref 58–77)
UA: UC IF INDICATED,UAUC: ABNORMAL
UROBILINOGEN UR QL STRIP.AUTO: 0.2 EU/DL (ref 0.2–1)
WBC # BLD AUTO: 7.8 K/UL (ref 4.1–11.1)
WBC URNS QL MICRO: ABNORMAL /HPF (ref 0–4)

## 2022-07-05 PROCEDURE — 86900 BLOOD TYPING SEROLOGIC ABO: CPT

## 2022-07-05 PROCEDURE — 81001 URINALYSIS AUTO W/SCOPE: CPT

## 2022-07-05 PROCEDURE — 36415 COLL VENOUS BLD VENIPUNCTURE: CPT

## 2022-07-05 PROCEDURE — 85610 PROTHROMBIN TIME: CPT

## 2022-07-05 PROCEDURE — 80053 COMPREHEN METABOLIC PANEL: CPT

## 2022-07-05 PROCEDURE — 85730 THROMBOPLASTIN TIME PARTIAL: CPT

## 2022-07-05 PROCEDURE — 85025 COMPLETE CBC W/AUTO DIFF WBC: CPT

## 2022-07-05 PROCEDURE — 82306 VITAMIN D 25 HYDROXY: CPT

## 2022-07-05 PROCEDURE — 83036 HEMOGLOBIN GLYCOSYLATED A1C: CPT

## 2022-07-05 RX ORDER — LISINOPRIL 20 MG/1
20 TABLET ORAL 2 TIMES DAILY
COMMUNITY
End: 2022-07-28

## 2022-07-05 RX ORDER — METFORMIN HYDROCHLORIDE 1000 MG/1
500 TABLET ORAL 2 TIMES DAILY WITH MEALS
COMMUNITY
End: 2022-09-19

## 2022-07-05 RX ORDER — LISINOPRIL 20 MG/1
10 TABLET ORAL
COMMUNITY
End: 2022-07-05

## 2022-07-05 RX ORDER — ASPIRIN 81 MG/1
81 TABLET ORAL EVERY EVENING
COMMUNITY
End: 2022-11-03 | Stop reason: ALTCHOICE

## 2022-07-05 RX ORDER — LISINOPRIL 20 MG/1
20 TABLET ORAL DAILY
COMMUNITY
End: 2022-07-05

## 2022-07-05 NOTE — H&P
History and Physical    Patient: Carolin Cabrera MRN: [id number]  SSN: [id number]    YOB: 1945  Age: 76 y.o. Sex: male      CC: Leg weakness    Subjective: Carolin Cabrera is a 76 y.o. male referred for pre-operative evaluation by Dr. Poornima Painter for surgery on 7/19/22. Annette Kamara notes that he has had mild pain the last couple of months but did not realize it was his back. He has had a few knee replacement and revision surgeries, the last one being November of 2021. He is using a rollator today for assistance. Pain today is 0/10. He notes his left leg is weak, feeling asleep. Denies falls. He notes his biggest problem with his back is his left leg weakness, not pain. He is followed by Dr. Radha Carlos for his heart health. He has been cleared on 6/29/22 after having a normal stress test. He is taking a 81mg ASA for prevention. The patient was evaluated in the surgeon's office and it was determined that the most appropriate plan of care is to proceed with surgical intervention.   Patient's PCP Vona Pallas, NP    Past Medical History:   Diagnosis Date    Atherosclerosis 02/11/2020    COVID-19 vaccine series completed     Diabetes ([facility] [address]     Fatty liver determined by biopsy 08/2019    Frequent urination at night     History of stress test 06/27/2022    Normal    Hypertension     Insomnia     Kidney stone 2019 & 2021    Obesity, Class I, BMI 30-34.9      Past Surgical History:   Procedure Laterality Date    [facility]      [facility]    HX KNEE REPLACEMENT Bilateral 2000 & 2013    HX KNEE REPLACEMENT Right 2017 & 11/8/2021    Revision    HX KNEE REPLACEMENT Left 2020    Revision    HX LITHOTRIPSY  2019    IR BX LIVER PERCUTANEOUS  2019    Normal      Family History   Problem Relation Age of Onset    Cancer Mother     Breast Cancer Mother     Colon Cancer Mother     Stroke Father     Prostate Cancer Brother     Deep Vein Thrombosis Neg Hx     Anesth Problems Neg Hx      Social History     Tobacco Use    Smoking status: Never Smoker    Smokeless tobacco: Never Used   Vaping Use    Vaping Use: Never used   Substance Use Topics    Alcohol use: Yes     Comment: rare    Drug use: Never      Prior to Admission medications    Medication Sig Start Date End Date Taking? Authorizing Provider   folic acid/multivit-min/lutein (CENTRUM SILVER PO) Take 1 Tablet by mouth daily. Yes Provider, Historical   metFORMIN (GLUCOPHAGE) 1,000 mg tablet Take 500 mg by mouth two (2) times daily (with meals). Yes Provider, Historical   aspirin delayed-release 81 mg tablet Take 81 mg by mouth every evening. Yes Provider, Historical   lisinopriL (PRINIVIL, ZESTRIL) 20 mg tablet Take 20 mg by mouth two (2) times a day. Yes Provider, Historical   gabapentin (NEURONTIN) 300 mg capsule Take 600 mg by mouth nightly. 5/27/22  Yes Provider, Historical   amLODIPine (NORVASC) 10 mg tablet Take 1 Tablet by mouth daily. OFFICE VISIT REQUIRED PRIOR TO ADDITIONAL REFILLS 5/5/22  Yes Nico Dee NP   hydroCHLOROthiazide (HYDRODIURIL) 12.5 mg tablet Take 1 Tablet by mouth daily. 5/5/22  Yes Nico Dee NP   tamsulosin (FLOMAX) 0.4 mg capsule Take 2 Capsules by mouth daily (with dinner). 5/5/22  Yes Nico Dee NP   simvastatin (ZOCOR) 40 mg tablet Take 1 Tablet by mouth every evening. 5/5/22  Yes Nico Dee NP   vit C/E/Zn/coppr/lutein/zeaxan (PRESERVISION AREDS-2 PO) Take 1 Capsule by mouth two (2) times a day. Yes Provider, Historical        No Known Allergies    Review of Systems:  Constitutional: Negative for chills and fever  HENT: Negative for congestion and sore throat  Eyes: negative for blurred vision and double vision  Respiratory: Negative for cough, shortness of breath and wheezing  Mouth: Negative for loose, broken or chipped teeth.    Cardiovascular: Negative for chest pain and palpitations  Gastrointestinal: Negative for abdominal pain, constipation, diarrhea and nausea  Genitourinary: Negative for dysuria and hematuria  Musculoskeletal: Leg weakness  Skin: Negative for rash, open wounds. Neurological: Negative for dizziness, tremors and headaches  Psychiatric: Negative for anxiety    Objective:     Vitals:    07/05/22 1405   BP: 134/74   Pulse: 87   Resp: 20   Temp: 97.5 °F (36.4 °C)   SpO2: 95%   Weight: 112.5 kg (248 lb)   Height: 5' 11\" (1.803 m)        Physical Exam:  General Appearance: Alert, Well Appearing and in no distress  Mental Status: Normal mood, behavior, speech and dress  Neck: Normal appearance externally  Ears: External canal no drainage  Nose: Normal external appearance and no drainage   Chest: Clear to auscultation, no wheezes, rales or rhonchi  Heart: Normal rate, regular rhythm, no murmurs, rubs, clicks or gallops  Skin: Normal color, no lesions externally  Abdomen: Not examined  Neuro: Not examined  Musculoskeletal: Gait antalgic with rollator    Recent Results (from the past 168 hour(s))   TYPE & SCREEN    Collection Time: 07/05/22  2:48 PM   Result Value Ref Range    Crossmatch Expiration 07/19/2022,2359     ABO/Rh(D) A POSITIVE     Antibody screen NEG    CBC WITH AUTOMATED DIFF    Collection Time: 07/05/22  2:48 PM   Result Value Ref Range    WBC 7.8 4.1 - 11.1 K/uL    RBC 4.61 4.10 - 5.70 M/uL    HGB 14.3 12.1 - 17.0 g/dL    HCT 43.6 36.6 - 50.3 %    MCV 94.6 80.0 - 99.0 FL    MCH 31.0 26.0 - 34.0 PG    MCHC 32.8 30.0 - 36.5 g/dL    RDW 13.8 11.5 - 14.5 %    PLATELET 716 005 - 016 K/uL    MPV 10.7 8.9 - 12.9 FL    NRBC 0.0 0  WBC    ABSOLUTE NRBC 0.00 0.00 - 0.01 K/uL    NEUTROPHILS 68 32 - 75 %    LYMPHOCYTES 19 12 - 49 %    MONOCYTES 11 5 - 13 %    EOSINOPHILS 1 0 - 7 %    BASOPHILS 1 0 - 1 %    IMMATURE GRANULOCYTES 0 0.0 - 0.5 %    ABS. NEUTROPHILS 5.4 1.8 - 8.0 K/UL    ABS. LYMPHOCYTES 1.5 0.8 - 3.5 K/UL    ABS. MONOCYTES 0.8 0.0 - 1.0 K/UL    ABS. EOSINOPHILS 0.1 0.0 - 0.4 K/UL    ABS.  BASOPHILS 0.1 0.0 - 0.1 K/UL ABS. IMM. GRANS. 0.0 0.00 - 0.04 K/UL    DF AUTOMATED     METABOLIC PANEL, COMPREHENSIVE    Collection Time: 07/05/22  2:48 PM   Result Value Ref Range    Sodium 141 136 - 145 mmol/L    Potassium 4.3 3.5 - 5.1 mmol/L    Chloride 105 97 - 108 mmol/L    CO2 27 21 - 32 mmol/L    Anion gap 9 5 - 15 mmol/L    Glucose 94 65 - 100 mg/dL    BUN 22 (H) 6 - 20 MG/DL    Creatinine 1.13 0.70 - 1.30 MG/DL    BUN/Creatinine ratio 19 12 - 20      GFR est AA >60 >60 ml/min/1.73m2    GFR est non-AA >60 >60 ml/min/1.73m2    Calcium 9.2 8.5 - 10.1 MG/DL    Bilirubin, total 0.4 0.2 - 1.0 MG/DL    ALT (SGPT) 17 12 - 78 U/L    AST (SGOT) 10 (L) 15 - 37 U/L    Alk. phosphatase 81 45 - 117 U/L    Protein, total 7.0 6.4 - 8.2 g/dL    Albumin 3.8 3.5 - 5.0 g/dL    Globulin 3.2 2.0 - 4.0 g/dL    A-G Ratio 1.2 1.1 - 2.2     HEMOGLOBIN A1C WITH EAG    Collection Time: 07/05/22  2:48 PM   Result Value Ref Range    Hemoglobin A1c 6.4 (H) 4.0 - 5.6 %    Est. average glucose 137 mg/dL   CULTURE, MRSA    Collection Time: 07/05/22  2:48 PM    Specimen: Nares; Nasal   Result Value Ref Range    Special Requests: NO SPECIAL REQUESTS      Culture result: MRSA NOT PRESENT      Culture result:        Screening of patient nares for MRSA is for surveillance purposes and, if positive, to facilitate isolation considerations in high risk settings. It is not intended for automatic decolonization interventions per se as regimens are not sufficiently effective to warrant routine use.    PROTHROMBIN TIME + INR    Collection Time: 07/05/22  2:48 PM   Result Value Ref Range    INR 1.0 0.9 - 1.1      Prothrombin time 10.2 9.0 - 11.1 sec   PTT    Collection Time: 07/05/22  2:48 PM   Result Value Ref Range    aPTT 25.4 22.1 - 31.0 sec    aPTT, therapeutic range     58.0 - 77.0 SECS   URINALYSIS W/ REFLEX CULTURE    Collection Time: 07/05/22  2:48 PM    Specimen: Urine   Result Value Ref Range    Color YELLOW/STRAW      Appearance CLEAR CLEAR      Specific gravity 1.026 1.003 - 1.030      pH (UA) 5.5 5.0 - 8.0      Protein Negative NEG mg/dL    Glucose Negative NEG mg/dL    Ketone Negative NEG mg/dL    Bilirubin Negative NEG      Blood Negative NEG      Urobilinogen 0.2 0.2 - 1.0 EU/dL    Nitrites Negative NEG      Leukocyte Esterase SMALL (A) NEG      UA:UC IF INDICATED CULTURE NOT INDICATED BY UA RESULT CNI      WBC 5-10 0 - 4 /hpf    RBC 0-5 0 - 5 /hpf    Epithelial cells FEW FEW /lpf    Bacteria Negative NEG /hpf    Hyaline cast 0-2 0 - 2 /lpf   VITAMIN D, 25 HYDROXY    Collection Time: 07/05/22  2:48 PM   Result Value Ref Range    Vitamin D 25-Hydroxy 45.3 30 - 100 ng/mL         Assessment:     Thoracic stenosis  Pre-Operative Evaluation    Plan:     T10-S2 Fusion  MRSA negative  Labs and EKG reviewed.        Signed By: Sheila Nugent NP     July 7, 2022

## 2022-07-05 NOTE — FACE TO FACE
The patient and  attended the pre-operative spine class. The content of the class was presented using a power point presentation and visual demonstrations specific for patients undergoing surgical spine procedures of the neck and back. A pre-operative Patient education booklet specific to spine surgery was given to patient. Incentive spirometer and CHG bath kits were demonstrated in class. Day of surgery routine and expectations, hospital routine and expectations, nutrition, alcohol, nicotine, medications, infection control, pain management, DVT precautions and equipment, ice therapy, durable medical equipment, exercises, mobility expectations and precautions, home preparation and safety were reviewed in class. During class the attendees had opportunities to ask questions of the material presented as well as any concerns about their upcoming surgery. My contact information was shared with the patient to provide further information as requested by the patient related to their upcoming surgery.

## 2022-07-05 NOTE — PERIOP NOTES
[address] \[facility] [place] [facility]\"" [address], [address] [facility]   MAIN OR                                  [phone]   MAIN PRE OP                          [phone]                                                                                AMBULATORY PRE OP          [phone]  PRE-ADMISSION TESTING    [phone]   Surgery Date:  Tuesday, 7/19/2022       Is surgery arrival time given by surgeon? NO  If NO, [facility] staff will call you between 3 and 7pm the day before your surgery with your arrival time. (If your surgery is on a Monday, we will call you the Friday before.)    Call [phone] after 7pm Monday-Friday if you did not receive this call. INSTRUCTIONS BEFORE YOUR SURGERY   When You  Arrive Arrive at the [address] [facility] on the day of your surgery  Have your insurance card, photo ID, and any copayment (if needed)   Food   and   Drink NO food or drink after midnight the night before surgery    This means NO water, gum, mints, coffee, juice, etc.  No alcohol (beer, wine, liquor) 24 hours before and after surgery   Medications to   TAKE   Morning of Surgery MEDICATIONS TO TAKE THE MORNING OF SURGERY WITH A SIP OF WATER:    Amlodipine   Do NOT take your Lisinopril either the evening before or the morning of surgery.  Ask your primary care physician about when to hold/ resume your Meformin in relation to your surgery. Medications  To  STOP      7 days before surgery  Non-Steroidal anti-inflammatory Drugs (NSAID's): for example, Ibuprofen (Advil, Motrin), Naproxen (Aleve)   Aspirin    Herbal supplements, vitamins, and fish oil   Other:  (Pain medications not listed above, including Tylenol may be taken)   Blood  Thinners  If you take  Aspirin, Plavix, Coumadin, or any blood-thinning or anti-blood clot medicine, talk to the doctor who prescribed the medications for pre-operative instructions.    Bathing Clothing  Jewelry  Valuables      If you shower the morning of surgery, please do not apply anything to your skin (lotions, powders, deodorant, or makeup, especially mascara)   Follow Chlorhexidine Care Fusion body wash instructions provided to you during PAT appointment. Begin 3 days prior to surgery.  Do not shave or trim anywhere 24 hours before surgery   Wear your hair loose or down; no pony-tails, buns, or metal hair clips   Wear loose, comfortable, clean clothes   Wear glasses instead of contacts  [employer] money, valuables, and jewelry, including body piercings, at home   If you were given an [employer] [place], bring it on day of surgery. Going Home - or Spending the Night  SAME-DAY SURGERY: You must have a responsible adult drive you home and stay with you 24 hours after surgery   ADMITS: If your doctor is keeping you in the hospital after surgery, leave personal belongings/luggage in your car until you have a hospital room number. Hospital discharge time is 12 noon  Drivers must be here before 12 noon unless you are told differently   Special Instructions   What to watch for:   Coronavirus (COVID-19) affects different people in different ways   It also appears with a wide range of symptoms from mild to severe   Signs usually appear 2-14 days after exposure     If you develop any of the following, notify your doctor immediately:  o Fever  o Chills, with or without a shiver  o Muscle pain  o Headache  o Sore throat  o Dry cough  o New loss of taste or smell  o Tiredness      If you develop any of the following, call 911:  o Shortness of breath  o Difficulty breathing  o Chest pain  o New confusion  o Blueness of fingers and/or lips         Follow all instructions so your surgery wont be cancelled. Please, be on time. If a situation occurs and you are delayed the day of surgery, call [phone].     If your physical condition changes (like a fever, cold, flu, etc.) call your surgeon. Home medication(s) reviewed and verified via  LIST   VERBAL   during PAT appointment. The patient was contacted by  IN-PERSON  The patient verbalizes understanding of all instructions and  DOES NOT   need reinforcement.

## 2022-07-06 LAB
BACTERIA SPEC CULT: NORMAL
BACTERIA SPEC CULT: NORMAL
SERVICE CMNT-IMP: NORMAL

## 2022-07-18 ENCOUNTER — ANESTHESIA EVENT (OUTPATIENT)
Dept: SURGERY | Age: 77
DRG: 457 | End: 2022-07-18
Payer: MEDICARE

## 2022-07-19 ENCOUNTER — ANESTHESIA (OUTPATIENT)
Dept: SURGERY | Age: 77
DRG: 457 | End: 2022-07-19
Payer: MEDICARE

## 2022-07-19 ENCOUNTER — HOSPITAL ENCOUNTER (INPATIENT)
Age: 77
LOS: 9 days | Discharge: NURSING, REHAB OR LONG-TERM CARE | DRG: 457 | End: 2022-07-28
Attending: ORTHOPAEDIC SURGERY | Admitting: ORTHOPAEDIC SURGERY
Payer: MEDICARE

## 2022-07-19 ENCOUNTER — APPOINTMENT (OUTPATIENT)
Dept: GENERAL RADIOLOGY | Age: 77
DRG: 457 | End: 2022-07-19
Attending: ORTHOPAEDIC SURGERY
Payer: MEDICARE

## 2022-07-19 DIAGNOSIS — Z98.1 S/P LUMBAR FUSION: ICD-10-CM

## 2022-07-19 DIAGNOSIS — I10 ESSENTIAL HYPERTENSION: ICD-10-CM

## 2022-07-19 DIAGNOSIS — E87.6 HYPOKALEMIA: ICD-10-CM

## 2022-07-19 DIAGNOSIS — I95.1 ORTHOSTATIC HYPOTENSION: ICD-10-CM

## 2022-07-19 DIAGNOSIS — M48.062 NEUROGENIC CLAUDICATION DUE TO LUMBAR SPINAL STENOSIS: Primary | ICD-10-CM

## 2022-07-19 PROBLEM — M43.16 SPONDYLOLISTHESIS AT L2-L3 LEVEL: Status: ACTIVE | Noted: 2022-07-19

## 2022-07-19 PROBLEM — R29.898 LEFT LEG WEAKNESS: Status: ACTIVE | Noted: 2022-07-19

## 2022-07-19 PROBLEM — M43.16 SPONDYLOLISTHESIS AT L3-L4 LEVEL: Status: ACTIVE | Noted: 2022-07-19

## 2022-07-19 PROBLEM — T81.9XXA COMPLICATION OF SURGICAL PROCEDURE: Status: ACTIVE | Noted: 2022-07-19

## 2022-07-19 PROBLEM — M43.8X9 SAGITTAL PLANE IMBALANCE: Status: ACTIVE | Noted: 2022-07-19

## 2022-07-19 PROBLEM — M43.16 SPONDYLOLISTHESIS AT L4-L5 LEVEL: Status: ACTIVE | Noted: 2022-07-19

## 2022-07-19 LAB
GLUCOSE BLD STRIP.AUTO-MCNC: 141 MG/DL (ref 65–117)
GLUCOSE BLD STRIP.AUTO-MCNC: 160 MG/DL (ref 65–117)
GLUCOSE BLD STRIP.AUTO-MCNC: 164 MG/DL (ref 65–117)
SERVICE CMNT-IMP: ABNORMAL

## 2022-07-19 PROCEDURE — 74011000258 HC RX REV CODE- 258: Performed by: NURSE ANESTHETIST, CERTIFIED REGISTERED

## 2022-07-19 PROCEDURE — 74011000250 HC RX REV CODE- 250: Performed by: NURSE PRACTITIONER

## 2022-07-19 PROCEDURE — C9290 INJ, BUPIVACAINE LIPOSOME: HCPCS | Performed by: ORTHOPAEDIC SURGERY

## 2022-07-19 PROCEDURE — 2709999900 HC NON-CHARGEABLE SUPPLY: Performed by: ORTHOPAEDIC SURGERY

## 2022-07-19 PROCEDURE — 0QH004Z INSERTION OF INTERNAL FIXATION DEVICE INTO LUMBAR VERTEBRA, OPEN APPROACH: ICD-10-PCS | Performed by: ORTHOPAEDIC SURGERY

## 2022-07-19 PROCEDURE — 76060000052 HC ANESTHESIA 10.5 TO 11 HR: Performed by: ORTHOPAEDIC SURGERY

## 2022-07-19 PROCEDURE — 0SG1071 FUSION OF 2 OR MORE LUMBAR VERTEBRAL JOINTS WITH AUTOLOGOUS TISSUE SUBSTITUTE, POSTERIOR APPROACH, POSTERIOR COLUMN, OPEN APPROACH: ICD-10-PCS | Performed by: ORTHOPAEDIC SURGERY

## 2022-07-19 PROCEDURE — 77030039147 HC PWDR HEMSTS SURGICEL JNJ -D: Performed by: ORTHOPAEDIC SURGERY

## 2022-07-19 PROCEDURE — 01NB0ZZ RELEASE LUMBAR NERVE, OPEN APPROACH: ICD-10-PCS | Performed by: ORTHOPAEDIC SURGERY

## 2022-07-19 PROCEDURE — 65610000006 HC RM INTENSIVE CARE

## 2022-07-19 PROCEDURE — 82962 GLUCOSE BLOOD TEST: CPT

## 2022-07-19 PROCEDURE — 0RGA071 FUSION OF THORACOLUMBAR VERTEBRAL JOINT WITH AUTOLOGOUS TISSUE SUBSTITUTE, POSTERIOR APPROACH, POSTERIOR COLUMN, OPEN APPROACH: ICD-10-PCS | Performed by: ORTHOPAEDIC SURGERY

## 2022-07-19 PROCEDURE — 77030013079 HC BLNKT BAIR HGGR 3M -A: Performed by: ANESTHESIOLOGY

## 2022-07-19 PROCEDURE — 0SG3071 FUSION OF LUMBOSACRAL JOINT WITH AUTOLOGOUS TISSUE SUBSTITUTE, POSTERIOR APPROACH, POSTERIOR COLUMN, OPEN APPROACH: ICD-10-PCS | Performed by: ORTHOPAEDIC SURGERY

## 2022-07-19 PROCEDURE — 77030035236 HC SUT PDS STRATFX BARB J&J -B: Performed by: ORTHOPAEDIC SURGERY

## 2022-07-19 PROCEDURE — 77030005513 HC CATH URETH FOL11 MDII -B: Performed by: ORTHOPAEDIC SURGERY

## 2022-07-19 PROCEDURE — 77030028271 HC SRGFL HEMSTAT MTRX KT J&J -C: Performed by: ORTHOPAEDIC SURGERY

## 2022-07-19 PROCEDURE — 77030037134 HC WRAP COMPR BACK THER SOLM -B

## 2022-07-19 PROCEDURE — 76210000017 HC OR PH I REC 1.5 TO 2 HR: Performed by: ORTHOPAEDIC SURGERY

## 2022-07-19 PROCEDURE — 74011000250 HC RX REV CODE- 250: Performed by: ORTHOPAEDIC SURGERY

## 2022-07-19 PROCEDURE — 0PH404Z INSERTION OF INTERNAL FIXATION DEVICE INTO THORACIC VERTEBRA, OPEN APPROACH: ICD-10-PCS | Performed by: ORTHOPAEDIC SURGERY

## 2022-07-19 PROCEDURE — 74011250636 HC RX REV CODE- 250/636: Performed by: NURSE ANESTHETIST, CERTIFIED REGISTERED

## 2022-07-19 PROCEDURE — 77030038552 HC DRN WND MDII -A: Performed by: ORTHOPAEDIC SURGERY

## 2022-07-19 PROCEDURE — 74011000250 HC RX REV CODE- 250: Performed by: NURSE ANESTHETIST, CERTIFIED REGISTERED

## 2022-07-19 PROCEDURE — C1713 ANCHOR/SCREW BN/BN,TIS/BN: HCPCS | Performed by: ORTHOPAEDIC SURGERY

## 2022-07-19 PROCEDURE — 77030008771 HC TU NG SALEM SUMP -A: Performed by: ANESTHESIOLOGY

## 2022-07-19 PROCEDURE — P9045 ALBUMIN (HUMAN), 5%, 250 ML: HCPCS

## 2022-07-19 PROCEDURE — 74011250637 HC RX REV CODE- 250/637: Performed by: ORTHOPAEDIC SURGERY

## 2022-07-19 PROCEDURE — 74011250636 HC RX REV CODE- 250/636

## 2022-07-19 PROCEDURE — 77030020269 HC MISC IMPL: Performed by: ORTHOPAEDIC SURGERY

## 2022-07-19 PROCEDURE — 0QH304Z INSERTION OF INTERNAL FIXATION DEVICE INTO LEFT PELVIC BONE, OPEN APPROACH: ICD-10-PCS | Performed by: ORTHOPAEDIC SURGERY

## 2022-07-19 PROCEDURE — 0QH204Z INSERTION OF INTERNAL FIXATION DEVICE INTO RIGHT PELVIC BONE, OPEN APPROACH: ICD-10-PCS | Performed by: ORTHOPAEDIC SURGERY

## 2022-07-19 PROCEDURE — 77030040922 HC BLNKT HYPOTHRM STRY -A

## 2022-07-19 PROCEDURE — 77030008684 HC TU ET CUF COVD -B: Performed by: ANESTHESIOLOGY

## 2022-07-19 PROCEDURE — 0QH104Z INSERTION OF INTERNAL FIXATION DEVICE INTO SACRUM, OPEN APPROACH: ICD-10-PCS | Performed by: ORTHOPAEDIC SURGERY

## 2022-07-19 PROCEDURE — 76010000164 HC OR TIME 10.5 TO 11 HR INTENSV-TIER 1: Performed by: ORTHOPAEDIC SURGERY

## 2022-07-19 PROCEDURE — 74011250636 HC RX REV CODE- 250/636: Performed by: ORTHOPAEDIC SURGERY

## 2022-07-19 PROCEDURE — 77030004391 HC BUR FLUT MEDT -C: Performed by: ORTHOPAEDIC SURGERY

## 2022-07-19 PROCEDURE — 77030034475 HC MISC IMPL SPN: Performed by: ORTHOPAEDIC SURGERY

## 2022-07-19 PROCEDURE — 77030038692 HC WND DEB SYS IRMX -B: Performed by: ORTHOPAEDIC SURGERY

## 2022-07-19 PROCEDURE — 77030035129: Performed by: ORTHOPAEDIC SURGERY

## 2022-07-19 PROCEDURE — 0RG7071 FUSION OF 2 TO 7 THORACIC VERTEBRAL JOINTS WITH AUTOLOGOUS TISSUE SUBSTITUTE, POSTERIOR APPROACH, POSTERIOR COLUMN, OPEN APPROACH: ICD-10-PCS | Performed by: ORTHOPAEDIC SURGERY

## 2022-07-19 PROCEDURE — 77030010507 HC ADH SKN DERMBND J&J -B: Performed by: ORTHOPAEDIC SURGERY

## 2022-07-19 PROCEDURE — 00NY0ZZ RELEASE LUMBAR SPINAL CORD, OPEN APPROACH: ICD-10-PCS | Performed by: ORTHOPAEDIC SURGERY

## 2022-07-19 PROCEDURE — 77030013797 HC KT TRNSDUC PRSSR EDWD -A: Performed by: ANESTHESIOLOGY

## 2022-07-19 PROCEDURE — 77030019908 HC STETH ESOPH SIMS -A: Performed by: ANESTHESIOLOGY

## 2022-07-19 PROCEDURE — 74011250636 HC RX REV CODE- 250/636: Performed by: NURSE PRACTITIONER

## 2022-07-19 PROCEDURE — 74011250636 HC RX REV CODE- 250/636: Performed by: ANESTHESIOLOGY

## 2022-07-19 PROCEDURE — 77030040361 HC SLV COMPR DVT MDII -B

## 2022-07-19 DEVICE — GRAFT BONE 10 CC: Type: IMPLANTABLE DEVICE | Site: BACK | Status: FUNCTIONAL

## 2022-07-19 DEVICE — SCREW 25840018580 BS CNMAS 8.5X80 T/C
Type: IMPLANTABLE DEVICE | Site: BACK | Status: FUNCTIONAL
Brand: CD HORIZON® SPINAL SYSTEM

## 2022-07-19 DEVICE — SET SCREW 5540030 5.5 TI NS BRK OFF
Type: IMPLANTABLE DEVICE | Site: BACK | Status: FUNCTIONAL
Brand: CD HORIZON® SPINAL SYSTEM

## 2022-07-19 RX ORDER — SODIUM CHLORIDE 0.9 % (FLUSH) 0.9 %
5-40 SYRINGE (ML) INJECTION EVERY 8 HOURS
Status: DISCONTINUED | OUTPATIENT
Start: 2022-07-19 | End: 2022-07-28 | Stop reason: HOSPADM

## 2022-07-19 RX ORDER — PROPOFOL 10 MG/ML
INJECTION, EMULSION INTRAVENOUS
Status: DISCONTINUED | OUTPATIENT
Start: 2022-07-19 | End: 2022-07-19 | Stop reason: HOSPADM

## 2022-07-19 RX ORDER — HYDROMORPHONE HYDROCHLORIDE 1 MG/ML
.25-1 INJECTION, SOLUTION INTRAMUSCULAR; INTRAVENOUS; SUBCUTANEOUS
Status: DISCONTINUED | OUTPATIENT
Start: 2022-07-19 | End: 2022-07-19 | Stop reason: HOSPADM

## 2022-07-19 RX ORDER — SODIUM CHLORIDE, SODIUM LACTATE, POTASSIUM CHLORIDE, CALCIUM CHLORIDE 600; 310; 30; 20 MG/100ML; MG/100ML; MG/100ML; MG/100ML
100 INJECTION, SOLUTION INTRAVENOUS CONTINUOUS
Status: DISCONTINUED | OUTPATIENT
Start: 2022-07-19 | End: 2022-07-19 | Stop reason: HOSPADM

## 2022-07-19 RX ORDER — LIDOCAINE HYDROCHLORIDE 20 MG/ML
INJECTION, SOLUTION EPIDURAL; INFILTRATION; INTRACAUDAL; PERINEURAL AS NEEDED
Status: DISCONTINUED | OUTPATIENT
Start: 2022-07-19 | End: 2022-07-19 | Stop reason: HOSPADM

## 2022-07-19 RX ORDER — KETAMINE HYDROCHLORIDE 10 MG/ML
INJECTION, SOLUTION INTRAMUSCULAR; INTRAVENOUS AS NEEDED
Status: DISCONTINUED | OUTPATIENT
Start: 2022-07-19 | End: 2022-07-19 | Stop reason: HOSPADM

## 2022-07-19 RX ORDER — METFORMIN HYDROCHLORIDE 500 MG/1
500 TABLET ORAL 2 TIMES DAILY WITH MEALS
Status: DISCONTINUED | OUTPATIENT
Start: 2022-07-20 | End: 2022-07-28 | Stop reason: HOSPADM

## 2022-07-19 RX ORDER — LIDOCAINE HYDROCHLORIDE 10 MG/ML
0.1 INJECTION, SOLUTION EPIDURAL; INFILTRATION; INTRACAUDAL; PERINEURAL AS NEEDED
Status: DISCONTINUED | OUTPATIENT
Start: 2022-07-19 | End: 2022-07-19 | Stop reason: HOSPADM

## 2022-07-19 RX ORDER — ONDANSETRON 2 MG/ML
4 INJECTION INTRAMUSCULAR; INTRAVENOUS
Status: ACTIVE | OUTPATIENT
Start: 2022-07-19 | End: 2022-07-20

## 2022-07-19 RX ORDER — CELECOXIB 100 MG/1
200 CAPSULE ORAL
Status: COMPLETED | OUTPATIENT
Start: 2022-07-19 | End: 2022-07-19

## 2022-07-19 RX ORDER — KETOROLAC TROMETHAMINE 30 MG/ML
15 INJECTION, SOLUTION INTRAMUSCULAR; INTRAVENOUS EVERY 6 HOURS
Status: COMPLETED | OUTPATIENT
Start: 2022-07-19 | End: 2022-07-20

## 2022-07-19 RX ORDER — ACETAMINOPHEN 500 MG
1000 TABLET ORAL
Status: COMPLETED | OUTPATIENT
Start: 2022-07-19 | End: 2022-07-19

## 2022-07-19 RX ORDER — FACIAL-BODY WIPES
10 EACH TOPICAL DAILY PRN
Status: DISCONTINUED | OUTPATIENT
Start: 2022-07-21 | End: 2022-07-28 | Stop reason: HOSPADM

## 2022-07-19 RX ORDER — HYDROMORPHONE HYDROCHLORIDE 1 MG/ML
0.5 INJECTION, SOLUTION INTRAMUSCULAR; INTRAVENOUS; SUBCUTANEOUS
Status: ACTIVE | OUTPATIENT
Start: 2022-07-19 | End: 2022-07-20

## 2022-07-19 RX ORDER — PROCHLORPERAZINE EDISYLATE 5 MG/ML
5 INJECTION INTRAMUSCULAR; INTRAVENOUS
Status: ACTIVE | OUTPATIENT
Start: 2022-07-19 | End: 2022-07-20

## 2022-07-19 RX ORDER — FAMOTIDINE 20 MG/1
20 TABLET, FILM COATED ORAL 2 TIMES DAILY
Status: DISCONTINUED | OUTPATIENT
Start: 2022-07-19 | End: 2022-07-28 | Stop reason: HOSPADM

## 2022-07-19 RX ORDER — GABAPENTIN 300 MG/1
600 CAPSULE ORAL
Status: DISCONTINUED | OUTPATIENT
Start: 2022-07-19 | End: 2022-07-28 | Stop reason: HOSPADM

## 2022-07-19 RX ORDER — TAMSULOSIN HYDROCHLORIDE 0.4 MG/1
0.8 CAPSULE ORAL
Status: DISCONTINUED | OUTPATIENT
Start: 2022-07-20 | End: 2022-07-25

## 2022-07-19 RX ORDER — SODIUM CHLORIDE 9 MG/ML
125 INJECTION, SOLUTION INTRAVENOUS CONTINUOUS
Status: DISPENSED | OUTPATIENT
Start: 2022-07-19 | End: 2022-07-20

## 2022-07-19 RX ORDER — LISINOPRIL 20 MG/1
20 TABLET ORAL 2 TIMES DAILY
Status: DISCONTINUED | OUTPATIENT
Start: 2022-07-20 | End: 2022-07-28 | Stop reason: HOSPADM

## 2022-07-19 RX ORDER — NALOXONE HYDROCHLORIDE 0.4 MG/ML
0.4 INJECTION, SOLUTION INTRAMUSCULAR; INTRAVENOUS; SUBCUTANEOUS AS NEEDED
Status: DISCONTINUED | OUTPATIENT
Start: 2022-07-19 | End: 2022-07-28 | Stop reason: HOSPADM

## 2022-07-19 RX ORDER — OXYCODONE HYDROCHLORIDE 5 MG/1
10 TABLET ORAL
Status: DISCONTINUED | OUTPATIENT
Start: 2022-07-19 | End: 2022-07-28 | Stop reason: HOSPADM

## 2022-07-19 RX ORDER — TRAMADOL HYDROCHLORIDE 50 MG/1
50 TABLET ORAL
Status: DISCONTINUED | OUTPATIENT
Start: 2022-07-19 | End: 2022-07-28 | Stop reason: HOSPADM

## 2022-07-19 RX ORDER — VANCOMYCIN HYDROCHLORIDE 1 G/20ML
INJECTION, POWDER, LYOPHILIZED, FOR SOLUTION INTRAVENOUS AS NEEDED
Status: DISCONTINUED | OUTPATIENT
Start: 2022-07-19 | End: 2022-07-19 | Stop reason: HOSPADM

## 2022-07-19 RX ORDER — CYCLOBENZAPRINE HCL 10 MG
10 TABLET ORAL
Status: DISCONTINUED | OUTPATIENT
Start: 2022-07-19 | End: 2022-07-28 | Stop reason: HOSPADM

## 2022-07-19 RX ORDER — AMOXICILLIN 250 MG
1 CAPSULE ORAL 2 TIMES DAILY
Status: DISCONTINUED | OUTPATIENT
Start: 2022-07-19 | End: 2022-07-28 | Stop reason: HOSPADM

## 2022-07-19 RX ORDER — THERA TABS 400 MCG
1 TAB ORAL DAILY
Status: DISCONTINUED | OUTPATIENT
Start: 2022-07-20 | End: 2022-07-28 | Stop reason: HOSPADM

## 2022-07-19 RX ORDER — ACETAMINOPHEN 500 MG
1000 TABLET ORAL EVERY 6 HOURS
Status: DISCONTINUED | OUTPATIENT
Start: 2022-07-20 | End: 2022-07-28 | Stop reason: HOSPADM

## 2022-07-19 RX ORDER — TRANEXAMIC ACID 100 MG/ML
INJECTION, SOLUTION INTRAVENOUS AS NEEDED
Status: DISCONTINUED | OUTPATIENT
Start: 2022-07-19 | End: 2022-07-19 | Stop reason: HOSPADM

## 2022-07-19 RX ORDER — SODIUM CHLORIDE 9 MG/ML
INJECTION, SOLUTION INTRAVENOUS
Status: DISCONTINUED | OUTPATIENT
Start: 2022-07-19 | End: 2022-07-19 | Stop reason: HOSPADM

## 2022-07-19 RX ORDER — FENTANYL CITRATE 50 UG/ML
INJECTION, SOLUTION INTRAMUSCULAR; INTRAVENOUS AS NEEDED
Status: DISCONTINUED | OUTPATIENT
Start: 2022-07-19 | End: 2022-07-19 | Stop reason: HOSPADM

## 2022-07-19 RX ORDER — EPHEDRINE SULFATE/0.9% NACL/PF 50 MG/5 ML
SYRINGE (ML) INTRAVENOUS AS NEEDED
Status: DISCONTINUED | OUTPATIENT
Start: 2022-07-19 | End: 2022-07-19 | Stop reason: HOSPADM

## 2022-07-19 RX ORDER — ADHESIVE BANDAGE
30 BANDAGE TOPICAL EVERY 12 HOURS
Status: DISCONTINUED | OUTPATIENT
Start: 2022-07-19 | End: 2022-07-26

## 2022-07-19 RX ORDER — MIDAZOLAM HYDROCHLORIDE 1 MG/ML
INJECTION, SOLUTION INTRAMUSCULAR; INTRAVENOUS AS NEEDED
Status: DISCONTINUED | OUTPATIENT
Start: 2022-07-19 | End: 2022-07-19 | Stop reason: HOSPADM

## 2022-07-19 RX ORDER — SODIUM CHLORIDE, SODIUM LACTATE, POTASSIUM CHLORIDE, CALCIUM CHLORIDE 600; 310; 30; 20 MG/100ML; MG/100ML; MG/100ML; MG/100ML
INJECTION, SOLUTION INTRAVENOUS
Status: DISCONTINUED | OUTPATIENT
Start: 2022-07-19 | End: 2022-07-19 | Stop reason: HOSPADM

## 2022-07-19 RX ORDER — OXYCODONE HYDROCHLORIDE 5 MG/1
5 TABLET ORAL
Status: DISCONTINUED | OUTPATIENT
Start: 2022-07-19 | End: 2022-07-28 | Stop reason: HOSPADM

## 2022-07-19 RX ORDER — GABAPENTIN 300 MG/1
300 CAPSULE ORAL
Status: COMPLETED | OUTPATIENT
Start: 2022-07-19 | End: 2022-07-19

## 2022-07-19 RX ORDER — HYDROMORPHONE HYDROCHLORIDE 1 MG/ML
INJECTION, SOLUTION INTRAMUSCULAR; INTRAVENOUS; SUBCUTANEOUS AS NEEDED
Status: DISCONTINUED | OUTPATIENT
Start: 2022-07-19 | End: 2022-07-19 | Stop reason: HOSPADM

## 2022-07-19 RX ORDER — DIPHENHYDRAMINE HYDROCHLORIDE 50 MG/ML
12.5 INJECTION, SOLUTION INTRAMUSCULAR; INTRAVENOUS
Status: ACTIVE | OUTPATIENT
Start: 2022-07-19 | End: 2022-07-20

## 2022-07-19 RX ORDER — PROPOFOL 10 MG/ML
INJECTION, EMULSION INTRAVENOUS AS NEEDED
Status: DISCONTINUED | OUTPATIENT
Start: 2022-07-19 | End: 2022-07-19 | Stop reason: HOSPADM

## 2022-07-19 RX ORDER — HYDROCHLOROTHIAZIDE 25 MG/1
12.5 TABLET ORAL DAILY
Status: DISCONTINUED | OUTPATIENT
Start: 2022-07-20 | End: 2022-07-28 | Stop reason: HOSPADM

## 2022-07-19 RX ORDER — AMLODIPINE BESYLATE 5 MG/1
10 TABLET ORAL DAILY
Status: DISCONTINUED | OUTPATIENT
Start: 2022-07-20 | End: 2022-07-27

## 2022-07-19 RX ORDER — ALBUMIN HUMAN 50 G/1000ML
SOLUTION INTRAVENOUS AS NEEDED
Status: DISCONTINUED | OUTPATIENT
Start: 2022-07-19 | End: 2022-07-19 | Stop reason: HOSPADM

## 2022-07-19 RX ORDER — ROCURONIUM BROMIDE 10 MG/ML
INJECTION, SOLUTION INTRAVENOUS AS NEEDED
Status: DISCONTINUED | OUTPATIENT
Start: 2022-07-19 | End: 2022-07-19 | Stop reason: HOSPADM

## 2022-07-19 RX ORDER — PHENYLEPHRINE HCL IN 0.9% NACL 0.4MG/10ML
SYRINGE (ML) INTRAVENOUS AS NEEDED
Status: DISCONTINUED | OUTPATIENT
Start: 2022-07-19 | End: 2022-07-19 | Stop reason: HOSPADM

## 2022-07-19 RX ORDER — DEXTROSE MONOHYDRATE 100 MG/ML
250 INJECTION, SOLUTION INTRAVENOUS ONCE
Status: ACTIVE | OUTPATIENT
Start: 2022-07-19 | End: 2022-07-20

## 2022-07-19 RX ORDER — SUCCINYLCHOLINE CHLORIDE 20 MG/ML
INJECTION INTRAMUSCULAR; INTRAVENOUS AS NEEDED
Status: DISCONTINUED | OUTPATIENT
Start: 2022-07-19 | End: 2022-07-19 | Stop reason: HOSPADM

## 2022-07-19 RX ORDER — POLYETHYLENE GLYCOL 3350 17 G/17G
17 POWDER, FOR SOLUTION ORAL DAILY
Status: DISCONTINUED | OUTPATIENT
Start: 2022-07-20 | End: 2022-07-28 | Stop reason: HOSPADM

## 2022-07-19 RX ORDER — MAGNESIUM SULFATE 100 %
4 CRYSTALS MISCELLANEOUS AS NEEDED
Status: DISCONTINUED | OUTPATIENT
Start: 2022-07-19 | End: 2022-07-28 | Stop reason: HOSPADM

## 2022-07-19 RX ORDER — SODIUM CHLORIDE 0.9 % (FLUSH) 0.9 %
5-40 SYRINGE (ML) INJECTION AS NEEDED
Status: DISCONTINUED | OUTPATIENT
Start: 2022-07-19 | End: 2022-07-28 | Stop reason: HOSPADM

## 2022-07-19 RX ORDER — INSULIN LISPRO 100 [IU]/ML
INJECTION, SOLUTION INTRAVENOUS; SUBCUTANEOUS
Status: DISCONTINUED | OUTPATIENT
Start: 2022-07-19 | End: 2022-07-28 | Stop reason: HOSPADM

## 2022-07-19 RX ADMIN — HYDROMORPHONE HYDROCHLORIDE 0.25 MG: 1 INJECTION, SOLUTION INTRAMUSCULAR; INTRAVENOUS; SUBCUTANEOUS at 17:44

## 2022-07-19 RX ADMIN — Medication 40 MCG: at 09:49

## 2022-07-19 RX ADMIN — Medication 10 MG: at 09:05

## 2022-07-19 RX ADMIN — ALBUMIN (HUMAN) 250 ML: 12.5 SOLUTION INTRAVENOUS at 14:43

## 2022-07-19 RX ADMIN — SODIUM CHLORIDE, POTASSIUM CHLORIDE, SODIUM LACTATE AND CALCIUM CHLORIDE: 600; 310; 30; 20 INJECTION, SOLUTION INTRAVENOUS at 08:34

## 2022-07-19 RX ADMIN — SODIUM CHLORIDE 4 MCG: 9 INJECTION, SOLUTION INTRAVENOUS at 17:56

## 2022-07-19 RX ADMIN — CEFAZOLIN SODIUM 2 G: 1 POWDER, FOR SOLUTION INTRAMUSCULAR; INTRAVENOUS at 12:35

## 2022-07-19 RX ADMIN — SODIUM CHLORIDE, POTASSIUM CHLORIDE, SODIUM LACTATE AND CALCIUM CHLORIDE: 600; 310; 30; 20 INJECTION, SOLUTION INTRAVENOUS at 07:58

## 2022-07-19 RX ADMIN — KETAMINE HYDROCHLORIDE 10 MG: 10 INJECTION INTRAMUSCULAR; INTRAVENOUS at 11:04

## 2022-07-19 RX ADMIN — Medication 40 MCG: at 10:45

## 2022-07-19 RX ADMIN — FENTANYL CITRATE 100 MCG: 50 INJECTION, SOLUTION INTRAMUSCULAR; INTRAVENOUS at 10:23

## 2022-07-19 RX ADMIN — SODIUM CHLORIDE, POTASSIUM CHLORIDE, SODIUM LACTATE AND CALCIUM CHLORIDE: 600; 310; 30; 20 INJECTION, SOLUTION INTRAVENOUS at 17:46

## 2022-07-19 RX ADMIN — Medication 10 MG: at 08:10

## 2022-07-19 RX ADMIN — KETOROLAC TROMETHAMINE 15 MG: 30 INJECTION, SOLUTION INTRAMUSCULAR at 22:10

## 2022-07-19 RX ADMIN — HYDROMORPHONE HYDROCHLORIDE 0.25 MG: 1 INJECTION, SOLUTION INTRAMUSCULAR; INTRAVENOUS; SUBCUTANEOUS at 17:52

## 2022-07-19 RX ADMIN — SODIUM CHLORIDE 8 MCG: 9 INJECTION, SOLUTION INTRAVENOUS at 10:20

## 2022-07-19 RX ADMIN — MIDAZOLAM HYDROCHLORIDE 1 MG: 1 INJECTION, SOLUTION INTRAMUSCULAR; INTRAVENOUS at 07:58

## 2022-07-19 RX ADMIN — ROCURONIUM BROMIDE 35 MG: 10 INJECTION INTRAVENOUS at 09:11

## 2022-07-19 RX ADMIN — SODIUM CHLORIDE: 9 INJECTION, SOLUTION INTRAVENOUS at 11:34

## 2022-07-19 RX ADMIN — ACETAMINOPHEN 1000 MG: 500 TABLET ORAL at 07:00

## 2022-07-19 RX ADMIN — PROPOFOL 50 MG: 10 INJECTION, EMULSION INTRAVENOUS at 08:16

## 2022-07-19 RX ADMIN — PROPOFOL 20 MG: 10 INJECTION, EMULSION INTRAVENOUS at 11:29

## 2022-07-19 RX ADMIN — PROPOFOL 75 MCG/KG/MIN: 10 INJECTION, EMULSION INTRAVENOUS at 08:46

## 2022-07-19 RX ADMIN — LIDOCAINE HYDROCHLORIDE 40 MG: 20 INJECTION, SOLUTION EPIDURAL; INFILTRATION; INTRACAUDAL; PERINEURAL at 08:04

## 2022-07-19 RX ADMIN — SODIUM CHLORIDE 4 MCG: 9 INJECTION, SOLUTION INTRAVENOUS at 18:04

## 2022-07-19 RX ADMIN — SUCCINYLCHOLINE CHLORIDE 140 MG: 20 INJECTION, SOLUTION INTRAMUSCULAR; INTRAVENOUS; PARENTERAL at 08:06

## 2022-07-19 RX ADMIN — Medication 80 MCG: at 11:23

## 2022-07-19 RX ADMIN — CEFAZOLIN SODIUM 2 G: 1 POWDER, FOR SOLUTION INTRAMUSCULAR; INTRAVENOUS at 08:42

## 2022-07-19 RX ADMIN — SODIUM CHLORIDE 8 MCG: 9 INJECTION, SOLUTION INTRAVENOUS at 11:09

## 2022-07-19 RX ADMIN — Medication 10 MG: at 08:30

## 2022-07-19 RX ADMIN — CEFAZOLIN SODIUM 2 G: 1 POWDER, FOR SOLUTION INTRAMUSCULAR; INTRAVENOUS at 16:32

## 2022-07-19 RX ADMIN — Medication 80 MCG: at 10:36

## 2022-07-19 RX ADMIN — KETAMINE HYDROCHLORIDE 10 MG: 10 INJECTION INTRAMUSCULAR; INTRAVENOUS at 14:10

## 2022-07-19 RX ADMIN — PROPOFOL 100 MG: 10 INJECTION, EMULSION INTRAVENOUS at 08:06

## 2022-07-19 RX ADMIN — CELECOXIB 200 MG: 100 CAPSULE ORAL at 07:01

## 2022-07-19 RX ADMIN — FENTANYL CITRATE 100 MCG: 50 INJECTION, SOLUTION INTRAMUSCULAR; INTRAVENOUS at 09:11

## 2022-07-19 RX ADMIN — FENTANYL CITRATE 100 MCG: 50 INJECTION, SOLUTION INTRAMUSCULAR; INTRAVENOUS at 09:19

## 2022-07-19 RX ADMIN — SODIUM CHLORIDE, POTASSIUM CHLORIDE, SODIUM LACTATE AND CALCIUM CHLORIDE: 600; 310; 30; 20 INJECTION, SOLUTION INTRAVENOUS at 08:14

## 2022-07-19 RX ADMIN — MIDAZOLAM HYDROCHLORIDE 1 MG: 1 INJECTION, SOLUTION INTRAMUSCULAR; INTRAVENOUS at 08:02

## 2022-07-19 RX ADMIN — KETAMINE HYDROCHLORIDE 10 MG: 10 INJECTION INTRAMUSCULAR; INTRAVENOUS at 10:01

## 2022-07-19 RX ADMIN — ROCURONIUM BROMIDE 5 MG: 10 INJECTION INTRAVENOUS at 08:04

## 2022-07-19 RX ADMIN — FENTANYL CITRATE 100 MCG: 50 INJECTION, SOLUTION INTRAMUSCULAR; INTRAVENOUS at 11:11

## 2022-07-19 RX ADMIN — Medication 40 MCG: at 10:34

## 2022-07-19 RX ADMIN — SODIUM CHLORIDE, POTASSIUM CHLORIDE, SODIUM LACTATE AND CALCIUM CHLORIDE: 600; 310; 30; 20 INJECTION, SOLUTION INTRAVENOUS at 14:20

## 2022-07-19 RX ADMIN — Medication 40 MCG: at 10:31

## 2022-07-19 RX ADMIN — SODIUM CHLORIDE: 9 INJECTION, SOLUTION INTRAVENOUS at 16:51

## 2022-07-19 RX ADMIN — ROCURONIUM BROMIDE 20 MG: 10 INJECTION INTRAVENOUS at 10:05

## 2022-07-19 RX ADMIN — PROPOFOL 50 MG: 10 INJECTION, EMULSION INTRAVENOUS at 08:23

## 2022-07-19 RX ADMIN — Medication 10 MG: at 09:07

## 2022-07-19 RX ADMIN — ROCURONIUM BROMIDE 20 MG: 10 INJECTION INTRAVENOUS at 10:54

## 2022-07-19 RX ADMIN — FENTANYL CITRATE 100 MCG: 50 INJECTION, SOLUTION INTRAMUSCULAR; INTRAVENOUS at 09:31

## 2022-07-19 RX ADMIN — GABAPENTIN 300 MG: 300 CAPSULE ORAL at 07:01

## 2022-07-19 RX ADMIN — SODIUM CHLORIDE 20 MCG/MIN: 9 INJECTION, SOLUTION INTRAVENOUS at 09:51

## 2022-07-19 RX ADMIN — Medication 10 MG: at 08:23

## 2022-07-19 RX ADMIN — TRANEXAMIC ACID 1 G: 100 INJECTION, SOLUTION INTRAVENOUS at 09:11

## 2022-07-19 RX ADMIN — Medication 10 ML: at 22:11

## 2022-07-19 RX ADMIN — KETAMINE HYDROCHLORIDE 20 MG: 10 INJECTION INTRAMUSCULAR; INTRAVENOUS at 08:56

## 2022-07-19 RX ADMIN — KETAMINE HYDROCHLORIDE 10.12 MG/HR: 10 INJECTION INTRAMUSCULAR; INTRAVENOUS at 08:46

## 2022-07-19 RX ADMIN — SODIUM CHLORIDE 125 ML/HR: 9 INJECTION, SOLUTION INTRAVENOUS at 20:21

## 2022-07-19 RX ADMIN — PROPOFOL 50 MG: 10 INJECTION, EMULSION INTRAVENOUS at 08:30

## 2022-07-19 RX ADMIN — SODIUM CHLORIDE, POTASSIUM CHLORIDE, SODIUM LACTATE AND CALCIUM CHLORIDE: 600; 310; 30; 20 INJECTION, SOLUTION INTRAVENOUS at 11:18

## 2022-07-19 RX ADMIN — SODIUM CHLORIDE 8 MCG: 9 INJECTION, SOLUTION INTRAVENOUS at 09:26

## 2022-07-19 RX ADMIN — HYDROMORPHONE HYDROCHLORIDE 0.5 MG: 1 INJECTION, SOLUTION INTRAMUSCULAR; INTRAVENOUS; SUBCUTANEOUS at 16:55

## 2022-07-19 RX ADMIN — FENTANYL CITRATE 50 MCG: 50 INJECTION, SOLUTION INTRAMUSCULAR; INTRAVENOUS at 08:04

## 2022-07-19 RX ADMIN — KETAMINE HYDROCHLORIDE 10 MG: 10 INJECTION INTRAMUSCULAR; INTRAVENOUS at 12:37

## 2022-07-19 NOTE — ANESTHESIA POSTPROCEDURE EVALUATION
Procedure(s):  T10-S2 ILIAC FIXATION, L1, L2, L3, L4 OSTEOTOMIES, L5 LAMINECTOMY, T10-S2 FUSION, OPEN SACROILIAC JOINT FUSION (O-ARM). general    Anesthesia Post Evaluation      Multimodal analgesia: multimodal analgesia used between 6 hours prior to anesthesia start to PACU discharge  Patient location during evaluation: PACU  Patient participation: complete - patient participated  Level of consciousness: awake and alert  Pain management: adequate  Airway patency: patent  Anesthetic complications: no  Cardiovascular status: acceptable  Respiratory status: acceptable  Hydration status: acceptable  Post anesthesia nausea and vomiting:  none  Final Post Anesthesia Temperature Assessment:  Normothermia (36.0-37.5 degrees C)      INITIAL Post-op Vital signs:   Vitals Value Taken Time   /85 07/19/22 1900   Temp     Pulse 75 07/19/22 1908   Resp 18 07/19/22 1908   SpO2 100 % 07/19/22 1908   Vitals shown include unvalidated device data.

## 2022-07-19 NOTE — PERIOP NOTES
Updated brother on surgical progression at 0. Brother updated by MIKKI Marquez RN at approximately [phone].

## 2022-07-19 NOTE — H&P
Date of Surgery Update:  Nancy Barraza was seen and examined. History and physical has been reviewed. The patient has been examined. There have been no significant clinical changes since the completion of the originally dated History and Physical.  Past Medical History:   Diagnosis Date    Atherosclerosis 02/11/2020    COVID-19 vaccine series completed     Diabetes ([facility] [address]     Fatty liver determined by biopsy 08/2019    Frequent urination at night     History of stress test 06/27/2022    Normal    Hypertension     Insomnia     Kidney stone 2019 & 2021    Obesity, Class I, BMI 30-34.9      No current facility-administered medications on file prior to encounter. Current Outpatient Medications on File Prior to Encounter   Medication Sig Dispense Refill    amLODIPine (NORVASC) 10 mg tablet Take 1 Tablet by mouth daily. OFFICE VISIT REQUIRED PRIOR TO ADDITIONAL REFILLS 90 Tablet 3    gabapentin (NEURONTIN) 300 mg capsule Take 600 mg by mouth nightly.  hydroCHLOROthiazide (HYDRODIURIL) 12.5 mg tablet Take 1 Tablet by mouth daily. 90 Tablet 3    tamsulosin (FLOMAX) 0.4 mg capsule Take 2 Capsules by mouth daily (with dinner). 180 Capsule 3    simvastatin (ZOCOR) 40 mg tablet Take 1 Tablet by mouth every evening. 90 Tablet 3    vit C/E/Zn/coppr/lutein/zeaxan (PRESERVISION AREDS-2 PO) Take 1 Capsule by mouth two (2) times a day. No Known Allergies    Patient identified by surgeon; surgical site was confirmed by patient and surgeon. Patient reports axial pain mild, radiating pain left leg  Numbness left leg (diminished distal to knee)  Weakness-profound left hip flexion, left quad, left dorsiflexion 3/5  Assistive device for ambulation walker, can't stand up straight and can't walk without leaning at 45 degree angle on walker. Vit D: 45.3    Discussed realistic expectations, will plan for L1-5 lami-fusion, potential for T0-S2 with osteotomies, pending time and blood loss, they understand.    He has malalignment, 4 level stenosis, 3 level spondy and postlami kyphosis. Discussed main goal of neurologic decompression and stabilization of stenotic, unstable segments, then pending blood loss, time, would proceed with osteotomies and extended fusion for realignment. If high degree of blood loss or extended time required for adequate decompression/stabilization at stenotic segments, then would abort extended fusion and osteotomies. Plan for ICU postop, discussed with anesthesia, recommendation for maximum IV access and arterial line. I re-reviewed risks and benefits of surgery today, expected hospital course, and patient wishes to proceed with surgery.          Signed By: Naveed Wells MD     July 19, 2022 8:01 AM

## 2022-07-19 NOTE — PROGRESS NOTES
Post-Op Assessment    Patient seen in recovery room  He remains sedated, exam limited  Extubated uneventfully  Postop pain controlled    Dressing clean and dry, intact  hemovac in place and functioning with moderate output  Motor exam deferred due to level of sedation    Stable postop: T9-S2 ILIAC FIXATION, L1, L2, L3, L4 OSTEOTOMIES, L5 LAMINECTOMY, T10-S2 FUSION, OPEN SACROILIAC JOINT FUSION (O-ARM)  -discussed surgery with family, answered questions  -periop antibiotics  -SCD's  -advance diet: clear to full liquids, then regular diet  -mobilize  -pain control: PO and IV options  -plan: transfer to ICU for close monitoring, A-line in place, begin PT/OT tomorrow    Babita Mercado NP

## 2022-07-19 NOTE — OP NOTES
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 Procedure(s):  T9-S2 ILIAC FIXATION, L1, L2, L3, L4 OSTEOTOMIES, L5 LAMINECTOMY, T10-S2 FUSION, OPEN SACROILIAC JOINT FUSION (O-ARM)    Surgeon(s):  Charlotte Minor MD    Surgical Assistant: None    Anesthesia: General     Estimated Blood Loss (mL):  650 total, 250 returned in cell saver, net 400 cc blood loss    Complications: None    Specimens: * No specimens in log *     Implants:   Implant Name Type Inv.  Item Serial No.  Lot No. LRB No. Used Action   DBM 2.5 cm x 10 cm    [id number] [employer] NA N/A 1 Implanted   OSTEOAMP 10   [id number] [facility] [id number] N/A 1 Implanted   GRAFT BONE 10 CC - [id number]  GRAFT BONE 10 CC [id number] [employer] NA N/A 1 Implanted   GRAFT BONE 10 CC - [id number]  GRAFT BONE 10 CC [id number] [place] [id number] N/A 1 Implanted   OSTEOAMP 10   [id number] [facility] [address] N/A 1 Implanted   OSTEOAMP 10 CC   [id number] [facility] [id number] N/A 1 Implanted   GRAFT BONE 10 CC - [id number]  GRAFT BONE 10 CC [id number] [employer] NA N/A 1 Implanted   MAS head anad set screw for 5.5 pardeep   NA [employer] [place] [id number] N/A 1 Implanted   MAS head and set screw for 5.5 pardeep   NA [employer] [id number] N/A 1 Implanted   MAS head and set screw for 5.5 pardeep   NA [employer] [id number] N/A 1 Implanted   MAS head and set screw for 5.5 pardeep   NA [employer] [id number] N/A 1 Implanted   MAS head and set screw for 5.5 pardeep   NA [employer] [id number] N/A 1 Implanted   Pardeep   NA MEDICRE[employer] [facility] [id number] N/A 1 Implanted   SCREW NL L80MM [id number] TI CO CHROME [facility] - SNA  SCREW SPNL L80MM [id number] TI CO CHROME [facility]C MULTIAXIAL [address] NA [employer]ONIC SOFAMOR DANEK_WD NA N/A 2 Implanted   SET SCR SPNL L6MM DIA5. 5MM TI BRK OFF SUKUMAR W/ DETACH [address] - SNA  SET SCR SPNL L6MM DIA5. 5MM TI BRK OFF SUKUMAR W/ DETACH [facility] NA [employer] SOFAMOR DANEK_WD NA N/A 4 Implanted   MODULE X ATS SCREW   NA [employer] NA N/A 4 Implanted   MODULE X ATS SCREW 5.5 X 45   NA [employer] NA N/A 5 Implanted   MODULE X ATS SCREW 5.5 X 50   NA [employer] NA N/A 1 Implanted   ATS SCREW 6.5 X 45   NA [employer] NA N/A 1 Implanted   ATS SCREW 6.5 X 50   NA [employer] NA N/A 3 Implanted   ATS SCREW 7.5 X40   NA [employer] NA N/A 1 Implanted   ATS SCREW 7.5 X 45   NA [employer] NA N/A 3 Implanted   ATS  SCREW 7.5 X 50   NA [employer] NA N/A 2 Implanted   Mas head and set screw 5.5 eufemia   NA [employer] [id number] N/A 1 Implanted   RENEE CONNECTOR   NA [employer] [id number] N/A 1 Implanted   RENEE CONNECTOR   NA [employer] [id number] N/A 1 Implanted   SOLERAROD 5.5 X 100   NA [employer] NA N/A 1 Implanted   SOLERALATERAL CONNECTOR    NA [employer] NA N/A 1 Implanted       Drains:   Hemovac Back (Active)       Findings: kyphosis, stenosis, osteopenia in thoracic spine    Detailed Description of Procedure:       Electronically Signed by Charity Marquez MD on 7/19/2022 at 6:15 PM      Indications for procedure. Patient a pleasant 17-year-old male who developed rapid decline and inability to ambulate. Patient reports about a year and a half ago he was able to ambulate with relief minimal difficulty with the exception of lower extremity arthritis. He underwent right total knee replacement about 9 months ago, since that time during rehab he developed excruciating pain in the back with radiating discomfort down the legs, pain improved somewhat but he lost the ability to ambulate with profound weakness in the left lower extremity.   He also is walking bent over he cannot ambulate at all without the use of a walker bent over at about 45 to 60 degrees. He had diffuse left leg weakness as well as numbness. He does have a history of neuropathy. Plain x-rays demonstrated sagittal plane imbalance multilevel spondylolisthesis L2-3 L3-4 L4-5, he had spinal pelvic mismatch. Advanced imaging with CT scan as well as MRI thoracic and lumbar spine demonstrated previous surgery at L5-S1 with almost all ankylosis through the L5-S1 disc base, fixed spondylolisthesis at L4-5 L3-4 and L2-3 with severe stenosis critically at L1-L2 3 L3-4 . We had a lengthy discussion, his facet joints would not allow for decompression without destabilization especially at L1 to and therefore with the ultimate level spondylolisthesis below this from L2-5 who therefore end up having an L1-5 decompression and fusion with extremely high risk for adjacent segment breakdown and disease. After consultation with additional spine surgeons and opinions, we decided on a osteotomies L1-L2 and L3 to help improve lordotic alignment to age-appropriate parameters, T10-S2 iliac fusion. We have discussed the risks and benefits of surgery extensively as well as realistic expectations. Patient understood, his main goal was just to become ambulatory and toe walk which she had lost the ability to do so. Operative note. Patient did find the preoperative area patient takes informed updated. Patient's brother was present, we once again reviewed the surgical plan including the radiographic parameters radiographic plan as well. We had discussed with the length of procedure, we may not be able to accomplish all goals in 1 procedure. Understood this we also plan for ICU management postoperatively. They are agreeable with that. Immediately preop he had severe weakness in the left lower extremity as well as numbness. Lumbar spine was marked as site of surgery. SCDs were applied in the preoperative area.     Patient was taken the operating room was in the operating room he underwent a general anesthetic by anesthesia team.  Horn catheter was inserted in sterile conditions. Arterial line was placed. Patient was then gently logrolled onto the [employer] Manifold frame all bony prominences well-padded including the cubital and carpal tunnels iliac crest knees and ankles. No pressure on the eyes per anesthesia. We utilized slight reverse Trendelenburg position as well. We obtained initial fluoroscopic radiographs demonstrated some degree of postural reduction. We then proceed with sterile prepped and draped in usual standard fashion including alcohol followed by Betadine scrub paint and ChloraPrep. Neurological monitoring electrodes have been applied and baselines were obtained. Patient received Ancef preoperatively. Surgical timeout was performed and confirmed by the anesthesia staff or staff and myself. I then proceeded with marking the patient's previous lumbar skin incision. I then utilized a midline incision spanning from T10-S2. Careful sharp dissection was taken down through the subtendinous tissue. Spinous processes were exposed utilizing Das elevator and monopolar cautery. Severe spondylosis was seen of the facet joints especially in L1-L2, L2-L3 and L3-L4 moderate at L4-5. After full exposure from T10-S2 I then proceeded to place the Medtronic spinous process clamp onto the lower sacrum. I then made my first O-arm spin. After confirming excellent images, I also confirmed that there was ankylosis through the anterior aspect of the L5-S1 disc space, also incredibly severe facet arthropathy L4-5 L3-4 and L2-3. Also salt diffuse idiopathic skeletal hyperostosis throughout the thoracic spine with flowing syndesmophytes from about T10 cephalad to at least T5. At this point to then utilize navigated instruments to make  holes for pedicle screws. I utilized navigated bur followed by a navigated tap and placed screws T10-L1 initially bilaterally.   All  holes were probed with no evidence of breach. I felt that the bone was quite osteopenic at T11 and T10, reviewing the CT again there appeared to be the skeletal hyperostosis anteriorly so I felt extending proximally to T9 would be necessary given the planned degree of correction to prevent screw cut out. I then made  holes for pedicle screws utilizing navigated instruments at T9 ball-tipped probe showed no evidence of breach and then I placed bilateral T9 screws. I then proceeded with utilization of navigated instruments to make  holes for pedicle screws at L2 L3-L4-L5 and S1 bilaterally. Palpated all  holes there is no evidence of breach. I then placed navigated screws L2-S1 bilaterally. I then proceeded utilize the spinal navigation to make  holes for S2 through the SI joint and into the ileum bilaterally. Once again these holes were probed there was no evidence of breach. I then proceeded to decorticate across the SI joint and then proceeded to place a 0.5 x 85 mm screws from S2 through the SI joint and into the ileum on the right-hand left-hand side. All screws had excellent bite. I then proceeded with stimulating EMG all had safe responses from T9-S2. I then proceeded with the decompression portion the procedure, there was incredibly severe stenosis as well as calcified ligamentum flavum from L1-L4. I proceeded to remove the spinous processes of L4 L3 and L2 and L1 completely. I then proceeded with utilization of the navigated bur and proceeded with thinning of the lamina L4 to through L1. I then utilized angled Kerrisons and completed central laminectomies completely at L3 L2 and L1. I then proceeded with pedicle to pedicle decompression utilizing angled Kerrisons from the L1-L4. I proceeded with foraminotomies for the L1 roots bilaterally L2 roots bilaterally L3 roots bilaterally and L4 roots bilaterally.   At L1-2 and L2-3 on the left-hand side there was severe foraminal stenosis as well as lateral recess stenosis that actually indented the thecal sac. I was able to peel off all of this without a dural tear. At L4-5, then proceeded with bilateral medial facetectomy utilizing angled Kerrisons and proceeded with foraminotomies for the L5 roots bilaterally. At this point I had thorough central decompression from L1-L5. There is no evidence of spinal fluid leak on neurological monitoring to baseline. At this point to then proceed with the osteotomies, I proceeded with bilateral utilization of the sonics bone scalpel to disarticulate the inferior facets at L1-L2 and L3 and then proceeded with use of osteotome and angled Kerrisons to resect the remainder of the inferior facet and pars region at L1-L2 and L3. This completed the osteotomies. There is no further neurologic impingement. I then proceeded to utilize contoured rods and these were placed from S2 up to T9. I then proceeded sequentially with placement of locking caps from S2 up to T9 bilaterally. AP and lateral fluoroscopic radiographs demonstrated good correction and safe position of all hardware. I then proceeded to torque and counter torque all screws to within  specifications. I then proceeded to place a third eufemia    T11-L3 on the left-hand side and this was torqued and counter torque, this was a kickstand eufemia to help prevent any eufemia fracture. I then proceeded to decorticate the lamina and posterolateral gutters at T9-10 T10-11 T11-12 T12-L1 L1-L2 L2-L3 L3-L4 L4-L5 L5-S1 S1-S2. I utilized Irrisept several times throughout the procedure and 1 last time after decortication. Copious saline was also used. I then utilized local autograft bone mixed with morselized allograft which was graft on matrix boats osteoamp and local autograft and packed this densely from T9 down to S2. Bone graft had been mixed with vancomycin powder. At this point to then proceeded with placement of a Hemovac drain.   We had excellent hemostasis. We then proceeded with closure of the paraspinal musculature with #1 strata fix suture. Exparel was utilized to the posterior lateral tissues. Subcutaneous tissue was closed with 2-0 Vicryl suture followed by 3 oh strata fix as well as Dermabond and sterile dressing. Patient tolerated procedure well. Plan for ICU management. I discussed surgery and reviewed preoperative and postoperative radiographs with patient's brother. Please asked Kia Alfaro nurse practitioner was present and scrubbed for the entire procedure and provided assistance with exposure decompression, osteotomies hardware placement and fusion.     Signed By: Rachel Yu MD     July 19, 2022

## 2022-07-19 NOTE — BRIEF OP NOTE
Brief Postoperative Note    Patient: Brie Kruger  YOB: 1945  MRN: [id number]    Date of Procedure: 7/19/2022     Pre-Op Diagnosis: Sagittal plane imbalance [M43.8X9]  Spondylolisthesis of lumbar region [M43.16]  Lumbar radiculitis [M54.16]  Diabetic peripheral neuropathy ([address] [E11.42]  Progressive focal motor weakness [R53.1]    Post-Op Diagnosis: Same as preoperative diagnosis. Procedure(s):  T9-S2 ILIAC FIXATION, L1, L2, L3, L4 OSTEOTOMIES, L5 LAMINECTOMY, T9-S2 FUSION, OPEN SACROILIAC JOINT FUSION (O-ARM)    Surgeon(s):  Mohinder Alcaraz MD    Surgical Assistant: None    Anesthesia: General     Estimated Blood Loss (mL): 650 total, 250 returned in cell saver, net 400 cc blood loss    Complications: None    Specimens: * No specimens in log *     Implants:   Implant Name Type Inv.  Item Serial No.  Lot No. LRB No. Used Action   DBM 2.5 cm x 10 cm    [id number] [employer] NA N/A 1 Implanted   OSTEOAMP 10CC   [id number] [facility] [id number] N/A 1 Implanted   GRAFT BONE 10 CC - [id number]  GRAFT BONE 10 CC [id number] [employer] NA N/A 1 Implanted   GRAFT BONE 10 CC - [id number]  GRAFT BONE 10 CC [id number] [employer] [id number] N/A 1 Implanted   OSTEOAMP 10CC   [id number] [facility] [id number] N/A 1 Implanted   OSTEOAMP 10 CC   [id number] [facility] [id number] N/A 1 Implanted   GRAFT BONE 10 CC - [id number]  GRAFT BONE 10 CC [id number] [employer] NA N/A 1 Implanted   MAS head anad set screw for 5.5 pardeep   NA [employer] [place] [id number] N/A 1 Implanted   MAS head and set screw for 5.5 pardeep   NA [employer] [id number] N/A 1 Implanted   MAS head and set screw for 5.5 pardeep   NA [employer] [id number] N/A 1 Implanted   MAS head and set screw for 5.5 pardeep   NA [employer] [id number] N/A 1 Implanted   MAS head and set screw for 5.5 pardeep   NA [employer] [id number] N/A 1 Implanted   Pardeep   NA [facility] [id number] N/A 1 Implanted   SCREW SPNL L80MM [id number] TI CO CHROME CAN MULTIAXIAL [address] - SNA  SCREW SPNL L80MM [id number] TI CO CHROME CANC MULTIAXIAL [address] NA [employer] SOFAMOR DANEK_WD NA N/A 2 Implanted   SET SCR SPNL L6MM DIA5. 5MM TI BRK OFF SUKUMAR W/ DETACH [address] - SNA  SET SCR SPNL L6MM DIA5. 5MM TI BRK OFF SUKUMAR W/ DETACH [facility] NA [employer] SOFAMOR DANEK_WD NA N/A 4 Implanted   MODULE X ATS SCREW   NA [employer] NA N/A 4 Implanted   MODULE X ATS SCREW 5.5 X 45   NA [employer] NA N/A 5 Implanted   MODULE X ATS SCREW 5.5 X 50   NA [employer] NA N/A 1 Implanted   ATS SCREW 6.5 X 45   NA [employer] NA N/A 1 Implanted   ATS SCREW 6.5 X 50   NA [employer] NA N/A 3 Implanted   ATS SCREW 7.5 X40   NA [employer] NA N/A 1 Implanted   ATS SCREW 7.5 X 45   NA [employer] NA N/A 3 Implanted   ATS  SCREW 7.5 X 50   NA [employer] NA N/A 2 Implanted   Mas head and set screw 5.5 eufemia   NA [employer] [id number] N/A 1 Implanted   RENEE CONNECTOR   NA [employer] [id number] N/A 1 Implanted   RENEE CONNECTOR   NA [employer] [id number] N/A 1 Implanted   SOLERAROD 5.5 X 100   NA [employer] NA N/A 1 Implanted   SOLERALATERAL CONNECTOR    NA [employer] NA N/A 1 Implanted       Drains:   Hemovac Back (Active)       Findings: kyphosis, stenosis, osteopenia in thoracic spine    Electronically Signed by Mitchell Green MD on 7/19/2022 at 6:12 PM

## 2022-07-19 NOTE — ANESTHESIA PREPROCEDURE EVALUATION
Relevant Problems   GASTROINTESTINAL   (+) Chronic hepatitis C without hepatic coma (HCC)      ENDOCRINE   (+) Severe obesity (HCC)   (+) Type 2 diabetes mellitus   (+) Type 2 diabetes mellitus with chronic kidney disease (HCC)       Anesthetic History   No history of anesthetic complications            Review of Systems / Medical History  Patient summary reviewed and pertinent labs reviewed    Pulmonary  Within defined limits                 Neuro/Psych         Psychiatric history     Cardiovascular    Hypertension          Hyperlipidemia    Exercise tolerance: >4 METS  Comments: Normal Nuclear stress test , nml EF  Intermediate risk cardiac clearance   GI/Hepatic/Renal         Renal disease: stones  Liver disease (fatty liver)     Endo/Other    Diabetes    Obesity and arthritis     Other Findings              Physical Exam    Airway  Mallampati: III  TM Distance: 4 - 6 cm  Neck ROM: normal range of motion   Mouth opening: Normal     Cardiovascular    Rhythm: regular  Rate: normal         Dental    Dentition: Upper dentition intact and Lower dentition intact     Pulmonary  Breath sounds clear to auscultation               Abdominal  GI exam deferred       Other Findings            Anesthetic Plan    ASA: 3  Anesthesia type: general          Induction: Intravenous  Anesthetic plan and risks discussed with: Patient

## 2022-07-20 LAB
ANION GAP SERPL CALC-SCNC: 8 MMOL/L (ref 5–15)
BUN SERPL-MCNC: 27 MG/DL (ref 6–20)
BUN/CREAT SERPL: 25 (ref 12–20)
CALCIUM SERPL-MCNC: 7.1 MG/DL (ref 8.5–10.1)
CHLORIDE SERPL-SCNC: 113 MMOL/L (ref 97–108)
CO2 SERPL-SCNC: 23 MMOL/L (ref 21–32)
CREAT SERPL-MCNC: 1.1 MG/DL (ref 0.7–1.3)
GLUCOSE BLD STRIP.AUTO-MCNC: 118 MG/DL (ref 65–117)
GLUCOSE BLD STRIP.AUTO-MCNC: 143 MG/DL (ref 65–117)
GLUCOSE SERPL-MCNC: 173 MG/DL (ref 65–100)
HCT VFR BLD AUTO: 28.6 % (ref 36.6–50.3)
HGB BLD-MCNC: 9.2 G/DL (ref 12.1–17)
HGB BLD-MCNC: 9.3 G/DL (ref 12.1–17)
POTASSIUM SERPL-SCNC: 4.1 MMOL/L (ref 3.5–5.1)
SERVICE CMNT-IMP: ABNORMAL
SERVICE CMNT-IMP: ABNORMAL
SODIUM SERPL-SCNC: 144 MMOL/L (ref 136–145)

## 2022-07-20 PROCEDURE — 74011636637 HC RX REV CODE- 636/637: Performed by: NURSE PRACTITIONER

## 2022-07-20 PROCEDURE — 80048 BASIC METABOLIC PNL TOTAL CA: CPT

## 2022-07-20 PROCEDURE — 74011250637 HC RX REV CODE- 250/637: Performed by: NURSE PRACTITIONER

## 2022-07-20 PROCEDURE — 74011250636 HC RX REV CODE- 250/636: Performed by: NURSE PRACTITIONER

## 2022-07-20 PROCEDURE — 85014 HEMATOCRIT: CPT

## 2022-07-20 PROCEDURE — 97161 PT EVAL LOW COMPLEX 20 MIN: CPT

## 2022-07-20 PROCEDURE — 97530 THERAPEUTIC ACTIVITIES: CPT

## 2022-07-20 PROCEDURE — 74011000250 HC RX REV CODE- 250: Performed by: NURSE PRACTITIONER

## 2022-07-20 PROCEDURE — 65610000006 HC RM INTENSIVE CARE

## 2022-07-20 PROCEDURE — 97165 OT EVAL LOW COMPLEX 30 MIN: CPT

## 2022-07-20 PROCEDURE — P9047 ALBUMIN (HUMAN), 25%, 50ML: HCPCS | Performed by: NURSE PRACTITIONER

## 2022-07-20 PROCEDURE — 77010033678 HC OXYGEN DAILY

## 2022-07-20 PROCEDURE — 85018 HEMOGLOBIN: CPT

## 2022-07-20 PROCEDURE — 36415 COLL VENOUS BLD VENIPUNCTURE: CPT

## 2022-07-20 PROCEDURE — 74011250637 HC RX REV CODE- 250/637: Performed by: ORTHOPAEDIC SURGERY

## 2022-07-20 PROCEDURE — 82962 GLUCOSE BLOOD TEST: CPT

## 2022-07-20 RX ORDER — ALBUMIN HUMAN 250 G/1000ML
12.5 SOLUTION INTRAVENOUS ONCE
Status: COMPLETED | OUTPATIENT
Start: 2022-07-20 | End: 2022-07-20

## 2022-07-20 RX ADMIN — LISINOPRIL 20 MG: 20 TABLET ORAL at 09:01

## 2022-07-20 RX ADMIN — ALBUMIN (HUMAN) 12.5 G: 0.25 INJECTION, SOLUTION INTRAVENOUS at 16:19

## 2022-07-20 RX ADMIN — Medication 1 TABLET: at 18:06

## 2022-07-20 RX ADMIN — FAMOTIDINE 20 MG: 20 TABLET, FILM COATED ORAL at 09:02

## 2022-07-20 RX ADMIN — KETOROLAC TROMETHAMINE 15 MG: 30 INJECTION, SOLUTION INTRAMUSCULAR at 03:25

## 2022-07-20 RX ADMIN — INSULIN LISPRO 2 UNITS: 100 INJECTION, SOLUTION INTRAVENOUS; SUBCUTANEOUS at 12:25

## 2022-07-20 RX ADMIN — FAMOTIDINE 20 MG: 20 TABLET, FILM COATED ORAL at 18:05

## 2022-07-20 RX ADMIN — Medication 1 TABLET: at 09:02

## 2022-07-20 RX ADMIN — KETOROLAC TROMETHAMINE 15 MG: 30 INJECTION, SOLUTION INTRAMUSCULAR at 16:19

## 2022-07-20 RX ADMIN — METFORMIN HYDROCHLORIDE 500 MG: 500 TABLET ORAL at 16:19

## 2022-07-20 RX ADMIN — AMLODIPINE BESYLATE 10 MG: 5 TABLET ORAL at 09:02

## 2022-07-20 RX ADMIN — DOCUSATE SODIUM 50MG AND SENNOSIDES 8.6MG 1 TABLET: 8.6; 5 TABLET, FILM COATED ORAL at 18:06

## 2022-07-20 RX ADMIN — Medication 10 ML: at 16:30

## 2022-07-20 RX ADMIN — POLYETHYLENE GLYCOL 3350 17 G: 17 POWDER, FOR SOLUTION ORAL at 09:02

## 2022-07-20 RX ADMIN — MAGNESIUM HYDROXIDE 30 ML: 1200 LIQUID ORAL at 09:02

## 2022-07-20 RX ADMIN — ACETAMINOPHEN 1000 MG: 500 TABLET ORAL at 18:06

## 2022-07-20 RX ADMIN — MAGNESIUM HYDROXIDE 30 ML: 1200 LIQUID ORAL at 21:22

## 2022-07-20 RX ADMIN — HYDROCHLOROTHIAZIDE 12.5 MG: 25 TABLET ORAL at 09:01

## 2022-07-20 RX ADMIN — CEFAZOLIN 2 G: 1 INJECTION, POWDER, FOR SOLUTION INTRAMUSCULAR; INTRAVENOUS at 00:14

## 2022-07-20 RX ADMIN — DOCUSATE SODIUM 50MG AND SENNOSIDES 8.6MG 1 TABLET: 8.6; 5 TABLET, FILM COATED ORAL at 09:01

## 2022-07-20 RX ADMIN — ACETAMINOPHEN 1000 MG: 500 TABLET ORAL at 12:25

## 2022-07-20 RX ADMIN — TAMSULOSIN HYDROCHLORIDE 0.8 MG: 0.4 CAPSULE ORAL at 16:19

## 2022-07-20 RX ADMIN — LISINOPRIL 20 MG: 20 TABLET ORAL at 18:05

## 2022-07-20 RX ADMIN — METFORMIN HYDROCHLORIDE 500 MG: 500 TABLET ORAL at 09:01

## 2022-07-20 RX ADMIN — SODIUM CHLORIDE 125 ML/HR: 9 INJECTION, SOLUTION INTRAVENOUS at 20:53

## 2022-07-20 RX ADMIN — THERA TABS 1 TABLET: TAB at 09:02

## 2022-07-20 RX ADMIN — KETOROLAC TROMETHAMINE 15 MG: 30 INJECTION, SOLUTION INTRAMUSCULAR at 09:02

## 2022-07-20 RX ADMIN — GABAPENTIN 600 MG: 300 CAPSULE ORAL at 21:22

## 2022-07-20 RX ADMIN — Medication 10 ML: at 05:14

## 2022-07-20 RX ADMIN — SODIUM CHLORIDE 125 ML/HR: 9 INJECTION, SOLUTION INTRAVENOUS at 05:14

## 2022-07-20 RX ADMIN — CEFAZOLIN 2 G: 1 INJECTION, POWDER, FOR SOLUTION INTRAMUSCULAR; INTRAVENOUS at 09:02

## 2022-07-20 NOTE — PROGRESS NOTES
2230- Verbal report received from Sam Hamilton RN (charge nurse) by Nurse Gilles Malhotra and Carmen Ortiz RN (oncoming nurse). Report included the following information SBAR, Kardex, OR Summary, Procedure Summary, Intake/Output, MAR, Accordion, Recent Results, Med Rec Status, Cardiac Rhythm NSR, and Alarm Parameters . 0000- Reassessment performed and documented in flowsheets. Patient resting comfortably and becoming more oriented. Labs drawn and sent to lab.    0400- Reassessment performed and documented in flowsheets. Patient resting comfortably and becoming more oriented. 0700- Verbal shift change report given to Brooks Franks (oncoming nurse) by Nurse Gilles Malhotra and Carmen Ortiz RN  (offgoing nurse). Report included the following information SBAR, Kardex, OR Summary, Procedure Summary, Intake/Output, MAR, Recent Results, Cardiac Rhythm NSR, and Alarm Parameters .

## 2022-07-20 NOTE — PROGRESS NOTES
ORTHOPAEDIC LUMBAR FUSION PROGRESS NOTE    NAME:     Allegra Blake   :       1945   MRN:       [id number]   DATE:      2022    POD:              1 Day Post-Op  S/P:              Procedure(s):  T10-S2 ILIAC FIXATION, L1, L2, L3, L4 OSTEOTOMIES, L5 LAMINECTOMY, T10-S2 FUSION, OPEN SACROILIAC JOINT FUSION (O-ARM)    SUBJECTIVE:    Postop pain controlled   Tolerating PO intake  Ambulation tolerance: not yet OOB  Passing flatus: yes  Voiding: clear yellow urine choi catheter  Denies nausea/vomiting, headache, chest pain or shortness of breath  Recent Labs     22  0023   HGB 9.2*      K 4.1   *   CO2 23   BUN 27*   CREA 1.10   *     Patient Vitals for the past 12 hrs:   BP Temp Pulse Resp SpO2   22 0645 (!) 126/59 -- 74 18 97 %   22 0630 (!) 124/56 -- 73 19 100 %   22 0615 (!) 118/53 -- 73 19 100 %   22 0600 (!) 107/53 -- 66 18 99 %   22 0545 105/68 -- 66 12 100 %   22 0530 124/61 -- 67 14 100 %   22 0515 (!) 117/56 -- 70 13 100 %   22 0500 (!) 118/51 -- 68 9 100 %   22 0445 (!) 118/59 -- 69 15 100 %   22 0430 (!) 109/52 -- 72 19 98 %   22 0415 (!) 111/51 -- 73 16 100 %   22 0400 (!) 110/51 97.5 °F (36.4 °C) 65 17 100 %   22 0345 (!) 127/59 -- 66 18 100 %   22 0330 124/60 -- 66 13 100 %   22 0315 132/74 -- 67 16 100 %   22 0300 (!) 125/53 -- 68 16 98 %   22 0245 (!) 114/49 -- 65 14 100 %   22 0230 (!) 111/54 -- 61 13 100 %   22 0215 (!) 118/57 -- 66 17 100 %   22 0200 (!) 118/49 -- 61 15 100 %   22 0145 (!) 117/53 -- 65 15 100 %   22 0130 (!) 114/95 -- 66 14 100 %   22 0115 (!) 116/55 -- 66 16 99 %   22 0100 (!) 109/53 -- 64 19 100 %   22 0045 (!) 120/57 -- 67 15 99 %   22 0030 (!) 120/55 -- 64 19 100 %   22 0015 (!) 114/56 -- 64 12 100 %   22 0000 (!) 121/57 97.8 °F (36.6 °C) 64 15 100 %   22 2345 (!) 114/53 -- 62 13 100 %   07/19/22 2330 (!) 117/53 -- 71 11 100 %   07/19/22 2315 116/60 -- 66 19 96 %   07/19/22 2300 (!) 120/59 -- 63 9 100 %   07/19/22 2245 (!) 124/56 -- 62 14 99 %   07/19/22 2230 -- -- 68 12 100 %   07/19/22 2215 -- -- 69 14 100 %   07/19/22 2200 130/60 -- 68 (!) 3 94 %   07/19/22 2145 -- -- 70 23 100 %   07/19/22 2130 -- -- 69 13 100 %   07/19/22 2115 -- -- 68 17 100 %   07/19/22 2104 -- -- 67 12 98 %   07/19/22 2100 136/67 97.4 °F (36.3 °C) 66 19 100 %   07/19/22 2056 -- -- 71 16 90 %   07/19/22 2025 117/61 97.4 °F (36.3 °C) 67 16 100 %   07/19/22 2015 -- -- 73 -- 100 %     Exam:  Positive strength/ROM bilat lower ext: main deficit is 3/5 left him flexion/SLR  Neuro intact to sensation  Dressings clean and dry, intact  Hemovac: functioning, 255ml  Lower extremities warm and well perfused    PLAN: 1 Day Post-Op, improved   Continue PO pain medications as needed  Continue SCD's, frequent ambulation  Diet: advance to regular diet today  Continue bowel regimen   Continue lumbar orthosis: PT to fit patient with TLSO -- if unavailable, please contact Helen Trevizo and we will obtain one from [address] [place] [address] office.    Continue Vit D3  Medical comorbities stable  Discharge planning: ambulate, PT/OT today, pain control, monitor labs     Helen Trevizo NP

## 2022-07-20 NOTE — PROGRESS NOTES
7/20/2022  5:05 PM  CM completed assessment w/ pt in person, CM wore mask at all times. Charted demographics verified, pt lives w/ spouse in a 1 story home, w/ 5 entry steps. Prior to surgery pt was ambulatory w/ RW, independent w/ ADLS, drives, No fall history. Pt's wife is experiencing memory issues  he has a paid caregiver staying w/ her while he is in hospital.  Reason for Admission:  Elective for Spondylolistesis L2-L, Neurogenic Claudication d/t Lumbar Stenosis  PMHx;      Atherosclerosis 02/11/2020     COVID-19 vaccine series completed      Diabetes ([facility] [address] [facility]      Fatty liver determined by biopsy 08/2019    Frequent urination at night      History of stress test 06/27/2022     Normal    Hypertension      Insomnia      Kidney stone 2019 & 2021    Obesity, Class I, BMI 30-34.9                    RUR Score:     8 % low Risk of Readmission/Green                Plan for utilizing home health:   PT, OT evals completed IPR vs HH pending progress, pt has no HH history, previous Hx SNF/[facility], agrees w/ IPR at  [address] [place] [address] if recommended     DME: RW, cane  Rx; [employer]ARP, pt uses [facility] [address] and is usually covered for his medications  COVID Vax Hx: Pfizer 2 jabs and 1 booster 2021      PCP: First and Last name:  Celeste Jimenez NP     Name of Practice:    Are you a current patient: Yes/No: yes    Approximate date of last visit: 3 months    Can you participate in a virtual visit with your PCP: NO                     Current Advanced Directive/Advance Care Plan: Prior  [facility] brother Julieta Frost 19     Healthcare Decision Maker:   Click here to complete A2B including selection of the Healthcare Decision Maker Relationship (ie \"Primary\")             Secondary Decision Maker: Shane Perez -  - [phone]                  Transition of Care Plan:                    RUR  8% Low Risk of Readmission  LOS [facility] admission for surgical management  POD #1 T10-S2 ILIAC FIXATION, L1, L2, L3, L4 OSTEOTOMIES, L5 LAMINECTOMY, T10-S2 FUSION, OPEN SACROILIAC JOINT FUSION  PT eval completed, recommending IPR vs HH pending progress  CM to follow through for treatment/response  DC when stable dispo pending progress  Outpatient follow-up  Ortho, PCP  Transprot TBD FAmily vs WC VAn  CM will follow and assist w/ DC plan  Care Management Interventions  PCP Verified by CM:  Yes (Gertrude Snellen , last visit   3 months)  Palliative Care Criteria Met (RRAT>21 & CHF Dx)?: No  Mode of Transport at Discharge: Self (Family)  Transition of Care Consult (CM Consult): Discharge Planning  Physical Therapy Consult: Yes  Occupational Therapy Consult: Yes  Support Systems: Other Family Member(s) (pt lives w/ spouse in pvt residence,at baseline pt is ambulatory w/ rollator  iADLS, drives,)  Confirm Follow Up Transport: Family  Discharge Location  Patient Expects to be Discharged to[de-identified] Unable to determine at this time  Harish Cao [facility]

## 2022-07-20 NOTE — PERIOP NOTES
TRANSFER - OUT REPORT:    Verbal report given to RG Douglass on Flakita Granda  being transferred to ICU 14 for routine progression of care       Report consisted of patients Situation, Background, Assessment and   Recommendations(SBAR). Information from the following report(s) SBAR, OR Summary, Intake/Output, MAR, Recent Results and Cardiac Rhythm NSR was reviewed with the receiving nurse. Lines:   Peripheral IV 07/19/22 Left;Posterior Wrist (Active)   Site Assessment Clean, dry, & intact 07/19/22 1840   Phlebitis Assessment 0 07/19/22 1840   Infiltration Assessment 0 07/19/22 1840   Dressing Status Clean, dry, & intact 07/19/22 1840   Dressing Type Transparent;Tape 07/19/22 1840   Hub Color/Line Status Pink;Patent; Flushed 07/19/22 1840   Alcohol Cap Used Yes 07/19/22 1840       Peripheral IV 07/19/22 Right Wrist (Active)   Site Assessment Clean, dry, & intact 07/19/22 1840   Phlebitis Assessment 0 07/19/22 1840   Infiltration Assessment 0 07/19/22 1840   Dressing Status Clean, dry, & intact 07/19/22 1840   Dressing Type Tape;Transparent 07/19/22 1840   Hub Color/Line Status Pink;Patent; Infusing 07/19/22 1840   Alcohol Cap Used Yes 07/19/22 1840       Arterial Line 07/19/22 Left Radial artery (Active)   Site Assessment Clean, dry, & intact 07/19/22 1840   Dressing Status Clean, dry, & intact 07/19/22 1840   Dressing Type Transparent;Tape 07/19/22 1840   Line Status Intact and in place 07/19/22 1840   Treatment Zeroed or re-zeroed 07/19/22 1840   Affected Extremity/Extremities Pulses palpable;Color distal to insertion site pink (or appropriate for race) 07/19/22 1840        Opportunity for questions and clarification was provided.       Patient transported with:   Monitor  O2 @ 4 liters  Registered Nurse

## 2022-07-20 NOTE — PROGRESS NOTES
Spiritual Care Assessment/Progress Note  [address]      NAME: Lalo Gray      MRN: [id number]  AGE: 76 y.o. SEX: male  Christian Affiliation: Mandaen   Language: English     7/20/2022     Total Time (in minutes): 5     Spiritual Assessment begun in [facility] INTENSIVE CARE through conversation with:         []Patient        [] Family    [] Friend(s)        Reason for Consult: Initial/Spiritual assessment, critical care     Spiritual beliefs: (Please include comment if needed)     [] Identifies with a carla tradition:  Mandaen on record      [] Supported by a carla community:            [] Claims no spiritual orientation:           [] Seeking spiritual identity:                [] Adheres to an individual form of spirituality:           [x] Not able to assess:                           Identified resources for coping:      [] Prayer                               [] Music                  [] Guided Imagery     [] Family/friends                 [] Pet visits     [] Devotional reading                         [x] Unknown     [] Other:                                               Interventions offered during this visit: (See comments for more details)                Plan of Care:     [] Support spiritual and/or cultural needs    [] Support AMD and/or advance care planning process      [] Support grieving process   [] Coordinate Rites and/or Rituals    [] Coordination with community clergy   [] No spiritual needs identified at this time   [] Detailed Plan of Care below (See Comments)  [] Make referral to Music Therapy  [] Make referral to Pet Therapy     [] Make referral to Addiction services  [] Make referral to [facility]  [] Make referral to Spiritual Care Partner  [] No future visits requested        [x] Contact Spiritual Care for further referrals     Comments: initial spiritual care assessment attempted for Mr. Randi Mitchell in ICU.    Pt is sleeping at this time, no visitors present; chart reviewed.      BRITTANY SpainDiv.  [phone] ([phone])

## 2022-07-20 NOTE — PROGRESS NOTES
Problem: Mobility Impaired (Adult and Pediatric)  Goal: *Acute Goals and Plan of Care (Insert Text)  Description: FUNCTIONAL STATUS PRIOR TO ADMISSION: Patient was modified independent using a rolling walker for functional mobility. HOME SUPPORT PRIOR TO ADMISSION: The patient lived with his wife but did not require assist. Patient reports recent signs of dementia in his wife and expressed concern for her care. Physical Therapy Goals  Initiated 7/20/2022  1. Patient will move from supine to sit and sit to supine  and roll side to side in bed with supervision/set-up within 7 day(s). 2.  Patient will transfer from bed to chair and chair to bed with minimal assistance/contact guard assist using the least restrictive device within 7 day(s). 3.  Patient will perform sit to stand with minimal assistance/contact guard assist within 7 day(s). 4.  Patient will ambulate with minimal assistance/contact guard assist for 100 feet with the least restrictive device within 7 day(s). 5.  Patient will ascend/descend 4 stairs with 1 handrail(s) with modified independence within 7 day(s). 6. Patient will verbalize and demonstrate understanding of spinal precautions (No bending, lifting greater than 5 lbs, or twisting; log-roll technique; frequent repositioning as instructed) within 4 days.         Outcome: Progressing Towards Goal   PHYSICAL THERAPY EVALUATION  Patient: Jonathon Quiroz (32 y.o. male)  Date: 7/20/2022  Primary Diagnosis: Sagittal plane imbalance [M43.8X9]  Spondylolisthesis of lumbar region [M43.16]  Lumbar radiculitis [M54.16]  Diabetic peripheral neuropathy (HCC) [E11.42]  Progressive focal motor weakness [R53.1]  Spondylolisthesis at L4-L5 level [M43.16]  Spondylolisthesis at L3-L4 level [M43.16]  Spondylolisthesis at L2-L3 level [M43.16]  Postlaminectomy kyphosis [M96.3]  Neurogenic claudication due to lumbar spinal stenosis [M48.062]  Left leg weakness [R29.898]  Procedure(s) (LRB):  T10-S2 ILIAC FIXATION, L1, L2, L3, L4 OSTEOTOMIES, L5 LAMINECTOMY, T10-S2 FUSION, OPEN SACROILIAC JOINT FUSION (O-ARM) (N/A) 1 Day Post-Op   Precautions:   Back (brace when up, no BLT, sitting <30-45 min, log roll to sit)    ASSESSMENT  Based on the objective data described below, the patient presents with concern for orthostatic hypotension, anxiety,  impaired strength, ROM, balance, and decreased activity tolerance following admission for an extensive T10-S2 and sacroiliac joint fusion. He had a syncopal episode when attempting to mobilize with nursing this am and was apprehensive regarding mobilizing again. Education provided regarding post-op precautions with verbalized understanding by the patient. He required moderate assist x2 for rolling and sidelying to sit EOB. BP remained low but appeared stable until preparing to stand. He c/o mild dizziness while seated and the last seated BP was low causing this writer to defer further activity. Discharge recommendations TBD based on activity going forward. He had a large surgery and has limited home support while requiring heavy assist at present. Would recommend IPR based on activity today but will progress activity tomorrow pending stable BP. Current Level of Function Impacting Discharge (mobility/balance): mod x2 rolling and sidelying to sit      Other factors to consider for discharge: lives with his wife with new diagnosis of dementia? Patient will benefit from skilled therapy intervention to address the above noted impairments. PLAN :  Recommendations and Planned Interventions: bed mobility training, transfer training, gait training, therapeutic exercises, neuromuscular re-education, and therapeutic activities      Frequency/Duration: Patient will be followed by physical therapy:  twice daily to address goals. Recommendation for discharge: (in order for the patient to meet his/her long term goals)  To be determined: IPR?         IF patient discharges home will need the following DME: to be determined (TBD)         SUBJECTIVE:   Patient stated I don't mean to be wimpy. I hope that I could do what you wanted me to do.     OBJECTIVE DATA SUMMARY:   HISTORY:    Past Medical History:   Diagnosis Date    Atherosclerosis 02/11/2020    COVID-19 vaccine series completed     Diabetes ([facility] [address]     Fatty liver determined by biopsy 08/2019    Frequent urination at night     History of stress test 06/27/2022    Normal    Hypertension     Insomnia     Kidney stone 2019 & 2021    Obesity, Class I, BMI 30-34.9      Past Surgical History:   Procedure Laterality Date    HX COLONOSCOPY      [facility]    HX KNEE REPLACEMENT Bilateral 2000 & 2013    HX KNEE REPLACEMENT Right 2017 & 11/8/2021    Revision    HX KNEE REPLACEMENT Left 2020    Revision    HX LITHOTRIPSY  2019    IR BX LIVER PERCUTANEOUS  2019    Normal       Personal factors and/or comorbidities impacting plan of care:     Home Situation  Home Environment: Private residence  # Steps to Enter: 5  Rails to Enter: Yes  Hand Rails : Right  One/Two Story Residence: One story  Living Alone: No  Support Systems: Spouse/Significant Other  Patient Expects to be Discharged to[de-identified] Home  Current DME Used/Available at Home: Glucometer, Shower chair, Grab bars, Blood pressure cuff, Walker, rolling, Raised toilet seat, Cane, straight, Cane, quad  Tub or Shower Type: Tub/Shower combination    EXAMINATION/PRESENTATION/DECISION MAKING:   Critical Behavior:  Neurologic State: Alert, Appropriate for age  Orientation Level: Oriented X4  Cognition: Appropriate decision making, Appropriate for age attention/concentration, Appropriate safety awareness, Memory loss     Hearing:     Skin:    Edema:   Range Of Motion:  AROM: Generally decreased, functional (left > right limited knee flexion)                       Strength:    Strength: Generally decreased, functional                    Tone & Sensation:   Tone: Normal Coordination:  Coordination: Generally decreased, functional  Vision:      Functional Mobility:  Bed Mobility:  Rolling: Moderate assistance;Assist x2  Supine to Sit: Moderate assistance;Assist x2; Additional time     Scooting: Moderate assistance; Additional time  Transfers:                             Balance:   Sitting: Impaired  Sitting - Static: Fair (occasional)  Sitting - Dynamic: Fair (occasional)                  Physical Therapy Evaluation Charge Determination   History Examination Presentation Decision-Making   MEDIUM  Complexity : 1-2 comorbidities / personal factors will impact the outcome/ POC  MEDIUM Complexity : 3 Standardized tests and measures addressing body structure, function, activity limitation and / or participation in recreation  MEDIUM Complexity : Evolving with changing characteristics  MEDIUM Complexity : FOTO score of 26-74      Based on the above components, the patient evaluation is determined to be of the following complexity level: MEDIUM    Pain Rating:  None at the time of intervention    Activity Tolerance:   Fair  Vitals:    07/20/22 1530 07/20/22 1534 07/20/22 1538 07/20/22 1600   BP: (!) 95/46 (!) 93/59 (!) 80/49    BP 1 Location:       BP Patient Position: Sitting Sitting Sitting    Pulse:    77   Temp:    98 °F (36.7 °C)   Resp:       Weight:       SpO2:             After treatment patient left in no apparent distress:   Supine in bed and Call bell within reach    COMMUNICATION/EDUCATION:   The patients plan of care was discussed with: Registered nurse. Fall prevention education was provided and the patient/caregiver indicated understanding., Patient/family have participated as able in goal setting and plan of care. , and Patient/family agree to work toward stated goals and plan of care.     Thank you for this referral.  Kirsten Lu, PT, DPT   Time Calculation: 34 mins

## 2022-07-20 NOTE — PROGRESS NOTES
2055 TRANSFER - IN REPORT:    Verbal report received from [address] on [facility]  being received from PACU for routine post - op      Report consisted of patients Situation, Background, Assessment and   Recommendations(SBAR). Information from the following report(s) SBAR, Kardex, OR Summary, Procedure Summary, Intake/Output, MAR, Accordion, Recent Results, Med Rec Status and Cardiac Rhythm SR was reviewed with the receiving nurse. Opportunity for questions and clarification was provided. Assessment completed upon patients arrival to unit and care assumed. Primary Nurse Rosana Martin RN and RG Tracy performed a dual skin assessment on this patient Impairment noted- see wound doc flow sheet  Weston score is 15. Patient wakes up to voice,answers , no c/o pain. 2230 Bedside shift change report given to RG Portillo .  Report included the following information SBAR, Kardex, OR Summary, Procedure Summary, Intake/Output, MAR, Accordion, Recent Results, Med Rec Status and Cardiac Rhythm SR.

## 2022-07-20 NOTE — PROGRESS NOTES
Problem: Self Care Deficits Care Plan (Adult)  Goal: *Acute Goals and Plan of Care (Insert Text)  Description: FUNCTIONAL STATUS PRIOR TO ADMISSION: Patient was modified independent using a rolling walker for functional mobility. HOME SUPPORT PRIOR TO ADMISSION: The patient lived with his wife but did not require assist. Patient reports recent signs of dementia in his wife and expressed concern for her care. Occupational Therapy Goals  Initiated 7/20/2022    1. Patient will perform upper body dressing and bathing with modified independence within 7 days. 2.  Patient will perform lower body dressing and bathing with supervision/set-up using AE PRN within 7 day(s). 3.  Patient will perform toilet transfer with minimal assistance using rolling walker within 7 days. 4.  Patient will perform all aspects of toileting at minimal assistance within 7 days. 5.  Patient will don/doff back brace at supervision/set-up within 7 days. 6.  Patient will verbalize/demonstrate all back precautions during ADL tasks without cues within 7 days.       Outcome: Progressing Towards Goal   OCCUPATIONAL THERAPY EVALUATION  Patient: Luciano Hernandes (76 y.o. male)  Date: 7/20/2022  Primary Diagnosis: Sagittal plane imbalance [M43.8X9]  Spondylolisthesis of lumbar region [M43.16]  Lumbar radiculitis [M54.16]  Diabetic peripheral neuropathy (HCC) [E11.42]  Progressive focal motor weakness [R53.1]  Spondylolisthesis at L4-L5 level [M43.16]  Spondylolisthesis at L3-L4 level [M43.16]  Spondylolisthesis at L2-L3 level [M43.16]  Postlaminectomy kyphosis [M96.3]  Neurogenic claudication due to lumbar spinal stenosis [M48.062]  Left leg weakness [R29.898]  Procedure(s) (LRB):  T10-S2 ILIAC FIXATION, L1, L2, L3, L4 OSTEOTOMIES, L5 LAMINECTOMY, T10-S2 FUSION, OPEN SACROILIAC JOINT FUSION (O-ARM) (N/A) 1 Day Post-Op   Precautions:  Back (brace when up, no BLT, sitting <30-45 min, log roll to sit)    ASSESSMENT  Based on the objective data described below, the patient presents with decreased activity tolerance, generalized weakness, impaired balance, decreased endurance, and orthostatic hypotension on POD 1 of T10-S1 spinal surgery + sacroiliac joint fusion. Education provided regarding spinal precautions, safe transfer techniques including log roll for bed mobility, and adaptive ADL techniques. Patient requires encouragement for participation as he reports anxiety associated with \"passing out\" when attempting to sit up with nursing staff this AM.  He performed bed mobility with x2 person assist and max cues to carryover education for log roll technique. Patient initially with improved tolerance for sitting; BP running low but stable with initial transition to sitting. With increased time, patient reports increased dizziness and drop in BP noted. He required return to supine and repositioned for comfort. Patient would benefit from continued skilled OT to progress towards goals and improve overall independence. Current Level of Function Impacting Discharge (ADLs/self-care): Patient required mod A x1-2 for bed mobility. Patient required setup to max A for UB ADLs and total A for LB ADLs. Functional Outcome Measure: The patient scored Total: 10/100 on the Barthel Index outcome measure. Patient will benefit from skilled therapy intervention to address the above noted impairments. PLAN :  Recommendations and Planned Interventions: self care training, functional mobility training, therapeutic exercise, balance training, therapeutic activities, endurance activities, patient education, home safety training, and family training/education    Frequency/Duration: Patient will be followed by occupational therapy 5 times a week to address goals.     Recommendation for discharge: (in order for the patient to meet his/her long term goals)  To be determined: 3 hours of therapy 5 days/week pending progress; Based on current functional status, he would likely benefit from inpatient rehab but eager to return home; Will continue to assess need as he progresses with increased tolerance for therapy    This discharge recommendation:  Has been made in collaboration with the attending provider and/or case management    IF patient discharges home will need the following DME: none       SUBJECTIVE:   Patient stated, \"Did I do okay guys? \"    OBJECTIVE DATA SUMMARY:   HISTORY:   Past Medical History:   Diagnosis Date    Atherosclerosis 02/11/2020    COVID-19 vaccine series completed     Diabetes ([facility] [address]     Fatty liver determined by biopsy 08/2019    Frequent urination at night     History of stress test 06/27/2022    Normal    Hypertension     Insomnia     Kidney stone 2019 & 2021    Obesity, Class I, BMI 30-34.9      Past Surgical History:   Procedure Laterality Date    HX COLONOSCOPY      [facility]    HX KNEE REPLACEMENT Bilateral 2000 & 2013    HX KNEE REPLACEMENT Right 2017 & 11/8/2021    Revision    HX KNEE REPLACEMENT Left 2020    Revision    HX LITHOTRIPSY  2019    IR BX LIVER PERCUTANEOUS  2019    Normal       Expanded or extensive additional review of patient history:     Home Situation  Home Environment: Private residence  # Steps to Enter: 5  Rails to Enter: Yes  Hand Rails : Right  One/Two Story Residence: One story  Living Alone: No  Support Systems: Spouse/Significant Other  Patient Expects to be Discharged to[de-identified] Home  Current DME Used/Available at Home: Glucometer, Shower chair, Grab bars, Blood pressure cuff, Walker, rolling, Raised toilet seat, Cane, straight, Cane, quad  Tub or Shower Type: Tub/Shower combination    Hand dominance: Right    EXAMINATION OF PERFORMANCE DEFICITS:  Cognitive/Behavioral Status:  Neurologic State: Alert; Appropriate for age  Orientation Level: Oriented X4  Cognition: Appropriate decision making; Appropriate for age attention/concentration; Appropriate safety awareness;Memory loss  Perception: Cues to maintain midline in sitting  Perseveration: No perseveration noted  Safety/Judgement: Awareness of environment    Skin: Intact in the uppers    Edema: None noted in the uppers    Vision/Perceptual:    Tracking: Able to track stimulus in all quadrants w/o difficulty    Diplopia: No    Acuity: Within Defined Limits       Range of Motion:  WDL in the uppers    Strength:  Strength: Generally decreased, functional B UE and B LE    Coordination:  Fine Motor Skills-Upper: Left Impaired;Right Impaired (reports digits are stiff/rigid)    Gross Motor Skills-Upper: Left Intact; Right Intact    Tone & Sensation:  Tone: Normal  Sensation; intact per patient report    Balance:  Sitting: Impaired  Sitting - Static: Fair (occasional)  Sitting - Dynamic: Fair (occasional)    Functional Mobility and Transfers for ADLs:  Bed Mobility:  Rolling: Moderate assistance;Assist x2  Supine to Sit: Moderate assistance;Assist x2; Additional time  Scooting: Moderate assistance; Additional time    ADL Assessment:  Feeding: Setup    Oral Facial Hygiene/Grooming: Minimum assistance    Bathing: Total assistance    Type of Bath: Chlorhexidine (CHG); Bath Pack    Upper Body Dressing: Maximum assistance    Lower Body Dressing: Total assistance    Toileting: Total assistance       Cognitive Retraining  Safety/Judgement: Awareness of environment    Functional Measure:    Barthel Index:  Bathin  Bladder: 0  Bowels: 0  Groomin  Dressin  Feedin  Mobility: 0  Stairs: 0  Toilet Use: 0  Transfer (Bed to Chair and Back): 5  Total: 10/100      The Barthel ADL Index: Guidelines  1. The index should be used as a record of what a patient does, not as a record of what a patient could do. 2. The main aim is to establish degree of independence from any help, physical or verbal, however minor and for whatever reason. 3. The need for supervision renders the patient not independent.   4. A patient's performance should be established using the best available evidence. Asking the patient, friends/relatives and nurses are the usual sources, but direct observation and common sense are also important. However direct testing is not needed. 5. Usually the patient's performance over the preceding 24-48 hours is important, but occasionally longer periods will be relevant. 6. Middle categories imply that the patient supplies over 50 per cent of the effort. 7. Use of aids to be independent is allowed. Score Interpretation (from [address] [facility])    Independent   60-79 Minimally independent   40-59 Partially dependent   20-39 Very dependent   <20 Totally dependent     -Linda Orr., Barthel, D.W. (1965). Functional evaluation: the Barthel Index. [address] ([address] (1997). The Barthel activities of daily living index: self-reporting versus actual performance in the old (> or = 75 years). Journal of [address] [place] [address] 45(7), [facility], MARCE, Isiah Cam., [facility] Kevin. (1999). Measuring the change in disability after inpatient rehabilitation; comparison of the responsiveness of the Barthel Index and Functional [place] Measure. Journal of Neurology, Neurosurgery, and Psychiatry, 66(4), 149-322. Kwesi Collier, N.J.A, AYSE Cline.RAY, & Mauricio Rojas, MCedricA. (2004) Assessment of post-stroke quality of life in cost-effectiveness studies: The usefulness of the Barthel Index and the EuroQoL-5D.  Quality of Life Research, 15, 568-50     Occupational Therapy Evaluation Charge Determination   History Examination Decision-Making   LOW Complexity : Brief history review  LOW Complexity : 1-3 performance deficits relating to physical, cognitive , or psychosocial skils that result in activity limitations and / or participation restrictions  LOW Complexity : No comorbidities that affect functional and no verbal or physical assistance needed to complete eval tasks       Based on the above components, the patient evaluation is determined to be of the following complexity level: LOW     Activity Tolerance:   Good and Fair    After treatment patient left in no apparent distress:    Supine in bed, Call bell within reach, Bed / chair alarm activated, and Caregiver / family present    COMMUNICATION/EDUCATION:   The patients plan of care was discussed with: Physical therapist, Registered nurse, and patient . Home safety education was provided and the patient/caregiver indicated understanding., Patient/family have participated as able in goal setting and plan of care. , and Patient/family agree to work toward stated goals and plan of care. This patients plan of care is appropriate for delegation to [facility].     Thank you for this referral.  Merari Perry OTR/L  Time Calculation: 34 mins

## 2022-07-20 NOTE — Clinical Note
Doing well  Minimal pain  Occasional flatus  Feels a bit lethargic  No leg pain or numbness    Visit Vitals  BP (!) 126/59   Pulse 74   Temp 97.5 °F (36.4 °C)   Resp 18   Wt 112.5 kg (248 lb)   SpO2 97%   BMI 34.59 kg/m²     Oriented x 4 but he reports feeling lethargic  Motor normal RLE  Motor left hip flexion and quad weakness unchanged, normal DF/PF  Sensation intact    A/p: doing well, s/p T9-S2 iliac fusion with osteotomies,  Reviewed surgery with patient  Keep on full liquids until bowel function increases  Mobilize today with PT  Keep choi today  Will need inpatient rehab  Hgb stable, keep hemovac. Reviewed with nursing.

## 2022-07-20 NOTE — PROGRESS NOTES
Problem: Falls - Risk of  Goal: *Absence of Falls  Description: Document Tete Victoria Fall Risk and appropriate interventions in the flowsheet. Outcome: Progressing Towards Goal  Note: Fall Risk Interventions:  Mentation Interventions: Adequate sleep, hydration, pain control,Evaluate medications/consider consulting pharmacy,More frequent rounding,Reorient patient,Room close to nurse's station,Toileting rounds,Update white board    Medication Interventions: Evaluate medications/consider consulting pharmacy    Elimination Interventions: Call light in reach,Patient to call for help with toileting needs,Toileting schedule/hourly rounds              Problem: Pressure Injury - Risk of  Goal: *Prevention of pressure injury  Description: Document Weston Scale and appropriate interventions in the flowsheet.   Outcome: Progressing Towards Goal  Note: Pressure Injury Interventions:  Sensory Interventions: Assess changes in LOC,Check visual cues for pain,Keep linens dry and wrinkle-free,Minimize linen layers,Monitor skin under medical devices,Pressure redistribution bed/mattress (bed type)  Activity Interventions: Pressure redistribution bed/mattress(bed type)    Mobility Interventions: HOB 30 degrees or less,Pressure redistribution bed/mattress (bed type)    Nutrition Interventions: Document food/fluid/supplement intake    Friction and Shear Interventions: HOB 30 degrees or less,Lift sheet,Minimize layers

## 2022-07-21 LAB
GLUCOSE BLD STRIP.AUTO-MCNC: 135 MG/DL (ref 65–117)
GLUCOSE BLD STRIP.AUTO-MCNC: 136 MG/DL (ref 65–117)
GLUCOSE BLD STRIP.AUTO-MCNC: 139 MG/DL (ref 65–117)
HGB BLD-MCNC: 7.9 G/DL (ref 12.1–17)
HISTORY CHECKED?,CKHIST: NORMAL
SERVICE CMNT-IMP: ABNORMAL

## 2022-07-21 PROCEDURE — 65610000006 HC RM INTENSIVE CARE

## 2022-07-21 PROCEDURE — 36415 COLL VENOUS BLD VENIPUNCTURE: CPT

## 2022-07-21 PROCEDURE — 82962 GLUCOSE BLOOD TEST: CPT

## 2022-07-21 PROCEDURE — 74011000250 HC RX REV CODE- 250: Performed by: NURSE PRACTITIONER

## 2022-07-21 PROCEDURE — P9016 RBC LEUKOCYTES REDUCED: HCPCS

## 2022-07-21 PROCEDURE — 86923 COMPATIBILITY TEST ELECTRIC: CPT

## 2022-07-21 PROCEDURE — 86900 BLOOD TYPING SEROLOGIC ABO: CPT

## 2022-07-21 PROCEDURE — 74011250636 HC RX REV CODE- 250/636: Performed by: NURSE PRACTITIONER

## 2022-07-21 PROCEDURE — 74011250637 HC RX REV CODE- 250/637: Performed by: ORTHOPAEDIC SURGERY

## 2022-07-21 PROCEDURE — 97530 THERAPEUTIC ACTIVITIES: CPT

## 2022-07-21 PROCEDURE — 85018 HEMOGLOBIN: CPT

## 2022-07-21 PROCEDURE — 36430 TRANSFUSION BLD/BLD COMPNT: CPT

## 2022-07-21 PROCEDURE — 74011250637 HC RX REV CODE- 250/637: Performed by: NURSE PRACTITIONER

## 2022-07-21 RX ORDER — SODIUM CHLORIDE 9 MG/ML
250 INJECTION, SOLUTION INTRAVENOUS AS NEEDED
Status: DISCONTINUED | OUTPATIENT
Start: 2022-07-21 | End: 2022-07-28 | Stop reason: HOSPADM

## 2022-07-21 RX ORDER — ENOXAPARIN SODIUM 100 MG/ML
30 INJECTION SUBCUTANEOUS EVERY 12 HOURS
Status: DISCONTINUED | OUTPATIENT
Start: 2022-07-21 | End: 2022-07-28 | Stop reason: HOSPADM

## 2022-07-21 RX ADMIN — Medication 10 ML: at 21:50

## 2022-07-21 RX ADMIN — MAGNESIUM HYDROXIDE 30 ML: 1200 LIQUID ORAL at 08:40

## 2022-07-21 RX ADMIN — ACETAMINOPHEN 1000 MG: 500 TABLET ORAL at 00:29

## 2022-07-21 RX ADMIN — TRAMADOL HYDROCHLORIDE 50 MG: 50 TABLET ORAL at 05:00

## 2022-07-21 RX ADMIN — METFORMIN HYDROCHLORIDE 500 MG: 500 TABLET ORAL at 17:58

## 2022-07-21 RX ADMIN — ENOXAPARIN SODIUM 30 MG: 100 INJECTION SUBCUTANEOUS at 21:49

## 2022-07-21 RX ADMIN — DOCUSATE SODIUM 50MG AND SENNOSIDES 8.6MG 1 TABLET: 8.6; 5 TABLET, FILM COATED ORAL at 17:58

## 2022-07-21 RX ADMIN — LISINOPRIL 20 MG: 20 TABLET ORAL at 17:58

## 2022-07-21 RX ADMIN — FAMOTIDINE 20 MG: 20 TABLET, FILM COATED ORAL at 17:58

## 2022-07-21 RX ADMIN — TAMSULOSIN HYDROCHLORIDE 0.8 MG: 0.4 CAPSULE ORAL at 17:58

## 2022-07-21 RX ADMIN — TRAMADOL HYDROCHLORIDE 50 MG: 50 TABLET ORAL at 17:50

## 2022-07-21 RX ADMIN — Medication 1 TABLET: at 08:41

## 2022-07-21 RX ADMIN — DOCUSATE SODIUM 50MG AND SENNOSIDES 8.6MG 1 TABLET: 8.6; 5 TABLET, FILM COATED ORAL at 08:41

## 2022-07-21 RX ADMIN — METFORMIN HYDROCHLORIDE 500 MG: 500 TABLET ORAL at 08:41

## 2022-07-21 RX ADMIN — ACETAMINOPHEN 1000 MG: 500 TABLET ORAL at 17:58

## 2022-07-21 RX ADMIN — TRAMADOL HYDROCHLORIDE 50 MG: 50 TABLET ORAL at 17:58

## 2022-07-21 RX ADMIN — FAMOTIDINE 20 MG: 20 TABLET, FILM COATED ORAL at 08:41

## 2022-07-21 RX ADMIN — LISINOPRIL 20 MG: 20 TABLET ORAL at 08:41

## 2022-07-21 RX ADMIN — AMLODIPINE BESYLATE 10 MG: 5 TABLET ORAL at 08:41

## 2022-07-21 RX ADMIN — Medication 1 TABLET: at 17:59

## 2022-07-21 RX ADMIN — ACETAMINOPHEN 1000 MG: 500 TABLET ORAL at 05:00

## 2022-07-21 RX ADMIN — HYDROCHLOROTHIAZIDE 12.5 MG: 25 TABLET ORAL at 08:41

## 2022-07-21 RX ADMIN — POLYETHYLENE GLYCOL 3350 17 G: 17 POWDER, FOR SOLUTION ORAL at 08:42

## 2022-07-21 RX ADMIN — Medication 10 ML: at 00:30

## 2022-07-21 RX ADMIN — Medication 10 ML: at 05:01

## 2022-07-21 RX ADMIN — GABAPENTIN 600 MG: 300 CAPSULE ORAL at 21:49

## 2022-07-21 NOTE — PROGRESS NOTES
7/21/2022  3:21 PM  Pt accepted to [address] [place] [address] for Rehab, pending medical stability. AVS updated. I updated the pt. NOHELIA Bowling       1:38 PM  Update via chart review, pt is continuing to require medical management for T10-S2 ILIAC FIXATION, L1, L2, L3, L4 OSTEOTOMIES, L5 LAMINECTOMY, T10-S2 FUSION, OPEN SACROILIAC JOINT FUSION, hypotension/dizziness.   Transition of Care Plan:                    RUR   9 % Low Risk of Readmission  LOS 2 Days  Post-op management continues   POD #2 T10-S2 ILIAC FIXATION, L1, L2, L3, L4 OSTEOTOMIES, L5 LAMINECTOMY, T10-S2 FUSION, OPEN SACROILIAC JOINT FUSION  PT, OT following recommending IPR vs HH pending progress, pt agreeable to IPR at [address] [place] [address], referral sent in [employer], requires COVID PCR for rehab, nursing to obtain 7/22  CM to follow through for treatment/response  DC when stable to Rehab   Outpatient follow-up  Ortho, PCP  Transprot TBD Family vs [facility], CM will follow and assist w/ DC plan  NOHELIA Bowling

## 2022-07-21 NOTE — PROGRESS NOTES
Pt doing well, still refusing pain medication. Educated pt on staying ahead of pain and it needing to be controlled so he is able to tolerate movement in the a.m. pt still does not want to take stronger pain meds, only ultram and scheduled tylenol, it is noted that pt does have sharp pain with movement. Tubing was disconnected on penrose drain, replaced tubing and now suctioning appropriately.

## 2022-07-21 NOTE — PROGRESS NOTES
Dear NIKI Nicolas,    The Lovenox dose/schedule was changed to reflect the new Enoxaparin Routine Prophylaxis Dosing guidelines which is based on the patient's weight and renal function. The dose/schedule was changed from 30mg sq q24h to 30mg sq bid. Thank you,    Lb Urena. Yuli, Pharm D.         Contact: [phone]

## 2022-07-21 NOTE — PROGRESS NOTES
ORTHOPAEDIC LUMBAR FUSION PROGRESS NOTE    NAME:     Cheyenne Cantrell   :       1945   MRN:       [id number]   DATE:      2022    POD:              2 Days Post-Op  S/P:              Procedure(s):  T10-S2 ILIAC FIXATION, L1, L2, L3, L4 OSTEOTOMIES, L5 LAMINECTOMY, T10-S2 FUSION, OPEN SACROILIAC JOINT FUSION (O-ARM)    SUBJECTIVE:    Postop pain controlled  Tolerating PO intake  Ambulation tolerance: has not yet tolerated getting OOB due to hypotension/dizziness  Passing flatus   Voiding: clear yellow urine via catheter  Denies nausea/vomiting, headache, chest pain or shortness of breath  Recent Labs     22  0450 22  1511 22  0023   HGB 7.9* 9.3* 9.2*   HCT  --  28.6*  --    NA  --   --  144   K  --   --  4.1   CL  --   --  113*   CO2  --   --  23   BUN  --   --  27*   CREA  --   --  1.10   GLU  --   --  173*     Patient Vitals for the past 12 hrs:   BP Temp Pulse Resp SpO2   22 0800 -- -- 87 -- --   22 0500 (!) 131/55 -- 77 15 99 %   22 0400 (!) 113/52 98.1 °F (36.7 °C) 69 18 98 %   22 0300 (!) 111/51 -- 75 17 96 %   22 0200 (!) 119/56 -- 70 16 (!) 89 %   22 0100 (!) 109/52 -- 72 16 91 %   22 0000 (!) 103/46 97.7 °F (36.5 °C) 69 16 100 %   22 2300 (!) 102/47 -- 72 17 96 %   22 2200 (!) 105/46 -- 71 17 99 %     Exam:  Positive strength/ROM bilat lower ext: main deficit is 3/5 left him flexion/SLR  Neuro intact to sensation  Dressings clean and dry  Hemovac: functioning, 85ml output last night  Lower extremities warm and well perfused    PLAN: 2 Days Post-Op, improved   Continue PO pain medications as needed  Continue SCD's, frequent ambulation  Diet: advance to regular  Continue bowel regimen   Continue lumbar orthosis  Continue Vit D3  Monitoring for acute blood loss anemia: consider transfusion. I have seen and examined patient today and agree with above. He has minimal pain today, reports some left hip discomfort.  Motor strength and sensation unchanged. Discussed with patient, with some of the hypotension , if this prevents ability to tolerate upright and OOB today, may wish to proceed with transfusion. His hgb is 7.9 but has hemovac which leads to additional blood loss, and has already had albumin and fluids. I recommend he stay in ICU today, attempt OOB. If not tolerated, then 1 unit PRBC's. He also needs to start lovenox once drain out. Blood Consent: In advance of possibly requiring blood transfusion, I reviewed risks and benefits with the patient regarding red blood cell transfusion. The patient confirmed understanding and agrees/consents to RBC should that be advised. Ramy Knapp MD  Spine Surgery  [facility]    Medical comorbities stable  Discharge planning: progress with PT/OT will dictate ability to discuss discharge. Will likely consider rehab facility.     Sea Chavez NP

## 2022-07-21 NOTE — PROGRESS NOTES
Problem: Mobility Impaired (Adult and Pediatric)  Goal: *Acute Goals and Plan of Care (Insert Text)  Description: FUNCTIONAL STATUS PRIOR TO ADMISSION: Patient was modified independent using a rolling walker for functional mobility. HOME SUPPORT PRIOR TO ADMISSION: The patient lived with his wife but did not require assist. Patient reports recent signs of dementia in his wife and expressed concern for her care. Physical Therapy Goals  Initiated 7/20/2022  1. Patient will move from supine to sit and sit to supine  and roll side to side in bed with supervision/set-up within 7 day(s). 2.  Patient will transfer from bed to chair and chair to bed with minimal assistance/contact guard assist using the least restrictive device within 7 day(s). 3.  Patient will perform sit to stand with minimal assistance/contact guard assist within 7 day(s). 4.  Patient will ambulate with minimal assistance/contact guard assist for 100 feet with the least restrictive device within 7 day(s). 5.  Patient will ascend/descend 4 stairs with 1 handrail(s) with modified independence within 7 day(s). 6. Patient will verbalize and demonstrate understanding of spinal precautions (No bending, lifting greater than 5 lbs, or twisting; log-roll technique; frequent repositioning as instructed) within 4 days.         Outcome: Progressing Towards Goal   PHYSICAL THERAPY TREATMENT  Patient: Maria Victoria Baltazar (1 y.o. male)  Date: 7/21/2022  Diagnosis: Sagittal plane imbalance [M43.8X9]  Spondylolisthesis of lumbar region [M43.16]  Lumbar radiculitis [M54.16]  Diabetic peripheral neuropathy (HCC) [E11.42]  Progressive focal motor weakness [R53.1]  Spondylolisthesis at L4-L5 level [M43.16]  Spondylolisthesis at L3-L4 level [M43.16]  Spondylolisthesis at L2-L3 level [M43.16]  Postlaminectomy kyphosis [M96.3]  Neurogenic claudication due to lumbar spinal stenosis [M48.062]  Left leg weakness [R29.898] <principal problem not specified>  Procedure(s) (LRB):  T10-S2 ILIAC FIXATION, L1, L2, L3, L4 OSTEOTOMIES, L5 LAMINECTOMY, T10-S2 FUSION, OPEN SACROILIAC JOINT FUSION (O-ARM) (N/A) 2 Days Post-Op  Precautions: Back (brace when up, no BLT, sitting <30-45 min, log roll to sit)  Chart, physical therapy assessment, plan of care and goals were reviewed. ASSESSMENT  Patient continues with skilled PT services and is progressing towards goals. Communicated with nurse cleared for therapy attempted twice today first time not available, hospital staff with the patient from a long time before lunch. Came back after lunch patient supine on bed when received. Reviewed back precautions and verbalized understanding. Rolled on the edge of bed max assist, supine to sit max assist, dangled on the edge of bed max assist. Sitting balance fair need occasional support for balance. BP drop while sitting on the edge of bed complaining of starting to feel dizzy and back pain getting worse. BP supine 134/80; sitting BP 93/48 and sitting after 8 minutes 78/57. Assisted supine on bed aand performed some active range of motion exercise on both LE all planes. Called nurse in the room to assist pulling patient up high on the bed. Place bed to modified chair position. Educate and instructed patient to continue active range of motion exercise on both legs while up on chair or on bed multiple times. Communicated with nurse who agreed to monitor  and try to assist patient on bedside commode later today if vitals remain stable. Current Level of Function Impacting Discharge (mobility/balance): max assist with bed mobility still orthostatic while sitting on the edge of bed. Other factors to consider for discharge: pain          PLAN :  Patient continues to benefit from skilled intervention to address the above impairments. Continue treatment per established plan of care. to address goals.     Recommendation for discharge: (in order for the patient to meet his/her long term goals)  Therapy up to 5 days/week in SNF setting    This discharge recommendation:  Has been made in collaboration with the attending provider and/or case management    IF patient discharges home will need the following DME: patient owns DME required for discharge       SUBJECTIVE:   Patient stated [address].     OBJECTIVE DATA SUMMARY:   Critical Behavior:  Neurologic State: Alert, Appropriate for age  Orientation Level: Appropriate for age, Oriented X4  Cognition: Appropriate decision making, Appropriate safety awareness, Appropriate for age attention/concentration  Safety/Judgement: Awareness of environment  Functional Mobility Training:  Bed Mobility:  Rolling: Maximum assistance  Supine to Sit: Maximum assistance  Sit to Supine: Maximum assistance  Scooting: Maximum assistance        Transfers:                                   Balance:  Sitting: Impaired; With support  Sitting - Static: Fair (occasional)  Sitting - Dynamic: Poor (constant support)  Ambulation/Gait Training:                                           Therapeutic Exercises:   Educate and instructed patient to continue active range of motion exercise on both legs while up on chair or on bed multiple times. Recommend patient to be up on the chair at least 3 times a day every meal times as tolerated. Pain Ratin/10    Activity Tolerance:   Good    After treatment patient left in no apparent distress:   Supine in bed, Heels elevated for pressure relief, Call bell within reach, Bed / chair alarm activated, and Side rails x 3    COMMUNICATION/COLLABORATION:   The patients plan of care was discussed with: Occupational therapist, Registered nurse, and Case management.      Odette Hodge, PT,WCC   Time Calculation: 24 mins

## 2022-07-21 NOTE — PROGRESS NOTES
Spiritual Care Assessment/Progress Note  [address]      NAME: Cheyenne Cantrell      MRN: [id number]  AGE: 76 y.o.  SEX: male  Anglican Affiliation: Congregation   Language: English     7/21/2022     Total Time (in minutes): 40     Spiritual Assessment begun in [facility] through conversation with:         [x]Patient        [] Family    [] Friend(s)        Reason for Consult: Initial/Spiritual assessment, critical care     Spiritual beliefs: (Please include comment if needed)     [x] Identifies with a carla tradition:    Congregation      [x] Supported by a carla community:            [] Claims no spiritual orientation:           [] Seeking spiritual identity:                [] Adheres to an individual form of spirituality:           [] Not able to assess:                           Identified resources for coping:      [x] Prayer                               [] Music                  [] Guided Imagery     [x] Family/friends                 [] Pet visits     [] Devotional reading                         [] Unknown     [] Other:                                               Interventions offered during this visit: (See comments for more details)    Patient Interventions: Affirmation of emotions/emotional suffering, Affirmation of carla, Judaism/blessing, Catharsis/review of pertinent events in supportive environment, Coping skills reviewed/reinforced, Initial/Spiritual assessment, Critical care, Life review/legacy, Normalization of emotional/spiritual concerns, Prayer (assurance of), Prayer (actual), Anglican beliefs/image of God discussed           Plan of Care:     [] Support spiritual and/or cultural needs    [] Support AMD and/or advance care planning process      [] Support grieving process   [] Coordinate Rites and/or Rituals    [] Coordination with community clergy   [] No spiritual needs identified at this time   [] Detailed Plan of Care below (See Comments)  [] Make referral to Music Therapy  [] Make referral to Pet Therapy     [] Make referral to Addiction services  [] Make referral to [facility]  [] Make referral to Spiritual Care Partner  [] No future visits requested        [x] Contact Spiritual Care for further referrals     Comments:   Initial spiritual care assessment with Mr. Ramandeep Nicholas in ICU. Pt is reclined up in bed, awake, alert, watching TV with food tray in front of him. He warmly welcomes  and openly engages in life review. He shared of his surgery and the issues that lead him here. He and his wife have been  61 years. They have no children but great neighbors for support around them, plus, he is active in his home Anabaptist and his  has already been in to visit him. He feels confident about the surgery because he is in no pain. What he is concerned about is his wife. He shared that just before coming in, his wife started experiencing cognitive issues. This is what he wanted prayer for.  listened empathetically with a caring, supportive presence and prayed with him. He expressed much appreciation and welcomed further visits.      Chaplain Dnaica Colón M.Div.  Jessica De La O ([id number])

## 2022-07-22 LAB
ABO + RH BLD: NORMAL
BLD PROD TYP BPU: NORMAL
BLOOD GROUP ANTIBODIES SERPL: NORMAL
BPU ID: NORMAL
CROSSMATCH RESULT,%XM: NORMAL
GLUCOSE BLD STRIP.AUTO-MCNC: 131 MG/DL (ref 65–117)
GLUCOSE BLD STRIP.AUTO-MCNC: 135 MG/DL (ref 65–117)
GLUCOSE BLD STRIP.AUTO-MCNC: 139 MG/DL (ref 65–117)
GLUCOSE BLD STRIP.AUTO-MCNC: 155 MG/DL (ref 65–117)
HCT VFR BLD AUTO: 28.8 % (ref 36.6–50.3)
HGB BLD-MCNC: 9.3 G/DL (ref 12.1–17)
SERVICE CMNT-IMP: ABNORMAL
SPECIMEN EXP DATE BLD: NORMAL
STATUS OF UNIT,%ST: NORMAL
UNIT DIVISION, %UDIV: 0

## 2022-07-22 PROCEDURE — 74011250636 HC RX REV CODE- 250/636: Performed by: NURSE PRACTITIONER

## 2022-07-22 PROCEDURE — 85018 HEMOGLOBIN: CPT

## 2022-07-22 PROCEDURE — 97535 SELF CARE MNGMENT TRAINING: CPT

## 2022-07-22 PROCEDURE — 82962 GLUCOSE BLOOD TEST: CPT

## 2022-07-22 PROCEDURE — 65270000046 HC RM TELEMETRY

## 2022-07-22 PROCEDURE — 97530 THERAPEUTIC ACTIVITIES: CPT

## 2022-07-22 PROCEDURE — 74011000250 HC RX REV CODE- 250: Performed by: NURSE PRACTITIONER

## 2022-07-22 PROCEDURE — 74011250637 HC RX REV CODE- 250/637: Performed by: ORTHOPAEDIC SURGERY

## 2022-07-22 PROCEDURE — 97116 GAIT TRAINING THERAPY: CPT

## 2022-07-22 PROCEDURE — 36415 COLL VENOUS BLD VENIPUNCTURE: CPT

## 2022-07-22 PROCEDURE — 74011250637 HC RX REV CODE- 250/637: Performed by: NURSE PRACTITIONER

## 2022-07-22 RX ADMIN — Medication 10 ML: at 16:36

## 2022-07-22 RX ADMIN — TRAMADOL HYDROCHLORIDE 50 MG: 50 TABLET ORAL at 20:46

## 2022-07-22 RX ADMIN — ACETAMINOPHEN 1000 MG: 500 TABLET ORAL at 00:34

## 2022-07-22 RX ADMIN — Medication 10 ML: at 05:08

## 2022-07-22 RX ADMIN — ENOXAPARIN SODIUM 30 MG: 100 INJECTION SUBCUTANEOUS at 09:07

## 2022-07-22 RX ADMIN — AMLODIPINE BESYLATE 10 MG: 5 TABLET ORAL at 09:10

## 2022-07-22 RX ADMIN — GABAPENTIN 600 MG: 300 CAPSULE ORAL at 20:47

## 2022-07-22 RX ADMIN — ENOXAPARIN SODIUM 30 MG: 100 INJECTION SUBCUTANEOUS at 20:47

## 2022-07-22 RX ADMIN — METFORMIN HYDROCHLORIDE 500 MG: 500 TABLET ORAL at 16:35

## 2022-07-22 RX ADMIN — MAGNESIUM HYDROXIDE 30 ML: 1200 LIQUID ORAL at 20:47

## 2022-07-22 RX ADMIN — Medication 5 ML: at 22:00

## 2022-07-22 RX ADMIN — THERA TABS 1 TABLET: TAB at 09:10

## 2022-07-22 RX ADMIN — ACETAMINOPHEN 1000 MG: 500 TABLET ORAL at 11:55

## 2022-07-22 RX ADMIN — FAMOTIDINE 20 MG: 20 TABLET, FILM COATED ORAL at 18:33

## 2022-07-22 RX ADMIN — Medication 1 TABLET: at 09:11

## 2022-07-22 RX ADMIN — ACETAMINOPHEN 1000 MG: 500 TABLET ORAL at 18:33

## 2022-07-22 RX ADMIN — LISINOPRIL 20 MG: 20 TABLET ORAL at 09:10

## 2022-07-22 RX ADMIN — HYDROCHLOROTHIAZIDE 12.5 MG: 25 TABLET ORAL at 09:10

## 2022-07-22 RX ADMIN — ACETAMINOPHEN 1000 MG: 500 TABLET ORAL at 05:07

## 2022-07-22 RX ADMIN — FAMOTIDINE 20 MG: 20 TABLET, FILM COATED ORAL at 09:10

## 2022-07-22 RX ADMIN — TAMSULOSIN HYDROCHLORIDE 0.8 MG: 0.4 CAPSULE ORAL at 16:35

## 2022-07-22 RX ADMIN — Medication 1 TABLET: at 18:39

## 2022-07-22 RX ADMIN — DOCUSATE SODIUM 50MG AND SENNOSIDES 8.6MG 1 TABLET: 8.6; 5 TABLET, FILM COATED ORAL at 16:37

## 2022-07-22 RX ADMIN — METFORMIN HYDROCHLORIDE 500 MG: 500 TABLET ORAL at 09:10

## 2022-07-22 NOTE — PROGRESS NOTES
Problem: Mobility Impaired (Adult and Pediatric)  Goal: *Acute Goals and Plan of Care (Insert Text)  Description: FUNCTIONAL STATUS PRIOR TO ADMISSION: Patient was modified independent using a rolling walker for functional mobility. HOME SUPPORT PRIOR TO ADMISSION: The patient lived with his wife but did not require assist. Patient reports recent signs of dementia in his wife and expressed concern for her care. Physical Therapy Goals  Initiated 7/20/2022  1. Patient will move from supine to sit and sit to supine  and roll side to side in bed with supervision/set-up within 7 day(s). 2.  Patient will transfer from bed to chair and chair to bed with minimal assistance/contact guard assist using the least restrictive device within 7 day(s). 3.  Patient will perform sit to stand with minimal assistance/contact guard assist within 7 day(s). 4.  Patient will ambulate with minimal assistance/contact guard assist for 100 feet with the least restrictive device within 7 day(s). 5.  Patient will ascend/descend 4 stairs with 1 handrail(s) with modified independence within 7 day(s). 6. Patient will verbalize and demonstrate understanding of spinal precautions (No bending, lifting greater than 5 lbs, or twisting; log-roll technique; frequent repositioning as instructed) within 4 days.         Outcome: Progressing Towards Goal   PHYSICAL THERAPY TREATMENT  Patient: Caden Alvarez (89 y.o. male)  Date: 7/22/2022  Diagnosis: Sagittal plane imbalance [M43.8X9]  Spondylolisthesis of lumbar region [M43.16]  Lumbar radiculitis [M54.16]  Diabetic peripheral neuropathy (HCC) [E11.42]  Progressive focal motor weakness [R53.1]  Spondylolisthesis at L4-L5 level [M43.16]  Spondylolisthesis at L3-L4 level [M43.16]  Spondylolisthesis at L2-L3 level [M43.16]  Postlaminectomy kyphosis [M96.3]  Neurogenic claudication due to lumbar spinal stenosis [M48.062]  Left leg weakness [R29.898] <principal problem not specified>  Procedure(s) (LRB):  T10-S2 ILIAC FIXATION, L1, L2, L3, L4 OSTEOTOMIES, L5 LAMINECTOMY, T10-S2 FUSION, OPEN SACROILIAC JOINT FUSION (O-ARM) (N/A) 3 Days Post-Op  Precautions: Back (brace when up, no BLT, sitting <30-45 min, log roll to sit)  Chart, physical therapy assessment, plan of care and goals were reviewed. ASSESSMENT  Patient continues with skilled PT services and is progressing towards goals. Communicated with nurse cleared for therapy patient supine on bed  when received. Fitted with TLSO per order, reviewed back precaution and donning of back brace verbalized understanding. Rolled on the edge of bed mod assist x 2, supine to sit min assist x 2, sit to stand min assist x 2, ambulate towards the recliner min assist x 2. OOB to chair as tolerated. Performed some active range of motion exercise on both LE all planes. Vitals remains stable during therapy. OOB to chair as tolerated Educate and instructed patient to continue active range of motion exercise on both legs while up on chair or on bed multiple times. Recommend patient to be up on the chair at least 3 times a day every meal times as tolerated. Communicated with nurse who agreed to monitor patient. Current Level of Function Impacting Discharge (mobility/balance): min assist x 2 with transfers and ambulation using a rolling walker. Other factors to consider for discharge: fall         PLAN :  Patient continues to benefit from skilled intervention to address the above impairments. Continue treatment per established plan of care. to address goals. Recommendation for discharge: (in order for the patient to meet his/her long term goals)  Therapy up to 5 days/week in SNF setting    This discharge recommendation:  Has been made in collaboration with the attending provider and/or case management    IF patient discharges home will need the following DME: to be determined (TBD)       SUBJECTIVE:   Patient stated ok.     OBJECTIVE DATA SUMMARY:   Critical Behavior:  Neurologic State: Alert  Orientation Level: Oriented X4  Cognition: Appropriate decision making, Appropriate for age attention/concentration, Appropriate safety awareness, Follows commands  Safety/Judgement: Awareness of environment, Fall prevention  Functional Mobility Training:  Bed Mobility:  Rolling: Maximum assistance  Supine to Sit: Moderate assistance;Maximum assistance;Assist x2  Sit to Supine: Moderate assistance;Assist x2  Scooting: Moderate assistance;Assist x2        Transfers:  Sit to Stand: Moderate assistance;Assist x2  Stand to Sit: Minimum assistance; Moderate assistance;Assist x2  Stand Pivot Transfers: Minimum assistance;Assist x2     Bed to Chair: Minimum assistance;Assist x2; Additional time; Adaptive equipment                    Balance:  Sitting: Intact  Sitting - Static: Good (unsupported)  Sitting - Dynamic: Good (unsupported)  Standing: Impaired; With support  Standing - Static: Fair;Constant support  Standing - Dynamic : Fair;Constant support  Ambulation/Gait Training:  Distance (ft): 5 Feet (ft)  Assistive Device: Walker, rolling;Gait belt;Brace/Splint  Ambulation - Level of Assistance: Minimal assistance;Assist x2     Gait Description (WDL): Exceptions to WDL  Gait Abnormalities: Antalgic; Path deviations; Step to gait        Base of Support: Widened     Speed/Katalina: Fluctuations; Slow  Step Length: Right shortened;Left shortened                  Therapeutic Exercises:   Educate and instructed patient to continue active range of motion exercise on both legs while up on chair or on bed multiple times. Recommend patient to be up on the chair at least 3 times a day every meal times as tolerated.        Pain Ratin/10    Activity Tolerance:   Good    After treatment patient left in no apparent distress:   Supine in bed, Heels elevated for pressure relief, Call bell within reach, and Bed / chair alarm activated    COMMUNICATION/COLLABORATION:   The patients plan of care was discussed with: Occupational therapist and Registered nurse. Chilo Evans PT,WCC.    Time Calculation: 23 mins

## 2022-07-22 NOTE — PROGRESS NOTES
Attempted to work with the patient for Physical Therapy, chart reviewed and discussed with nurse patient back to bed earlier and patient became orthostatic during transfer back to bed after eating lunch advised to let patient rest for this afternoon. We will continue to follow up with the patient for therapy and try to see early in the morning tomorrow. Nurse agreed and continue to monitor patient.

## 2022-07-22 NOTE — PROGRESS NOTES
Informed Consent for Blood Component Transfusion Note    I have discussed with the patient the rationale for blood component transfusion; its benefits in treating or preventing fatigue, organ damage, or death; and its risk which includes mild transfusion reactions, rare risk of blood borne infection, or more serious but rare reactions. I have discussed the alternatives to transfusion, including the risk and consequences of not receiving transfusion. The patient had an opportunity to ask questions and had agreed to proceed with transfusion of blood components. Electronically signed by Fritzi Sacks, RN on 7/21/22 at 8:45 PM       Per NP Chencho Baker and patient, physician explained benefits and risk of blood transfusion. Hard copy in chart.

## 2022-07-22 NOTE — PROGRESS NOTES
Problem: Falls - Risk of  Goal: *Absence of Falls  Description: Document Jazlyn Perez Fall Risk and appropriate interventions in the flowsheet. Outcome: Progressing Towards Goal  Note: Fall Risk Interventions:       Mentation Interventions: Adequate sleep, hydration, pain control, Bed/chair exit alarm, Door open when patient unattended, Eyeglasses and hearing aids    Medication Interventions: Assess postural VS orthostatic hypotension    Elimination Interventions: Call light in reach              Problem: Patient Education: Go to Patient Education Activity  Goal: Patient/Family Education  Outcome: Progressing Towards Goal     Problem: Pressure Injury - Risk of  Goal: *Prevention of pressure injury  Description: Document Weston Scale and appropriate interventions in the flowsheet. Outcome: Progressing Towards Goal  Note: Pressure Injury Interventions:  Sensory Interventions: Assess changes in LOC    Moisture Interventions: Internal/External urinary devices    Activity Interventions: Assess need for specialty bed, Pressure redistribution bed/mattress(bed type)    Mobility Interventions: Assess need for specialty bed, Turn and reposition approx.  every two hours(pillow and wedges)    Nutrition Interventions: Document food/fluid/supplement intake, Offer support with meals,snacks and hydration    Friction and Shear Interventions: Transferring/repositioning devices, HOB 30 degrees or less

## 2022-07-22 NOTE — PROGRESS NOTES
Postop day #3 T9-S2 iliac fusion with osteotomies    Patient doing well, he did receive unit of packed red blood cells last night, hemoglobin has significantly improved this morning. Today he reports minimal pain. He reports increased strength in the left lower extremity and complete resolution of numbness. Patient reports he has had a bowel movement. He was max assist for being out of bed. On exam is oriented x4, he has significantly improved motor strength in the left lower extremity today he is able to get 3+ to 4 out of 5 hip flexion, quadriceps dorsiflexion and plantarflexion are normal sensation intact to light touch, right lower extremity is normal.  He has some mild tenderness to palpation around the greater trochanter on the left.     Patient Vitals for the past 24 hrs:   Temp Pulse Resp BP SpO2   07/22/22 0400 97.8 °F (36.6 °C) 70 15 (!) 114/57 95 %   07/22/22 0330 -- 73 16 (!) 131/55 95 %   07/22/22 0300 -- 70 15 (!) 122/52 95 %   07/22/22 0230 -- 68 15 (!) 117/56 97 %   07/22/22 0200 -- 78 18 (!) 126/59 97 %   07/22/22 0130 -- 73 16 (!) 118/52 95 %   07/22/22 0100 -- 77 19 (!) 140/59 97 %   07/22/22 0039 -- 75 19 (!) 128/52 96 %   07/22/22 0035 98.1 °F (36.7 °C) 66 14 (!) 128/52 93 %   07/22/22 0030 -- 76 15 (!) 138/57 95 %   07/22/22 0000 -- 69 15 (!) 126/51 95 %   07/21/22 2145 97.9 °F (36.6 °C) 69 16 (!) 133/55 99 %   07/21/22 2130 97.9 °F (36.6 °C) 71 17 (!) 132/55 98 %   07/21/22 2115 -- 78 18 (!) 125/54 96 %   07/21/22 2108 98.1 °F (36.7 °C) 77 13 (!) 125/54 97 %   07/21/22 2100 -- 79 14 (!) 131/56 98 %   07/21/22 2045 -- 80 18 (!) 132/57 94 %   07/21/22 2030 -- 73 20 127/66 96 %   07/21/22 2015 -- 75 18 (!) 133/58 96 %   07/21/22 2000 -- 78 17 (!) 113/54 93 %   07/21/22 1945 -- 79 17 (!) 125/55 92 %   07/21/22 1930 -- 88 18 (!) 142/57 99 %   07/21/22 1915 -- 80 18 (!) 120/55 95 %   07/21/22 1900 -- 84 21 (!) 128/54 97 %   07/21/22 1845 -- 92 23 (!) 80/60 98 %   07/21/22 1830 -- 92 17 (!) 138/58 100 %   22 1815 -- 92 20 (!) 138/53 98 %   22 1800 -- 90 23 (!) 146/60 97 %   22 1700 -- 81 18 (!) 138/58 95 %   22 1600 -- (!) 102 27 -- 100 %   22 1500 -- 90 18 -- 98 %   22 1400 -- 97 20 (!) 127/54 98 %   22 1300 -- 84 12 (!) 141/47 99 %   22 1200 98.3 °F (36.8 °C) 84 22 (!) 123/49 98 %   22 1100 -- 78 19 (!) 129/52 --   22 0900 -- 78 18 (!) 119/54 95 %   22 0800 98.2 °F (36.8 °C) 87 23 -- 100 %     Recent Results (from the past 12 hour(s))   GLUCOSE, POC    Collection Time: 22  9:57 PM   Result Value Ref Range    Glucose (POC) 135 (H) 65 - 117 mg/dL    Performed by Suad Argueta    HGB & HCT    Collection Time: 22  5:02 AM   Result Value Ref Range    HGB 9.3 (L) 12.1 - 17.0 g/dL    HCT 28.8 (L) 36.6 - 50.3 %     Assessment plan: Postop day #3 T9-S2 iliac fusion    He is neurologically improved, hemoglobin has improved as well to 9.3 after 1 unit. Hemovac is now out. He will continue on Lovenox daily for DVT prophylaxis. Orthostatic hypotension hopefully will be improved after the transfusion. Again we will attempt ambulation at least out of bed to chair today. If he does well with therapy this morning he can transition to [facility] or floor. Continue Ultram for pain control SCDs as well as Lovenox. Given his history of prostatism, will keep his catheter until he can tolerate upright positioning to decrease risks of having to do I&O catheterization. Patient has been accepted to [facility] for rehab, will look towards that over the next several days.     Signed By: Lupillo Alanis MD     2022

## 2022-07-22 NOTE — PROGRESS NOTES
Problem: Self Care Deficits Care Plan (Adult)  Goal: *Acute Goals and Plan of Care (Insert Text)  Description: FUNCTIONAL STATUS PRIOR TO ADMISSION: Patient was modified independent using a rolling walker for functional mobility. HOME SUPPORT PRIOR TO ADMISSION: The patient lived with his wife but did not require assist. Patient reports recent signs of dementia in his wife and expressed concern for her care. Occupational Therapy Goals  Initiated 7/20/2022    1. Patient will perform upper body dressing and bathing with modified independence within 7 days. 2.  Patient will perform lower body dressing and bathing with supervision/set-up using AE PRN within 7 day(s). 3.  Patient will perform toilet transfer with minimal assistance using rolling walker within 7 days. 4.  Patient will perform all aspects of toileting at minimal assistance within 7 days. 5.  Patient will don/doff back brace at supervision/set-up within 7 days. 6.  Patient will verbalize/demonstrate all back precautions during ADL tasks without cues within 7 days.       Outcome: Progressing Towards Goal     OCCUPATIONAL THERAPY TREATMENT  Patient: Ansley Cooney (40 y.o. male)  Date: 7/22/2022  Diagnosis: Sagittal plane imbalance [M43.8X9]  Spondylolisthesis of lumbar region [M43.16]  Lumbar radiculitis [M54.16]  Diabetic peripheral neuropathy (HCC) [E11.42]  Progressive focal motor weakness [R53.1]  Spondylolisthesis at L4-L5 level [M43.16]  Spondylolisthesis at L3-L4 level [M43.16]  Spondylolisthesis at L2-L3 level [M43.16]  Postlaminectomy kyphosis [M96.3]  Neurogenic claudication due to lumbar spinal stenosis [M48.062]  Left leg weakness [R29.898] <principal problem not specified>  Procedure(s) (LRB):  T10-S2 ILIAC FIXATION, L1, L2, L3, L4 OSTEOTOMIES, L5 LAMINECTOMY, T10-S2 FUSION, OPEN SACROILIAC JOINT FUSION (O-ARM) (N/A) 3 Days Post-Op  Precautions: Back (brace when up, no BLT, sitting <30-45 min, log roll to sit)  Chart, occupational therapy assessment, plan of care, and goals were reviewed. ASSESSMENT  Patient continues with skilled OT services and is progressing towards goals. Patient continues to present with hypotension, impaired standing balance, generally decreased strength, and increased pain. Noted improvement in overall tolerance this session, but pt still with symptomatic drop in BP with positional change. MAP ranged as low as 59 but stabilized in low 60s by end of session (discussed with RN). He requires increased time between all activity for recovery and today is able to perform SPT to chair with A x 2 and use of RW. He continues to require increased assistance for all ADLs due to weakness and pt continues to benefit from skilled services. Continue to recommend SNF at discharge. Current Level of Function Impacting Discharge (ADLs): up to max/total A for LB ADLs and TLSO mgmt; up to mod A for UB ADLs    Other factors to consider for discharge: extensive spinal surgery; back precautions; A x 2 for mobility         PLAN :  Patient continues to benefit from skilled intervention to address the above impairments. Continue treatment per established plan of care to address goals. Recommend with staff: OOB to chair for meals for no longer than 30-45 minutes; BSC vs. Bedpan for toileting; Encourage participation in ADL tasks    Recommend next OT session: continue per POC    Recommendation for discharge: (in order for the patient to meet his/her long term goals)  Therapy up to 5 days/week in SNF setting    This discharge recommendation:  Has been made in collaboration with the attending provider and/or case management    IF patient discharges home will need the following DME: TBD       SUBJECTIVE:   Patient stated I feel weak as a kitten.     OBJECTIVE DATA SUMMARY:   Cognitive/Behavioral Status:  Neurologic State: Alert  Orientation Level: Oriented X4  Cognition: Appropriate decision making; Appropriate for age attention/concentration; Appropriate safety awareness; Follows commands  Perception: Appears intact  Perseveration: No perseveration noted  Safety/Judgement: Awareness of environment; Fall prevention    Functional Mobility and Transfers for ADLs:  Bed Mobility:  Rolling: Maximum assistance  Supine to Sit: Moderate assistance;Maximum assistance;Assist x2    Transfers:  Sit to Stand: Moderate assistance;Assist x2  Bed to Chair: Minimum assistance;Assist x2; Additional time; Adaptive equipment    Balance:  Sitting: Intact  Sitting - Static: Good (unsupported)  Sitting - Dynamic: Good (unsupported)  Standing: Impaired; With support  Standing - Static: Fair;Constant support  Standing - Dynamic : Fair;Constant support    ADL Intervention:  Upper Body Dressing Assistance  Orthotics(Brace): Total assistance (dependent) (to manage TLSO)  Hospital Gown: Moderate assistance;Maximum assistance (due to weakness in UEs)  Cues: Physical assistance; Tactile cues provided;Verbal cues provided;Visual cues provided    Lower Body Dressing Assistance  Socks: Total assistance (dependent)  Leg Crossed Method Used: No  Position Performed: Seated edge of bed  Cues: Don;Physical assistance    Cognitive Retraining  Safety/Judgement: Awareness of environment; Fall prevention    The patient recalled and demonstrated 1/3 back precautions. Reviewed all 3 with patient. Patient instructed and indicated understanding the benefits of maintaining activity tolerance, functional mobility, and independence with self-care tasks during acute stay  to ensure safe return home and to baseline. Encouraged patient to increase frequency and duration OOB, not sitting longer than 30 mins without marching/walking with staff, be out of bed for all meals, perform daily ADLs (as approved by RN/MD regarding bathing etc.), and performing functional mobility to/from bathroom.  Patient instruction and indicated understanding on body mechanics, ergonomics and gravitational force on the spine during different body positions to plan activities in prep for return home to complete basic ADLs, instrumental ADLs and back to work safely. Dressing brace: Patient instructed and demonstrated to don/doff Velcro on brace using dominant side, keeping non-dominant side intact. Patient instructed and demonstrated in meantime of being able to stand with back against wall to don/doff brace, to don/doff seated using lap and bed/chair surface to support brace while manipulating. Pain:  Pt reporting moderate pain in back and L hip/thigh area    Activity Tolerance:   Fair, SpO2 stable on RA, requires rest breaks, and signs and symptoms of orthostatic hypotension    After treatment patient left in no apparent distress:   Sitting in chair, Call bell within reach, and Bed / chair alarm activated    COMMUNICATION/COLLABORATION:   The patients plan of care was discussed with: Physical therapist and Registered nurse.      Monica Sullivan OT  Time Calculation: 38 mins

## 2022-07-23 LAB
ANION GAP SERPL CALC-SCNC: 4 MMOL/L (ref 5–15)
BUN SERPL-MCNC: 21 MG/DL (ref 6–20)
BUN/CREAT SERPL: 24 (ref 12–20)
CALCIUM SERPL-MCNC: 8 MG/DL (ref 8.5–10.1)
CHLORIDE SERPL-SCNC: 111 MMOL/L (ref 97–108)
CO2 SERPL-SCNC: 28 MMOL/L (ref 21–32)
CREAT SERPL-MCNC: 0.86 MG/DL (ref 0.7–1.3)
GLUCOSE BLD STRIP.AUTO-MCNC: 105 MG/DL (ref 65–117)
GLUCOSE BLD STRIP.AUTO-MCNC: 110 MG/DL (ref 65–117)
GLUCOSE BLD STRIP.AUTO-MCNC: 110 MG/DL (ref 65–117)
GLUCOSE BLD STRIP.AUTO-MCNC: 160 MG/DL (ref 65–117)
GLUCOSE SERPL-MCNC: 112 MG/DL (ref 65–100)
HCT VFR BLD AUTO: 28.3 % (ref 36.6–50.3)
HGB BLD-MCNC: 9.5 G/DL (ref 12.1–17)
POTASSIUM SERPL-SCNC: 3.8 MMOL/L (ref 3.5–5.1)
SERVICE CMNT-IMP: ABNORMAL
SERVICE CMNT-IMP: NORMAL
SODIUM SERPL-SCNC: 143 MMOL/L (ref 136–145)

## 2022-07-23 PROCEDURE — 80048 BASIC METABOLIC PNL TOTAL CA: CPT

## 2022-07-23 PROCEDURE — 97535 SELF CARE MNGMENT TRAINING: CPT

## 2022-07-23 PROCEDURE — 74011250637 HC RX REV CODE- 250/637: Performed by: NURSE PRACTITIONER

## 2022-07-23 PROCEDURE — 65270000046 HC RM TELEMETRY

## 2022-07-23 PROCEDURE — 85018 HEMOGLOBIN: CPT

## 2022-07-23 PROCEDURE — 97116 GAIT TRAINING THERAPY: CPT

## 2022-07-23 PROCEDURE — 97530 THERAPEUTIC ACTIVITIES: CPT

## 2022-07-23 PROCEDURE — 97110 THERAPEUTIC EXERCISES: CPT

## 2022-07-23 PROCEDURE — 74011000250 HC RX REV CODE- 250: Performed by: NURSE PRACTITIONER

## 2022-07-23 PROCEDURE — 74011250637 HC RX REV CODE- 250/637: Performed by: ORTHOPAEDIC SURGERY

## 2022-07-23 PROCEDURE — 82962 GLUCOSE BLOOD TEST: CPT

## 2022-07-23 PROCEDURE — 99222 1ST HOSP IP/OBS MODERATE 55: CPT | Performed by: FAMILY MEDICINE

## 2022-07-23 PROCEDURE — 74011636637 HC RX REV CODE- 636/637: Performed by: NURSE PRACTITIONER

## 2022-07-23 PROCEDURE — 36415 COLL VENOUS BLD VENIPUNCTURE: CPT

## 2022-07-23 PROCEDURE — 74011250636 HC RX REV CODE- 250/636: Performed by: NURSE PRACTITIONER

## 2022-07-23 RX ORDER — MIDODRINE HYDROCHLORIDE 5 MG/1
5 TABLET ORAL
Status: DISCONTINUED | OUTPATIENT
Start: 2022-07-23 | End: 2022-07-25

## 2022-07-23 RX ORDER — MIDODRINE HYDROCHLORIDE 5 MG/1
2.5 TABLET ORAL
Status: DISCONTINUED | OUTPATIENT
Start: 2022-07-23 | End: 2022-07-23

## 2022-07-23 RX ADMIN — TRAMADOL HYDROCHLORIDE 50 MG: 50 TABLET ORAL at 20:35

## 2022-07-23 RX ADMIN — THERA TABS 1 TABLET: TAB at 08:29

## 2022-07-23 RX ADMIN — HYDROCHLOROTHIAZIDE 12.5 MG: 25 TABLET ORAL at 08:29

## 2022-07-23 RX ADMIN — ENOXAPARIN SODIUM 30 MG: 100 INJECTION SUBCUTANEOUS at 08:28

## 2022-07-23 RX ADMIN — DOCUSATE SODIUM 50MG AND SENNOSIDES 8.6MG 1 TABLET: 8.6; 5 TABLET, FILM COATED ORAL at 17:28

## 2022-07-23 RX ADMIN — Medication 5 ML: at 22:00

## 2022-07-23 RX ADMIN — Medication 10 ML: at 06:00

## 2022-07-23 RX ADMIN — MAGNESIUM HYDROXIDE 30 ML: 1200 LIQUID ORAL at 08:28

## 2022-07-23 RX ADMIN — DOCUSATE SODIUM 50MG AND SENNOSIDES 8.6MG 1 TABLET: 8.6; 5 TABLET, FILM COATED ORAL at 08:29

## 2022-07-23 RX ADMIN — ACETAMINOPHEN 1000 MG: 500 TABLET ORAL at 12:19

## 2022-07-23 RX ADMIN — POLYETHYLENE GLYCOL 3350 17 G: 17 POWDER, FOR SOLUTION ORAL at 08:28

## 2022-07-23 RX ADMIN — FAMOTIDINE 20 MG: 20 TABLET, FILM COATED ORAL at 08:29

## 2022-07-23 RX ADMIN — ENOXAPARIN SODIUM 30 MG: 100 INJECTION SUBCUTANEOUS at 20:35

## 2022-07-23 RX ADMIN — MIDODRINE HYDROCHLORIDE 2.5 MG: 5 TABLET ORAL at 08:29

## 2022-07-23 RX ADMIN — ACETAMINOPHEN 1000 MG: 500 TABLET ORAL at 06:23

## 2022-07-23 RX ADMIN — TAMSULOSIN HYDROCHLORIDE 0.8 MG: 0.4 CAPSULE ORAL at 17:29

## 2022-07-23 RX ADMIN — ACETAMINOPHEN 1000 MG: 500 TABLET ORAL at 17:28

## 2022-07-23 RX ADMIN — METFORMIN HYDROCHLORIDE 500 MG: 500 TABLET ORAL at 17:29

## 2022-07-23 RX ADMIN — METFORMIN HYDROCHLORIDE 500 MG: 500 TABLET ORAL at 08:29

## 2022-07-23 RX ADMIN — MIDODRINE HYDROCHLORIDE 2.5 MG: 5 TABLET ORAL at 12:20

## 2022-07-23 RX ADMIN — Medication 1 TABLET: at 17:28

## 2022-07-23 RX ADMIN — ACETAMINOPHEN 1000 MG: 500 TABLET ORAL at 00:11

## 2022-07-23 RX ADMIN — GABAPENTIN 600 MG: 300 CAPSULE ORAL at 20:35

## 2022-07-23 RX ADMIN — FAMOTIDINE 20 MG: 20 TABLET, FILM COATED ORAL at 17:29

## 2022-07-23 NOTE — PROGRESS NOTES
ORTHOPAEDIC LUMBAR FUSION PROGRESS NOTE    NAME:     Dieudonne Posada   :       1945   MRN:       [id number]   DATE:      2022    POD:              4 Days Post-Op  S/P:              Procedure(s):  T10-S2 ILIAC FIXATION, L1, L2, L3, L4 OSTEOTOMIES, L5 LAMINECTOMY, T10-S2 FUSION, OPEN SACROILIAC JOINT FUSION (O-ARM)    SUBJECTIVE:    Reports improvement in preop lower extremity strength  Postop pain controlled  Tolerating PO intake  Ambulation tolerance: 5 feed, up to chair  Passing flatus   Voiding clear yellow -- choi catheter in place due to mobility issues and not being able to void without standing up  Denies nausea/vomiting, headache, chest pain or shortness of breath  Recent Labs     22  0044   HGB 9.5*   HCT 28.3*      K 3.8   *   CO2 28   BUN 21*   CREA 0.86   *     Patient Vitals for the past 12 hrs:   BP Temp Pulse Resp SpO2   22 0500 (!) 155/70 -- 85 17 98 %   22 0400 130/65 98.2 °F (36.8 °C) 69 16 98 %   22 0300 (!) 133/57 -- 73 15 96 %   22 0200 105/74 -- 77 10 96 %   22 0147 (!) 141/69 -- 74 15 97 %   22 0100 -- -- 78 18 96 %   22 0000 (!) 132/58 97.8 °F (36.6 °C) 76 18 96 %   22 2345 -- -- 76 16 96 %   22 2330 -- -- 78 17 96 %   22 2315 -- -- 69 15 97 %   22 2300 (!) 147/57 -- 81 18 95 %   22 2245 -- -- 76 17 96 %   22 2230 -- -- 72 17 96 %   22 2215 -- -- 73 17 97 %   22 2200 (!) 126/52 -- 77 17 96 %   22 2145 -- -- 78 17 96 %   22 -- -- 80 15 97 %   22 -- -- 75 19 95 %   22 2100 (!) 151/62 -- 79 22 96 %   22 -- -- 81 17 96 %   22 -- -- 83 16 100 %   22 -- -- 83 20 96 %   22 (!) 136/54 98.1 °F (36.7 °C) 81 14 96 %   22 -- -- 87 26 99 %   22 1930 -- -- 86 15 97 %   22 1915 -- -- 86 21 99 %   22 1900 (!) 149/54 -- 84 22 100 %   22 1800 (!) 137/54 -- 70 17 96 % Exam:  Positive strength/ROM bilat lower ext: main deficit is 3+/5 left him flexion/SLR (this is improving)  Neuro intact to sensation  Dressings clean and dry  Hemovac: removed 7/21   Lower extremities warm and well perfused    PLAN: 4 Days Post-Op, improved   Continue PO pain medications as needed  Continue SCD's, frequent ambulation, Lovenox daily  Diet: regular  Continue bowel regimen   Continue lumbar orthosis  Continue Vit D3  Hemoglobin continues to rise  Lisinopril/Amlodipine held in effort to avoid orthostatic hypotension. Midodrine added. Hospitalist consult placed -- appreciate advice regarding orthostatic hypotension and any suggestions to help promote his activity.  PT/OT has been slowed down by symptomatic hypotension during these sessions  He was downgraded to stepdown yesterday  Medical comorbities stable  Discharge planning: [facility] once patient able to tolerate multiple PT/OT sessions without hypotensive symptoms    Helen Trevizo NP

## 2022-07-23 NOTE — PROGRESS NOTES
0700- Bedside and Verbal shift change report given to Carli (oncoming nurse) by Tomer Dc (offgoing nurse). Report included the following information ED Summary, Procedure Summary, Accordion, Recent Results, Med Rec Status, Cardiac Rhythm sinus rhythm, and Alarm Parameters . 0800- Shift assessment complete.  No c/o of pain at this time

## 2022-07-23 NOTE — CONSULTS
[address] [place] [address] with [facility] and [employer]  [address] [place] [address] Road [address] [place] [address]   Office [phone], Fax [phone]    Family Medicine Consult    Patient:  Lydia Randall  MRN:  [id number]  YOB: 1945  Age:  76 y.o. Primary care provider:  Sofia Taylor NP    Date of admission:  7/19/2022    Date of consultation:  7/23/2022    Requesting physician: Dr. Arlene Arnold                   History of present illness  Lydia Randall is a 76 y.o. male POD 4 of T10-S2 Iliac fixation, L1, L2, L3, L4 Osteotomies, L5 Laminectomy, T10-S2 fusion, open sacroiliac joint. Pt was tolerated the procedure well with minimal blood loss. SFFM was consulted due to orthostatic hypotension. He became orthostatic while working with Physical therapy. Per patient, he has a history of his blood pressure dropping low after surgery. The patient denies any fever, chills, chest pain, sob, n/v/d, cough, congestion, recent illness, palpitations, or dysuria. Pain well controlled. Tolerating diet.         Past Medical History:   Diagnosis Date    Atherosclerosis 02/11/2020    COVID-19 vaccine series completed     Diabetes ([facility] [address]     Fatty liver determined by biopsy 08/2019    Frequent urination at night     History of stress test 06/27/2022    Normal    Hypertension     Insomnia     Kidney stone 2019 & 2021    Obesity, Class I, BMI 30-34.9       Past Surgical History:   Procedure Laterality Date    HX COLONOSCOPY      [facility]    HX KNEE REPLACEMENT Bilateral 2000 & 2013    HX KNEE REPLACEMENT Right 2017 & 11/8/2021    Revision    HX KNEE REPLACEMENT Left 2020    Revision    HX LITHOTRIPSY  2019    IR BX LIVER PERCUTANEOUS  2019    Normal     Family History   Problem Relation Age of Onset    Cancer Mother     Breast Cancer Mother     Colon Cancer Mother     Stroke Father     Prostate Cancer Brother Deep Vein Thrombosis Neg Hx     Anesth Problems Neg Hx       Social History     Tobacco Use    Smoking status: Never    Smokeless tobacco: Never   Substance Use Topics    Alcohol use: Yes     Comment: rare       Prior to Admission Medications   Prescriptions Last Dose Informant Patient Reported? Taking? amLODIPine (NORVASC) 10 mg tablet 7/19/2022 at 0300  No Yes   Sig: Take 1 Tablet by mouth daily. OFFICE VISIT REQUIRED PRIOR TO ADDITIONAL REFILLS   aspirin delayed-release 81 mg tablet 7/11/2022 at 0640  Yes Yes   Sig: Take 81 mg by mouth every evening. folic acid/multivit-min/lutein (CENTRUM SILVER PO) 7/11/2022 at 0900  Yes No   Sig: Take 1 Tablet by mouth daily. gabapentin (NEURONTIN) 300 mg capsule 7/18/2022 at 0750  Yes No   Sig: Take 600 mg by mouth nightly. hydroCHLOROthiazide (HYDRODIURIL) 12.5 mg tablet 7/18/2022 at 0750  No No   Sig: Take 1 Tablet by mouth daily. lisinopriL (PRINIVIL, ZESTRIL) 20 mg tablet 7/17/2022 at 0750  Yes No   Sig: Take 20 mg by mouth two (2) times a day. metFORMIN (GLUCOPHAGE) 1,000 mg tablet 7/18/2022  Yes No   Sig: Take 500 mg by mouth two (2) times daily (with meals). simvastatin (ZOCOR) 40 mg tablet 7/18/2022 at 0840  No No   Sig: Take 1 Tablet by mouth every evening. tamsulosin (FLOMAX) 0.4 mg capsule 7/18/2022 at 0640  No No   Sig: Take 2 Capsules by mouth daily (with dinner). vit C/E/Zn/coppr/lutein/zeaxan (PRESERVISION AREDS-2 PO) 7/11/2022 at 0640  Yes No   Sig: Take 1 Capsule by mouth two (2) times a day.       Facility-Administered Medications: None       Current Facility-Administered Medications   Medication Dose Route Frequency Provider Last Rate Last Admin    midodrine (PROAMATINE) tablet 2.5 mg  2.5 mg Oral TID WITH MEALS Cyndi Munoz NP   2.5 mg at 07/23/22 1220    0.9% sodium chloride infusion 250 mL  250 mL IntraVENous PRN Cyndi Munoz NP        enoxaparin (LOVENOX) injection 30 mg  30 mg SubCUTAneous Q12H Cyndi Munoz NP   30 mg at 07/23/22 5297    [Held by provider] amLODIPine (NORVASC) tablet 10 mg  10 mg Oral DAILY Radha Kaur NP   10 mg at 07/22/22 0910    gabapentin (NEURONTIN) capsule 600 mg  600 mg Oral QHS Charlenecie Vincent, NP   600 mg at 07/22/22 2047    [Held by provider] hydroCHLOROthiazide (HYDRODIURIL) tablet 12.5 mg  12.5 mg Oral DAILY Chare Vincent NP   12.5 mg at 07/23/22 8354    [Held by provider] lisinopriL (PRINIVIL, ZESTRIL) tablet 20 mg  20 mg Oral BID Radha Kaur NP   20 mg at 07/22/22 0910    metFORMIN (GLUCOPHAGE) tablet 500 mg  500 mg Oral BID WITH MEALS Radha Kaur NP   500 mg at 07/23/22 1606    tamsulosin (FLOMAX) capsule 0.8 mg  0.8 mg Oral DAILY WITH Stephanie AlstromSara, NP   0.8 mg at 07/22/22 1635    sodium chloride (NS) flush 5-40 mL  5-40 mL IntraVENous Q8H Sara Muniz, NP   10 mL at 07/23/22 0600    sodium chloride (NS) flush 5-40 mL  5-40 mL IntraVENous PRN Radha Kaur NP        naloxone [facility]) injection 0.4 mg  0.4 mg IntraVENous PRN Charlenecizayra Kaur NP        senna-docusate (PERICOLACE) 8.6-50 mg per tablet 1 Tablet  1 Tablet Oral BID Radha Kaur NP   1 Tablet at 07/23/22 0829    polyethylene glycol (MIRALAX) packet 17 g  17 g Oral DAILY Charlenecie Vincent NP   17 g at 07/23/22 8646    bisacodyL (DULCOLAX) suppository 10 mg  10 mg Rectal DAILY PRN Radha Kaur NP        acetaminophen (TYLENOL) tablet 1,000 mg  1,000 mg Oral Q6H Sara Muniz, NP   1,000 mg at 07/23/22 1219    traMADoL (ULTRAM) tablet 50 mg  50 mg Oral Q6H PRN Chare Vincent NP   50 mg at 07/22/22 2046    oxyCODONE IR (ROXICODONE) tablet 5 mg  5 mg Oral Q3H PRN Dulcie Rocher, NP        oxyCODONE IR (ROXICODONE) tablet 10 mg  10 mg Oral Q3H PRN Dulcie Rocher, NP        famotidine (PEPCID) tablet 20 mg  20 mg Oral BID Dulcie Rocher, NP   20 mg at 07/23/22 0829    phenol throat spray (CHLORASEPTIC)   Oral PRN Dulcie Rocher, NP        cyclobenzaprine (FLEXERIL) tablet 10 mg  10 mg Oral BID PRN Dulcie Rocher, NP        insulin lispro (HUMALOG) injection   SubCUTAneous AC&HS Eboni Hard, NP   2 Units at 07/20/22 1225    glucose chewable tablet 16 g  4 Tablet Oral PRN Eboni Hard, NP        glucagon (GLUCAGEN) injection 1 mg  1 mg IntraMUSCular PRN Eboni Hard, NP        magnesium hydroxide (MILK OF MAGNESIA) 400 mg/5 mL oral suspension 30 mL  30 mL Oral Q12H Eboni Hard, NP   30 mL at 07/23/22 9109    therapeutic multivitamin (THERAGRAN) tablet 1 Tablet  1 Tablet Oral DAILY Inna Whitehead MD   1 Tablet at 07/23/22 0532    vitamin T-N-G-lutein-minerals (OCUVITE) tablet 1 Tablet  1 Tablet Oral BID Inna Whitehead MD   1 Tablet at 07/22/22 1839        No Known Allergies      Immunization History   Administered Date(s) Administered    COVID-19, PFIZER PURPLE top, DILUTE for use, (age 15 y+), IM, 30mcg/0.3mL 03/08/2021, 03/29/2021, 10/05/2021    Influenza High Dose Vaccine PF 10/21/2019    Influenza Vaccine 09/21/2020    Influenza, High-dose, Quadrivalent (>65 Yrs Fluzone High Dose Quad 50501) 09/23/2021         Review of systems  A comprehensive review of systems was negative except for that written in the History of Present Illness. The remainder of the review of systems was reviewed and is non-contributory. Physical Examination   Visit Vitals  BP (!) 118/58 (BP 1 Location: Right upper arm, BP Patient Position: Sitting) Comment (BP Patient Position): after walking   Pulse 91   Temp 98.2 °F (36.8 °C)   Resp 17   Wt 248 lb (112.5 kg)   SpO2 99%   BMI 34.59 kg/m²       Intake and Output:    No intake/output data recorded.   07/21 1901 - 07/23 0700  In: 439.2   Out: 2600 [Urine:2600]    Visit Vitals  BP (!) 118/58 (BP 1 Location: Right upper arm, BP Patient Position: Sitting) Comment (BP Patient Position): after walking   Pulse 91   Temp 98.2 °F (36.8 °C)   Resp 17   Wt 248 lb (112.5 kg)   SpO2 99%   BMI 34.59 kg/m²       Physical Examination:  General: No acute distress  Head: Normocephalic, atraumatic  Eyes: Conjunctiva pink  Neck: Supple  ENT: No rhinorrhea  CV: Heart: regular rate  Resp: Lungs: clear to auscultation bilaterally  GI: + bowel sounds, non-tender to palpation, non-distended. Back: No CVA tenderness  Extremities: No lower extremity edema  Skin: Warm, dry  Neuro: Awake, alert, and oriented.       Data Review:   Recent Results (from the past 24 hour(s))   GLUCOSE, POC    Collection Time: 07/22/22  4:35 PM   Result Value Ref Range    Glucose (POC) 131 (H) 65 - 117 mg/dL    Performed by [employer], POC    Collection Time: 07/22/22  8:46 PM   Result Value Ref Range    Glucose (POC) 139 (H) 65 - 117 mg/dL    Performed by [employer]    HGB & HCT    Collection Time: 07/23/22 12:44 AM   Result Value Ref Range    HGB 9.5 (L) 12.1 - 17.0 g/dL    HCT 28.3 (L) 36.6 - 22.2 %   METABOLIC PANEL, BASIC    Collection Time: 07/23/22 12:44 AM   Result Value Ref Range    Sodium 143 136 - 145 mmol/L    Potassium 3.8 3.5 - 5.1 mmol/L    Chloride 111 (H) 97 - 108 mmol/L    CO2 28 21 - 32 mmol/L    Anion gap 4 (L) 5 - 15 mmol/L    Glucose 112 (H) 65 - 100 mg/dL    BUN 21 (H) 6 - 20 MG/DL    Creatinine 0.86 0.70 - 1.30 MG/DL    BUN/Creatinine ratio 24 (H) 12 - 20      GFR est AA >60 >60 ml/min/1.73m2    GFR est non-AA >60 >60 ml/min/1.73m2    Calcium 8.0 (L) 8.5 - 10.1 MG/DL   GLUCOSE, POC    Collection Time: 07/23/22  7:37 AM   Result Value Ref Range    Glucose (POC) 110 65 - 117 mg/dL    Performed by Laura Cedillo    GLUCOSE, POC    Collection Time: 07/23/22 11:56 AM   Result Value Ref Range    Glucose (POC) 160 (H) 65 - 117 mg/dL    Performed by Haydee Ahn          24 Hour Results:  Recent Results (from the past 24 hour(s))   GLUCOSE, POC    Collection Time: 07/22/22  4:35 PM   Result Value Ref Range    Glucose (POC) 131 (H) 65 - 117 mg/dL    Performed by [employer], POC    Collection Time: 07/22/22  8:46 PM   Result Value Ref Range    Glucose (POC) 139 (H) 65 - 117 mg/dL    Performed by [employer]    HGB & HCT    Collection Time: 07/23/22 12:44 AM   Result Value Ref Range    HGB 9.5 (L) 12.1 - 17.0 g/dL    HCT 28.3 (L) 36.6 - 71.3 %   METABOLIC PANEL, BASIC    Collection Time: 07/23/22 12:44 AM   Result Value Ref Range    Sodium 143 136 - 145 mmol/L    Potassium 3.8 3.5 - 5.1 mmol/L    Chloride 111 (H) 97 - 108 mmol/L    CO2 28 21 - 32 mmol/L    Anion gap 4 (L) 5 - 15 mmol/L    Glucose 112 (H) 65 - 100 mg/dL    BUN 21 (H) 6 - 20 MG/DL    Creatinine 0.86 0.70 - 1.30 MG/DL    BUN/Creatinine ratio 24 (H) 12 - 20      GFR est AA >60 >60 ml/min/1.73m2    GFR est non-AA >60 >60 ml/min/1.73m2    Calcium 8.0 (L) 8.5 - 10.1 MG/DL   GLUCOSE, POC    Collection Time: 07/23/22  7:37 AM   Result Value Ref Range    Glucose (POC) 110 65 - 117 mg/dL    Performed by [id number] [place], POC    Collection Time: 07/23/22 11:56 AM   Result Value Ref Range    Glucose (POC) 160 (H) 65 - 117 mg/dL    Performed by Tien Daugherty      Recent Labs     07/23/22  0044 07/22/22  0502 07/21/22  0450 07/20/22  1511   HGB 9.5* 9.3* 7.9* 9.3*   HCT 28.3* 28.8*  --  28.6*     Recent Labs     07/23/22  0044      K 3.8   *   CO2 28   *   BUN 21*   CREA 0.86   CA 8.0*         Impression/Recomendations     Karlie Goodrich is a 76 y.o. male who is POD 4 of T10-S2 Iliac fixation, L1, L2, L3, L4 Osteotomies, L5 Laminectomy, T10-S2 fusion, open sacroiliac joint. The [address] [place] [address] team was consulted for medical management. POD 4 of T10-S2 Iliac fixation, L1, L2, L3, L4 Osteotomies, L5 Laminectomy, T10-S2 fusion, open sacroiliac joint.  - Management per Ortho    Orthostatic hypotension: Patient has history of HTN. Orthostatic when walking with Physical therapy. Patient was started on Midodrine 2.5 mg TID. Pt is negative for chest pain, shortness of breath, nausea, vomiting. No difficulty urinating. Hgb is stable.   - Continue to hold Norvasc 10 mg daily   - Continue to hold Lisinopril 20 mg twice a day  - Hold HCTZ 12.5 mg  - Compression socks ordered. - Keep legs elevated at night.   - Can increase midodrine to 5 mg TID. Type II DM  Lab Results   Component Value Date/Time    Hemoglobin A1c 6.4 (H) 07/05/2022 02:48 PM     - Re-started on Metformin 500 mg BID.   - Monitor on daily BMP       Pain Management: Per Ortho    DVT Prophylaxis: Per Ortho    Disposition: Per Ortho      Thank you very much for allowing us to participate in the care of this pleasant patient. The family medicine service will continue to follow the patient's medical progress along with you. Please do not hesitate to page us at [phone] with any questions or concerns.       Signed by:     Boo Wiggins MD   Family Medicine Resident    July 23, 2022 at 8:03 AM

## 2022-07-23 NOTE — PROGRESS NOTES
Problem: Mobility Impaired (Adult and Pediatric)  Goal: *Acute Goals and Plan of Care (Insert Text)  Description: FUNCTIONAL STATUS PRIOR TO ADMISSION: Patient was modified independent using a rolling walker for functional mobility. HOME SUPPORT PRIOR TO ADMISSION: The patient lived with his wife but did not require assist. Patient reports recent signs of dementia in his wife and expressed concern for her care. Physical Therapy Goals  Initiated 7/20/2022  1. Patient will move from supine to sit and sit to supine  and roll side to side in bed with supervision/set-up within 7 day(s). 2.  Patient will transfer from bed to chair and chair to bed with minimal assistance/contact guard assist using the least restrictive device within 7 day(s). 3.  Patient will perform sit to stand with minimal assistance/contact guard assist within 7 day(s). 4.  Patient will ambulate with minimal assistance/contact guard assist for 100 feet with the least restrictive device within 7 day(s). 5.  Patient will ascend/descend 4 stairs with 1 handrail(s) with modified independence within 7 day(s). 6. Patient will verbalize and demonstrate understanding of spinal precautions (No bending, lifting greater than 5 lbs, or twisting; log-roll technique; frequent repositioning as instructed) within 4 days.         7/23/2022 1419 by Wilfred Rice PT  Outcome: Progressing Towards Goal   PHYSICAL THERAPY TREATMENT  Patient: Jonathon Quiroz (68 y.o. male)  Date: 7/23/2022  Diagnosis: Sagittal plane imbalance [M43.8X9]  Spondylolisthesis of lumbar region [M43.16]  Lumbar radiculitis [M54.16]  Diabetic peripheral neuropathy (HCC) [E11.42]  Progressive focal motor weakness [R53.1]  Spondylolisthesis at L4-L5 level [M43.16]  Spondylolisthesis at L3-L4 level [M43.16]  Spondylolisthesis at L2-L3 level [M43.16]  Postlaminectomy kyphosis [M96.3]  Neurogenic claudication due to lumbar spinal stenosis [M48.062]  Left leg weakness [R29.898] <principal problem not specified>  Procedure(s) (LRB):  T10-S2 ILIAC FIXATION, L1, L2, L3, L4 OSTEOTOMIES, L5 LAMINECTOMY, T10-S2 FUSION, OPEN SACROILIAC JOINT FUSION (O-ARM) (N/A) 4 Days Post-Op  Precautions: Back (brace when up, no BLT, sitting <30-45 min, log roll to sit) No bending, no lifting greater than 5 lbs, no twisting, log-roll technique, repositioning every 20-30 min except when sleeping, brace when OOB (if ordered)  Chart, physical therapy assessment, plan of care and goals were reviewed. ASSESSMENT  Patient continues with skilled PT services and is progressing towards goals. Donned SARAH stocking in supine with Total A and brace in sitting with Max A/ Pt continues with orthostatic hypotension though not as severe and able to tolerate increased mobility this afternoon. Pt's largest complaint is feeling exhausted. Pt requires Mod A x 2 for bed mobility 2/2 weakness and back pain. Stands with Min A x 2 and performs gait training from bed to doorway and back. Assessed BP prior to proceeding to the hallway after short gait distance. BP remained stable therefore allowed pt to ambulate out into hallway with close chair follow for safety. Pt tolerates mobility well and only requires minimal verbal cueing to maintain a safe distance from RW and physical assistance with navigating RW for turning. Pt requires frequent reassurance that he is doing well. Sat EOB and then requests one additional walk to doorway prior to returning to supine. Excellent progress this afternoon. Will attempt sitting up in chair tomorrow.       Started 151/76 in supine  Vitals:    07/23/22 1353 07/23/22 1400 07/23/22 1403 07/23/22 1404   BP: 122/81 109/72 108/78 (!) 118/58   BP 1 Location: Right upper arm  Right upper arm Right upper arm   BP Patient Position: Sitting  Standing Sitting  Comment: after walking   Pulse:       Temp:       Resp:       Weight:       SpO2:                Current Level of Function Impacting Discharge (mobility/balance): remains orthostatic, back pain, A x 2 for bed mobility    Other factors to consider for discharge: orthostatic hypotension, awaiting rehab         PLAN :  Patient continues to benefit from skilled intervention to address the above impairments. Continue treatment per established plan of care. to address goals. Recommendation for discharge: (in order for the patient to meet his/her long term goals)  Therapy 3 hours per day 5-7 days per week    This discharge recommendation:  Has been made in collaboration with the attending provider and/or case management    IF patient discharges home will need the following DME: to be determined (TBD)       SUBJECTIVE:   Patient stated Am I doing okay? [employer] Leader    OBJECTIVE DATA SUMMARY:   Critical Behavior:  Neurologic State: Alert  Orientation Level: Oriented X4  Cognition: Follows commands  Safety/Judgement: Awareness of environment, Fall prevention    Spinal diagnosis intervention:  The patient stated 2/3 back precautions when prompted. Reviewed all 3 back precautions, log roll technique, and sitting for 30 minutes at a time. The patient required verbal cues to maintain back precautions during functional activity. Reviewed back brace application and wear schedule. Brace donned with maximal assistance sitting EOB    Functional Mobility Training:    Bed Mobility:  Log Rolling: Moderate assistance  Supine to Sit: Moderate assistance;Assist x2  Sit to Supine:  Moderate assistance;Assist x1  Scooting: Minimum assistance        Transfers:  Sit to Stand: Minimum assistance;Assist x2  Stand to Sit: Minimum assistance        Bed to Chair: Minimum assistance;Assist x2                    Balance:  Sitting: Intact  Sitting - Static: Good (unsupported)  Sitting - Dynamic: Good (unsupported)  Standing: Impaired  Standing - Static: Good;Constant support  Standing - Dynamic : Fair  Ambulation/Gait Training:  Distance (ft): 40 Feet (ft) (x 2, plus additional 12ft x 2)  Assistive Device: Walker, rolling;Gait belt;Brace/Splint  Ambulation - Level of Assistance: Minimal assistance (2nd assist with chair follow)        Gait Abnormalities: Decreased step clearance;Trunk sway increased        Base of Support: Widened     Speed/Katalina: Accelerated  Step Length: Right shortened;Left shortened                  Activity Tolerance:   Good    After treatment patient left in no apparent distress:   Supine in bed, Call bell within reach, and Side rails x 3    COMMUNICATION/COLLABORATION:   The patients plan of care was discussed with: Registered nurse.      Jessica Valenzuela, PT   Time Calculation: 28 mins

## 2022-07-23 NOTE — PROGRESS NOTES
Problem: Mobility Impaired (Adult and Pediatric)  Goal: *Acute Goals and Plan of Care (Insert Text)  Description: FUNCTIONAL STATUS PRIOR TO ADMISSION: Patient was modified independent using a rolling walker for functional mobility. HOME SUPPORT PRIOR TO ADMISSION: The patient lived with his wife but did not require assist. Patient reports recent signs of dementia in his wife and expressed concern for her care. Physical Therapy Goals  Initiated 7/20/2022  1. Patient will move from supine to sit and sit to supine  and roll side to side in bed with supervision/set-up within 7 day(s). 2.  Patient will transfer from bed to chair and chair to bed with minimal assistance/contact guard assist using the least restrictive device within 7 day(s). 3.  Patient will perform sit to stand with minimal assistance/contact guard assist within 7 day(s). 4.  Patient will ambulate with minimal assistance/contact guard assist for 100 feet with the least restrictive device within 7 day(s). 5.  Patient will ascend/descend 4 stairs with 1 handrail(s) with modified independence within 7 day(s). 6. Patient will verbalize and demonstrate understanding of spinal precautions (No bending, lifting greater than 5 lbs, or twisting; log-roll technique; frequent repositioning as instructed) within 4 days.         Outcome: Progressing Towards Goal   PHYSICAL THERAPY TREATMENT  Patient: Brie Kruger (74 y.o. male)  Date: 7/23/2022  Diagnosis: Sagittal plane imbalance [M43.8X9]  Spondylolisthesis of lumbar region [M43.16]  Lumbar radiculitis [M54.16]  Diabetic peripheral neuropathy (HCC) [E11.42]  Progressive focal motor weakness [R53.1]  Spondylolisthesis at L4-L5 level [M43.16]  Spondylolisthesis at L3-L4 level [M43.16]  Spondylolisthesis at L2-L3 level [M43.16]  Postlaminectomy kyphosis [M96.3]  Neurogenic claudication due to lumbar spinal stenosis [M48.062]  Left leg weakness [R29.898] <principal problem not specified>  Procedure(s) (LRB):  T10-S2 ILIAC FIXATION, L1, L2, L3, L4 OSTEOTOMIES, L5 LAMINECTOMY, T10-S2 FUSION, OPEN SACROILIAC JOINT FUSION (O-ARM) (N/A) 4 Days Post-Op  Precautions: Back (brace when up, no BLT, sitting <30-45 min, log roll to sit) No bending, no lifting greater than 5 lbs, no twisting, log-roll technique, repositioning every 20-30 min except when sleeping, brace when OOB (if ordered)  Chart, physical therapy assessment, plan of care and goals were reviewed. ASSESSMENT  Patient continues with skilled PT services and is progressing towards goals. Pt remains limited by orthostatic hypotension and back pain. See flow sheet for BP trends. Pt with several episodes of BP drops with standing attempts despite slow to rise strategies, seated and standing exercises and slow progression of mobility. Despite low BPs pt eager to mobilize. Ambulated to recliner chair with Min A x 2 and BP slowly recovers once sitting and LEs elevated. Pt reporting increased back pain and unable to tolerate sitting in chair despite several attempts to reposition. Agreeable to return to bed after stop on BSC. While seated on BSC pt's BP dropping to 76/37(lowest reading of session) which rises with LE ankle pumps and LAQ. Assisted back to supine for safety. May benefit from trial of SARAH stockings vs JOBSTs to combat orthostatic drops. See prior BP readings in flow sheet  Vitals:    07/23/22 1102 07/23/22 1107 07/23/22 1112 07/23/22 1200   BP: (!) 94/51 (!) 76/37 (!) 104/54    BP 1 Location: Right upper arm Right upper arm Right upper arm    BP Patient Position: Sitting Sitting  Comment: after trasnfer to [facility] Supine    Pulse:  (!) 116 100    Temp:    98.2 °F (36.8 °C)   Resp:   26    Weight:       SpO2:   99%          Current Level of Function Impacting Discharge (mobility/balance):  Mod A x 2 bed mobility, Min A x 2 upright mobility    Other factors to consider for discharge: orthostatic hypotension, back pain, difficultly following directions to don brace         PLAN :  Patient continues to benefit from skilled intervention to address the above impairments. Continue treatment per established plan of care. to address goals. Recommendation for discharge: (in order for the patient to meet his/her long term goals)  Therapy 3 hours per day 5-7 days per week    This discharge recommendation:  Has been made in collaboration with the attending provider and/or case management    IF patient discharges home will need the following DME: patient owns DME required for discharge       SUBJECTIVE:   Patient stated I'm sorry I'm not doing any better.     OBJECTIVE DATA SUMMARY:   Critical Behavior:  Neurologic State: Alert  Orientation Level: Oriented X4  Cognition: Follows commands  Safety/Judgement: Awareness of environment, Fall prevention    Spinal diagnosis intervention:  The patient stated 2/3 back precautions when prompted. Reviewed all 3 back precautions, log roll technique, and sitting for 30 minutes at a time. The patient required verbal cues to maintain back precautions during functional activity. Reviewed back brace application and wear schedule. Brace donned with maximal assistance      Functional Mobility Training:    Bed Mobility:  Log Rolling: Maximum assistance  Supine to Sit: Moderate assistance;Assist x1  Sit to Supine: Moderate assistance;Assist x2  Scooting: Moderate assistance        Transfers:  Sit to Stand: Minimum assistance;Assist x2  Stand to Sit: Minimum assistance;Assist x2        Bed to Chair: Minimum assistance;Assist x2                    Balance:  Sitting: Intact  Sitting - Static: Good (unsupported)  Sitting - Dynamic: Good (unsupported)  Standing: Impaired; With support  Standing - Static: Fair;Constant support  Standing - Dynamic : Fair;Constant support  Ambulation/Gait Training:  Distance (ft): 8 Feet (ft) (x2)  Assistive Device: Walker, rolling;Gait belt;Brace/Splint  Ambulation - Level of Assistance: Minimal assistance;Assist x2        Gait Abnormalities: Antalgic;Decreased step clearance        Base of Support: Widened     Speed/Katalina: Accelerated; Fluctuations  Step Length: Right shortened;Left shortened         Activity Tolerance:   Fair and signs and symptoms of orthostatic hypotension    After treatment patient left in no apparent distress:   Supine in bed and Call bell within reach    COMMUNICATION/COLLABORATION:   The patients plan of care was discussed with: Registered nurse.      Julio Malhotra, PT   Time Calculation: 40 mins

## 2022-07-23 NOTE — PROGRESS NOTES
Problem: Falls - Risk of  Goal: *Absence of Falls  Description: Document Pablo Correia Fall Risk and appropriate interventions in the flowsheet. Outcome: Progressing Towards Goal  Note: Fall Risk Interventions:  Mobility Interventions: Bed/chair exit alarm, Patient to call before getting OOB, PT Consult for mobility concerns    Mentation Interventions: Adequate sleep, hydration, pain control, Bed/chair exit alarm    Medication Interventions: Patient to call before getting OOB, Teach patient to arise slowly, Assess postural VS orthostatic hypotension    Elimination Interventions: Bed/chair exit alarm, Call light in reach    History of Falls Interventions: Bed/chair exit alarm         Problem: Patient Education: Go to Patient Education Activity  Goal: Patient/Family Education  Outcome: Progressing Towards Goal     Problem: Pressure Injury - Risk of  Goal: *Prevention of pressure injury  Description: Document Weston Scale and appropriate interventions in the flowsheet.   Outcome: Progressing Towards Goal  Note: Pressure Injury Interventions:  Sensory Interventions: Assess changes in LOC, Keep linens dry and wrinkle-free, Maintain/enhance activity level, Minimize linen layers    Moisture Interventions: Internal/External urinary devices, Minimize layers, Absorbent underpads    Activity Interventions: Assess need for specialty bed, Increase time out of bed, Pressure redistribution bed/mattress(bed type)    Mobility Interventions: Assess need for specialty bed, HOB 30 degrees or less, Pressure redistribution bed/mattress (bed type)    Nutrition Interventions: Document food/fluid/supplement intake    Friction and Shear Interventions: Minimize layers, HOB 30 degrees or less

## 2022-07-23 NOTE — PROGRESS NOTES
Problem: Self Care Deficits Care Plan (Adult)  Goal: *Acute Goals and Plan of Care (Insert Text)  Description: FUNCTIONAL STATUS PRIOR TO ADMISSION: Patient was modified independent using a rolling walker for functional mobility. HOME SUPPORT PRIOR TO ADMISSION: The patient lived with his wife but did not require assist. Patient reports recent signs of dementia in his wife and expressed concern for her care. Occupational Therapy Goals  Initiated 7/20/2022    1. Patient will perform upper body dressing and bathing with modified independence within 7 days. 2.  Patient will perform lower body dressing and bathing with supervision/set-up using AE PRN within 7 day(s). 3.  Patient will perform toilet transfer with minimal assistance using rolling walker within 7 days. 4.  Patient will perform all aspects of toileting at minimal assistance within 7 days. 5.  Patient will don/doff back brace at supervision/set-up within 7 days. 6.  Patient will verbalize/demonstrate all back precautions during ADL tasks without cues within 7 days.       Outcome: Progressing Towards Goal   OCCUPATIONAL THERAPY TREATMENT  Patient: Cara Bose (25 y.o. male)  Date: 7/23/2022  Diagnosis: Sagittal plane imbalance [M43.8X9]  Spondylolisthesis of lumbar region [M43.16]  Lumbar radiculitis [M54.16]  Diabetic peripheral neuropathy (HCC) [E11.42]  Progressive focal motor weakness [R53.1]  Spondylolisthesis at L4-L5 level [M43.16]  Spondylolisthesis at L3-L4 level [M43.16]  Spondylolisthesis at L2-L3 level [M43.16]  Postlaminectomy kyphosis [M96.3]  Neurogenic claudication due to lumbar spinal stenosis [M48.062]  Left leg weakness [R29.898] <principal problem not specified>  Procedure(s) (LRB):  T10-S2 ILIAC FIXATION, L1, L2, L3, L4 OSTEOTOMIES, L5 LAMINECTOMY, T10-S2 FUSION, OPEN SACROILIAC JOINT FUSION (O-ARM) (N/A) 4 Days Post-Op  Precautions: Back (brace when up, no BLT, sitting <30-45 min, log roll to sit)  Chart, occupational therapy assessment, plan of care, and goals were reviewed. ASSESSMENT  Patient continues with skilled OT services and is progressing towards goals. Pt remains limited by orthostatic hypotension and back pain. See flow sheet for BP trends. Pt with several episodes of BP drops with standing attempts despite slow to rise strategies, seated and standing exercises and slow progression of mobility. Despite low BPs pt eager to mobilize. Pt dep to apply brace seated edge of bed. Pt ambulated to recliner chair with Min A x 2 and BP slowly recovers once sitting and LEs elevated. Pt reporting increased back pain and unable to tolerate sitting in chair despite several attempts to reposition. Agreeable to return to bed after transfer to [facility]. Pt dep for hygiene following BM. While seated on [facility] pt's BP dropping to 76/37 (lowest reading of session) which rises with LE ankle pumps and kicking. . Assisted back to supine for safety. See prior BP readings in flow sheet         Vitals:     07/23/22 1102 07/23/22 1107 07/23/22 1112 07/23/22 1200   BP: (!) 94/51 (!) 76/37 (!) 104/54     BP 1 Location: Right upper arm Right upper arm Right upper arm     BP Patient Position: Sitting Sitting  Comment: after trasnfer to [facility] Supine     Pulse:   (!) 116 100     Temp:       98.2 °F (36.8 °C)   Resp:     26     Weight:           SpO2:     99%            Current Level of Function Impacting Discharge (ADLs): min assist x 2 transfer to [facility], dep hygiene and to apply back brace    Other factors to consider for discharge:          PLAN :  Patient continues to benefit from skilled intervention to address the above impairments. Continue treatment per established plan of care to address goals.     Recommend with staff: Pt seated edge of bed for ADL's as tolerated    Recommend next OT session: cont towards goal    Recommendation for discharge: (in order for the patient to meet his/her long term goals)  Therapy 3 hours per day 5-7 days per week    This discharge recommendation:  Has not yet been discussed the attending provider and/or case management    IF patient discharges home will need the following DME:        SUBJECTIVE:   Patient stated I am sorry.     OBJECTIVE DATA SUMMARY:   Cognitive/Behavioral Status:  Neurologic State: Alert  Orientation Level: Oriented X4  Cognition: Follows commands             Functional Mobility and Transfers for ADLs:  Bed Mobility:  Rolling: Maximum assistance  Supine to Sit: Moderate assistance;Assist x1  Sit to Supine: Moderate assistance;Assist x2  Scooting: Moderate assistance    Transfers:  Sit to Stand: Minimum assistance;Assist x2  Functional Transfers  Toilet Transfer : Minimum assistance;Assist x2  Adaptive Equipment: Bedside commode;Walker (comment)  Bed to Chair: Minimum assistance;Assist x2    Balance:  Sitting: Intact  Sitting - Static: Good (unsupported)  Sitting - Dynamic: Good (unsupported)  Standing: Impaired; With support  Standing - Static: Fair;Constant support  Standing - Dynamic : Fair;Constant support    ADL Intervention:                 Type of Bath: Chlorhexidine (CHG)         Upper Body Dressing Assistance  Orthotics(Brace): Maximum assistance         Toileting  Bowel Hygiene: Maximum assistance       Activity Tolerance:   Fair    After treatment patient left in no apparent distress:   Supine in bed and Call bell within reach    COMMUNICATION/COLLABORATION:   The patients plan of care was discussed with: Physical therapist, Occupational therapist, and Registered nurse.      EN Jason/L  Time Calculation: 31 mins Satisfactory

## 2022-07-24 LAB
ANION GAP SERPL CALC-SCNC: 5 MMOL/L (ref 5–15)
BUN SERPL-MCNC: 19 MG/DL (ref 6–20)
BUN/CREAT SERPL: 26 (ref 12–20)
CALCIUM SERPL-MCNC: 8.3 MG/DL (ref 8.5–10.1)
CHLORIDE SERPL-SCNC: 107 MMOL/L (ref 97–108)
CO2 SERPL-SCNC: 27 MMOL/L (ref 21–32)
CREAT SERPL-MCNC: 0.74 MG/DL (ref 0.7–1.3)
GLUCOSE BLD STRIP.AUTO-MCNC: 113 MG/DL (ref 65–117)
GLUCOSE BLD STRIP.AUTO-MCNC: 115 MG/DL (ref 65–117)
GLUCOSE BLD STRIP.AUTO-MCNC: 118 MG/DL (ref 65–117)
GLUCOSE BLD STRIP.AUTO-MCNC: 137 MG/DL (ref 65–117)
GLUCOSE SERPL-MCNC: 114 MG/DL (ref 65–100)
HCT VFR BLD AUTO: 29.5 % (ref 36.6–50.3)
HGB BLD-MCNC: 9.9 G/DL (ref 12.1–17)
POTASSIUM SERPL-SCNC: 3.5 MMOL/L (ref 3.5–5.1)
SERVICE CMNT-IMP: ABNORMAL
SERVICE CMNT-IMP: ABNORMAL
SERVICE CMNT-IMP: NORMAL
SERVICE CMNT-IMP: NORMAL
SODIUM SERPL-SCNC: 139 MMOL/L (ref 136–145)

## 2022-07-24 PROCEDURE — 74011250637 HC RX REV CODE- 250/637: Performed by: NURSE PRACTITIONER

## 2022-07-24 PROCEDURE — 85018 HEMOGLOBIN: CPT

## 2022-07-24 PROCEDURE — 2709999900 HC NON-CHARGEABLE SUPPLY

## 2022-07-24 PROCEDURE — 74011250636 HC RX REV CODE- 250/636: Performed by: NURSE PRACTITIONER

## 2022-07-24 PROCEDURE — 74011250636 HC RX REV CODE- 250/636: Performed by: STUDENT IN AN ORGANIZED HEALTH CARE EDUCATION/TRAINING PROGRAM

## 2022-07-24 PROCEDURE — 80048 BASIC METABOLIC PNL TOTAL CA: CPT

## 2022-07-24 PROCEDURE — 77030040831 HC BAG URINE DRNG MDII -A

## 2022-07-24 PROCEDURE — 36415 COLL VENOUS BLD VENIPUNCTURE: CPT

## 2022-07-24 PROCEDURE — 74011250637 HC RX REV CODE- 250/637: Performed by: STUDENT IN AN ORGANIZED HEALTH CARE EDUCATION/TRAINING PROGRAM

## 2022-07-24 PROCEDURE — 82962 GLUCOSE BLOOD TEST: CPT

## 2022-07-24 PROCEDURE — 74011250637 HC RX REV CODE- 250/637: Performed by: ORTHOPAEDIC SURGERY

## 2022-07-24 PROCEDURE — 99232 SBSQ HOSP IP/OBS MODERATE 35: CPT | Performed by: FAMILY MEDICINE

## 2022-07-24 PROCEDURE — 74011000250 HC RX REV CODE- 250: Performed by: NURSE PRACTITIONER

## 2022-07-24 PROCEDURE — 65270000046 HC RM TELEMETRY

## 2022-07-24 RX ORDER — LABETALOL HCL 20 MG/4 ML
10 SYRINGE (ML) INTRAVENOUS
Status: DISCONTINUED | OUTPATIENT
Start: 2022-07-24 | End: 2022-07-25

## 2022-07-24 RX ORDER — SODIUM CHLORIDE 9 MG/ML
100 INJECTION, SOLUTION INTRAVENOUS CONTINUOUS
Status: DISCONTINUED | OUTPATIENT
Start: 2022-07-24 | End: 2022-07-25

## 2022-07-24 RX ADMIN — Medication 1 TABLET: at 09:39

## 2022-07-24 RX ADMIN — GABAPENTIN 600 MG: 300 CAPSULE ORAL at 21:08

## 2022-07-24 RX ADMIN — Medication 5 ML: at 05:42

## 2022-07-24 RX ADMIN — SODIUM CHLORIDE 500 ML: 9 INJECTION, SOLUTION INTRAVENOUS at 10:01

## 2022-07-24 RX ADMIN — MIDODRINE HYDROCHLORIDE 5 MG: 5 TABLET ORAL at 09:38

## 2022-07-24 RX ADMIN — METFORMIN HYDROCHLORIDE 500 MG: 500 TABLET ORAL at 17:09

## 2022-07-24 RX ADMIN — ENOXAPARIN SODIUM 30 MG: 100 INJECTION SUBCUTANEOUS at 21:09

## 2022-07-24 RX ADMIN — FAMOTIDINE 20 MG: 20 TABLET, FILM COATED ORAL at 17:05

## 2022-07-24 RX ADMIN — TAMSULOSIN HYDROCHLORIDE 0.8 MG: 0.4 CAPSULE ORAL at 17:09

## 2022-07-24 RX ADMIN — ACETAMINOPHEN 1000 MG: 500 TABLET ORAL at 17:04

## 2022-07-24 RX ADMIN — Medication 10 ML: at 21:12

## 2022-07-24 RX ADMIN — ACETAMINOPHEN 1000 MG: 500 TABLET ORAL at 12:24

## 2022-07-24 RX ADMIN — SODIUM CHLORIDE 100 ML/HR: 9 INJECTION, SOLUTION INTRAVENOUS at 15:44

## 2022-07-24 RX ADMIN — FAMOTIDINE 20 MG: 20 TABLET, FILM COATED ORAL at 09:38

## 2022-07-24 RX ADMIN — THERA TABS 1 TABLET: TAB at 09:38

## 2022-07-24 RX ADMIN — MIDODRINE HYDROCHLORIDE 5 MG: 5 TABLET ORAL at 12:24

## 2022-07-24 RX ADMIN — Medication 1 TABLET: at 17:05

## 2022-07-24 RX ADMIN — ACETAMINOPHEN 1000 MG: 500 TABLET ORAL at 00:59

## 2022-07-24 RX ADMIN — ENOXAPARIN SODIUM 30 MG: 100 INJECTION SUBCUTANEOUS at 09:40

## 2022-07-24 RX ADMIN — METFORMIN HYDROCHLORIDE 500 MG: 500 TABLET ORAL at 09:38

## 2022-07-24 RX ADMIN — ACETAMINOPHEN 1000 MG: 500 TABLET ORAL at 06:30

## 2022-07-24 RX ADMIN — MIDODRINE HYDROCHLORIDE 5 MG: 5 TABLET ORAL at 17:09

## 2022-07-24 RX ADMIN — Medication 10 ML: at 15:26

## 2022-07-24 RX ADMIN — SODIUM CHLORIDE 100 ML/HR: 9 INJECTION, SOLUTION INTRAVENOUS at 10:02

## 2022-07-24 NOTE — PROGRESS NOTES
[address] [facility] Medicine Service Daily Progress Note    24 Hour Events: NAEO    SUBJECTIVE: Endorses episode of hypotension when walking to commode this AM. Denies SOB, CP.     OBJECTIVE:    Tele:    Vitals: Visit Vitals  BP (!) 177/71   Pulse 86   Temp 97 °F (36.1 °C)   Resp 17   Wt 248 lb (112.5 kg)   SpO2 97%   BMI 34.59 kg/m²     Physical Exam:  General: NAD. Respiratory: CTAB. Cardiovascular: Regular rate. GI: Nondistended. + bowel sounds. Nontender. Extremities: No LE edema. Skin: Warm, dry.   Neuro: Alert and oriented    I/O:  Date 07/23/22 0700 - 07/24/22 0659 07/24/22 0700 - 07/25/22 0659   Shift 9007-7653 2805-9571 24 Hour Total 6487-8613 9170-9568 24 Hour Total   INTAKE   P.O.  30 30 835  835     P.O.  30 30 835  835   Shift Total(mL/kg)  30(0.3) 30(0.3) 835(7.4)  835(7.4)   OUTPUT   Urine(mL/kg/hr)  1100(0.8) 1100(0.4) 350  350     Urine Output (mL) (Urinary Catheter 07/19/22 2- way)  1100 1100 350  350   Stool           Stool Occurrence(s)  1 x 1 x 2 x  2 x   Shift Total(mL/kg)  1100(9.8) 1100(9.8) 350(3.1)  350(3.1)   NET  -1070 -1070 485  485   Weight (kg) 112.5 112.5 112.5 112.5 112.5 112.5       Inpatient Medications  Current Facility-Administered Medications   Medication Dose Route Frequency    0.9% sodium chloride infusion  100 mL/hr IntraVENous CONTINUOUS    midodrine (PROAMATINE) tablet 5 mg  5 mg Oral TID WITH MEALS    0.9% sodium chloride infusion 250 mL  250 mL IntraVENous PRN    enoxaparin (LOVENOX) injection 30 mg  30 mg SubCUTAneous Q12H    [Held by provider] amLODIPine (NORVASC) tablet 10 mg  10 mg Oral DAILY    gabapentin (NEURONTIN) capsule 600 mg  600 mg Oral QHS    [Held by provider] hydroCHLOROthiazide (HYDRODIURIL) tablet 12.5 mg  12.5 mg Oral DAILY    [Held by provider] lisinopriL (PRINIVIL, ZESTRIL) tablet 20 mg  20 mg Oral BID    metFORMIN (GLUCOPHAGE) tablet 500 mg  500 mg Oral BID WITH MEALS    tamsulosin (FLOMAX) capsule 0.8 mg  0.8 mg Oral DAILY WITH DINNER sodium chloride (NS) flush 5-40 mL  5-40 mL IntraVENous Q8H    sodium chloride (NS) flush 5-40 mL  5-40 mL IntraVENous PRN    naloxone (NARCAN) injection 0.4 mg  0.4 mg IntraVENous PRN    senna-docusate (PERICOLACE) 8.6-50 mg per tablet 1 Tablet  1 Tablet Oral BID    polyethylene glycol (MIRALAX) packet 17 g  17 g Oral DAILY    bisacodyL (DULCOLAX) suppository 10 mg  10 mg Rectal DAILY PRN    acetaminophen (TYLENOL) tablet 1,000 mg  1,000 mg Oral Q6H    traMADoL (ULTRAM) tablet 50 mg  50 mg Oral Q6H PRN    oxyCODONE IR (ROXICODONE) tablet 5 mg  5 mg Oral Q3H PRN    oxyCODONE IR (ROXICODONE) tablet 10 mg  10 mg Oral Q3H PRN    famotidine (PEPCID) tablet 20 mg  20 mg Oral BID    phenol throat spray (CHLORASEPTIC)   Oral PRN    cyclobenzaprine (FLEXERIL) tablet 10 mg  10 mg Oral BID PRN    insulin lispro (HUMALOG) injection   SubCUTAneous AC&HS    glucose chewable tablet 16 g  4 Tablet Oral PRN    glucagon (GLUCAGEN) injection 1 mg  1 mg IntraMUSCular PRN    magnesium hydroxide (MILK OF MAGNESIA) 400 mg/5 mL oral suspension 30 mL  30 mL Oral Q12H    therapeutic multivitamin (THERAGRAN) tablet 1 Tablet  1 Tablet Oral DAILY    vitamin R-G-V-lutein-minerals (OCUVITE) tablet 1 Tablet  1 Tablet Oral BID       Allergies  No Known Allergies    CBC:  Recent Labs     07/24/22  0122 07/23/22  0044 07/22/22  0502   HGB 9.9* 9.5* 9.3*   HCT 29.5* 28.3* 60.4*       Metabolic Panel:  Recent Labs     07/24/22  0122 07/23/22  0044    143   K 3.5 3.8    111*   CO2 27 28   BUN 19 21*   CREA 0.74 0.86   * 112*   CA 8.3* 8.0*            Assessment and Plan     Eli Landis is a 76 y.o. male that was admitted for back surgery by ortho and family medicine team was consulted for orthostatic hypotension. POD 4 of T10-S2 Iliac fixation, L1, L2, L3, L4 Osteotomies, L5 Laminectomy, T10-S2 fusion, open sacroiliac joint.  - Management per Ortho     Orthostatic hypotension: Pt has h/o HTN. Hgb is stable.   - 500 cc bolus this AM then  ml/hr  - Continue to hold Norvasc 10 mg daily, Lisinopril 20 mg twice a day, HCTZ 12.5 mg  - Compression socks ordered. - Keep legs elevated at night.  - Can midodrine to 5 mg TID. Type II DM        Lab Results   Component Value Date/Time     Hemoglobin A1c 6.4 (H) 07/05/2022 02:48 PM      - Re-started on Metformin 500 mg BID.  - Monitor on daily BMP         Pain Management: Per Ortho     DVT Prophylaxis: Per Ortho     Disposition: Per Florencio Syed MD  Family Medicine Resident       For Billing    No chief complaint on file. Hospital Problems  Date Reviewed: 11/1/2021            Codes Class Noted POA    Complication of surgical procedure ICD-10-CM: T81. 9XXA  ICD-9-CM: 998.9  7/19/2022 Unknown        Sagittal plane imbalance ICD-10-CM: M43.8X9  ICD-9-CM: 737.8  7/19/2022 Unknown        Neurogenic claudication due to lumbar spinal stenosis ICD-10-CM: M48.062  ICD-9-CM: 724.03  7/19/2022 Unknown        Left leg weakness ICD-10-CM: R29.898  ICD-9-CM: 729.89  7/19/2022 Unknown        Spondylolisthesis at L4-L5 level ICD-10-CM: M43.16  ICD-9-CM: 756.12  7/19/2022 Unknown        Spondylolisthesis at L2-L3 level ICD-10-CM: M43.16  ICD-9-CM: 756.12  7/19/2022 Unknown        Spondylolisthesis at L3-L4 level ICD-10-CM: M43.16  ICD-9-CM: 756.12  7/19/2022 Unknown

## 2022-07-24 NOTE — PROGRESS NOTES
ORTHOPAEDIC LUMBAR FUSION PROGRESS NOTE    NAME:     Jonathon Quiroz   :       1945   MRN:       [id number]   DATE:      2022    POD:              5 Days Post-Op  S/P:              Procedure(s):  T10-S2 ILIAC FIXATION, L1, L2, L3, L4 OSTEOTOMIES, L5 LAMINECTOMY, T10-S2 FUSION, OPEN SACROILIAC JOINT FUSION (O-ARM)    SUBJECTIVE:    Reports improvement in preop lower extremity strength  Postop pain controlled  Tolerating PO intake  Ambulation tolerance: 40 feet, up to chair  Passing flatus, has made BM  Voiding clear yellow -- choi catheter in place due to mobility issues and not being able to void without standing up  Denies nausea/vomiting, headache, chest pain or shortness of breath    Recent Labs     22  0122   HGB 9.9*   HCT 29.5*      K 3.5      CO2 27   BUN 19   CREA 0.74   *     Patient Vitals for the past 12 hrs:   BP Temp Pulse Resp SpO2   22 0730 -- 98 °F (36.7 °C) -- -- --   22 0645 -- -- 71 15 96 %   22 0630 -- -- 74 15 97 %   22 0615 -- -- 73 15 96 %   22 0600 (!) 156/58 -- 82 16 94 %   22 0545 -- -- 80 15 95 %   22 0530 -- -- 74 15 95 %   22 0515 -- -- 83 17 97 %   22 0500 (!) 80/53 -- (!) 109 (!) 31 100 %   22 0445 (!) 74/54 -- 86 15 98 %   22 0430 -- -- 81 17 100 %   22 0415 -- -- 82 15 98 %   22 0400 (!) 142/61 98.3 °F (36.8 °C) 82 14 99 %   22 0345 -- -- 86 19 98 %   22 0330 -- -- 73 15 98 %   22 0315 -- -- 74 15 100 %   22 0300 (!) 149/56 -- 77 16 97 %   22 0245 -- -- 68 14 95 %   22 0230 -- -- 68 15 93 %   22 0215 -- -- 70 14 96 %   22 0200 (!) 143/61 -- 70 15 96 %   22 0145 -- -- 71 15 98 %   22 0130 -- -- 78 19 96 %   22 0115 -- -- 84 17 98 %   22 0100 (!) 140/64 -- 75 15 94 %   22 0045 -- -- 76 16 96 %   22 0030 -- -- 74 16 95 %   22 0015 -- -- 76 15 98 %   22 0000 138/63 98.1 °F (36.7 °C) 74 15 96 %   07/23/22 2345 -- -- 77 16 99 %   07/23/22 2330 -- -- 74 15 97 %   07/23/22 2315 -- -- 73 17 93 %   07/23/22 2300 125/67 -- 76 14 96 %   07/23/22 2245 -- -- 72 15 98 %   07/23/22 2230 -- -- 75 16 95 %   07/23/22 2215 -- -- 78 16 94 %   07/23/22 2200 (!) 144/66 -- 80 18 99 %   07/23/22 2145 -- -- 74 16 97 %   07/23/22 2130 -- -- 82 18 94 %   07/23/22 2115 -- -- 83 24 97 %   07/23/22 2100 (!) 169/89 -- 89 20 97 %   07/23/22 2045 -- -- 81 25 99 %   07/23/22 2030 -- -- 70 17 99 %   07/23/22 2015 -- -- 72 18 98 %     Exam:  Positive strength/ROM bilat lower ext: main deficit is 3+/5 left him flexion/SLR  Neuro intact to sensation  Dressings clean and dry  Lower extremities warm and well perfused    PLAN: 5 Days Post-Op, improved   Continue PO pain medications as needed  Continue SCD's, frequent ambulation, Lovenox daily  Diet: regular  Continue bowel regimen  Continue lumbar orthosis  Continue Vit D3  Hemoglobin continues to rise  Lisinopril/Amlodipine/HCTZ held in effort to avoid orthostatic hypotension. Midodrine added. Hospitalist feedback has been appreciated regarding orthostatic hypotension.   PT/OT has been slowed down by symptomatic hypotension during these sessions -- though he is making progress  Medical comorbities stable  [facility] once patient able to tolerate multiple PT/OT sessions, ideally without hypotensive symptoms     Daisha De Jesus NP

## 2022-07-24 NOTE — PROGRESS NOTES
Problem: Falls - Risk of  Goal: *Absence of Falls  Description: Document [place] Fall Risk and appropriate interventions in the flowsheet.   Outcome: Progressing Towards Goal  Note: Fall Risk Interventions:  Mobility Interventions: Bed/chair exit alarm, PT Consult for mobility concerns, PT Consult for assist device competence    Mentation Interventions: Adequate sleep, hydration, pain control, Bed/chair exit alarm    Medication Interventions: Assess postural VS orthostatic hypotension, Bed/chair exit alarm    Elimination Interventions: Bed/chair exit alarm, Call light in reach    History of Falls Interventions: Bed/chair exit alarm         Problem: Patient Education: Go to Patient Education Activity  Goal: Patient/Family Education  Outcome: Progressing Towards Goal

## 2022-07-24 NOTE — PROGRESS NOTES
0700 - Bedside shift change report given to Sofya Brian RN (oncoming nurse) by Celena Pagan RN (offgoing nurse). Report included the following information SBAR, Kardex, ED Summary, Intake/Output, MAR, Accordion, Recent Results, Med Rec Status, and Cardiac Rhythm NSR .       0940 - 153/65 BP while lying down    0945 - BP 95/51 while sitting on edge of bed    0948 - /80 when standing    1824 - RN notified family medicine of elevated BP (190/72). Orders received to stop IVF and recheck BP in 30 min. If SBP still 180 or greater RN to give PRN labetalol after 30 min window. 1900 - Bedside shift change report given to Charity Hernandes RN (oncoming nurse) by Sofya Brian RN (offgoing nurse). Report included the following information SBAR, Kardex, ED Summary, OR Summary, Procedure Summary, Intake/Output, MAR, Accordion, Recent Results, Med Rec Status, and Cardiac Rhythm NSR .

## 2022-07-25 PROBLEM — I95.1 ORTHOSTATIC HYPOTENSION: Status: ACTIVE | Noted: 2022-07-25

## 2022-07-25 LAB
ANION GAP SERPL CALC-SCNC: 6 MMOL/L (ref 5–15)
BUN SERPL-MCNC: 16 MG/DL (ref 6–20)
BUN/CREAT SERPL: 23 (ref 12–20)
CALCIUM SERPL-MCNC: 8 MG/DL (ref 8.5–10.1)
CHLORIDE SERPL-SCNC: 107 MMOL/L (ref 97–108)
CO2 SERPL-SCNC: 27 MMOL/L (ref 21–32)
CREAT SERPL-MCNC: 0.7 MG/DL (ref 0.7–1.3)
GLUCOSE BLD STRIP.AUTO-MCNC: 107 MG/DL (ref 65–117)
GLUCOSE BLD STRIP.AUTO-MCNC: 118 MG/DL (ref 65–117)
GLUCOSE BLD STRIP.AUTO-MCNC: 132 MG/DL (ref 65–117)
GLUCOSE SERPL-MCNC: 107 MG/DL (ref 65–100)
HCT VFR BLD AUTO: 28.9 % (ref 36.6–50.3)
HGB BLD-MCNC: 9.7 G/DL (ref 12.1–17)
POTASSIUM SERPL-SCNC: 3.5 MMOL/L (ref 3.5–5.1)
SERVICE CMNT-IMP: ABNORMAL
SERVICE CMNT-IMP: ABNORMAL
SERVICE CMNT-IMP: NORMAL
SODIUM SERPL-SCNC: 140 MMOL/L (ref 136–145)

## 2022-07-25 PROCEDURE — 36415 COLL VENOUS BLD VENIPUNCTURE: CPT

## 2022-07-25 PROCEDURE — 74011000250 HC RX REV CODE- 250: Performed by: NURSE PRACTITIONER

## 2022-07-25 PROCEDURE — 74011250636 HC RX REV CODE- 250/636: Performed by: HOSPITALIST

## 2022-07-25 PROCEDURE — 99222 1ST HOSP IP/OBS MODERATE 55: CPT | Performed by: INTERNAL MEDICINE

## 2022-07-25 PROCEDURE — 74011250637 HC RX REV CODE- 250/637: Performed by: NURSE PRACTITIONER

## 2022-07-25 PROCEDURE — 99232 SBSQ HOSP IP/OBS MODERATE 35: CPT | Performed by: FAMILY MEDICINE

## 2022-07-25 PROCEDURE — 74011250637 HC RX REV CODE- 250/637: Performed by: STUDENT IN AN ORGANIZED HEALTH CARE EDUCATION/TRAINING PROGRAM

## 2022-07-25 PROCEDURE — 82962 GLUCOSE BLOOD TEST: CPT

## 2022-07-25 PROCEDURE — 97530 THERAPEUTIC ACTIVITIES: CPT

## 2022-07-25 PROCEDURE — 65270000046 HC RM TELEMETRY

## 2022-07-25 PROCEDURE — 80048 BASIC METABOLIC PNL TOTAL CA: CPT

## 2022-07-25 PROCEDURE — 74011250636 HC RX REV CODE- 250/636: Performed by: NURSE PRACTITIONER

## 2022-07-25 PROCEDURE — 74011250637 HC RX REV CODE- 250/637: Performed by: ORTHOPAEDIC SURGERY

## 2022-07-25 PROCEDURE — 85018 HEMOGLOBIN: CPT

## 2022-07-25 PROCEDURE — 97110 THERAPEUTIC EXERCISES: CPT

## 2022-07-25 RX ORDER — MIDODRINE HYDROCHLORIDE 5 MG/1
2.5 TABLET ORAL
Status: DISCONTINUED | OUTPATIENT
Start: 2022-07-25 | End: 2022-07-27

## 2022-07-25 RX ORDER — HYDRALAZINE HYDROCHLORIDE 20 MG/ML
10 INJECTION INTRAMUSCULAR; INTRAVENOUS ONCE
Status: COMPLETED | OUTPATIENT
Start: 2022-07-25 | End: 2022-07-25

## 2022-07-25 RX ORDER — SODIUM CHLORIDE 9 MG/ML
75 INJECTION, SOLUTION INTRAVENOUS CONTINUOUS
Status: DISPENSED | OUTPATIENT
Start: 2022-07-25 | End: 2022-07-26

## 2022-07-25 RX ORDER — TAMSULOSIN HYDROCHLORIDE 0.4 MG/1
0.4 CAPSULE ORAL
Status: DISCONTINUED | OUTPATIENT
Start: 2022-07-25 | End: 2022-07-28 | Stop reason: HOSPADM

## 2022-07-25 RX ADMIN — ENOXAPARIN SODIUM 30 MG: 100 INJECTION SUBCUTANEOUS at 22:13

## 2022-07-25 RX ADMIN — GABAPENTIN 600 MG: 300 CAPSULE ORAL at 22:13

## 2022-07-25 RX ADMIN — ACETAMINOPHEN 1000 MG: 500 TABLET ORAL at 06:56

## 2022-07-25 RX ADMIN — Medication 10 ML: at 06:54

## 2022-07-25 RX ADMIN — Medication 1 TABLET: at 17:07

## 2022-07-25 RX ADMIN — Medication 10 ML: at 22:09

## 2022-07-25 RX ADMIN — Medication 1 TABLET: at 08:29

## 2022-07-25 RX ADMIN — ENOXAPARIN SODIUM 30 MG: 100 INJECTION SUBCUTANEOUS at 08:20

## 2022-07-25 RX ADMIN — ACETAMINOPHEN 1000 MG: 500 TABLET ORAL at 01:16

## 2022-07-25 RX ADMIN — CYCLOBENZAPRINE 10 MG: 10 TABLET, FILM COATED ORAL at 08:21

## 2022-07-25 RX ADMIN — FAMOTIDINE 20 MG: 20 TABLET, FILM COATED ORAL at 08:21

## 2022-07-25 RX ADMIN — ACETAMINOPHEN 1000 MG: 500 TABLET ORAL at 17:06

## 2022-07-25 RX ADMIN — MIDODRINE HYDROCHLORIDE 2.5 MG: 5 TABLET ORAL at 11:36

## 2022-07-25 RX ADMIN — FAMOTIDINE 20 MG: 20 TABLET, FILM COATED ORAL at 17:07

## 2022-07-25 RX ADMIN — METFORMIN HYDROCHLORIDE 500 MG: 500 TABLET ORAL at 16:26

## 2022-07-25 RX ADMIN — METFORMIN HYDROCHLORIDE 500 MG: 500 TABLET ORAL at 08:21

## 2022-07-25 RX ADMIN — ACETAMINOPHEN 1000 MG: 500 TABLET ORAL at 11:36

## 2022-07-25 RX ADMIN — Medication 10 ML: at 13:04

## 2022-07-25 RX ADMIN — HYDRALAZINE HYDROCHLORIDE 10 MG: 20 INJECTION INTRAMUSCULAR; INTRAVENOUS at 23:42

## 2022-07-25 RX ADMIN — MAGNESIUM HYDROXIDE 30 ML: 1200 LIQUID ORAL at 08:21

## 2022-07-25 RX ADMIN — THERA TABS 1 TABLET: TAB at 08:21

## 2022-07-25 RX ADMIN — SODIUM CHLORIDE 75 ML/HR: 9 INJECTION, SOLUTION INTRAVENOUS at 15:18

## 2022-07-25 RX ADMIN — MIDODRINE HYDROCHLORIDE 2.5 MG: 5 TABLET ORAL at 08:28

## 2022-07-25 NOTE — PROGRESS NOTES
Occupational Therapy Note:  Chart reviewed. Attempted OT tx but patient kindly declined. He reports increased fatigue this afternoon and requested to continue resting. He was agreeable to OT follow-up next tx day.     Kathleen Blanco OTR/L

## 2022-07-25 NOTE — PROGRESS NOTES
ORTHOPAEDIC LUMBAR FUSION PROGRESS NOTE    NAME:     Karlie Goodrich   :       1945   MRN:       [id number]   DATE:      2022    POD:              6 Days Post-Op  S/P:              Procedure(s):  T10-S2 ILIAC FIXATION, L1, L2, L3, L4 OSTEOTOMIES, L5 LAMINECTOMY, T10-S2 FUSION, OPEN SACROILIAC JOINT FUSION (O-ARM)    SUBJECTIVE:    Reports improvement in preop lower extremity strength  Postop pain controlled  Tolerating PO intake -- does not have a strong appetite   Ambulation tolerance: 40 feet, up to chair. Unfortunately PT/OT did not visit him yesterday. Passing flatus, has made BM. Voiding clear yellow -- condom catheter in place  Denies nausea/vomiting, headache, chest pain or shortness of breath    Recent Labs     22  0330   HGB 9.7*   HCT 28.9*      K 3.5      CO2 27   BUN 16   CREA 0.70   *     Patient Vitals for the past 12 hrs:   BP Temp Pulse Resp SpO2   22 0100 (!) 159/71 -- 80 17 99 %   22 0000 (!) 153/70 98.2 °F (36.8 °C) 74 19 98 %   22 2300 (!) 154/73 -- 73 16 100 %   22 2200 (!) 155/75 -- 71 18 98 %   22 2100 (!) 169/71 -- 90 20 98 %   22 2000 (!) 151/94 98.4 °F (36.9 °C) 96 19 95 %     Exam:  Positive strength/ROM bilat lower ext: main deficit is 4/5 left him flexion/SLR  Neuro intact to sensation  Dressings clean and dry  Lower extremities warm and well perfused    PLAN: 6 Days Post-Op, improved   Continue PO pain medications as needed  Continue SCD's, frequent ambulation, Lovenox daily  > Please offer the patient the opportunity to get up to recliner or go on a walk every other hour when patient not resting.  >> Per patient, this was not offered to him yesterday. Diet: regular, please offer ensure with meals. Continue bowel regimen -- patient feels as if stool has been loose, holding stool softeners.   Continue TLSO orthosis  Continue Vit D3  Hemoglobin stable  Lisinopril/Amlodipine/HCTZ held in effort to avoid orthostatic hypotension. Midodrine added. Hospitalist feedback has been appreciated regarding orthostatic hypotension. PT/OT has been slowed down by symptomatic hypotension during these sessions -- though he is making progress.   Medical comorbities stable  [facility]: [facility] once patient able to tolerate multiple PT/OT sessions, ideally without hypotensive symptoms     Angelito Vincent, NP

## 2022-07-25 NOTE — PROGRESS NOTES
Cardiology Initial Care Encounter    Patient: Paulina Crump MRN: [id number]     YOB: 1945  Age: 76 y.o. Sex: male      Admit Date: 7/19/2022       Assessment/Plan     1 orthostatic hypotension : agree with holding ace, amlodipine and HCTZ as can all cause orthostasis, cont compression , hose , consider jobst stocking for higher compression. In short term can use midodrine. Would hydrate overnight. 2. Hx HTN:BP meds on hold. 3 Hx DM  4. POD # 6 spine surgery:   5 BPH: flomax dose reduced, would hold today. 6 post op anemia:        NP spent __15__ minutes reviewing chart, labs, diagnostics and coordination of care . NP spent __10__ minutes with pt/family in examination and care plan review. HPI     Paulina Crump is a 76 y.o.  male  with PMH of diabetes, hypertension, dyslipidemia, DJD who is s/p day 6 from T10-S2 Iliac fixation, L1, L2, L3, L4 Osteotomies, L5 Laminectomy, T10-S2 fusion, open sacroiliac joint. He worked with PT Saturday and noted to be orthostatic.  0940 - 153/65 BP while lying down, 0945 - BP 95/51 while sitting on edge of bed, 0948 - /80 when standing. BP meds were held and midodrine initiated. He did not get up yesterday and today he became diaphoretic and felt like he was going to pass out when he tried to get up to chair. Today when he got up BP dropped from 464 systolic to 471 sitting then 78 standing and he nearly passed out. He became diaphoretic and pale. Surgical compression stocking are in place. He notes he is not eating and drinking very little per his report. Doni Aparicio He is noted to be on flomax daily. Has received 1 un PRBC's post op. The patient denies chest pain, dependent edema,  RINALDI, orthopnea, palpitations, PND, shortness of breath, claudication or syncope.      The patient has been referred to cardiology for on going management of orthostatic hypotension        Review of Symptoms:  Constitutional: positive for fatigue  ENT: negative   Respiratory: negative for cough or dyspnea on exertion  Gastrointestinal: negative for vomiting  Genitourinary: + frequency   Musculoskeletal:positive for back pain  Neurological: negative for memory problems  Other systems reviewed and negative except as above. Previous cardiac hx  06/20/22    NUCLEAR CARDIAC STRESS TEST 06/24/2022 6/30/2022    Interpretation Summary  Formatting of this result is different from the original.      ECG: Resting ECG demonstrates normal sinus rhythm. Normal stress myocardial perfusion imaging without ischemia or infarction on pharmacological stress test. Normal left ventricular systolic function with LVEF 55%.   No EKG changes of ischemia on pharmacological stress test.    Signed by: Bela Stokes MD on 6/24/2022  2:43 PM    Risk factors: male, HTN, DM     Social History     Tobacco Use    Smoking status: Never    Smokeless tobacco: Never   Substance Use Topics    Alcohol use: Yes     Comment: rare     Family History   Problem Relation Age of Onset    Cancer Mother     Breast Cancer Mother     Colon Cancer Mother     Stroke Father     Prostate Cancer Brother     Deep Vein Thrombosis Neg Hx     Anesth Problems Neg Hx        Current Facility-Administered Medications   Medication Dose Route Frequency    midodrine (PROAMATINE) tablet 2.5 mg  2.5 mg Oral TID WITH MEALS    tamsulosin (FLOMAX) capsule 0.4 mg  0.4 mg Oral DAILY WITH DINNER    0.9% sodium chloride infusion 250 mL  250 mL IntraVENous PRN    enoxaparin (LOVENOX) injection 30 mg  30 mg SubCUTAneous Q12H    [Held by provider] amLODIPine (NORVASC) tablet 10 mg  10 mg Oral DAILY    gabapentin (NEURONTIN) capsule 600 mg  600 mg Oral QHS    [Held by provider] hydroCHLOROthiazide (HYDRODIURIL) tablet 12.5 mg  12.5 mg Oral DAILY    [Held by provider] lisinopriL (PRINIVIL, ZESTRIL) tablet 20 mg  20 mg Oral BID    metFORMIN (GLUCOPHAGE) tablet 500 mg  500 mg Oral BID WITH MEALS    sodium chloride (NS) flush 5-40 mL  5-40 mL IntraVENous Q8H    sodium chloride (NS) flush 5-40 mL  5-40 mL IntraVENous PRN    naloxone (NARCAN) injection 0.4 mg  0.4 mg IntraVENous PRN    [Held by provider] senna-docusate (PERICOLACE) 8.6-50 mg per tablet 1 Tablet  1 Tablet Oral BID    [Held by provider] polyethylene glycol (MIRALAX) packet 17 g  17 g Oral DAILY    [Held by provider] bisacodyL (DULCOLAX) suppository 10 mg  10 mg Rectal DAILY PRN    acetaminophen (TYLENOL) tablet 1,000 mg  1,000 mg Oral Q6H    traMADoL (ULTRAM) tablet 50 mg  50 mg Oral Q6H PRN    oxyCODONE IR (ROXICODONE) tablet 5 mg  5 mg Oral Q3H PRN    oxyCODONE IR (ROXICODONE) tablet 10 mg  10 mg Oral Q3H PRN    famotidine (PEPCID) tablet 20 mg  20 mg Oral BID    phenol throat spray (CHLORASEPTIC)   Oral PRN    cyclobenzaprine (FLEXERIL) tablet 10 mg  10 mg Oral BID PRN    insulin lispro (HUMALOG) injection   SubCUTAneous AC&HS    glucose chewable tablet 16 g  4 Tablet Oral PRN    glucagon (GLUCAGEN) injection 1 mg  1 mg IntraMUSCular PRN    magnesium hydroxide (MILK OF MAGNESIA) 400 mg/5 mL oral suspension 30 mL  30 mL Oral Q12H    therapeutic multivitamin (THERAGRAN) tablet 1 Tablet  1 Tablet Oral DAILY    vitamin B-P-A-lutein-minerals (OCUVITE) tablet 1 Tablet  1 Tablet Oral BID       Objective:     Vitals:    07/25/22 1127 07/25/22 1130 07/25/22 1200 07/25/22 1209   BP: 116/76 (!) 123/107 (!) 166/69    Pulse: (!) 135 (!) 119 78    Resp:  19 17    Temp:   98.2 °F (36.8 °C)    SpO2:  98% 98%    Weight:       Height:    5' 11\" (1.803 m)        Intake and Output:  Current Shift: 07/25 0701 - 07/25 1900  In: 650 [P.O.:650]  Out: 1   Last three shifts: 07/23 1901 - 07/25 0700  In: 2832 [P.O.:1395;  I.V.:1360]  Out: 1650 [Urine:1650]          Gen: Well-developed, well-nourished, in no acute distress  Neck: Supple,No JVD, No Carotid Bruit,   Resp: No accessory muscle use, Clear breath sounds, No rales or rhonchi  Card: Regular Rate,  Rhythm,  Normal S1, S2, No murmurs, rubs or irwin.    Abd:  Soft, non-tender, non-distended,BS+,   MSK: No cyanosis  Skin: No rashes    Neuro: moving all four extremities , follows commands appropriately  Psych:  Good insight, oriented to person, place , alert, Nml Affect  LE: No edema, SARAH hose on     EKG:   EKG Results       None              TELEMETRY; SR occ PVC     Lab/Data Review:  BMP:   Lab Results   Component Value Date/Time     07/25/2022 03:30 AM    K 3.5 07/25/2022 03:30 AM     07/25/2022 03:30 AM    CO2 27 07/25/2022 03:30 AM    AGAP 6 07/25/2022 03:30 AM     (H) 07/25/2022 03:30 AM    BUN 16 07/25/2022 03:30 AM    CREA 0.70 07/25/2022 03:30 AM    GFRAA >60 07/25/2022 03:30 AM    GFRNA >60 07/25/2022 03:30 AM     CBC:   Lab Results   Component Value Date/Time    HGB 9.7 (L) 07/25/2022 03:30 AM    HCT 28.9 (L) 07/25/2022 03:30 AM        Signed By: Jamie Romeo NP     July 25, 2022

## 2022-07-25 NOTE — PROGRESS NOTES
Problem: Falls - Risk of  Goal: *Absence of Falls  Description: Document Freda Hdz Fall Risk and appropriate interventions in the flowsheet.   Outcome: Progressing Towards Goal  Note: Fall Risk Interventions:  Mobility Interventions: Bed/chair exit alarm, PT Consult for mobility concerns, PT Consult for assist device competence    Mentation Interventions: Adequate sleep, hydration, pain control, Bed/chair exit alarm    Medication Interventions: Patient to call before getting OOB, Teach patient to arise slowly, Evaluate medications/consider consulting pharmacy    Elimination Interventions: Bed/chair exit alarm, Call light in reach    History of Falls Interventions: Bed/chair exit alarm, Door open when patient unattended         Problem: Patient Education: Go to Patient Education Activity  Goal: Patient/Family Education  Outcome: Progressing Towards Goal     Problem: Patient Education: Go to Patient Education Activity  Goal: Patient/Family Education  Outcome: Progressing Towards Goal     Problem: Pain  Goal: *Control of Pain  Outcome: Progressing Towards Goal  Goal: *PALLIATIVE CARE:  Alleviation of Pain  Outcome: Progressing Towards Goal

## 2022-07-25 NOTE — PROGRESS NOTES
Problem: Mobility Impaired (Adult and Pediatric)  Goal: *Acute Goals and Plan of Care (Insert Text)  Description: FUNCTIONAL STATUS PRIOR TO ADMISSION: Patient was modified independent using a rolling walker for functional mobility. HOME SUPPORT PRIOR TO ADMISSION: The patient lived with his wife but did not require assist. Patient reports recent signs of dementia in his wife and expressed concern for her care. Physical Therapy Goals  Initiated 7/20/2022  1. Patient will move from supine to sit and sit to supine  and roll side to side in bed with supervision/set-up within 7 day(s). 2.  Patient will transfer from bed to chair and chair to bed with minimal assistance/contact guard assist using the least restrictive device within 7 day(s). 3.  Patient will perform sit to stand with minimal assistance/contact guard assist within 7 day(s). 4.  Patient will ambulate with minimal assistance/contact guard assist for 100 feet with the least restrictive device within 7 day(s). 5.  Patient will ascend/descend 4 stairs with 1 handrail(s) with modified independence within 7 day(s). 6. Patient will verbalize and demonstrate understanding of spinal precautions (No bending, lifting greater than 5 lbs, or twisting; log-roll technique; frequent repositioning as instructed) within 4 days.         Outcome: Progressing Towards Goal   PHYSICAL THERAPY TREATMENT  Patient: Trena Valenzuela (74 y.o. male)  Date: 7/25/2022  Diagnosis: Sagittal plane imbalance [M43.8X9]  Spondylolisthesis of lumbar region [M43.16]  Lumbar radiculitis [M54.16]  Diabetic peripheral neuropathy (HCC) [E11.42]  Progressive focal motor weakness [R53.1]  Spondylolisthesis at L4-L5 level [M43.16]  Spondylolisthesis at L3-L4 level [M43.16]  Spondylolisthesis at L2-L3 level [M43.16]  Postlaminectomy kyphosis [M96.3]  Neurogenic claudication due to lumbar spinal stenosis [M48.062]  Left leg weakness [R29.898] <principal problem not specified>  Procedure(s) (LRB):  T10-S2 ILIAC FIXATION, L1, L2, L3, L4 OSTEOTOMIES, L5 LAMINECTOMY, T10-S2 FUSION, OPEN SACROILIAC JOINT FUSION (O-ARM) (N/A) 6 Days Post-Op  Precautions: Back (brace when up, no BLT, sitting <30-45 min, log roll to sit)  Chart, physical therapy assessment, plan of care and goals were reviewed. ASSESSMENT  Patient continues with skilled PT services and is progressing towards goals. Pt's progress limited by \"feeling like a drunk skunk\" this morning. Not able to tolerate ambulation this morning despite BP remaining WNL. Continues to have significant drops in BP with positional changes and accelerations in HR. Supine: 181/93 HR 90BPM  Standin/76 HR 135BPM   Transfers to EOB with Mod A x 1. Sat EOB and donned brace with Max A. Pt performs several sit<>stand transfers and maintains standing from 10-45 seconds on 6 separate attempts. Offered transfer to the chair though pt declines d/t his perceived weakness. POf note pt reports he was up in the chair for ~1 hour this morning and his BP did drop into the 96G systolic. Assisted back to supine with all needs in reach. Will attempt ambulation this afternoon if schedule allows. Current Level of Function Impacting Discharge (mobility/balance): Mod A x 1 to EOB, Min A to ambulate when he tolerates    Other factors to consider for discharge: BP and HR control         PLAN :  Patient continues to benefit from skilled intervention to address the above impairments. Continue treatment per established plan of care. to address goals. Recommendation for discharge: (in order for the patient to meet his/her long term goals)  Therapy 3 hours per day 5-7 days per week    This discharge recommendation:  Has been made in collaboration with the attending provider and/or case management    IF patient discharges home will need the following DME: to be determined (TBD)       SUBJECTIVE:   Patient stated I feel very bad.     OBJECTIVE DATA SUMMARY:   Critical Behavior:  Neurologic State: Alert, Eyes open spontaneously  Orientation Level: Oriented to situation, Oriented to place, Oriented to person  Cognition: Follows commands  Safety/Judgement: Awareness of environment, Fall prevention  Functional Mobility Training:  Bed Mobility:  Rolling: Moderate assistance  Supine to Sit: Moderate assistance;Assist x1  Sit to Supine: Minimum assistance;Assist x2  Scooting: Minimum assistance        Transfers:  Sit to Stand: Minimum assistance;Assist x2  Stand to Sit: Minimum assistance                             Balance:  Sitting: Impaired  Sitting - Static: Good (unsupported)  Sitting - Dynamic: Fair (occasional)  Standing: Impaired  Standing - Static: Fair;Constant support  Standing - Dynamic : Fair;Constant support  Ambulation/Gait Training:                        Activity Tolerance:   Fair, SpO2 stable on RA, requires frequent rest breaks, and signs and symptoms of orthostatic hypotension    After treatment patient left in no apparent distress:   Supine in bed and Call bell within reach    COMMUNICATION/COLLABORATION:   The patients plan of care was discussed with: Registered nurse.      Guillermo Rahman, PT   Time Calculation: 35 mins

## 2022-07-25 NOTE — PROGRESS NOTES
0700- Bedside and Verbal shift change report received from Xochilt Goodwin RN (offgoing nurse) by Olesya Emerson RN (oncoming nurse). Report included the following information SBAR, Kardex, ED Summary, Procedure Summary, Intake/Output, MAR, Recent Results, Cardiac Rhythm SB,SR, and Alarm Parameters . Primary Nurse Olesya Emerson RN and Xochilt Goodwin RN performed a dual skin assessment on this patient No impairment noted. All drips verified. 0800- Patient shift assessment performed. VAP bundle performed. Patient turned and resting comfortably. Patient educated on call bell and patient safety measures. Call bell in reach, bed in low and locked position, and reminded patient to use call bell. Patient board updated and care plan discussed with patient. 0820:pt OOB to [facility] with the brace on ,   pt very pale and not  tolerated well . Vitals checked and charted . With 2 max assist pt transfer to [address] [place] [address] . Pt able to stand up and took few step to the [address] [place] [address] .     1000: VAP bundle performed. Patient turned and resting comfortably. 1145:pt worked with PT and pt not able to walk ,pt c/o not feeling good . Please see their notes . 1200- Reassessment performed. No changes noted. VAP bundle performed. Patient turned and resting comfortably. 1400- VAP bundle performed. Patient turned and resting comfortably. 1600- Reassessment performed. No changes noted. VAP bundle performed. Patient turned and resting comfortably. 1800- VAP bundle performed. Patient turned and resting comfortably. 1900- Bedside and Verbal shift change report given to Simona Kramer RN(oncoming nurse) by Olesya Emerson RN (offgoing nurse). Report included the following information SBAR, Kardex, ED Summary, OR Summary, Procedure Summary, Intake/Output, MAR, Recent Results, Cardiac Rhythm SR, and Alarm Parameters .

## 2022-07-25 NOTE — PROGRESS NOTES
[facility] Daily Progress Note    24 Hour Events: PARISHEO    SUBJECTIVE: Has had no further episodes of lightheadedness/dizziness. Anxious to work with PT/OT today so he can discharge to [address] [place] [address]. Denies SOB, CP.     OBJECTIVE:      Vitals: Visit Vitals  BP (!) 159/71   Pulse 80   Temp 98.2 °F (36.8 °C)   Resp 17   Wt 248 lb (112.5 kg)   SpO2 99%   BMI 34.59 kg/m²     Physical Exam:  General: NAD. Respiratory: Lungs clear to auscultation bilaterally with no wheezing, rhonchi, rales, or crackles. Cardiovascular: Regular rate and rhythm. S1/S2 auscultated with no murmurs, rubs, or gallops. GI: Nondistended. + bowel sounds. Nontender to palpation. Extremities: No LE edema. Skin: Warm, dry.   Neuro: Alert and oriented    I/O:  Date 07/24/22 0700 - 07/25/22 0659 07/25/22 0700 - 07/26/22 0659   Shift 0623-8350 6139-3585 24 Hour Total 3879-1196 1477-4556 24 Hour Total   INTAKE   P.O. 1305 60 1365        P.O. 1305 60 1365      I.V.(mL/kg/hr) 1200(0.9) 160(0.1) 1360(0.5)        Volume (sodium chloride 0.9 % bolus infusion 500 mL) 500  500        Volume (0.9% sodium chloride infusion) 700 160 860      Shift Total(mL/kg) 6751(24.7) 220(2) 3545(53.3)      OUTPUT   Urine(mL/kg/hr) 550(0.4)  550(0.2)        Urine Output (mL) ([REMOVED] Urinary Catheter 07/19/22 2- way) 550  550      Stool           Stool Occurrence(s) 2 x  2 x      Shift Total(mL/kg) 550(4.9)  550(4.9)      NET 7144 084 9675      Weight (kg) 112.5 112.5 112.5 112.5 112.5 112.5         Inpatient Medications  Current Facility-Administered Medications   Medication Dose Route Frequency    midodrine (PROAMATINE) tablet 2.5 mg  2.5 mg Oral TID WITH MEALS    [Held by provider] 0.9% sodium chloride infusion  100 mL/hr IntraVENous CONTINUOUS    0.9% sodium chloride infusion 250 mL  250 mL IntraVENous PRN    enoxaparin (LOVENOX) injection 30 mg  30 mg SubCUTAneous Q12H    [Held by provider] amLODIPine (NORVASC) tablet 10 mg  10 mg Oral DAILY    gabapentin (NEURONTIN) capsule 600 mg  600 mg Oral QHS    [Held by provider] hydroCHLOROthiazide (HYDRODIURIL) tablet 12.5 mg  12.5 mg Oral DAILY    [Held by provider] lisinopriL (PRINIVIL, ZESTRIL) tablet 20 mg  20 mg Oral BID    metFORMIN (GLUCOPHAGE) tablet 500 mg  500 mg Oral BID WITH MEALS    tamsulosin (FLOMAX) capsule 0.8 mg  0.8 mg Oral DAILY WITH DINNER    sodium chloride (NS) flush 5-40 mL  5-40 mL IntraVENous Q8H    sodium chloride (NS) flush 5-40 mL  5-40 mL IntraVENous PRN    naloxone (NARCAN) injection 0.4 mg  0.4 mg IntraVENous PRN    senna-docusate (PERICOLACE) 8.6-50 mg per tablet 1 Tablet  1 Tablet Oral BID    polyethylene glycol (MIRALAX) packet 17 g  17 g Oral DAILY    bisacodyL (DULCOLAX) suppository 10 mg  10 mg Rectal DAILY PRN    acetaminophen (TYLENOL) tablet 1,000 mg  1,000 mg Oral Q6H    traMADoL (ULTRAM) tablet 50 mg  50 mg Oral Q6H PRN    oxyCODONE IR (ROXICODONE) tablet 5 mg  5 mg Oral Q3H PRN    oxyCODONE IR (ROXICODONE) tablet 10 mg  10 mg Oral Q3H PRN    famotidine (PEPCID) tablet 20 mg  20 mg Oral BID    phenol throat spray (CHLORASEPTIC)   Oral PRN    cyclobenzaprine (FLEXERIL) tablet 10 mg  10 mg Oral BID PRN    insulin lispro (HUMALOG) injection   SubCUTAneous AC&HS    glucose chewable tablet 16 g  4 Tablet Oral PRN    glucagon (GLUCAGEN) injection 1 mg  1 mg IntraMUSCular PRN    magnesium hydroxide (MILK OF MAGNESIA) 400 mg/5 mL oral suspension 30 mL  30 mL Oral Q12H    therapeutic multivitamin (THERAGRAN) tablet 1 Tablet  1 Tablet Oral DAILY    vitamin J-Y-G-lutein-minerals (OCUVITE) tablet 1 Tablet  1 Tablet Oral BID       Allergies  No Known Allergies    CBC:  Recent Labs     07/25/22  0330 07/24/22  0122 07/23/22  0044   HGB 9.7* 9.9* 9.5*   HCT 28.9* 29.5* 51.9*         Metabolic Panel:  Recent Labs     07/25/22  0330 07/24/22  0122 07/23/22  0044    139 143   K 3.5 3.5 3.8    107 111*   CO2 27 27 28   BUN 16 19 21*   CREA 0.70 0.74 0.86   * 114* 112*   CA 8.0* 8.3* 8.0*              Assessment and Plan     Bhavin Dalton is a 76 y.o. male that was admitted for back surgery by ortho and family medicine team was consulted for orthostatic hypotension. POD 5 of T10-S2 Iliac fixation, L1, L2, L3, L4 Osteotomies, L5 Laminectomy, T10-S2 fusion, open sacroiliac joint.  - Management per Ortho     Orthostatic hypotension: Pt has h/o HTN. Hgb is stable. - Continue to hold Norvasc 10 mg daily, Lisinopril 20 mg twice a day, HCTZ 12.5 mg  - Compression socks ordered. - Keep legs elevated at night.  - Decrease midodrine 2.5mg TID  - PT/OT today will help determine if patient continues to have orthostasis, guide further mgmt     Type II DM        Lab Results   Component Value Date/Time     Hemoglobin A1c 6.4 (H) 07/05/2022 02:48 PM   - Re-started on Metformin 500 mg BID.  - Monitor on daily BMP         Pain Management: Per Ortho     DVT Prophylaxis: Per Ortho     Disposition: Per Danika Montague MD  Family Medicine Resident       For Billing    No chief complaint on file. Hospital Problems  Date Reviewed: 11/1/2021            Codes Class Noted POA    Complication of surgical procedure ICD-10-CM: T81. 9XXA  ICD-9-CM: 998.9  7/19/2022 Unknown        Sagittal plane imbalance ICD-10-CM: M43.8X9  ICD-9-CM: 737.8  7/19/2022 Unknown        Neurogenic claudication due to lumbar spinal stenosis ICD-10-CM: M48.062  ICD-9-CM: 724.03  7/19/2022 Unknown        Left leg weakness ICD-10-CM: R29.898  ICD-9-CM: 729.89  7/19/2022 Unknown        Spondylolisthesis at L4-L5 level ICD-10-CM: M43.16  ICD-9-CM: 756.12  7/19/2022 Unknown        Spondylolisthesis at L2-L3 level ICD-10-CM: M43.16  ICD-9-CM: 756.12  7/19/2022 Unknown        Spondylolisthesis at L3-L4 level ICD-10-CM: M43.16  ICD-9-CM: 756.12  7/19/2022 Unknown

## 2022-07-25 NOTE — PROGRESS NOTES
Comprehensive Nutrition Assessment    Type and Reason for Visit: Initial, RD nutrition re-screen/LOS    Nutrition Recommendations/Plan:   Continue regular diet  Provide Ensure High Protein BID (320 kcal, 38 g carbs, 32 g protein)       Malnutrition Assessment:  Malnutrition Status:  Mild malnutrition (07/25/22 1213)    Context:  Acute illness     Findings of the 6 clinical characteristics of malnutrition:   Energy Intake:  50% or less of est energy requirements for 5 or more days  Weight Loss:  No significant weight loss     Body Fat Loss:  No significant body fat loss,     Muscle Mass Loss:  No significant muscle mass loss,    Fluid Accumulation:  No significant fluid accumulation,     Strength:  Not performed     Nutrition Assessment:     Pt is a 76year old male admitted with Sagittal plane imbalance; Spondylolisthesis of lumbar region;  Spondylolisthesis at L4-L5 level [M43.16]  Spondylolisthesis at L3-L4 level [M43.16]  Spondylolisthesis at L2-L3 level [M43.16]  Postlaminectomy kyphosis [M96.3]  Neurogenic claudication due to lumbar spinal stenosis. He  has a past medical history of Atherosclerosis, Diabetes ([facility] [address] Fatty liver determined by biopsy (08/2019), Frequent urination at night, History of stress test (06/27/2022), Hypertension, Insomnia, Kidney stone (2019 & 2021), and Obesity, Class I, BMI 30-34.9. RD screen for LOS. No clinically significant weight loss noted, Patient states # and denies recent weight or appetite changes. No N/V/D at this time. Denies chewing/swallowing problems. NKFA. PO intake averaging 25% of all meals - patient requests Ensure with lunch and dinner. RD placed order for ONS BID. Patient reports intake poor 2/2 back pain in addition to not liking food that much.        Wt Readings from Last 10 Encounters:   07/19/22 112.5 kg (248 lb)   07/05/22 112.5 kg (248 lb)   06/20/22 110.2 kg (243 lb)   06/15/22 110.5 kg (243 lb 9.6 oz)   05/05/22 112.9 kg (249 lb)   11/08/21 111.6 kg (246 lb 0.5 oz)   11/01/21 114.8 kg (253 lb)   10/29/21 114.3 kg (252 lb)   10/27/21 115.5 kg (254 lb 11.2 oz)   04/20/21 115.5 kg (254 lb 9.6 oz)       Nutrition Related Findings:      Wound Type: Multiple, Surgical incision    Last Bowel Movement Date: 07/24/22  Stool Appearance: Loose  Abdominal Assessment: Intact, Soft  Appetite: Poor  Bowel Sounds: Active   Edema:LLE: No Edema (7/25/2022  8:00 AM)  RLE: No Edema (7/25/2022  8:00 AM)      Nutr. Labs:  Lab Results   Component Value Date/Time    GFR est AA >60 07/25/2022 03:30 AM    GFR est non-AA >60 07/25/2022 03:30 AM    Creatinine 0.70 07/25/2022 03:30 AM    BUN 16 07/25/2022 03:30 AM    Sodium 140 07/25/2022 03:30 AM    Potassium 3.5 07/25/2022 03:30 AM    Chloride 107 07/25/2022 03:30 AM    CO2 27 07/25/2022 03:30 AM       Lab Results   Component Value Date/Time    Glucose 107 (H) 07/25/2022 03:30 AM    Glucose (POC) 118 (H) 07/25/2022 11:34 AM       Lab Results   Component Value Date/Time    Hemoglobin A1c 6.4 (H) 07/05/2022 02:48 PM       Nutr. Meds:  Dulcolax, Lovenox, Pepcid, humalog, MoM, metformin, midodrine, miralax, pericolace, theragran      Current Nutrition Intake & Therapies:  Average Meal Intake: 1-25%  Average Supplement Intake: Unable to assess  DIET ONE TIME MESSAGE  ADULT DIET Regular  ADULT ORAL NUTRITION SUPPLEMENT Lunch, Dinner; Low Calorie/High Protein    Anthropometric Measures:  Height: 5' 11\" (180.3 cm)  Ideal Body Weight (IBW): 172 lbs (78 kg)     Current Body Wt:  112.5 kg (248 lb 0.3 oz), 144.2 % IBW. Current BMI (kg/m2): 34.6        Weight Adjustment: No adjustment                 BMI Category: Obese class 1 (BMI 30.0-34. 9)    Estimated Daily Nutrient Needs:  Energy Requirements Based On: Formula  Weight Used for Energy Requirements: Current  Energy (kcal/day): 2422 (MSJ x 1.3)  Weight Used for Protein Requirements: Current  Protein (g/day): 113-135 (1.0-1.2 g/kg)  Method Used for Fluid Requirements: 1 ml/kcal  Fluid (ml/day): 2422    Nutrition Diagnosis:   Inadequate protein-energy intake related to early satiety as evidenced by intake 0-25%      Nutrition Interventions:   Food and/or Nutrient Delivery: Continue current diet, Continue oral nutrition supplement  Nutrition Education/Counseling: No recommendations at this time  Coordination of Nutrition Care: Continue to monitor while inpatient, Interdisciplinary rounds       Goals:     Goals: PO intake 50% or greater, by next RD assessment       Nutrition Monitoring and Evaluation:   Behavioral-Environmental Outcomes: None identified  Food/Nutrient Intake Outcomes: Food and nutrient intake  Physical Signs/Symptoms Outcomes: Biochemical data, Weight, Skin    Discharge Planning:     Too soon to determine    Jaimee Ureña MS, RD  Contact: Ext: [phone], or via [employer]

## 2022-07-25 NOTE — PROGRESS NOTES
7/25/2022  11:15 AM  Update via chart review, pt is continuing to require medical management for T10-S2 ILIAC FIXATION, L1, L2, L3, L4 OSTEOTOMIES, L5 LAMINECTOMY, T10-S2 FUSION, OPEN SACROILIAC JOINT FUSION, hypotension/dizziness.   Transition of Care Plan:                    RUR   5 % Low Risk of Readmission  LOS 6 Days  Post-op management continues, hypotension  POD #6 T10-S2 ILIAC FIXATION, L1, L2, L3, L4 OSTEOTOMIES, L5 LAMINECTOMY, T10-S2 FUSION, OPEN SACROILIAC JOINT FUSION  PT, OT following, accepted to [facility], pending medical stability, needs COVID PCR to admit to [facility], nursing will obtain 24H prior to DC   CM to follow through for treatment/response  DC when stable to Rehab   Outpatient follow-up  Ortho, PCP  Transprot TBD Family vs [facility]  CM will follow and assist w/ DC plan  Karla Cockayne, BS

## 2022-07-26 LAB
ANION GAP SERPL CALC-SCNC: 5 MMOL/L (ref 5–15)
ANION GAP SERPL CALC-SCNC: 6 MMOL/L (ref 5–15)
BUN SERPL-MCNC: 14 MG/DL (ref 6–20)
BUN SERPL-MCNC: 18 MG/DL (ref 6–20)
BUN/CREAT SERPL: 19 (ref 12–20)
BUN/CREAT SERPL: 29 (ref 12–20)
CALCIUM SERPL-MCNC: 6.5 MG/DL (ref 8.5–10.1)
CALCIUM SERPL-MCNC: 8.2 MG/DL (ref 8.5–10.1)
CHLORIDE SERPL-SCNC: 108 MMOL/L (ref 97–108)
CHLORIDE SERPL-SCNC: 116 MMOL/L (ref 97–108)
CO2 SERPL-SCNC: 24 MMOL/L (ref 21–32)
CO2 SERPL-SCNC: 27 MMOL/L (ref 21–32)
CREAT SERPL-MCNC: 0.49 MG/DL (ref 0.7–1.3)
CREAT SERPL-MCNC: 0.93 MG/DL (ref 0.7–1.3)
GLUCOSE BLD STRIP.AUTO-MCNC: 128 MG/DL (ref 65–117)
GLUCOSE BLD STRIP.AUTO-MCNC: 136 MG/DL (ref 65–117)
GLUCOSE BLD STRIP.AUTO-MCNC: 139 MG/DL (ref 65–117)
GLUCOSE BLD STRIP.AUTO-MCNC: 176 MG/DL (ref 65–117)
GLUCOSE SERPL-MCNC: 136 MG/DL (ref 65–100)
GLUCOSE SERPL-MCNC: 96 MG/DL (ref 65–100)
HCT VFR BLD AUTO: 22.1 % (ref 36.6–50.3)
HCT VFR BLD AUTO: 28.4 % (ref 36.6–50.3)
HGB BLD-MCNC: 7.2 G/DL (ref 12.1–17)
HGB BLD-MCNC: 9.6 G/DL (ref 12.1–17)
POTASSIUM SERPL-SCNC: 2.7 MMOL/L (ref 3.5–5.1)
POTASSIUM SERPL-SCNC: 3.6 MMOL/L (ref 3.5–5.1)
SERVICE CMNT-IMP: ABNORMAL
SODIUM SERPL-SCNC: 141 MMOL/L (ref 136–145)
SODIUM SERPL-SCNC: 145 MMOL/L (ref 136–145)

## 2022-07-26 PROCEDURE — 82962 GLUCOSE BLOOD TEST: CPT

## 2022-07-26 PROCEDURE — 94761 N-INVAS EAR/PLS OXIMETRY MLT: CPT

## 2022-07-26 PROCEDURE — 36415 COLL VENOUS BLD VENIPUNCTURE: CPT

## 2022-07-26 PROCEDURE — 77010033678 HC OXYGEN DAILY

## 2022-07-26 PROCEDURE — 97530 THERAPEUTIC ACTIVITIES: CPT

## 2022-07-26 PROCEDURE — 74011250637 HC RX REV CODE- 250/637: Performed by: ORTHOPAEDIC SURGERY

## 2022-07-26 PROCEDURE — 99232 SBSQ HOSP IP/OBS MODERATE 35: CPT | Performed by: FAMILY MEDICINE

## 2022-07-26 PROCEDURE — 74011250636 HC RX REV CODE- 250/636: Performed by: NURSE PRACTITIONER

## 2022-07-26 PROCEDURE — 80048 BASIC METABOLIC PNL TOTAL CA: CPT

## 2022-07-26 PROCEDURE — 74011250637 HC RX REV CODE- 250/637: Performed by: STUDENT IN AN ORGANIZED HEALTH CARE EDUCATION/TRAINING PROGRAM

## 2022-07-26 PROCEDURE — 65270000029 HC RM PRIVATE

## 2022-07-26 PROCEDURE — 99232 SBSQ HOSP IP/OBS MODERATE 35: CPT | Performed by: INTERNAL MEDICINE

## 2022-07-26 PROCEDURE — 74011250637 HC RX REV CODE- 250/637: Performed by: HOSPITALIST

## 2022-07-26 PROCEDURE — 74011636637 HC RX REV CODE- 636/637: Performed by: NURSE PRACTITIONER

## 2022-07-26 PROCEDURE — 74011000250 HC RX REV CODE- 250: Performed by: NURSE PRACTITIONER

## 2022-07-26 PROCEDURE — APPSS15 APP SPLIT SHARED TIME 0-15 MINUTES: Performed by: NURSE PRACTITIONER

## 2022-07-26 PROCEDURE — 85018 HEMOGLOBIN: CPT

## 2022-07-26 PROCEDURE — 74011250636 HC RX REV CODE- 250/636

## 2022-07-26 PROCEDURE — 97116 GAIT TRAINING THERAPY: CPT

## 2022-07-26 PROCEDURE — 74011250637 HC RX REV CODE- 250/637: Performed by: NURSE PRACTITIONER

## 2022-07-26 RX ORDER — MIRTAZAPINE 15 MG/1
7.5 TABLET, FILM COATED ORAL DAILY
Status: DISCONTINUED | OUTPATIENT
Start: 2022-07-26 | End: 2022-07-28 | Stop reason: HOSPADM

## 2022-07-26 RX ORDER — POTASSIUM CHLORIDE 750 MG/1
40 TABLET, FILM COATED, EXTENDED RELEASE ORAL
Status: COMPLETED | OUTPATIENT
Start: 2022-07-26 | End: 2022-07-26

## 2022-07-26 RX ORDER — SULFAMETHOXAZOLE AND TRIMETHOPRIM 800; 160 MG/1; MG/1
1 TABLET ORAL EVERY 12 HOURS
Status: DISCONTINUED | OUTPATIENT
Start: 2022-07-26 | End: 2022-07-26

## 2022-07-26 RX ORDER — POTASSIUM CHLORIDE 7.45 MG/ML
10 INJECTION INTRAVENOUS
Status: COMPLETED | OUTPATIENT
Start: 2022-07-26 | End: 2022-07-26

## 2022-07-26 RX ADMIN — Medication 1 TABLET: at 17:03

## 2022-07-26 RX ADMIN — METFORMIN HYDROCHLORIDE 500 MG: 500 TABLET ORAL at 17:03

## 2022-07-26 RX ADMIN — POTASSIUM CHLORIDE 10 MEQ: 7.46 INJECTION, SOLUTION INTRAVENOUS at 10:50

## 2022-07-26 RX ADMIN — FAMOTIDINE 20 MG: 20 TABLET, FILM COATED ORAL at 17:03

## 2022-07-26 RX ADMIN — MIDODRINE HYDROCHLORIDE 2.5 MG: 5 TABLET ORAL at 08:27

## 2022-07-26 RX ADMIN — GABAPENTIN 600 MG: 300 CAPSULE ORAL at 21:30

## 2022-07-26 RX ADMIN — ACETAMINOPHEN 1000 MG: 500 TABLET ORAL at 17:03

## 2022-07-26 RX ADMIN — MIDODRINE HYDROCHLORIDE 2.5 MG: 5 TABLET ORAL at 13:01

## 2022-07-26 RX ADMIN — MIRTAZAPINE 7.5 MG: 15 TABLET, FILM COATED ORAL at 09:18

## 2022-07-26 RX ADMIN — THERA TABS 1 TABLET: TAB at 08:27

## 2022-07-26 RX ADMIN — POTASSIUM CHLORIDE 10 MEQ: 7.46 INJECTION, SOLUTION INTRAVENOUS at 08:25

## 2022-07-26 RX ADMIN — POTASSIUM CHLORIDE 10 MEQ: 7.46 INJECTION, SOLUTION INTRAVENOUS at 09:47

## 2022-07-26 RX ADMIN — Medication 10 ML: at 14:19

## 2022-07-26 RX ADMIN — POTASSIUM CHLORIDE 40 MEQ: 750 TABLET, FILM COATED, EXTENDED RELEASE ORAL at 06:45

## 2022-07-26 RX ADMIN — Medication 1 TABLET: at 08:27

## 2022-07-26 RX ADMIN — METFORMIN HYDROCHLORIDE 500 MG: 500 TABLET ORAL at 08:27

## 2022-07-26 RX ADMIN — ACETAMINOPHEN 1000 MG: 500 TABLET ORAL at 06:45

## 2022-07-26 RX ADMIN — ENOXAPARIN SODIUM 30 MG: 100 INJECTION SUBCUTANEOUS at 08:25

## 2022-07-26 RX ADMIN — ENOXAPARIN SODIUM 30 MG: 100 INJECTION SUBCUTANEOUS at 21:30

## 2022-07-26 RX ADMIN — INSULIN LISPRO 2 UNITS: 100 INJECTION, SOLUTION INTRAVENOUS; SUBCUTANEOUS at 08:25

## 2022-07-26 RX ADMIN — Medication 10 ML: at 06:46

## 2022-07-26 RX ADMIN — FAMOTIDINE 20 MG: 20 TABLET, FILM COATED ORAL at 08:27

## 2022-07-26 RX ADMIN — SODIUM CHLORIDE 75 ML/HR: 9 INJECTION, SOLUTION INTRAVENOUS at 04:59

## 2022-07-26 RX ADMIN — ACETAMINOPHEN 1000 MG: 500 TABLET ORAL at 00:54

## 2022-07-26 RX ADMIN — Medication 10 ML: at 21:30

## 2022-07-26 RX ADMIN — ACETAMINOPHEN 1000 MG: 500 TABLET ORAL at 13:01

## 2022-07-26 NOTE — PROGRESS NOTES
Cardiology Progress Note  Patient: Mark Wganer MRN: [id number]     YOB: 1945  Age: 76 y.o. Sex: male      Admit Date: 7/19/2022       Assessment/Plan     1 orthostatic hypotension with supine hypertension : agree with holding ace, amlodipine and HCTZ as can all cause orthostasis, jobst stockings. In short term can use midodrine. 2. Hx HTN:BP meds on hold. 3 Hx DM  4. POD # & spine surgery: Needs PT/OT  5 BPH: holding flomax. 6 post op anemia: hgb 7.2 consider transfusion. NP spent __10__ minutes reviewing chart, labs, diagnostics and coordination of care . NP spent __8_ minutes with pt/family in examination and care plan review. HPI     Mark Wagner is a 76 y.o.  male  with PMH of diabetes, hypertension, dyslipidemia, DJD who is s/p day 6 from T10-S2 Iliac fixation, L1, L2, L3, L4 Osteotomies, L5 Laminectomy, T10-S2 fusion, open sacroiliac joint. He worked with PT Saturday and noted to be orthostatic.  0940 - 153/65 BP while lying down, 0945 - BP 95/51 while sitting on edge of bed, 0948 - /80 when standing. BP meds were held and midodrine initiated. He did not get up yesterday and today he became diaphoretic and felt like he was going to pass out when he tried to get up to chair. Today when he got up BP dropped from 425 systolic to 898 sitting then 78 standing and he nearly passed out. He became diaphoretic and pale. Surgical compression stocking are in place. He notes he is not eating and drinking very little per his report. Kinza Miles He is noted to be on flomax daily. Has received 1 un PRBC's post op. The patient denies chest pain, dependent edema,  RINALDI, orthopnea, palpitations, PND, shortness of breath, claudication or syncope.      The patient has been referred to cardiology for on going management of orthostatic hypotension        Review of Symptoms:  Constitutional: positive for fatigue  ENT: negative   Respiratory: negative for cough or dyspnea on exertion  Gastrointestinal: negative for vomiting  Genitourinary: + frequency   Musculoskeletal:positive for back pain  Neurological: negative for memory problems  Other systems reviewed and negative except as above. Previous cardiac hx  06/20/22    NUCLEAR CARDIAC STRESS TEST 06/24/2022 6/30/2022    Interpretation Summary  Formatting of this result is different from the original.      ECG: Resting ECG demonstrates normal sinus rhythm. Normal stress myocardial perfusion imaging without ischemia or infarction on pharmacological stress test. Normal left ventricular systolic function with LVEF 55%.   No EKG changes of ischemia on pharmacological stress test.    Signed by: Nydia Armstrong MD on 6/24/2022  2:43 PM    Risk factors: male, HTN, DM     Social History     Tobacco Use    Smoking status: Never    Smokeless tobacco: Never   Substance Use Topics    Alcohol use: Yes     Comment: rare     Family History   Problem Relation Age of Onset    Cancer Mother     Breast Cancer Mother     Colon Cancer Mother     Stroke Father     Prostate Cancer Brother     Deep Vein Thrombosis Neg Hx     Anesth Problems Neg Hx        Current Facility-Administered Medications   Medication Dose Route Frequency    potassium chloride 10 mEq in 100 ml IVPB  10 mEq IntraVENous Q1H    trimethoprim-sulfamethoxazole (BACTRIM DS, SEPTRA DS) 160-800 mg per tablet 1 Tablet  1 Tablet Oral Q12H    mirtazapine (REMERON) tablet 7.5 mg  7.5 mg Oral DAILY    midodrine (PROAMATINE) tablet 2.5 mg  2.5 mg Oral TID WITH MEALS    [Held by provider] tamsulosin (FLOMAX) capsule 0.4 mg  0.4 mg Oral DAILY WITH DINNER    0.9% sodium chloride infusion  75 mL/hr IntraVENous CONTINUOUS    0.9% sodium chloride infusion 250 mL  250 mL IntraVENous PRN    enoxaparin (LOVENOX) injection 30 mg  30 mg SubCUTAneous Q12H    [Held by provider] amLODIPine (NORVASC) tablet 10 mg  10 mg Oral DAILY    gabapentin (NEURONTIN) capsule 600 mg  600 mg Oral QHS    [Held by provider] hydroCHLOROthiazide (HYDRODIURIL) tablet 12.5 mg  12.5 mg Oral DAILY    [Held by provider] lisinopriL (PRINIVIL, ZESTRIL) tablet 20 mg  20 mg Oral BID    metFORMIN (GLUCOPHAGE) tablet 500 mg  500 mg Oral BID WITH MEALS    sodium chloride (NS) flush 5-40 mL  5-40 mL IntraVENous Q8H    sodium chloride (NS) flush 5-40 mL  5-40 mL IntraVENous PRN    naloxone (NARCAN) injection 0.4 mg  0.4 mg IntraVENous PRN    [Held by provider] senna-docusate (PERICOLACE) 8.6-50 mg per tablet 1 Tablet  1 Tablet Oral BID    [Held by provider] polyethylene glycol (MIRALAX) packet 17 g  17 g Oral DAILY    [Held by provider] bisacodyL (DULCOLAX) suppository 10 mg  10 mg Rectal DAILY PRN    acetaminophen (TYLENOL) tablet 1,000 mg  1,000 mg Oral Q6H    traMADoL (ULTRAM) tablet 50 mg  50 mg Oral Q6H PRN    oxyCODONE IR (ROXICODONE) tablet 5 mg  5 mg Oral Q3H PRN    oxyCODONE IR (ROXICODONE) tablet 10 mg  10 mg Oral Q3H PRN    famotidine (PEPCID) tablet 20 mg  20 mg Oral BID    phenol throat spray (CHLORASEPTIC)   Oral PRN    cyclobenzaprine (FLEXERIL) tablet 10 mg  10 mg Oral BID PRN    insulin lispro (HUMALOG) injection   SubCUTAneous AC&HS    glucose chewable tablet 16 g  4 Tablet Oral PRN    glucagon (GLUCAGEN) injection 1 mg  1 mg IntraMUSCular PRN    magnesium hydroxide (MILK OF MAGNESIA) 400 mg/5 mL oral suspension 30 mL  30 mL Oral Q12H    therapeutic multivitamin (THERAGRAN) tablet 1 Tablet  1 Tablet Oral DAILY    vitamin Z-I-P-lutein-minerals (OCUVITE) tablet 1 Tablet  1 Tablet Oral BID       Objective:     Vitals:    07/26/22 0400 07/26/22 0500 07/26/22 0600 07/26/22 0700   BP: (!) 165/63 (!) 168/66  (!) 180/87   Pulse: 73 64 (!) 125 89   Resp: 16 15 22 18   Temp: 98.2 °F (36.8 °C)      SpO2: 99% 92% 90% 97%   Weight:       Height:            Intake and Output:  Current Shift: No intake/output data recorded. Last three shifts: 07/24 1901 - 07/26 0700  In: 2347.5 [P.O.:1010;  I.V.:1337.5]  Out: 725 [Urine:725]          Gen: Well-developed, well-nourished, in no acute distress  Neck: Supple,No JVD, No Carotid Bruit,   Resp: No accessory muscle use, Clear breath sounds, No rales or rhonchi  Card: Regular Rate,  Rhythm,  Normal S1, S2, No murmurs, rubs or gallop.    Abd:  Soft, non-tender, non-distended,BS+,   MSK: No cyanosis  Skin: No rashes    Neuro: moving all four extremities , follows commands appropriately  Psych:  Good insight, oriented to person, place , alert, Nml Affect  LE: No edema, SARAH hose on     EKG:   EKG Results       None              TELEMETRY; SR occ PVC     Lab/Data Review:  BMP:   Lab Results   Component Value Date/Time     07/26/2022 04:55 AM    K 2.7 (LL) 07/26/2022 04:55 AM     (H) 07/26/2022 04:55 AM    CO2 24 07/26/2022 04:55 AM    AGAP 5 07/26/2022 04:55 AM    GLU 96 07/26/2022 04:55 AM    BUN 14 07/26/2022 04:55 AM    CREA 0.49 (L) 07/26/2022 04:55 AM    GFRAA >60 07/26/2022 04:55 AM    GFRNA >60 07/26/2022 04:55 AM     CBC:   Lab Results   Component Value Date/Time    HGB 7.2 (L) 07/26/2022 04:55 AM    HCT 22.1 (L) 07/26/2022 04:55 AM        Signed By: Delmy Thomas NP     July 26, 2022

## 2022-07-26 NOTE — PROGRESS NOTES
0700 Bedside shift change report given to Cassius Boogie RN (oncoming nurse) by   [facility], RN (offgoing nurse). Report included the following information SBAR, OR Summary, Intake/Output, MAR, Recent Results, Med Rec Status, and Cardiac Rhythm sinus rhythm . Primary Nurse Miguel Redmond RN and [facility], RN performed a dual skin assessment on this patient No impairment noted  Weston score is see flowsheet. 0825 Morning meds given. 2 units insulin given for BG of 176. Patient assessed. Patient is alert and oriented x4, pupils round and reactive, and pulses felt in all extremities. Heart sounds heard, lung sounds clear, and bowel sounds active. Patient's appetite is poor due to not liking the hospital food. Nutrition talked to patient and tried to make some differences in meals. Temp was afebrile. Patient is x1 assist to bedside commode and uses urinal. Patient reports 0/10 pain. Patient does have some periodic hypertension when getting excited and orthostatic hypotension when getting up, MD aware and patient feels fine. 0900 Milk of magnesia held due to patient's loose bowel movements. 0003 Potassium hung. 1050 Potassium hung. 1130 Insulin held. .     1301 Midodrine and tylenol given. Patient reassessed, no change. 1345 TRANSFER - OUT REPORT:    Verbal report given to RG Reyes(name) on Caden Alvarez  being transferred to ASU(unit) for routine progression of care       Report consisted of patients Situation, Background, Assessment and   Recommendations(SBAR). Information from the following report(s) SBAR, OR Summary, Intake/Output, MAR, Recent Results, Med Rec Status, and Cardiac Rhythm sinus rhythm  was reviewed with the receiving nurse.     Lines:   Peripheral IV 07/19/22 Right Wrist (Active)   Site Assessment Clean, dry, & intact 07/26/22 1200   Phlebitis Assessment 0 07/26/22 1200   Infiltration Assessment 0 07/26/22 1200   Dressing Status Clean, dry, & intact 07/26/22 1200   Dressing Type Transparent 07/26/22 1200   Hub Color/Line Status Pink;Flushed;Capped 07/26/22 1200   Action Taken Open ports on tubing capped 07/26/22 1200   Alcohol Cap Used Yes 07/26/22 1200       Peripheral IV 07/24/22 Anterior; Left Forearm (Active)   Site Assessment Clean, dry, & intact 07/26/22 1200   Phlebitis Assessment 0 07/26/22 1200   Infiltration Assessment 0 07/26/22 1200   Dressing Status Clean, dry, & intact 07/26/22 1200   Dressing Type Transparent 07/26/22 1200   Hub Color/Line Status Pink; Infusing 07/26/22 1200   Action Taken Open ports on tubing capped 07/26/22 1200   Alcohol Cap Used Yes 07/26/22 1200        Opportunity for questions and clarification was provided.       Patient transported with:   [employer]

## 2022-07-26 NOTE — CONSULTS
Nutrition Note    RD re-consulted for poor intake/appetite. Please see full assessment note from 7/25/22. Met with patient again today - he endorses 'doing okay' with ONS but wanting to try different flavor. May consider appetite stimulant?      RD to modify ONS: Provide Ensure Enlive BID to increase kcal/protein intake (700 kcal, 88 g Carbs, 40 g protein)    Electronically signed by Suad Robledo RD on 7/81/6877  Contact: Ext: [phone], or via Integration Management

## 2022-07-26 NOTE — PROGRESS NOTES
Problem: Self Care Deficits Care Plan (Adult)  Goal: *Acute Goals and Plan of Care (Insert Text)  Description: FUNCTIONAL STATUS PRIOR TO ADMISSION: Patient was modified independent using a rolling walker for functional mobility. HOME SUPPORT PRIOR TO ADMISSION: The patient lived with his wife but did not require assist. Patient reports recent signs of dementia in his wife and expressed concern for her care. Occupational Therapy Goals  Initiated 7/20/2022    1. Patient will perform upper body dressing and bathing with modified independence within 7 days. 2.  Patient will perform lower body dressing and bathing with supervision/set-up using AE PRN within 7 day(s). 3.  Patient will perform toilet transfer with minimal assistance using rolling walker within 7 days. 4.  Patient will perform all aspects of toileting at minimal assistance within 7 days. 5.  Patient will don/doff back brace at supervision/set-up within 7 days. 6.  Patient will verbalize/demonstrate all back precautions during ADL tasks without cues within 7 days.       Outcome: Progressing Towards Goal   OCCUPATIONAL THERAPY TREATMENT  Patient: Hugo Cat (42 y.o. male)  Date: 7/26/2022  Diagnosis: Sagittal plane imbalance [M43.8X9]  Spondylolisthesis of lumbar region [M43.16]  Lumbar radiculitis [M54.16]  Diabetic peripheral neuropathy (HCC) [E11.42]  Progressive focal motor weakness [R53.1]  Spondylolisthesis at L4-L5 level [M43.16]  Spondylolisthesis at L3-L4 level [M43.16]  Spondylolisthesis at L2-L3 level [M43.16]  Postlaminectomy kyphosis [M96.3]  Neurogenic claudication due to lumbar spinal stenosis [M48.062]  Left leg weakness [R29.898] <principal problem not specified>  Procedure(s) (LRB):  T10-S2 ILIAC FIXATION, L1, L2, L3, L4 OSTEOTOMIES, L5 LAMINECTOMY, T10-S2 FUSION, OPEN SACROILIAC JOINT FUSION (O-ARM) (N/A) 7 Days Post-Op  Precautions: Back (brace when up, no BLT, sitting <30-45 min, log roll to sit)  Chart, occupational therapy assessment, plan of care, and goals were reviewed. ASSESSMENT  Patient continues with skilled OT services and is progressing slowly towards goals. Mr. Radha Mercado was received in bed agreeable to activity. He continues to be limited by symptomatic orthostatic hypotension, elevated HR with activity, significant generalized weakness, and poor endurance. He performed transfer to EOB with min A x2 and max cues to carry out log roll technique. Patient reports dizziness, worsening with increased time in sitting and requested to return to supine. Once repositioned, patient requested to attempt sitting EOB again. He was able to sit EOB and perform stand-pivot transfer to the chair with assist x2. Patient with drop in BP, reports discomfort in the back, and requested to return to bed. Assisted with additional transfer back to bed and repositioned in supine. Patient with elidia hose in place for tx. He was encouraged to attempt to have bed in modified chair position but only able to tolerated partial postioning. He is making slow progress overall. Patient Vitals during OT tx:   Position Pulse BP   07/26/22 1216 Sitting (!) 114 (!) 107/53   07/26/22 1215 Supine (!) 114 (!) 131/51   07/26/22 1213 Supine (!) 122 (!) 131/51   07/26/22 1212 Sitting (!) 126 (!) 126/95   07/26/22 1208 Supine 88 (!) 147/72   07/26/22 1200 Supine  (!) 108 130/85          Current Level of Function Impacting Discharge (ADLs): Patient required min A x1-2 for functional mobility. Patient reuqired max  for UB dressing and total A for LB dressing. PLAN :  Patient continues to benefit from skilled intervention to address the above impairments. Continue treatment per established plan of care to address goals. Recommend with staff:   Recommend patient be placed in chair position frequently throughout the day.    For toileting needs, recommend BSC transfers or bed level with staff assist.  Encourage patient involvement in personal care as able. Encourage exercises frequently throughout the day. Recommend next OT session: Continue towards set OT goals. Recommendation for discharge: (in order for the patient to meet his/her long term goals)  Therapy up to 5 days/week in rehab setting  This discharge recommendation:  Has been made in collaboration with the attending provider and/or case management    IF patient discharges home will need the following DME: none       SUBJECTIVE:   Patient agreeable to OT tx. OBJECTIVE DATA SUMMARY:   Cognitive/Behavioral Status:  Neurologic State: Alert  Orientation Level: Oriented X4  Cognition: Appropriate decision making; Appropriate for age attention/concentration; Appropriate safety awareness  Perception: Appears intact  Perseveration: No perseveration noted  Safety/Judgement: Awareness of environment    Functional Mobility and Transfers for ADLs:  Bed Mobility:  Rolling: Minimum assistance;Assist x2  Supine to Sit: Minimum assistance;Assist x2  Sit to Supine: Minimum assistance;Assist x2  Scooting: Minimum assistance;Assist x2    Transfers:  Sit to Stand: Minimum assistance;Assist x2  Bed to Chair: Minimum assistance;Assist x2    Balance:  Sitting: Intact; High guard  Sitting - Static: Good (unsupported)  Sitting - Dynamic: Fair (occasional)  Standing: Impaired; With support  Standing - Static: Fair  Standing - Dynamic : Fair    ADL Intervention:  Upper Body Dressing Assistance  Dressing Assistance: Maximum assistance  Orthotics(Brace): Maximum assistance  Shirt simulation with hospital gown: Maximum assistance    Lower Body Dressing Assistance  Dressing Assistance:  Total assistance(dependent)       Cognitive Retraining  Safety/Judgement: Awareness of environment      Activity Tolerance:   Fair    After treatment patient left in no apparent distress:   Supine in bed, Call bell within reach, and Bed / chair alarm activated    COMMUNICATION/COLLABORATION:   The patients plan of care was discussed with: Physical therapist, Registered nurse, and patient .      Kathleen Blanco, OTR/L  Time Calculation: 40 mins

## 2022-07-26 NOTE — PROGRESS NOTES
Bedside and Verbal shift change report given to [address] (oncoming nurse) by Rocio Morse (offgoing nurse). Report included the following information SBAR, Intake/Output, MAR, Recent Results, Cardiac Rhythm NSR and Alarm Parameters . Primary Nurse Ananya Santos, RG and  Andrei RN performed a dual skin assessment on this patient skin impairment noted  Weston score is See Flowsheets    See Flowsheets for all assessments. See MAR for all medication administrations. Pt critical K 2.7 called to floor around 0630AM, Shira Hernandez RN notified Dr. Catalina Maoy and received orders. See MAR for orders. Bedside and Verbal shift change report given to Dannie Beverly (oncoming nurse) by [facility] (offgoing nurse). Report included the following information SBAR, Intake/Output, MAR, Recent Results, Cardiac Rhythm NSR, and Alarm Parameters .

## 2022-07-26 NOTE — PROGRESS NOTES
1400: TRANSFER - IN REPORT:    Verbal report received from MARIA LUISA JURADO RN(name) on Dinorah Burris  being received from ICU(unit) for routine progression of care      Report consisted of patients Situation, Background, Assessment and   Recommendations(SBAR). Information from the following report(s) SBAR, Intake/Output, MAR, Recent Results, and Cardiac Rhythm NSR  was reviewed with the receiving nurse. Opportunity for questions and clarification was provided. Assessment completed upon patients arrival to unit and care assumed. Upon arrival patient is hooked up to monitor and assisted to the bedside commode. Danilo experiences no dizziness and BP remains elevated but HR jumps up to the 140s while patient is up. Upon returning to bed HR drops down to 100s. Labs drawn and sent. 1515: Dr Daniel Dozier at bedside assessing patient. 1530: Reassessment completed. 1900: Bedside and Verbal shift change report given to Daksha Mixon RN (oncoming nurse) by Gaurav Guan RN (offgoing nurse). Report included the following information SBAR, Intake/Output, MAR, Recent Results, and Cardiac Rhythm NSR .

## 2022-07-26 NOTE — PROGRESS NOTES
Problem: Mobility Impaired (Adult and Pediatric)  Goal: *Acute Goals and Plan of Care (Insert Text)  Description: FUNCTIONAL STATUS PRIOR TO ADMISSION: Patient was modified independent using a rolling walker for functional mobility. HOME SUPPORT PRIOR TO ADMISSION: The patient lived with his wife but did not require assist. Patient reports recent signs of dementia in his wife and expressed concern for her care. Physical Therapy Goals  Initiated 7/20/2022  1. Patient will move from supine to sit and sit to supine  and roll side to side in bed with supervision/set-up within 7 day(s). 2.  Patient will transfer from bed to chair and chair to bed with minimal assistance/contact guard assist using the least restrictive device within 7 day(s). 3.  Patient will perform sit to stand with minimal assistance/contact guard assist within 7 day(s). 4.  Patient will ambulate with minimal assistance/contact guard assist for 100 feet with the least restrictive device within 7 day(s). 5.  Patient will ascend/descend 4 stairs with 1 handrail(s) with modified independence within 7 day(s). 6. Patient will verbalize and demonstrate understanding of spinal precautions (No bending, lifting greater than 5 lbs, or twisting; log-roll technique; frequent repositioning as instructed) within 4 days.         Outcome: Progressing Towards Goal   PHYSICAL THERAPY TREATMENT  Patient: Karlie Goodrich (89 y.o. male)  Date: 7/26/2022  Diagnosis: Sagittal plane imbalance [M43.8X9]  Spondylolisthesis of lumbar region [M43.16]  Lumbar radiculitis [M54.16]  Diabetic peripheral neuropathy (HCC) [E11.42]  Progressive focal motor weakness [R53.1]  Spondylolisthesis at L4-L5 level [M43.16]  Spondylolisthesis at L3-L4 level [M43.16]  Spondylolisthesis at L2-L3 level [M43.16]  Postlaminectomy kyphosis [M96.3]  Neurogenic claudication due to lumbar spinal stenosis [M48.062]  Left leg weakness [R29.898] <principal problem not specified>  Procedure(s) (LRB):  T10-S2 ILIAC FIXATION, L1, L2, L3, L4 OSTEOTOMIES, L5 LAMINECTOMY, T10-S2 FUSION, OPEN SACROILIAC JOINT FUSION (O-ARM) (N/A) 7 Days Post-Op  Precautions: Back (brace when up, no BLT, sitting <30-45 min, log roll to sit)  Chart, physical therapy assessment, plan of care and goals were reviewed. ASSESSMENT  Patient continues with skilled PT services and is progressing towards goals. Communicated with nurse cleared for therapy. Patient supine on bed when received. Reviewed back precautions and donning of back brace verbalized understanding. Rolled on the edge of bed min assist x 2, supine to sit min assist, sit to stand min assist x 2,  dangled on the edge of bed for 5 minutes, sit to stand min assist x 2. Attempted to  transfers to chair how ever patient too anxious about his blood pressure and anxious about his pain on his back asked to go back to bed supine. Assisted supine on bed and reposition up high of bed. Then patient still want to get up and ambulate again. Sat patient up and ambulate towards the recliner performed some active range of motion exercise on both LE all planes. After sitting for 10 minutes and getting ready to stepout of his room patient then asked to go back to bed he is not comfortable sitting on the recliner. Assisted supine on bed and place bed to chair position jennifere patent to  at least stay on chair position for 30 minutes before calling the nurse to put bed to flat position. Educate and instructed patient to continue active range of motion exercise on both legs while up on chair or on bed multiple times. Recommend patient to be up on the chair at least 3 times a day every meal times as tolerated. Communicated with nurse who agreed to monitor patient.             Current Level of Function Impacting Discharge (mobility/balance): min assist x 2 with transfer and ambulation with rolling walker    Other factors to consider for discharge: fall         PLAN :  Patient continues to benefit from skilled intervention to address the above impairments. Continue treatment per established plan of care. to address goals. Recommendation for discharge: (in order for the patient to meet his/her long term goals)  Therapy 3 hours per day 5-7 days per week    This discharge recommendation:  Has been made in collaboration with the attending provider and/or case management    IF patient discharges home will need the following DME: patient owns DME required for discharge       SUBJECTIVE:   Patient stated Keyon Easley.     OBJECTIVE DATA SUMMARY:   Critical Behavior:  Neurologic State: Alert  Orientation Level: Oriented X4  Cognition: Follows commands  Safety/Judgement: Awareness of environment, Fall prevention  Functional Mobility Training:  Bed Mobility:  Rolling: Minimum assistance;Assist x2  Supine to Sit: Minimum assistance;Assist x2  Sit to Supine: Minimum assistance;Assist x2  Scooting: Minimum assistance;Assist x2        Transfers:  Sit to Stand: Minimum assistance;Assist x2  Stand to Sit: Minimum assistance;Assist x2  Stand Pivot Transfers: Minimum assistance;Assist x2     Bed to Chair: Minimum assistance;Assist x2                    Balance:  Sitting: Intact; High guard  Sitting - Static: Good (unsupported)  Sitting - Dynamic: Fair (occasional)  Standing: Impaired; With support  Standing - Static: Fair  Standing - Dynamic : Fair  Ambulation/Gait Training:  Distance (ft): 10 Feet (ft)  Assistive Device: Walker, rolling;Gait belt;Brace/Splint  Ambulation - Level of Assistance: Minimal assistance;Assist x2     Gait Description (WDL): Exceptions to WDL  Gait Abnormalities: Antalgic; Path deviations; Step to gait        Base of Support: Widened     Speed/Katalina: Fluctuations; Slow  Step Length: Right shortened;Left shortened                         Therapeutic Exercises:   Educate and instructed patient to continue active range of motion exercise on both legs while up on chair or on bed multiple times.  Recommend patient to be up on the chair at least 3 times a day every meal times as tolerated. Pain Ratin/10    Activity Tolerance:   Good    After treatment patient left in no apparent distress:   Sitting in chair, Supine in bed, Heels elevated for pressure relief, Call bell within reach, and Bed / chair alarm activated    COMMUNICATION/COLLABORATION:   The patients plan of care was discussed with: Occupational therapist and Registered nurse. Chuy Novoa PT,WCC.    Time Calculation: 35 mins

## 2022-07-26 NOTE — PROGRESS NOTES
[facility] Medicine Service Daily Progress Note    24 Hour Events: NAEO    SUBJECTIVE: Has had no further episodes of lightheadedness/dizziness. Denies SOB, CP. Had some orthostatic pressures yesterday. OBJECTIVE:      Vitals: Visit Vitals  BP (!) 168/66   Pulse 64   Temp 98.2 °F (36.8 °C)   Resp 15   Ht 5' 11\" (1.803 m)   Wt 248 lb (112.5 kg)   SpO2 92%   BMI 34.59 kg/m²     Physical Exam:  General: NAD. Respiratory: Lungs clear to auscultation bilaterally with no wheezing, rhonchi, rales, or crackles. Cardiovascular: Regular rate and rhythm. S1/S2 auscultated with no murmurs, rubs, or gallops. GI: Nondistended. + bowel sounds. Nontender to palpation. Extremities: No LE edema. Skin: Warm, dry. Neuro: Alert and oriented    I/O:  Date 07/25/22 0700 - 07/26/22 0659 07/26/22 0700 - 07/27/22 0659   Shift 7772-1658 6016-5234 24 Hour Total 1165-9463 9673-7604 24 Hour Total   INTAKE   P.O. 950  950        P. O. 950  950      I. V.(mL/kg/hr) 202.5(0.2) 825(0.6) 1027.5(0.4)        Volume (0.9% sodium chloride infusion) 202.5 825 1027.5      Shift Total(mL/kg) 1152. 5(10.2) 825(7.3) 1977.5(17.6)      OUTPUT   Urine(mL/kg/hr) 500(0.4) 225(0.2) 725(0.3)        Urine Voided 500 225 725        Urine Occurrence(s) 3 x 1 x 4 x      Stool           Stool Occurrence(s) 1 x 1 x 2 x      Shift Total(mL/kg) 500(4.4) 225(2) 725(6.4)      .5 600 1252.5      Weight (kg) 112.5 112.5 112.5 112.5 112.5 112.5         Inpatient Medications  Current Facility-Administered Medications   Medication Dose Route Frequency    potassium chloride 10 mEq in 100 ml IVPB  10 mEq IntraVENous Q1H    midodrine (PROAMATINE) tablet 2.5 mg  2.5 mg Oral TID WITH MEALS    [Held by provider] tamsulosin (FLOMAX) capsule 0.4 mg  0.4 mg Oral DAILY WITH DINNER    0.9% sodium chloride infusion  75 mL/hr IntraVENous CONTINUOUS    0.9% sodium chloride infusion 250 mL  250 mL IntraVENous PRN    enoxaparin (LOVENOX) injection 30 mg  30 mg SubCUTAneous Q12H    [Held by provider] amLODIPine (NORVASC) tablet 10 mg  10 mg Oral DAILY    gabapentin (NEURONTIN) capsule 600 mg  600 mg Oral QHS    [Held by provider] hydroCHLOROthiazide (HYDRODIURIL) tablet 12.5 mg  12.5 mg Oral DAILY    [Held by provider] lisinopriL (PRINIVIL, ZESTRIL) tablet 20 mg  20 mg Oral BID    metFORMIN (GLUCOPHAGE) tablet 500 mg  500 mg Oral BID WITH MEALS    sodium chloride (NS) flush 5-40 mL  5-40 mL IntraVENous Q8H    sodium chloride (NS) flush 5-40 mL  5-40 mL IntraVENous PRN    naloxone (NARCAN) injection 0.4 mg  0.4 mg IntraVENous PRN    [Held by provider] senna-docusate (PERICOLACE) 8.6-50 mg per tablet 1 Tablet  1 Tablet Oral BID    [Held by provider] polyethylene glycol (MIRALAX) packet 17 g  17 g Oral DAILY    [Held by provider] bisacodyL (DULCOLAX) suppository 10 mg  10 mg Rectal DAILY PRN    acetaminophen (TYLENOL) tablet 1,000 mg  1,000 mg Oral Q6H    traMADoL (ULTRAM) tablet 50 mg  50 mg Oral Q6H PRN    oxyCODONE IR (ROXICODONE) tablet 5 mg  5 mg Oral Q3H PRN    oxyCODONE IR (ROXICODONE) tablet 10 mg  10 mg Oral Q3H PRN    famotidine (PEPCID) tablet 20 mg  20 mg Oral BID    phenol throat spray (CHLORASEPTIC)   Oral PRN    cyclobenzaprine (FLEXERIL) tablet 10 mg  10 mg Oral BID PRN    insulin lispro (HUMALOG) injection   SubCUTAneous AC&HS    glucose chewable tablet 16 g  4 Tablet Oral PRN    glucagon (GLUCAGEN) injection 1 mg  1 mg IntraMUSCular PRN    magnesium hydroxide (MILK OF MAGNESIA) 400 mg/5 mL oral suspension 30 mL  30 mL Oral Q12H    therapeutic multivitamin (THERAGRAN) tablet 1 Tablet  1 Tablet Oral DAILY    vitamin N-A-S-lutein-minerals (OCUVITE) tablet 1 Tablet  1 Tablet Oral BID       Allergies  No Known Allergies    CBC:  Recent Labs     07/26/22  0455 07/25/22  0330 07/24/22  0122   HGB 7.2* 9.7* 9.9*   HCT 22.1* 28.9* 01.3*         Metabolic Panel:  Recent Labs     07/26/22  0455 07/25/22  0330 07/24/22  0122    140 139   K 2.7* 3.5 3.5   * 107 107   CO2 24 27 27   BUN 14 16 19   CREA 0.49* 0.70 0.74   GLU 96 107* 114*   CA 6.5* 8.0* 8.3*              Assessment and Plan     Pako Nails is a 76 y.o. male that was admitted for back surgery by ortho and family medicine team was consulted for orthostatic hypotension. POD 5 of T10-S2 Iliac fixation, L1, L2, L3, L4 Osteotomies, L5 Laminectomy, T10-S2 fusion, open sacroiliac joint.  - Management per Ortho     Orthostatic hypotension: Pt has h/o HTN. Hgb 7.2 (07/26), recheck ordered. Cardiology following.   - Continue to hold Norvasc 10 mg daily, Lisinopril 20 mg BID, HCTZ 12.5 mg  - Compression socks ordered. - Keep legs elevated at night.  - Continue midodrine 2.5mg TID  - HOLD flomax per Cardiology  - mIVF 75ml/Hr    Hypokalemia: K+ 2.7. Given 40mEq PO by Dr. Severo Carrel.   - Additional 30mEq IV repletion today am   - repeat BMP ordered for noon      Type II DM        Lab Results   Component Value Date/Time     Hemoglobin A1c 6.4 (H) 07/05/2022 02:48 PM   - Re-started on Metformin 500 mg BID.  - Monitor on daily BMP      Pain Management: Per Ortho     DVT Prophylaxis: Per Ortho     Disposition: Per Ortho    Anemia: Per Ann Hawthorne MD  Family Medicine Resident       For Billing    No chief complaint on file. Hospital Problems  Date Reviewed: 11/1/2021            Codes Class Noted POA    Orthostatic hypotension ICD-10-CM: I95.1  ICD-9-CM: 458.0  7/25/2022 Unknown        Complication of surgical procedure ICD-10-CM: T81. 9XXA  ICD-9-CM: 998.9  7/19/2022 Unknown        Sagittal plane imbalance ICD-10-CM: M43.8X9  ICD-9-CM: 737.8  7/19/2022 Unknown        Neurogenic claudication due to lumbar spinal stenosis ICD-10-CM: L47.642  ICD-9-CM: 724.03  7/19/2022 Unknown        Left leg weakness ICD-10-CM: R29.898  ICD-9-CM: 729.89  7/19/2022 Unknown        Spondylolisthesis at L4-L5 level ICD-10-CM: M43.16  ICD-9-CM: 756.12  7/19/2022 Unknown        Spondylolisthesis at L2-L3 level ICD-10-CM: M43.16  ICD-9-CM: 756.12  7/19/2022 Unknown        Spondylolisthesis at L3-L4 level ICD-10-CM: M43.16  ICD-9-CM: 756.12  7/19/2022 Unknown

## 2022-07-26 NOTE — PROGRESS NOTES
Physician Progress Note      PATIENT:               Cecily Dwyer  [facility] #:                  [id number]  :                       1945  ADMIT DATE:       2022 5:40 AM  DISCH DATE:  RESPONDING  PROVIDER #:        FARZANEH BUNDY MD          QUERY TEXT:    Good afternoon  Pt admitted with Spondylolisthesis for surgical intervention . Pt noted to have decreased Hgb level. If possible, please document in the progress notes and discharge summary if you are evaluating and/or treating any of the following: The medical record reflects the following:  Risk Factors: post op open sacroiliac joint fusion, laminectomy  Clinical Indicators: Estimated Blood Loss (mL): 650 total, 250 returned in cell saver, net 400 cc blood loss  Pre op Hgb-14.3, Post op Hgb-9.2  Treatment: lab monitoring , PO MVI    Thank you  Triston Winston RN CDI  [phone]  Options provided:  -- Acute blood loss anemia  -- Postoperative acute blood loss anemia  -- Acute blood loss anemia not present  -- Other - I will add my own diagnosis  -- Disagree - Not applicable / Not valid  -- Disagree - Clinically unable to determine / Unknown  -- Refer to Clinical Documentation Reviewer    PROVIDER RESPONSE TEXT:    This patient has postoperative acute blood loss anemia.     Query created by: Megha Laws on 2022 10:54 AM      Electronically signed by:  Luis Fernando Alejo MD 2022 4:31 PM

## 2022-07-26 NOTE — PROGRESS NOTES
ORTHOPAEDIC LUMBAR FUSION PROGRESS NOTE    NAME:     Ansley Cooney   :       1945   MRN:       [id number]   DATE:      2022    POD:              7 Days Post-Op  S/P:              Procedure(s):  T10-S2 ILIAC FIXATION, L1, L2, L3, L4 OSTEOTOMIES, L5 LAMINECTOMY, T10-S2 FUSION, OPEN SACROILIAC JOINT FUSION (O-ARM)    SUBJECTIVE:    Reports improvement in preop lower extremity strength  Postop pain controlled  Tolerating PO intake -- does not have a strong appetite, he explain that the thought of food makes his stomach sick  Ambulation tolerance: 40 feet, up to chair. Continued issues with orthostatic hypotension during sessions. Passing flatus, has made BM.   Voiding clear yellow -- condom catheter in place  Denies nausea/vomiting, headache, chest pain or shortness of breath    Recent Labs     22  0455   HGB 7.2*   HCT 22.1*      K 2.7*   *   CO2 24   BUN 14   CREA 0.49*   GLU 96     Patient Vitals for the past 12 hrs:   BP Temp Pulse Resp SpO2   22 0700 (!) 180/87 -- 89 18 97 %   22 0600 -- -- (!) 125 22 90 %   22 0500 (!) 168/66 -- 64 15 92 %   22 0400 (!) 165/63 98.2 °F (36.8 °C) 73 16 99 %   22 0300 (!) 144/65 -- 67 14 98 %   22 0200 (!) 152/60 -- 74 20 97 %   22 0100 130/60 -- 87 14 98 %   22 0000 (!) 147/61 97.9 °F (36.6 °C) 75 17 99 %   22 2342 (!) 178/73 -- 74 -- --   22 2300 (!) 178/73 -- 72 17 95 %   22 2218 (!) 169/73 -- 81 19 98 %   22 2200 (!) 183/74 -- 73 17 99 %   22 2100 (!) 171/85 -- 93 20 98 %   07/25/22 2045 (!) 164/92 97.9 °F (36.6 °C) 84 14 99 %     Exam:  Positive strength/ROM bilat lower ext: main deficit is 4/5 left him flexion/SLR  Neuro intact to sensation  Dressings clean and dry  Lower extremities warm and well perfused  Soft, non-tender abdomen    PLAN: 7 Days Post-Op, improved   Continue PO pain medications as needed  Continue SCD's, frequent ambulation, Lovenox daily  > Please offer the patient the opportunity to get up to recliner or go on a walk every other hour when patient not resting. Diet: regular, please offer Ensure with meals. Consult to nutrition services for poor appetite. Remeron added. Urinalysis to evaluate potential UTI -- if so, Bactrim as treatment plan. Continue bowel regimen -- patient feels as if stool has been loose, holding stool softeners. Continue TLSO orthosis  Continue Vit D3  Potassium low, order is in for IV replacement  H/H low today without cause, ordered repeat sample to confirm not dilutional with increased IV fluid intake  Lisinopril/Amlodipine/HCTZ/Flomax held in effort to avoid orthostatic hypotension. Midodrine added. > Appreciate Hospitalist feedback regarding restarting HTN medication (potentially beginning with one) as BP has been elevated. PT/OT has been slowed down by symptomatic hypotension during these sessions -- though he is making slow progress. Medical comorbities stable  Discharge planning: [facility] once patient able to tolerate multiple PT/OT sessions, ideally without hypotensive symptoms    Mane Hernandez, NIKI    I have seen and examined patient today and agree with above. I discussed with Dr. Lula Barreto yesterday the flomax and cardiology consult. Low hgb may be dilutional, will recheck this am to make sure also not lab error. Holding all hypertension meds and flomax. Once orthostasis improved to tolerate more mobilization, will transfer to rehab. He is neurologically improved from preop, minimal pain, normal bowel and bladder. Orthostatic hypotension only thing holding him back at this point. Keshav Rowley MD  Spine Surgery  [facility]      Patient seen and reevaluated again this afternoon, repeat hemoglobin was 9.6 therefore the hemoglobin is 7.5 this morning was laboratory error. Patient has tolerated sitting up and has been out of bed. Blood pressures are improved.   Patient is voiding without difficulty with good control, he did report some looseness of stools, milk of magnesia is discontinued. Continue to hold blood pressure medications and Flomax for orthostatic hypotension.

## 2022-07-26 NOTE — PROGRESS NOTES
Problem: Falls - Risk of  Goal: *Absence of Falls  Description: Document Maik Cease Fall Risk and appropriate interventions in the flowsheet. Outcome: Progressing Towards Goal  Note: Fall Risk Interventions:  Mobility Interventions: Bed/chair exit alarm, Assess mobility with egress test, Patient to call before getting OOB, PT Consult for mobility concerns, PT Consult for assist device competence, Utilize walker, cane, or other assistive device, Strengthening exercises (ROM-active/passive)    Mentation Interventions: Adequate sleep, hydration, pain control, Bed/chair exit alarm, Door open when patient unattended, Evaluate medications/consider consulting pharmacy, Reorient patient, Toileting rounds, Increase mobility, More frequent rounding    Medication Interventions: Bed/chair exit alarm, Evaluate medications/consider consulting pharmacy, Patient to call before getting OOB, Teach patient to arise slowly    Elimination Interventions: Call light in reach, Patient to call for help with toileting needs, Toilet paper/wipes in reach, Toileting schedule/hourly rounds, Bed/chair exit alarm    History of Falls Interventions: Bed/chair exit alarm, Consult care management for discharge planning, Door open when patient unattended, Evaluate medications/consider consulting pharmacy, Room close to nurse's station         Problem: Patient Education: Go to Patient Education Activity  Goal: Patient/Family Education  Outcome: Progressing Towards Goal     Problem: Pressure Injury - Risk of  Goal: *Prevention of pressure injury  Description: Document Weston Scale and appropriate interventions in the flowsheet.   Outcome: Progressing Towards Goal  Note: Pressure Injury Interventions:  Sensory Interventions: Assess changes in LOC, Assess need for specialty bed, Discuss PT/OT consult with provider, Keep linens dry and wrinkle-free, Maintain/enhance activity level, Minimize linen layers, Monitor skin under medical devices, Pressure redistribution bed/mattress (bed type), Turn and reposition approx. every two hours (pillows and wedges if needed)    Moisture Interventions: Absorbent underpads, Apply protective barrier, creams and emollients, Assess need for specialty bed, Internal/External urinary devices, Minimize layers, Moisture barrier    Activity Interventions: Increase time out of bed, PT/OT evaluation, Pressure redistribution bed/mattress(bed type), Assess need for specialty bed    Mobility Interventions: Assess need for specialty bed, HOB 30 degrees or less, Pressure redistribution bed/mattress (bed type), PT/OT evaluation, Turn and reposition approx.  every two hours(pillow and wedges)    Nutrition Interventions: Discuss nutritional consult with provider    Friction and Shear Interventions: Apply protective barrier, creams and emollients                Problem: Patient Education: Go to Patient Education Activity  Goal: Patient/Family Education  Outcome: Progressing Towards Goal     Problem: Patient Education: Go to Patient Education Activity  Goal: Patient/Family Education  Outcome: Progressing Towards Goal     Problem: Patient Education: Go to Patient Education Activity  Goal: Patient/Family Education  Outcome: Progressing Towards Goal     Problem: Pain  Goal: *Control of Pain  Outcome: Progressing Towards Goal  Goal: *PALLIATIVE CARE:  Alleviation of Pain  Outcome: Progressing Towards Goal     Problem: Patient Education: Go to Patient Education Activity  Goal: Patient/Family Education  Outcome: Progressing Towards Goal     Problem: Nutrition Deficit  Goal: *Optimize nutritional status  Outcome: Progressing Towards Goal

## 2022-07-26 NOTE — PROGRESS NOTES
Transition of care note:  RUR 7%    Pt has been accepted to [facility] pending medical stability. Pt will need a COVID PCR for  admission to Inpatient rehab.   Nursing will obtain 24H prior to DC -will need order from attending for this test.    Yuni Mercado

## 2022-07-27 ENCOUNTER — APPOINTMENT (OUTPATIENT)
Dept: NON INVASIVE DIAGNOSTICS | Age: 77
DRG: 457 | End: 2022-07-27
Attending: STUDENT IN AN ORGANIZED HEALTH CARE EDUCATION/TRAINING PROGRAM
Payer: MEDICARE

## 2022-07-27 LAB
ANION GAP SERPL CALC-SCNC: 8 MMOL/L (ref 5–15)
B PERT DNA SPEC QL NAA+PROBE: NOT DETECTED
BORDETELLA PARAPERTUSSIS PCR, BORPAR: NOT DETECTED
BUN SERPL-MCNC: 15 MG/DL (ref 6–20)
BUN/CREAT SERPL: 19 (ref 12–20)
C PNEUM DNA SPEC QL NAA+PROBE: NOT DETECTED
CALCIUM SERPL-MCNC: 8.6 MG/DL (ref 8.5–10.1)
CHLORIDE SERPL-SCNC: 109 MMOL/L (ref 97–108)
CO2 SERPL-SCNC: 25 MMOL/L (ref 21–32)
CREAT SERPL-MCNC: 0.79 MG/DL (ref 0.7–1.3)
ECHO AO ASC DIAM: 3.5 CM
ECHO AO ASCENDING AORTA INDEX: 1.52 CM/M2
ECHO AV AREA PEAK VELOCITY: 2.6 CM2
ECHO AV AREA VTI: 2.9 CM2
ECHO AV AREA/BSA PEAK VELOCITY: 1.1 CM2/M2
ECHO AV AREA/BSA VTI: 1.3 CM2/M2
ECHO AV MEAN GRADIENT: 7 MMHG
ECHO AV MEAN VELOCITY: 1.3 M/S
ECHO AV PEAK GRADIENT: 13 MMHG
ECHO AV PEAK VELOCITY: 1.8 M/S
ECHO AV VELOCITY RATIO: 0.56
ECHO AV VTI: 34.9 CM
ECHO LA DIAMETER INDEX: 1.69 CM/M2
ECHO LA DIAMETER: 3.9 CM
ECHO LA VOL 2C: 62 ML (ref 18–58)
ECHO LA VOL 4C: 51 ML (ref 18–58)
ECHO LA VOL BP: 59 ML (ref 18–58)
ECHO LA VOL/BSA BIPLANE: 26 ML/M2 (ref 16–34)
ECHO LA VOLUME AREA LENGTH: 67 ML
ECHO LA VOLUME INDEX A2C: 27 ML/M2 (ref 16–34)
ECHO LA VOLUME INDEX A4C: 22 ML/M2 (ref 16–34)
ECHO LA VOLUME INDEX AREA LENGTH: 29 ML/M2 (ref 16–34)
ECHO LV E' LATERAL VELOCITY: 10 CM/S
ECHO LV E' SEPTAL VELOCITY: 9 CM/S
ECHO LV FRACTIONAL SHORTENING: 31 % (ref 28–44)
ECHO LV INTERNAL DIMENSION DIASTOLE INDEX: 1.69 CM/M2
ECHO LV INTERNAL DIMENSION DIASTOLIC: 3.9 CM (ref 4.2–5.9)
ECHO LV INTERNAL DIMENSION SYSTOLIC INDEX: 1.17 CM/M2
ECHO LV INTERNAL DIMENSION SYSTOLIC: 2.7 CM
ECHO LV IVSD: 1.2 CM (ref 0.6–1)
ECHO LV MASS 2D: 159.3 G (ref 88–224)
ECHO LV MASS INDEX 2D: 69 G/M2 (ref 49–115)
ECHO LV POSTERIOR WALL DIASTOLIC: 1.2 CM (ref 0.6–1)
ECHO LV RELATIVE WALL THICKNESS RATIO: 0.62
ECHO LVOT AREA: 4.9 CM2
ECHO LVOT AV VTI INDEX: 0.58
ECHO LVOT DIAM: 2.5 CM
ECHO LVOT MEAN GRADIENT: 1 MMHG
ECHO LVOT PEAK GRADIENT: 4 MMHG
ECHO LVOT PEAK VELOCITY: 1 M/S
ECHO LVOT STROKE VOLUME INDEX: 42.9 ML/M2
ECHO LVOT SV: 99.1 ML
ECHO LVOT VTI: 20.2 CM
ECHO MV A VELOCITY: 0.96 M/S
ECHO MV E DECELERATION TIME (DT): 274.8 MS
ECHO MV E VELOCITY: 0.82 M/S
ECHO MV E/A RATIO: 0.85
ECHO MV E/E' LATERAL: 8.2
ECHO MV E/E' RATIO (AVERAGED): 8.66
ECHO MV E/E' SEPTAL: 9.11
ECHO PULMONARY ARTERY END DIASTOLIC PRESSURE: 7 MMHG
ECHO PV MAX VELOCITY: 1.2 M/S
ECHO PV PEAK GRADIENT: 5 MMHG
ECHO PV REGURGITANT MAX VELOCITY: 1.4 M/S
ECHO RV INTERNAL DIMENSION: 4.3 CM
ECHO RV TAPSE: 2.6 CM (ref 1.7–?)
ECHO RVOT PEAK GRADIENT: 3 MMHG
ECHO RVOT PEAK VELOCITY: 0.9 M/S
ECHO TV REGURGITANT MAX VELOCITY: 2.63 M/S
ECHO TV REGURGITANT PEAK GRADIENT: 28 MMHG
FLUAV H1 2009 PAND RNA SPEC QL NAA+PROBE: NOT DETECTED
FLUAV H1 RNA SPEC QL NAA+PROBE: NOT DETECTED
FLUAV H3 RNA SPEC QL NAA+PROBE: NOT DETECTED
FLUAV SUBTYP SPEC NAA+PROBE: NOT DETECTED
FLUBV RNA SPEC QL NAA+PROBE: NOT DETECTED
GLUCOSE BLD STRIP.AUTO-MCNC: 124 MG/DL (ref 65–117)
GLUCOSE BLD STRIP.AUTO-MCNC: 129 MG/DL (ref 65–117)
GLUCOSE BLD STRIP.AUTO-MCNC: 133 MG/DL (ref 65–117)
GLUCOSE BLD STRIP.AUTO-MCNC: 136 MG/DL (ref 65–117)
GLUCOSE BLD STRIP.AUTO-MCNC: 146 MG/DL (ref 65–117)
GLUCOSE SERPL-MCNC: 121 MG/DL (ref 65–100)
HADV DNA SPEC QL NAA+PROBE: NOT DETECTED
HCOV 229E RNA SPEC QL NAA+PROBE: NOT DETECTED
HCOV HKU1 RNA SPEC QL NAA+PROBE: NOT DETECTED
HCOV NL63 RNA SPEC QL NAA+PROBE: NOT DETECTED
HCOV OC43 RNA SPEC QL NAA+PROBE: NOT DETECTED
HMPV RNA SPEC QL NAA+PROBE: NOT DETECTED
HPIV1 RNA SPEC QL NAA+PROBE: NOT DETECTED
HPIV2 RNA SPEC QL NAA+PROBE: NOT DETECTED
HPIV3 RNA SPEC QL NAA+PROBE: NOT DETECTED
HPIV4 RNA SPEC QL NAA+PROBE: NOT DETECTED
M PNEUMO DNA SPEC QL NAA+PROBE: NOT DETECTED
MAGNESIUM SERPL-MCNC: 1.6 MG/DL (ref 1.6–2.4)
POTASSIUM SERPL-SCNC: 3.2 MMOL/L (ref 3.5–5.1)
RSV RNA SPEC QL NAA+PROBE: NOT DETECTED
RV+EV RNA SPEC QL NAA+PROBE: NOT DETECTED
SARS-COV-2 PCR, COVPCR: NOT DETECTED
SERVICE CMNT-IMP: ABNORMAL
SODIUM SERPL-SCNC: 142 MMOL/L (ref 136–145)

## 2022-07-27 PROCEDURE — 74011250637 HC RX REV CODE- 250/637: Performed by: STUDENT IN AN ORGANIZED HEALTH CARE EDUCATION/TRAINING PROGRAM

## 2022-07-27 PROCEDURE — 74011000250 HC RX REV CODE- 250: Performed by: NURSE PRACTITIONER

## 2022-07-27 PROCEDURE — 97535 SELF CARE MNGMENT TRAINING: CPT

## 2022-07-27 PROCEDURE — 0202U NFCT DS 22 TRGT SARS-COV-2: CPT

## 2022-07-27 PROCEDURE — 74011250636 HC RX REV CODE- 250/636: Performed by: NURSE PRACTITIONER

## 2022-07-27 PROCEDURE — 74011250637 HC RX REV CODE- 250/637: Performed by: ORTHOPAEDIC SURGERY

## 2022-07-27 PROCEDURE — 99232 SBSQ HOSP IP/OBS MODERATE 35: CPT | Performed by: FAMILY MEDICINE

## 2022-07-27 PROCEDURE — 65270000029 HC RM PRIVATE

## 2022-07-27 PROCEDURE — 97110 THERAPEUTIC EXERCISES: CPT

## 2022-07-27 PROCEDURE — 99232 SBSQ HOSP IP/OBS MODERATE 35: CPT | Performed by: INTERNAL MEDICINE

## 2022-07-27 PROCEDURE — 80048 BASIC METABOLIC PNL TOTAL CA: CPT

## 2022-07-27 PROCEDURE — 36415 COLL VENOUS BLD VENIPUNCTURE: CPT

## 2022-07-27 PROCEDURE — 74011250637 HC RX REV CODE- 250/637: Performed by: NURSE PRACTITIONER

## 2022-07-27 PROCEDURE — APPSS30 APP SPLIT SHARED TIME 16-30 MINUTES: Performed by: NURSE PRACTITIONER

## 2022-07-27 PROCEDURE — 97530 THERAPEUTIC ACTIVITIES: CPT

## 2022-07-27 PROCEDURE — 93306 TTE W/DOPPLER COMPLETE: CPT | Performed by: STUDENT IN AN ORGANIZED HEALTH CARE EDUCATION/TRAINING PROGRAM

## 2022-07-27 PROCEDURE — 83735 ASSAY OF MAGNESIUM: CPT

## 2022-07-27 PROCEDURE — 93306 TTE W/DOPPLER COMPLETE: CPT

## 2022-07-27 PROCEDURE — 2709999900 HC NON-CHARGEABLE SUPPLY

## 2022-07-27 PROCEDURE — 94761 N-INVAS EAR/PLS OXIMETRY MLT: CPT

## 2022-07-27 PROCEDURE — 82962 GLUCOSE BLOOD TEST: CPT

## 2022-07-27 RX ORDER — MAGNESIUM SULFATE HEPTAHYDRATE 40 MG/ML
2 INJECTION, SOLUTION INTRAVENOUS ONCE
Status: COMPLETED | OUTPATIENT
Start: 2022-07-27 | End: 2022-07-27

## 2022-07-27 RX ORDER — LABETALOL 100 MG/1
50 TABLET, FILM COATED ORAL EVERY 12 HOURS
Status: DISCONTINUED | OUTPATIENT
Start: 2022-07-27 | End: 2022-07-28

## 2022-07-27 RX ORDER — POTASSIUM CHLORIDE 750 MG/1
40 TABLET, FILM COATED, EXTENDED RELEASE ORAL
Status: COMPLETED | OUTPATIENT
Start: 2022-07-27 | End: 2022-07-27

## 2022-07-27 RX ORDER — AMLODIPINE BESYLATE 5 MG/1
10 TABLET ORAL DAILY
Status: DISCONTINUED | OUTPATIENT
Start: 2022-07-27 | End: 2022-07-28 | Stop reason: HOSPADM

## 2022-07-27 RX ADMIN — FAMOTIDINE 20 MG: 20 TABLET, FILM COATED ORAL at 17:40

## 2022-07-27 RX ADMIN — THERA TABS 1 TABLET: TAB at 10:01

## 2022-07-27 RX ADMIN — ENOXAPARIN SODIUM 30 MG: 100 INJECTION SUBCUTANEOUS at 20:51

## 2022-07-27 RX ADMIN — Medication 1 TABLET: at 17:40

## 2022-07-27 RX ADMIN — METFORMIN HYDROCHLORIDE 500 MG: 500 TABLET ORAL at 10:00

## 2022-07-27 RX ADMIN — METFORMIN HYDROCHLORIDE 500 MG: 500 TABLET ORAL at 17:40

## 2022-07-27 RX ADMIN — Medication 1 TABLET: at 10:01

## 2022-07-27 RX ADMIN — Medication 10 ML: at 20:53

## 2022-07-27 RX ADMIN — ACETAMINOPHEN 1000 MG: 500 TABLET ORAL at 17:40

## 2022-07-27 RX ADMIN — POTASSIUM CHLORIDE 40 MEQ: 750 TABLET, FILM COATED, EXTENDED RELEASE ORAL at 10:01

## 2022-07-27 RX ADMIN — LABETALOL HYDROCHLORIDE 50 MG: 100 TABLET, FILM COATED ORAL at 10:01

## 2022-07-27 RX ADMIN — Medication 10 ML: at 05:23

## 2022-07-27 RX ADMIN — FAMOTIDINE 20 MG: 20 TABLET, FILM COATED ORAL at 10:00

## 2022-07-27 RX ADMIN — LABETALOL HYDROCHLORIDE 50 MG: 100 TABLET, FILM COATED ORAL at 20:50

## 2022-07-27 RX ADMIN — Medication 10 ML: at 14:00

## 2022-07-27 RX ADMIN — GABAPENTIN 600 MG: 300 CAPSULE ORAL at 20:51

## 2022-07-27 RX ADMIN — MIRTAZAPINE 7.5 MG: 15 TABLET, FILM COATED ORAL at 10:00

## 2022-07-27 RX ADMIN — ENOXAPARIN SODIUM 30 MG: 100 INJECTION SUBCUTANEOUS at 10:01

## 2022-07-27 RX ADMIN — MAGNESIUM SULFATE HEPTAHYDRATE 2 G: 40 INJECTION, SOLUTION INTRAVENOUS at 10:01

## 2022-07-27 RX ADMIN — ACETAMINOPHEN 1000 MG: 500 TABLET ORAL at 11:53

## 2022-07-27 RX ADMIN — AMLODIPINE BESYLATE 10 MG: 5 TABLET ORAL at 11:53

## 2022-07-27 RX ADMIN — ACETAMINOPHEN 1000 MG: 500 TABLET ORAL at 01:00

## 2022-07-27 NOTE — PROGRESS NOTES
Notified by telemetry monitor tech that patient converted to afib for a few minutes and then returned to normal sinus rhythm, Md notified. 1136: Physical therapy unable to work with patient due to orthostatic blood pressure, pt bp currently 173/77, family medicine resident notified, ordered to administer norvasc.     [id number]: pt bp 175/85, family med resident notified, ordered to continue to monitor. Bedside shift change report given to [address] [place] [address] (oncoming nurse) by Jonny Lyon (offgoing nurse). Report included the following information SBAR, Kardex, MAR, Recent Results, and Med Rec Status.

## 2022-07-27 NOTE — PROGRESS NOTES
07/27/22    Medicare pt has received, reviewed, and signed 2nd IM letter informing them of their right to appeal the discharge. Patient unable to sign provider in room administering care left copy on bedside table.

## 2022-07-27 NOTE — PROGRESS NOTES
Orthopedic Spine Progress Note  Post Op day: 8 Days Post-Op    2022 8:48 AM   Admit Date: 2022  Procedure: Procedure(s):  T10-S2 ILIAC FIXATION, L1, L2, L3, L4 OSTEOTOMIES, L5 LAMINECTOMY, T10-S2 FUSION, OPEN SACROILIAC JOINT FUSION (O-ARM)    Subjective: German August  reports he feels like he turned the corner. Reports improved left leg strength, preop numbness resolved. Transferred to floor last night, reports out of bed to bathroom twice with nursing without orthostatic issues. .   Tolerating diet. No N/V.  +voiding without difficulty, +BM  Pain Control:   Pain Assessment  Pain Scale 1: Numeric (0 - 10)  Pain Intensity 1: 0  Pain Onset 1: postop  Pain Location 1: Back  Pain Orientation 1: Posterior  Pain Description 1: Aching  Pain Intervention(s) 1: Medication (see MAR)    Objective:          Physical Exam:  General:  Alert and oriented. No acute distress. Heart:  Respirations unlabored. Abdomen:   Extremities: Soft, non-tender. No evidence of cyanosis. Pulses palpable in both upper and lower extremities. Neurologic:  Musculoskeletal:  No new motor deficits. Neurovascular exam within normal limits. Sensation stable. Motor: strong RLE and LLE , now has 4+/5 left hip flexion and quad, normal DF/PF on left, normal on right  Valeriy's sign negative in bilateral lower extremities. Calves soft, nontender upon palpation and with passive twitch. Moves both upper and lower extremities. Incision: clean, dry, and intact. No significant erythema or swelling. No active drainage noted. Vital Signs:    Blood pressure (!) 189/87, pulse 73, temperature 98.9 °F (37.2 °C), resp. rate 18, height 5' 11\" (1.803 m), weight 112.5 kg (248 lb), SpO2 95 %.   Temp (24hrs), Av.1 °F (36.7 °C), Min:97.6 °F (36.4 °C), Max:98.9 °F (37.2 °C)      LAB:    Recent Labs     22  1438   HCT 28.4*   HGB 9.6*     Lab Results   Component Value Date/Time    Sodium 142 2022 05:21 AM    Potassium 3.2 (L) 07/27/2022 05:21 AM    Chloride 109 (H) 07/27/2022 05:21 AM    CO2 25 07/27/2022 05:21 AM    Glucose 121 (H) 07/27/2022 05:21 AM    BUN 15 07/27/2022 05:21 AM    Creatinine 0.79 07/27/2022 05:21 AM    Calcium 8.6 07/27/2022 05:21 AM       Intake/Output:No intake/output data recorded. 07/25 1901 - 07/27 0700  In: 1837.5 [I.V.:1837.5]  Out: 776 [Urine:775]    PT/OT:   Gait:  Gait  Base of Support: Widened  Speed/Katalina: Fluctuations, Slow  Step Length: Right shortened, Left shortened  Gait Abnormalities: Antalgic, Path deviations, Step to gait  Ambulation - Level of Assistance: Minimal assistance, Assist x2  Distance (ft): 10 Feet (ft)  Assistive Device: Walker, rolling, Gait belt, Brace/Splint                 Assessment:   Patient is 8 Days Post-Op s/p Procedure(s):  T10-S2 ILIAC FIXATION, L1, L2, L3, L4 OSTEOTOMIES, L5 LAMINECTOMY, T10-S2 FUSION, OPEN SACROILIAC JOINT FUSION (O-ARM)  -orthostatic hypotension improved -still holding BP meds and flomax,   -HTN-will ask cards/FP to address reintegration of BP meds  Plan:     1. Continue PT/OT  2. Continue established methods of pain control  3. VTE Prophylaxes - TEDS &/or SCDs, continue lovenox  4. GI- normal function,, holding stool softeners and Milk of mag  5. Bllood pressure management and orthostatis-per cards/FP recs  6. Disposition-likely able to transfer to [address] [place] [address] tomorrow given stabilization of medical status. Will need ambulance transport.        Signed By: Nadya Pierre MD

## 2022-07-27 NOTE — PROGRESS NOTES
Problem: Falls - Risk of  Goal: *Absence of Falls  Description: Document Jeff Giraldo Fall Risk and appropriate interventions in the flowsheet. Outcome: Progressing Towards Goal  Note: Fall Risk Interventions:  Mobility Interventions: Bed/chair exit alarm    Mentation Interventions: Adequate sleep, hydration, pain control    Medication Interventions: Bed/chair exit alarm    Elimination Interventions: Call light in reach, Bed/chair exit alarm    History of Falls Interventions: Bed/chair exit alarm         Problem: Patient Education: Go to Patient Education Activity  Goal: Patient/Family Education  Outcome: Progressing Towards Goal     Problem: Pressure Injury - Risk of  Goal: *Prevention of pressure injury  Description: Document Weston Scale and appropriate interventions in the flowsheet.   Outcome: Progressing Towards Goal  Note: Pressure Injury Interventions:  Sensory Interventions: Keep linens dry and wrinkle-free, Minimize linen layers    Moisture Interventions: Absorbent underpads    Activity Interventions: Increase time out of bed    Mobility Interventions: HOB 30 degrees or less    Nutrition Interventions: Document food/fluid/supplement intake    Friction and Shear Interventions: HOB 30 degrees or less                Problem: Patient Education: Go to Patient Education Activity  Goal: Patient/Family Education  Outcome: Progressing Towards Goal     Problem: Patient Education: Go to Patient Education Activity  Goal: Patient/Family Education  Outcome: Progressing Towards Goal     Problem: Patient Education: Go to Patient Education Activity  Goal: Patient/Family Education  Outcome: Progressing Towards Goal     Problem: Pain  Goal: *Control of Pain  Outcome: Progressing Towards Goal  Goal: *PALLIATIVE CARE:  Alleviation of Pain  Outcome: Progressing Towards Goal     Problem: Patient Education: Go to Patient Education Activity  Goal: Patient/Family Education  Outcome: Progressing Towards Goal     Problem: Nutrition Deficit  Goal: *Optimize nutritional status  Outcome: Progressing Towards Goal

## 2022-07-27 NOTE — PROGRESS NOTES
[address] [facility] Medicine Service Daily Progress Note    24 Hour Events: NAEO    SUBJECTIVE: Has had no further episodes of lightheadedness/dizziness. Denies SOB, CP. Had some orthostatic pressures yesterday. OBJECTIVE:      Vitals: Visit Vitals  BP (!) 189/87 (BP 1 Location: Right upper arm, BP Patient Position: At rest)   Pulse 73   Temp 98.9 °F (37.2 °C)   Resp 18   Ht 5' 11\" (1.803 m)   Wt 248 lb (112.5 kg)   SpO2 95%   BMI 34.59 kg/m²     Physical Exam:  General: NAD. Respiratory: Lungs clear to auscultation bilaterally with no wheezing, rhonchi, rales, or crackles. Cardiovascular: Regular rate and rhythm. S1/S2 auscultated with no murmurs, rubs, or gallops. GI: Nondistended. + bowel sounds. Nontender to palpation. Extremities: No LE edema. Skin: Warm, dry.   Neuro: Alert and oriented    I/O:  Date 07/26/22 0700 - 07/27/22 0659 07/27/22 0700 - 07/28/22 0659   Shift 1653-2242 3475-1083 24 Hour Total 0803-7321 6540-6824 24 Hour Total   INTAKE   I.V.(mL/kg/hr) 1012.5(0.8)  1012.5(0.4)        Volume (0.9% sodium chloride infusion) 712.5  712.5        Volume (0.9% sodium chloride infusion 250 mL) 0  0        Volume (potassium chloride 10 mEq in 100 ml IVPB) 300  300      Shift Total(mL/kg) 1012.5(9)  1012.5(9)      OUTPUT   Urine(mL/kg/hr) 550(0.4)  550(0.2)        Urine Voided 550  550        Urine Occurrence(s) 1 x 2 x 3 x      Stool 1  1        Stool Occurrence(s) 4 x 3 x 7 x        Stool 1  1      Shift Total(mL/kg) 551(4.9)  551(4.9)      .5  461.5      Weight (kg) 112.5 112.5 112.5 112.5 112.5 112.5         Inpatient Medications  Current Facility-Administered Medications   Medication Dose Route Frequency    potassium chloride SR (KLOR-CON 10) tablet 40 mEq  40 mEq Oral NOW    mirtazapine (REMERON) tablet 7.5 mg  7.5 mg Oral DAILY    [Held by provider] midodrine (PROAMATINE) tablet 2.5 mg  2.5 mg Oral TID WITH MEALS    [Held by provider] tamsulosin (FLOMAX) capsule 0.4 mg  0.4 mg Oral DAILY WITH DINNER    0.9% sodium chloride infusion 250 mL  250 mL IntraVENous PRN    enoxaparin (LOVENOX) injection 30 mg  30 mg SubCUTAneous Q12H    [Held by provider] amLODIPine (NORVASC) tablet 10 mg  10 mg Oral DAILY    gabapentin (NEURONTIN) capsule 600 mg  600 mg Oral QHS    [Held by provider] hydroCHLOROthiazide (HYDRODIURIL) tablet 12.5 mg  12.5 mg Oral DAILY    [Held by provider] lisinopriL (PRINIVIL, ZESTRIL) tablet 20 mg  20 mg Oral BID    metFORMIN (GLUCOPHAGE) tablet 500 mg  500 mg Oral BID WITH MEALS    sodium chloride (NS) flush 5-40 mL  5-40 mL IntraVENous Q8H    sodium chloride (NS) flush 5-40 mL  5-40 mL IntraVENous PRN    naloxone (NARCAN) injection 0.4 mg  0.4 mg IntraVENous PRN    [Held by provider] senna-docusate (PERICOLACE) 8.6-50 mg per tablet 1 Tablet  1 Tablet Oral BID    [Held by provider] polyethylene glycol (MIRALAX) packet 17 g  17 g Oral DAILY    [Held by provider] bisacodyL (DULCOLAX) suppository 10 mg  10 mg Rectal DAILY PRN    acetaminophen (TYLENOL) tablet 1,000 mg  1,000 mg Oral Q6H    traMADoL (ULTRAM) tablet 50 mg  50 mg Oral Q6H PRN    oxyCODONE IR (ROXICODONE) tablet 5 mg  5 mg Oral Q3H PRN    oxyCODONE IR (ROXICODONE) tablet 10 mg  10 mg Oral Q3H PRN    famotidine (PEPCID) tablet 20 mg  20 mg Oral BID    phenol throat spray (CHLORASEPTIC)   Oral PRN    cyclobenzaprine (FLEXERIL) tablet 10 mg  10 mg Oral BID PRN    insulin lispro (HUMALOG) injection   SubCUTAneous AC&HS    glucose chewable tablet 16 g  4 Tablet Oral PRN    glucagon (GLUCAGEN) injection 1 mg  1 mg IntraMUSCular PRN    therapeutic multivitamin (THERAGRAN) tablet 1 Tablet  1 Tablet Oral DAILY    vitamin T-Z-A-lutein-minerals (OCUVITE) tablet 1 Tablet  1 Tablet Oral BID       Allergies  No Known Allergies    CBC:  Recent Labs     07/26/22  1438 07/26/22  0455 07/25/22  0330   HGB 9.6* 7.2* 9.7*   HCT 28.4* 22.1* 23.9*         Metabolic Panel:  Recent Labs     07/27/22  0521 07/26/22  1406 07/26/22  0455    141 145   K 3.2* 3.6 2.7*   * 108 116*   CO2 25 27 24   BUN 15 18 14   CREA 0.79 0.93 0.49*   * 136* 96   CA 8.6 8.2* 6.5*   MG 1.6  --   --               Assessment and Plan     Trena Valenzuela is a 76 y.o. male that was admitted for back surgery by ortho and family medicine team was consulted for orthostatic hypotension. POD 5 of T10-S2 Iliac fixation, L1, L2, L3, L4 Osteotomies, L5 Laminectomy, T10-S2 fusion, open sacroiliac joint.  - Management per Ortho     Orthostatic hypotension: Pt has h/o HTN. Hgb 7.2-->9.6(07/26), recheck ordered. Cardiology following.   - Continue to hold Norvasc 10 mg daily, Lisinopril 20 mg BID, HCTZ 12.5 mg  - Compression socks ordered. - Keep legs elevated at night.  - HOLD midodrine 2.5mg TID  - HOLD flomax per Cardiology  - mIVF 75ml/Hr    Hypokalemia: K+ 3.2.   - Additional 40mEq IV repletion today am      Type II DM        Lab Results   Component Value Date/Time     Hemoglobin A1c 6.4 (H) 07/05/2022 02:48 PM   - Re-started on Metformin 500 mg BID.  - Monitor on daily BMP      Pain Management: Per Ortho     DVT Prophylaxis: Per Ortho     Disposition: Per Ortho    Anemia: Per Delio Youssef MD  Family Medicine Resident       For Billing    No chief complaint on file. Hospital Problems  Date Reviewed: 11/1/2021            Codes Class Noted POA    Orthostatic hypotension ICD-10-CM: I95.1  ICD-9-CM: 458.0  7/25/2022 Unknown        Complication of surgical procedure ICD-10-CM: T81. 9XXA  ICD-9-CM: 998.9  7/19/2022 Unknown        Sagittal plane imbalance ICD-10-CM: M43.8X9  ICD-9-CM: 737.8  7/19/2022 Unknown        Neurogenic claudication due to lumbar spinal stenosis ICD-10-CM: E32.143  ICD-9-CM: 724.03  7/19/2022 Unknown        Left leg weakness ICD-10-CM: R29.898  ICD-9-CM: 729.89  7/19/2022 Unknown        Spondylolisthesis at L4-L5 level ICD-10-CM: M43.16  ICD-9-CM: 756.12  7/19/2022 Unknown        Spondylolisthesis at L2-L3 level ICD-10-CM: M43.16  ICD-9-CM: 756.12  7/19/2022 Unknown        Spondylolisthesis at L3-L4 level ICD-10-CM: M43.16  ICD-9-CM: 756.12  7/19/2022 Unknown

## 2022-07-27 NOTE — PROGRESS NOTES
Problem: Mobility Impaired (Adult and Pediatric)  Goal: *Acute Goals and Plan of Care (Insert Text)  Description: FUNCTIONAL STATUS PRIOR TO ADMISSION: Patient was modified independent using a rolling walker for functional mobility. HOME SUPPORT PRIOR TO ADMISSION: The patient lived with his wife but did not require assist. Patient reports recent signs of dementia in his wife and expressed concern for her care. Physical Therapy Goals  Initiated 7/20/2022  1. Patient will move from supine to sit and sit to supine  and roll side to side in bed with supervision/set-up within 7 day(s). 2.  Patient will transfer from bed to chair and chair to bed with minimal assistance/contact guard assist using the least restrictive device within 7 day(s). 3.  Patient will perform sit to stand with minimal assistance/contact guard assist within 7 day(s). 4.  Patient will ambulate with minimal assistance/contact guard assist for 100 feet with the least restrictive device within 7 day(s). 5.  Patient will ascend/descend 4 stairs with 1 handrail(s) with modified independence within 7 day(s). 6. Patient will verbalize and demonstrate understanding of spinal precautions (No bending, lifting greater than 5 lbs, or twisting; log-roll technique; frequent repositioning as instructed) within 4 days.         7/27/2022 1604 by Enrique Acosta PTA  Note:   PHYSICAL THERAPY TREATMENT  Patient: Lalo Gray (51 y.o. male)  Date: 7/27/2022  Diagnosis: Sagittal plane imbalance [M43.8X9]  Spondylolisthesis of lumbar region [M43.16]  Lumbar radiculitis [M54.16]  Diabetic peripheral neuropathy (HCC) [E11.42]  Progressive focal motor weakness [R53.1]  Spondylolisthesis at L4-L5 level [M43.16]  Spondylolisthesis at L3-L4 level [M43.16]  Spondylolisthesis at L2-L3 level [M43.16]  Postlaminectomy kyphosis [M96.3]  Neurogenic claudication due to lumbar spinal stenosis [M48.062]  Left leg weakness [R29.898] <principal problem not specified>  Procedure(s) (LRB):  T10-S2 ILIAC FIXATION, L1, L2, L3, L4 OSTEOTOMIES, L5 LAMINECTOMY, T10-S2 FUSION, OPEN SACROILIAC JOINT FUSION (O-ARM) (N/A) 8 Days Post-Op  Precautions: Back (brace when up, no BLT, sitting <30-45 min, log roll to sit)  Chart, physical therapy assessment, plan of care and goals were reviewed. ASSESSMENT  Patient continues with skilled PT services. Pt was just back to bed with OT as he again became orthostatic as he did this AM with PT. Pt received now sitting up in bed agreeable to and performing LE exercise with cues. Pt tolerated well. PT will follow in AM to monitor and progress mobility. Continue goals. PLAN :  Patient continues to benefit from skilled intervention to address the above impairments. Continue treatment per established plan of care. to address goals. Recommendation for discharge: (in order for the patient to meet his/her long term goals)  Therapy 3 hours per day 5-7 days per week    This discharge recommendation:  Has been made in collaboration with the attending provider and/or case management    IF patient discharges home will need the following DME: rolling walker       SUBJECTIVE:       OBJECTIVE DATA SUMMARY:   Critical Behavior:  Neurologic State: Alert  Orientation Level: Oriented X4  Cognition: Appropriate decision making, Appropriate for age attention/concentration, Appropriate safety awareness, Follows commands  Safety/Judgement: Awareness of environment, Fall prevention, Good awareness of safety precautions, Home safety, Insight into deficits    Therapeutic Exercises:   Pt performed heel slides ankle pumps with cues to LEs.     Activity Tolerance:   Fair    After treatment patient left in no apparent distress:   Supine in bed    COMMUNICATION/COLLABORATION:   The patients plan of care was discussed with: Physical therapist.     Umm Hernandez PTA   Time Calculation: 10 mins         7/27/2022 1107 by Nabila Eugene PTA  Note:   PHYSICAL THERAPY TREATMENT  Patient: Harish Hernandes (1 y.o. male)  Date: 7/27/2022  Diagnosis: Sagittal plane imbalance [M43.8X9]  Spondylolisthesis of lumbar region [M43.16]  Lumbar radiculitis [M54.16]  Diabetic peripheral neuropathy (HCC) [E11.42]  Progressive focal motor weakness [R53.1]  Spondylolisthesis at L4-L5 level [M43.16]  Spondylolisthesis at L3-L4 level [M43.16]  Spondylolisthesis at L2-L3 level [M43.16]  Postlaminectomy kyphosis [M96.3]  Neurogenic claudication due to lumbar spinal stenosis [M48.062]  Left leg weakness [R29.898] <principal problem not specified>  Procedure(s) (LRB):  T10-S2 ILIAC FIXATION, L1, L2, L3, L4 OSTEOTOMIES, L5 LAMINECTOMY, T10-S2 FUSION, OPEN SACROILIAC JOINT FUSION (O-ARM) (N/A) 8 Days Post-Op  Precautions: Back (brace when up, no BLT, sitting <30-45 min, log roll to sit)  Chart, physical therapy assessment, plan of care and goals were reviewed. ASSESSMENT  Patient continues with skilled PT services. Pt BP supine was 177/85. Pt supine to sit mod of 2 with log roll. Pt CGA sitting EOB. Pt with increased dizziness and weakness  sitting with /56. Pt returned to supine with mod assist of 2./79 after return to supine. Note PT BP has been high this morning prior to PT. Pt orthostatic with mobility. Nurse notified. PT will follow in afternoon . PLAN :  Patient continues to benefit from skilled intervention to address the above impairments. Continue treatment per established plan of care. to address goals.     Recommendation for discharge: (in order for the patient to meet his/her long term goals)  Therapy 3 hours per day 5-7 days per week    This discharge recommendation:  Has been made in collaboration with the attending provider and/or case management    IF patient discharges home will need the following DME: rolling walker       SUBJECTIVE:     OBJECTIVE DATA SUMMARY:   Critical Behavior:  Neurologic State: Alert  Orientation Level: Oriented X4  Cognition: Appropriate for age attention/concentration  Safety/Judgement: Awareness of environment  Functional Mobility Training:  Bed Mobility:      MOD assist of 2 for bed mobility             Balance:    GOOD CGA      Therapeutic Exercises:   Heel slides ankle pumps.     Activity Tolerance:   Poor    After treatment patient left in no apparent distress:   Supine in bed    COMMUNICATION/COLLABORATION:   The patients plan of care was discussed with: Physical therapist.     Chetna Kramer PTA   Time Calculation: 23 mins

## 2022-07-27 NOTE — PROGRESS NOTES
Problem: Mobility Impaired (Adult and Pediatric)  Goal: *Acute Goals and Plan of Care (Insert Text)  Description: FUNCTIONAL STATUS PRIOR TO ADMISSION: Patient was modified independent using a rolling walker for functional mobility. HOME SUPPORT PRIOR TO ADMISSION: The patient lived with his wife but did not require assist. Patient reports recent signs of dementia in his wife and expressed concern for her care. Physical Therapy Goals  Initiated 7/20/2022  1. Patient will move from supine to sit and sit to supine  and roll side to side in bed with supervision/set-up within 7 day(s). 2.  Patient will transfer from bed to chair and chair to bed with minimal assistance/contact guard assist using the least restrictive device within 7 day(s). 3.  Patient will perform sit to stand with minimal assistance/contact guard assist within 7 day(s). 4.  Patient will ambulate with minimal assistance/contact guard assist for 100 feet with the least restrictive device within 7 day(s). 5.  Patient will ascend/descend 4 stairs with 1 handrail(s) with modified independence within 7 day(s). 6. Patient will verbalize and demonstrate understanding of spinal precautions (No bending, lifting greater than 5 lbs, or twisting; log-roll technique; frequent repositioning as instructed) within 4 days.         Note:   PHYSICAL THERAPY TREATMENT  Patient: Luis Evans (59 y.o. male)  Date: 7/27/2022  Diagnosis: Sagittal plane imbalance [M43.8X9]  Spondylolisthesis of lumbar region [M43.16]  Lumbar radiculitis [M54.16]  Diabetic peripheral neuropathy (HCC) [E11.42]  Progressive focal motor weakness [R53.1]  Spondylolisthesis at L4-L5 level [M43.16]  Spondylolisthesis at L3-L4 level [M43.16]  Spondylolisthesis at L2-L3 level [M43.16]  Postlaminectomy kyphosis [M96.3]  Neurogenic claudication due to lumbar spinal stenosis [M48.062]  Left leg weakness [R29.898] <principal problem not specified>  Procedure(s) (LRB):  T10-S2 ILIAC FIXATION, L1, L2, L3, L4 OSTEOTOMIES, L5 LAMINECTOMY, T10-S2 FUSION, OPEN SACROILIAC JOINT FUSION (O-ARM) (N/A) 8 Days Post-Op  Precautions: Back (brace when up, no BLT, sitting <30-45 min, log roll to sit)  Chart, physical therapy assessment, plan of care and goals were reviewed. ASSESSMENT  Patient continues with skilled PT services. Pt BP supine was 177/85. Pt supine to sit mod of 2 with log roll. Pt CGA sitting EOB. Pt with increased dizziness and weakness  sitting with /56. Pt returned to supine with mod assist of 2./79 after return to supine. Note PT BP has been high this morning prior to PT. Pt orthostatic with mobility. Nurse notified. PT will follow in afternoon . PLAN :  Patient continues to benefit from skilled intervention to address the above impairments. Continue treatment per established plan of care. to address goals. Recommendation for discharge: (in order for the patient to meet his/her long term goals)  Therapy 3 hours per day 5-7 days per week    This discharge recommendation:  Has been made in collaboration with the attending provider and/or case management    IF patient discharges home will need the following DME: rolling walker       SUBJECTIVE:     OBJECTIVE DATA SUMMARY:   Critical Behavior:  Neurologic State: Alert  Orientation Level: Oriented X4  Cognition: Appropriate for age attention/concentration  Safety/Judgement: Awareness of environment  Functional Mobility Training:  Bed Mobility:      MOD assist of 2 for bed mobility             Balance:    GOOD CGA      Therapeutic Exercises:   Heel slides ankle pumps.     Activity Tolerance:   Poor    After treatment patient left in no apparent distress:   Supine in bed    COMMUNICATION/COLLABORATION:   The patients plan of care was discussed with: Physical therapist.     Rudolph Mendoza PTA   Time Calculation: 23 mins

## 2022-07-27 NOTE — PROGRESS NOTES
Cardiology Progress Note  Patient: Flakita Granda MRN: [id number]     YOB: 1945  Age: 76 y.o. Sex: male      Admit Date: 7/19/2022       Assessment/Plan     1 orthostatic hypotension with supine hypertension: stop midrdrine, start low dose labetalol 50 mg BID. 2. Hx HTN:  3 Hx DM  4. POD 8 & spine surgery:   5 BPH: holding flomax. 6 post op anemia:  7 hypokalemia/hypomagnesemia: replete        NP spent __8_ minutes reviewing chart, labs, diagnostics and coordination of care . NP spent __6_ minutes with pt/family in examination and care plan review. HPI     Flakita Granda is a 76 y.o.  male  with PMH of diabetes, hypertension, dyslipidemia, DJD who is s/p day 6 from T10-S2 Iliac fixation, L1, L2, L3, L4 Osteotomies, L5 Laminectomy, T10-S2 fusion, open sacroiliac joint. He worked with PT Saturday and noted to be orthostatic.  0940 - 153/65 BP while lying down, 0945 - BP 95/51 while sitting on edge of bed, 0948 - /80 when standing. BP meds were held and midodrine initiated. He did not get up yesterday and today he became diaphoretic and felt like he was going to pass out when he tried to get up to chair. Today when he got up BP dropped from 973 systolic to 127 sitting then 78 standing and he nearly passed out. He became diaphoretic and pale. Surgical compression stocking are in place. He notes he is not eating and drinking very little per his report. Primitivo Campbell He is noted to be on flomax daily. Has received 1 un PRBC's post op. The patient denies chest pain, dependent edema,  RINALDI, orthopnea, palpitations, PND, shortness of breath, claudication or syncope.      The patient has been referred to cardiology for on going management of orthostatic hypotension        Review of Symptoms:  Constitutional: positive for fatigue  ENT: negative   Respiratory: negative for cough or dyspnea on exertion  Gastrointestinal: negative for vomiting  Genitourinary: + frequency Musculoskeletal:positive for back pain  Neurological: negative for memory problems  Other systems reviewed and negative except as above. Previous cardiac hx  06/20/22    NUCLEAR CARDIAC STRESS TEST 06/24/2022 6/30/2022    Interpretation Summary  Formatting of this result is different from the original.      ECG: Resting ECG demonstrates normal sinus rhythm. Normal stress myocardial perfusion imaging without ischemia or infarction on pharmacological stress test. Normal left ventricular systolic function with LVEF 55%.   No EKG changes of ischemia on pharmacological stress test.    Signed by: Tino Sue MD on 6/24/2022  2:43 PM    Risk factors: male, HTN, DM     Social History     Tobacco Use    Smoking status: Never    Smokeless tobacco: Never   Substance Use Topics    Alcohol use: Yes     Comment: rare     Family History   Problem Relation Age of Onset    Cancer Mother     Breast Cancer Mother     Colon Cancer Mother     Stroke Father     Prostate Cancer Brother     Deep Vein Thrombosis Neg Hx     Anesth Problems Neg Hx        Current Facility-Administered Medications   Medication Dose Route Frequency    potassium chloride SR (KLOR-CON 10) tablet 40 mEq  40 mEq Oral NOW    magnesium sulfate 2 g/50 ml IVPB (premix or compounded)  2 g IntraVENous ONCE    mirtazapine (REMERON) tablet 7.5 mg  7.5 mg Oral DAILY    [Held by provider] midodrine (PROAMATINE) tablet 2.5 mg  2.5 mg Oral TID WITH MEALS    [Held by provider] tamsulosin (FLOMAX) capsule 0.4 mg  0.4 mg Oral DAILY WITH DINNER    0.9% sodium chloride infusion 250 mL  250 mL IntraVENous PRN    enoxaparin (LOVENOX) injection 30 mg  30 mg SubCUTAneous Q12H    [Held by provider] amLODIPine (NORVASC) tablet 10 mg  10 mg Oral DAILY    gabapentin (NEURONTIN) capsule 600 mg  600 mg Oral QHS    [Held by provider] hydroCHLOROthiazide (HYDRODIURIL) tablet 12.5 mg  12.5 mg Oral DAILY    [Held by provider] lisinopriL (PRINIVIL, ZESTRIL) tablet 20 mg  20 mg Oral BID metFORMIN (GLUCOPHAGE) tablet 500 mg  500 mg Oral BID WITH MEALS    sodium chloride (NS) flush 5-40 mL  5-40 mL IntraVENous Q8H    sodium chloride (NS) flush 5-40 mL  5-40 mL IntraVENous PRN    naloxone (NARCAN) injection 0.4 mg  0.4 mg IntraVENous PRN    [Held by provider] senna-docusate (PERICOLACE) 8.6-50 mg per tablet 1 Tablet  1 Tablet Oral BID    [Held by provider] polyethylene glycol (MIRALAX) packet 17 g  17 g Oral DAILY    [Held by provider] bisacodyL (DULCOLAX) suppository 10 mg  10 mg Rectal DAILY PRN    acetaminophen (TYLENOL) tablet 1,000 mg  1,000 mg Oral Q6H    traMADoL (ULTRAM) tablet 50 mg  50 mg Oral Q6H PRN    oxyCODONE IR (ROXICODONE) tablet 5 mg  5 mg Oral Q3H PRN    oxyCODONE IR (ROXICODONE) tablet 10 mg  10 mg Oral Q3H PRN    famotidine (PEPCID) tablet 20 mg  20 mg Oral BID    phenol throat spray (CHLORASEPTIC)   Oral PRN    cyclobenzaprine (FLEXERIL) tablet 10 mg  10 mg Oral BID PRN    insulin lispro (HUMALOG) injection   SubCUTAneous AC&HS    glucose chewable tablet 16 g  4 Tablet Oral PRN    glucagon (GLUCAGEN) injection 1 mg  1 mg IntraMUSCular PRN    therapeutic multivitamin (THERAGRAN) tablet 1 Tablet  1 Tablet Oral DAILY    vitamin N-F-S-lutein-minerals (OCUVITE) tablet 1 Tablet  1 Tablet Oral BID       Objective:     Vitals:    07/27/22 0300 07/27/22 0400 07/27/22 0456 07/27/22 0746   BP: (!) 174/76 (!) 167/66 (!) 180/71 (!) 189/87   Pulse: 73 72 88 73   Resp: 17 17 17 18   Temp:   97.6 °F (36.4 °C) 98.9 °F (37.2 °C)   SpO2:   97% 95%   Weight:       Height:            Intake and Output:  Current Shift: No intake/output data recorded. Last three shifts: 07/25 1901 - 07/27 0700  In: 1837.5 [I.V.:1837.5]  Out: 80 [Urine:775]          Gen: Well-developed, well-nourished, in no acute distress  Neck: Supple,No JVD, No Carotid Bruit,   Resp: No accessory muscle use, Clear breath sounds, No rales or rhonchi  Card: Regular Rate,  Rhythm,  Normal S1, S2, No murmurs, rubs or gallop.    Abd: Soft, non-tender, non-distended,BS+,   MSK: No cyanosis  Skin: No rashes    Neuro: moving all four extremities , follows commands appropriately  Psych:  Good insight, oriented to person, place , alert, Nml Affect  LE: No edema, SARAH hose on           TELEMETRY; SR occ PVC     Lab/Data Review:  BMP:   Lab Results   Component Value Date/Time     07/27/2022 05:21 AM    K 3.2 (L) 07/27/2022 05:21 AM     (H) 07/27/2022 05:21 AM    CO2 25 07/27/2022 05:21 AM    AGAP 8 07/27/2022 05:21 AM     (H) 07/27/2022 05:21 AM    BUN 15 07/27/2022 05:21 AM    CREA 0.79 07/27/2022 05:21 AM    GFRAA >60 07/27/2022 05:21 AM    GFRNA >60 07/27/2022 05:21 AM     CBC:   Lab Results   Component Value Date/Time    HGB 9.6 (L) 07/26/2022 02:38 PM    HCT 28.4 (L) 07/26/2022 02:38 PM        Signed By: Stella Sumner NP     July 27, 2022

## 2022-07-27 NOTE — PROGRESS NOTES
Problem: Self Care Deficits Care Plan (Adult)  Goal: *Acute Goals and Plan of Care (Insert Text)  Description: FUNCTIONAL STATUS PRIOR TO ADMISSION: Patient was modified independent using a rolling walker for functional mobility. HOME SUPPORT PRIOR TO ADMISSION: The patient lived with his wife but did not require assist. Patient reports recent signs of dementia in his wife and expressed concern for her care. Occupational Therapy Goals  Initiated 7/20/2022; goals reviewed 7/27/22, all goals remain appropriate. 1.  Patient will perform upper body dressing and bathing with modified independence within 7 days. 2.  Patient will perform lower body dressing and bathing with supervision/set-up using AE PRN within 7 day(s). 3.  Patient will perform toilet transfer with minimal assistance using rolling walker within 7 days. 4.  Patient will perform all aspects of toileting at minimal assistance within 7 days. 5.  Patient will don/doff back brace at supervision/set-up within 7 days. 6.  Patient will verbalize/demonstrate all back precautions during ADL tasks without cues within 7 days.       Outcome: Progressing Towards Goal     OCCUPATIONAL THERAPY TREATMENT/WEEKLY RE-EVALUATION  Patient: Allegra Blake (89 y.o. male)  Date: 7/27/2022  Diagnosis: Sagittal plane imbalance [M43.8X9]  Spondylolisthesis of lumbar region [M43.16]  Lumbar radiculitis [M54.16]  Diabetic peripheral neuropathy (HCC) [E11.42]  Progressive focal motor weakness [R53.1]  Spondylolisthesis at L4-L5 level [M43.16]  Spondylolisthesis at L3-L4 level [M43.16]  Spondylolisthesis at L2-L3 level [M43.16]  Postlaminectomy kyphosis [M96.3]  Neurogenic claudication due to lumbar spinal stenosis [M48.062]  Left leg weakness [R29.898] <principal problem not specified>  Procedure(s) (LRB):  T10-S2 ILIAC FIXATION, L1, L2, L3, L4 OSTEOTOMIES, L5 LAMINECTOMY, T10-S2 FUSION, OPEN SACROILIAC JOINT FUSION (O-ARM) (N/A) 8 Days Post-Op  Precautions: Back (brace when up, no BLT, sitting <30-45 min, log roll to sit)  Chart, occupational therapy assessment, plan of care, and goals were reviewed. ASSESSMENT  Based on the objective data described below, patient continues to present with symptomatic orthostatic hypotension, decreased strength, impaired balance, pain, limited activity tolerance, and impaired mobility and ADL performance. Pt has been experiencing orthostatic BP since surgery, but has been able to slowly increase tolerance for activity and ADLs. At a week post op, he is now able to perform ADL transfers and has progressed to walking to bathroom with staff. Today, BP more stable as compared to previous sessions, however after prolonged activity, dropped to 79/50, and required return to bed. Pt is more able to participate in seated ADL tasks, including seated grooming, feeding, and TLSO mgmt, which is an improvement since initial evaluation. Due to large brace, he still requires mod/max A to manage, but slowing improving. Pt continues to require max/total A for LB dressing and will eventually benefit from AE training once able to tolerate prolonged sitting. Pt placed in modified bed-in-chair position at end of session with immediate recover of blood pressure noted. Will continue to follow; All goals remain appropriate.     Patient Vitals during OT session:   Pt position Pulse BP   07/27/22 1416 Bed in chair position; end of session 86 127/60   07/27/22 1412 Seated in recliner; post activity (!) 104 (!) 79/50   07/27/22 1400 Seated EOB; during activity 84 126/66   07/27/22 1355 Seated EOB 95 107/61   07/27/22 1352 Semi-fowlers; at rest; pre activity 82 (!) 140/73     Current Level of Function Impacting Discharge (ADLs): max/total A for LB ADLs; mod/max A for TLSO mgmt; min A for seated UB dressing, dressing, and grooming; min to mod A for ADL transfers    Other factors to consider for discharge: symptomatic orthostatic hypotension; fall risk         PLAN Marychuy Nicolas Goals have been updated based on progression since last assessment. Patient continues to benefit from skilled intervention to address the above impairments. Continue to follow patient 5 times a week to address goals. Recommend with staff: OOB to chair for all meals; mobility to bathroom vs. BSC for toileting pending BP stability; encourage participation with all ADL tasks    Recommend next OT session: toileting tasks; standing tolerance; seated ADLs    Recommendation for discharge: (in order for the patient to meet his/her long term goals)  Therapy 3 hours per day 5-7 days per week    This discharge recommendation:  Has been made in collaboration with the attending provider and/or case management    Equipment recommendations for successful discharge (if) home: TBD       SUBJECTIVE:   Patient stated I really want this to pass.     OBJECTIVE DATA SUMMARY:   Cognitive/Behavioral Status:  Neurologic State: Alert  Orientation Level: Oriented X4  Cognition: Appropriate decision making; Appropriate for age attention/concentration; Appropriate safety awareness; Follows commands  Perception: Appears intact  Perseveration: No perseveration noted  Safety/Judgement: Awareness of environment; Fall prevention;Good awareness of safety precautions; Home safety; Insight into deficits    Functional Mobility and Transfers for ADLs:  Bed Mobility:  Rolling: Minimum assistance (cues for log roll)  Supine to Sit: Minimum assistance;Assist x1;Additional time  Sit to Supine: Minimum assistance; Moderate assistance    Transfers:  Sit to Stand: Minimum assistance; Moderate assistance (Irene from elevated height; mod A from lower surface)  Bed to Chair: Contact guard assistance;Minimum assistance    Balance:  Sitting: Without support  Sitting - Static: Good (unsupported)  Sitting - Dynamic: Good (unsupported)  Standing: Impaired; With support  Standing - Static: Good  Standing - Dynamic : Fair    ADL Intervention:  Upper Body Dressing Assistance  Orthotics(Brace): Maximum assistance (to manage TLSO)  Hospital Gown: Minimum  assistance  Cues: Physical assistance;Verbal cues provided; Tactile cues provided;Visual cues provided    Cognitive Retraining  Safety/Judgement: Awareness of environment; Fall prevention;Good awareness of safety precautions; Home safety; Insight into deficits    Patient instructed and indicated understanding the benefits of maintaining activity tolerance, functional mobility, and independence with self care tasks during acute stay  to ensure safe return home and to baseline. Encouraged patient to increase frequency and duration OOB, not sitting longer than 30 mins without marching/walking with staff, be out of bed for all meals, perform daily ADLs (as approved by RN/MD regarding bathing etc), and performing functional mobility to/from bathroom. Patient instruction and indicated understanding on body mechanics, ergonomics and gravitational force on the spine during different body positions to plan activities in prep for return home to complete basic ADLs, instrumental ADLs and back to work safely. Functional Outcome Measure:    Barthel Index:  Bathin  Bladder: 10  Bowels: 5  Groomin  Dressin  Feedin  Mobility: 0  Stairs: 0  Toilet Use: 5  Transfer (Bed to Chair and Back): 5  Total: 30/100      The Barthel ADL Index: Guidelines  1. The index should be used as a record of what a patient does, not as a record of what a patient could do. 2. The main aim is to establish degree of independence from any help, physical or verbal, however minor and for whatever reason. 3. The need for supervision renders the patient not independent. 4. A patient's performance should be established using the best available evidence. Asking the patient, friends/relatives and nurses are the usual sources, but direct observation and common sense are also important. However direct testing is not needed.   5. Usually the patient's performance over the preceding 24-48 hours is important, but occasionally longer periods will be relevant. 6. Middle categories imply that the patient supplies over 50 per cent of the effort. 7. Use of aids to be independent is allowed. Score Interpretation (from [facility] 83)    Independent   60-79 Minimally independent   40-59 Partially dependent   20-39 Very dependent   <20 Totally dependent     -Linda Orr., BarthelVANESSA. (1965). Functional evaluation: the Barthel Index. [address] ([address] [place] [address] (1997). The Barthel activities of daily living index: self-reporting versus actual performance in the old (> or = 75 years). Journal of [address] [place] [address] 45(7), [facility], MARCE, Leo Marley., Randi Cargo. (1999). Measuring the change in disability after inpatient rehabilitation; comparison of the responsiveness of the Barthel Index and Functional [place] Measure. Journal of Neurology, Neurosurgery, and Psychiatry, 66(4), 133-271. Zahida Grubbs, [place].MALU, BABAK Cline, & Aleksandra Swift M.A. (2004) Assessment of post-stroke quality of life in cost-effectiveness studies: The usefulness of the Barthel Index and the EuroQoL-5D.  Quality of Life Research, 13, 427-43     Pain:  Pt reporting minimal pain    Activity Tolerance:   Fair and signs and symptoms of orthostatic hypotension    After treatment patient left in no apparent distress:   Call bell within reach, Bed / chair alarm activated, Caregiver / family present, Side rails x 3, and bed in chair position    COMMUNICATION/COLLABORATION:   The patients plan of care was discussed with: Physical Therapist, Physical Therapy Assistant, Registered Nurse, and ortho NP    Shakir Devi OT  Time Calculation: 32 mins

## 2022-07-27 NOTE — PROGRESS NOTES
Bedside and Verbal shift change report given to Chele Chou (oncoming nurse) by Alejandra Lopez RN (offgoing nurse). Report included the following information SBAR, Procedure Summary, Intake/Output, MAR, Recent Results, and Cardiac Rhythm Sinus Rhythm, Run of A Fib MD aware .

## 2022-07-27 NOTE — PROGRESS NOTES
7/27/2022 2:51 PM Anticipating discharge on 7/28 to [address] [place] [address]. CM confirmed with Orlando Prescott as [facility] they can accept pt on 7/28 if COVID test has resulted. COVID test pending. Per pt's attending, pt will need ambulance transport at discharge. [facility] placed in will call status via [employer]. GUI Hardin    Care Management Progress Note      ICD-10-CM ICD-9-CM    1. Neurogenic claudication due to lumbar spinal stenosis  M48.062 724.03       2. Orthostatic hypotension  I95.1 458.0       3. Essential hypertension  I10 401.9       4. Hypokalemia  E87.6 276.8           RUR:  6%  Risk Level: [x]Low []Moderate []High  Value-based purchasing: [x] Yes [] No  Bundle patient: [] Yes [x] No   Specify:     Transition of care plan:  Ongoing management by Ortho, Cardiology, Family Medicine   [facility] on 7/28  COVID PCR pending for placement. Outpatient follow-up.   [facility] in will call status

## 2022-07-28 VITALS
BODY MASS INDEX: 34.72 KG/M2 | RESPIRATION RATE: 20 BRPM | WEIGHT: 248 LBS | SYSTOLIC BLOOD PRESSURE: 141 MMHG | HEIGHT: 71 IN | OXYGEN SATURATION: 99 % | TEMPERATURE: 97.5 F | DIASTOLIC BLOOD PRESSURE: 64 MMHG | HEART RATE: 67 BPM

## 2022-07-28 LAB
ANION GAP SERPL CALC-SCNC: 6 MMOL/L (ref 5–15)
BUN SERPL-MCNC: 19 MG/DL (ref 6–20)
BUN/CREAT SERPL: 24 (ref 12–20)
CALCIUM SERPL-MCNC: 8.4 MG/DL (ref 8.5–10.1)
CHLORIDE SERPL-SCNC: 109 MMOL/L (ref 97–108)
CO2 SERPL-SCNC: 27 MMOL/L (ref 21–32)
CREAT SERPL-MCNC: 0.78 MG/DL (ref 0.7–1.3)
GLUCOSE BLD STRIP.AUTO-MCNC: 119 MG/DL (ref 65–117)
GLUCOSE BLD STRIP.AUTO-MCNC: 128 MG/DL (ref 65–117)
GLUCOSE SERPL-MCNC: 128 MG/DL (ref 65–100)
MAGNESIUM SERPL-MCNC: 1.9 MG/DL (ref 1.6–2.4)
POTASSIUM SERPL-SCNC: 3.4 MMOL/L (ref 3.5–5.1)
SERVICE CMNT-IMP: ABNORMAL
SERVICE CMNT-IMP: ABNORMAL
SODIUM SERPL-SCNC: 142 MMOL/L (ref 136–145)

## 2022-07-28 PROCEDURE — 74011250637 HC RX REV CODE- 250/637: Performed by: ORTHOPAEDIC SURGERY

## 2022-07-28 PROCEDURE — 74011250637 HC RX REV CODE- 250/637: Performed by: STUDENT IN AN ORGANIZED HEALTH CARE EDUCATION/TRAINING PROGRAM

## 2022-07-28 PROCEDURE — 80048 BASIC METABOLIC PNL TOTAL CA: CPT

## 2022-07-28 PROCEDURE — 74011250636 HC RX REV CODE- 250/636: Performed by: NURSE PRACTITIONER

## 2022-07-28 PROCEDURE — 82962 GLUCOSE BLOOD TEST: CPT

## 2022-07-28 PROCEDURE — 94761 N-INVAS EAR/PLS OXIMETRY MLT: CPT

## 2022-07-28 PROCEDURE — 74011250637 HC RX REV CODE- 250/637: Performed by: NURSE PRACTITIONER

## 2022-07-28 PROCEDURE — APPSS15 APP SPLIT SHARED TIME 0-15 MINUTES: Performed by: NURSE PRACTITIONER

## 2022-07-28 PROCEDURE — 74011000250 HC RX REV CODE- 250: Performed by: NURSE PRACTITIONER

## 2022-07-28 PROCEDURE — 83735 ASSAY OF MAGNESIUM: CPT

## 2022-07-28 PROCEDURE — 36415 COLL VENOUS BLD VENIPUNCTURE: CPT

## 2022-07-28 PROCEDURE — 99232 SBSQ HOSP IP/OBS MODERATE 35: CPT | Performed by: FAMILY MEDICINE

## 2022-07-28 RX ORDER — FAMOTIDINE 20 MG/1
20 TABLET, FILM COATED ORAL 2 TIMES DAILY
Qty: 30 TABLET | Refills: 0 | Status: SHIPPED | OUTPATIENT
Start: 2022-07-28 | End: 2022-09-19 | Stop reason: ALTCHOICE

## 2022-07-28 RX ORDER — MIRTAZAPINE 7.5 MG/1
7.5 TABLET, FILM COATED ORAL DAILY
Qty: 30 TABLET | Refills: 0 | Status: SHIPPED | OUTPATIENT
Start: 2022-07-29 | End: 2022-09-19

## 2022-07-28 RX ORDER — LABETALOL 100 MG/1
100 TABLET, FILM COATED ORAL EVERY 12 HOURS
Qty: 30 TABLET | Refills: 0 | Status: SHIPPED | OUTPATIENT
Start: 2022-07-28 | End: 2022-09-19 | Stop reason: ALTCHOICE

## 2022-07-28 RX ORDER — ACETAMINOPHEN 500 MG
TABLET ORAL
Qty: 100 TABLET | Refills: 0 | Status: SHIPPED | OUTPATIENT
Start: 2022-07-28 | End: 2022-08-16

## 2022-07-28 RX ORDER — OXYCODONE HYDROCHLORIDE 5 MG/1
5 TABLET ORAL
Qty: 30 TABLET | Refills: 0 | Status: SHIPPED | OUTPATIENT
Start: 2022-07-28 | End: 2022-08-04

## 2022-07-28 RX ORDER — LABETALOL 100 MG/1
100 TABLET, FILM COATED ORAL EVERY 12 HOURS
Status: DISCONTINUED | OUTPATIENT
Start: 2022-07-28 | End: 2022-07-28 | Stop reason: HOSPADM

## 2022-07-28 RX ORDER — POTASSIUM CHLORIDE 7.45 MG/ML
10 INJECTION INTRAVENOUS ONCE
Status: DISCONTINUED | OUTPATIENT
Start: 2022-07-28 | End: 2022-07-28

## 2022-07-28 RX ORDER — ENOXAPARIN SODIUM 100 MG/ML
30 INJECTION SUBCUTANEOUS EVERY 12 HOURS
Qty: 10 EACH | Refills: 0 | Status: SHIPPED | OUTPATIENT
Start: 2022-07-28 | End: 2022-08-22

## 2022-07-28 RX ORDER — POTASSIUM CHLORIDE 7.45 MG/ML
10 INJECTION INTRAVENOUS
Status: DISCONTINUED | OUTPATIENT
Start: 2022-07-28 | End: 2022-07-28

## 2022-07-28 RX ORDER — CHOLECALCIFEROL TAB 125 MCG (5000 UNIT) 125 MCG
5000 TAB ORAL DAILY
Qty: 90 TABLET | Refills: 1 | Status: SHIPPED | OUTPATIENT
Start: 2022-07-29

## 2022-07-28 RX ORDER — CHOLECALCIFEROL TAB 125 MCG (5000 UNIT) 125 MCG
5000 TAB ORAL DAILY
Status: DISCONTINUED | OUTPATIENT
Start: 2022-07-29 | End: 2022-07-28 | Stop reason: HOSPADM

## 2022-07-28 RX ORDER — CYCLOBENZAPRINE HCL 10 MG
10 TABLET ORAL
Qty: 30 TABLET | Refills: 0 | Status: SHIPPED | OUTPATIENT
Start: 2022-07-28 | End: 2022-09-19 | Stop reason: ALTCHOICE

## 2022-07-28 RX ADMIN — Medication 10 ML: at 05:25

## 2022-07-28 RX ADMIN — LABETALOL HYDROCHLORIDE 100 MG: 100 TABLET, FILM COATED ORAL at 08:14

## 2022-07-28 RX ADMIN — MIRTAZAPINE 7.5 MG: 15 TABLET, FILM COATED ORAL at 08:06

## 2022-07-28 RX ADMIN — ACETAMINOPHEN 1000 MG: 500 TABLET ORAL at 05:24

## 2022-07-28 RX ADMIN — POTASSIUM BICARBONATE 10 MEQ: 782 TABLET, EFFERVESCENT ORAL at 08:05

## 2022-07-28 RX ADMIN — ENOXAPARIN SODIUM 30 MG: 100 INJECTION SUBCUTANEOUS at 08:06

## 2022-07-28 RX ADMIN — Medication 1 TABLET: at 08:06

## 2022-07-28 RX ADMIN — THERA TABS 1 TABLET: TAB at 08:06

## 2022-07-28 RX ADMIN — METFORMIN HYDROCHLORIDE 500 MG: 500 TABLET ORAL at 08:06

## 2022-07-28 RX ADMIN — FAMOTIDINE 20 MG: 20 TABLET, FILM COATED ORAL at 08:06

## 2022-07-28 RX ADMIN — ACETAMINOPHEN 1000 MG: 500 TABLET ORAL at 00:17

## 2022-07-28 RX ADMIN — ACETAMINOPHEN 1000 MG: 500 TABLET ORAL at 12:48

## 2022-07-28 NOTE — PROGRESS NOTES
[id number] Family Medicine Service Daily Progress Note    24 Hour Events: NAEO    SUBJECTIVE: Has had no further episodes of lightheadedness/dizziness. Denies SOB, CP. Had some orthostatic pressures yesterday. OBJECTIVE:      Vitals: Visit Vitals  /74 (BP 1 Location: Right upper arm, BP Patient Position: At rest)   Pulse 66   Temp 98.1 °F (36.7 °C)   Resp 22   Ht 5' 11\" (1.803 m)   Wt 248 lb (112.5 kg)   SpO2 98%   BMI 34.59 kg/m²     Physical Exam:  General: NAD. Respiratory: Lungs clear to auscultation bilaterally with no wheezing, rhonchi, rales, or crackles. Cardiovascular: Regular rate and rhythm. S1/S2 auscultated with no murmurs, rubs, or gallops. GI: Nondistended. + bowel sounds. Nontender to palpation. Extremities: No LE edema. Skin: Warm, dry.   Neuro: Alert and oriented    I/O:  Date 07/27/22 0700 - 07/28/22 0659 07/28/22 0700 - 07/29/22 0659   Shift 7404-1122 0764-2079 24 Hour Total 2514-0653 0676-9194 24 Hour Total   INTAKE   Shift Total(mL/kg)         OUTPUT   Urine(mL/kg/hr)           Urine Occurrence(s) 2 x  2 x      Stool           Stool Occurrence(s) 1 x 1 x 2 x      Shift Total(mL/kg)         NET         Weight (kg) 112.5 112.5 112.5 112.5 112.5 112.5         Inpatient Medications  Current Facility-Administered Medications   Medication Dose Route Frequency    potassium bicarb-citric acid (EFFER-K) tablet 10 mEq  10 mEq Oral ONCE    labetaloL (NORMODYNE) tablet 50 mg  50 mg Oral Q12H    amLODIPine (NORVASC) tablet 10 mg  10 mg Oral DAILY    mirtazapine (REMERON) tablet 7.5 mg  7.5 mg Oral DAILY    [Held by provider] tamsulosin (FLOMAX) capsule 0.4 mg  0.4 mg Oral DAILY WITH DINNER    0.9% sodium chloride infusion 250 mL  250 mL IntraVENous PRN    enoxaparin (LOVENOX) injection 30 mg  30 mg SubCUTAneous Q12H    gabapentin (NEURONTIN) capsule 600 mg  600 mg Oral QHS    [Held by provider] hydroCHLOROthiazide (HYDRODIURIL) tablet 12.5 mg  12.5 mg Oral DAILY    [Held by provider] lisinopriL (PRINIVIL, ZESTRIL) tablet 20 mg  20 mg Oral BID    metFORMIN (GLUCOPHAGE) tablet 500 mg  500 mg Oral BID WITH MEALS    sodium chloride (NS) flush 5-40 mL  5-40 mL IntraVENous Q8H    sodium chloride (NS) flush 5-40 mL  5-40 mL IntraVENous PRN    naloxone (NARCAN) injection 0.4 mg  0.4 mg IntraVENous PRN    [Held by provider] senna-docusate (PERICOLACE) 8.6-50 mg per tablet 1 Tablet  1 Tablet Oral BID    [Held by provider] polyethylene glycol (MIRALAX) packet 17 g  17 g Oral DAILY    [Held by provider] bisacodyL (DULCOLAX) suppository 10 mg  10 mg Rectal DAILY PRN    acetaminophen (TYLENOL) tablet 1,000 mg  1,000 mg Oral Q6H    traMADoL (ULTRAM) tablet 50 mg  50 mg Oral Q6H PRN    oxyCODONE IR (ROXICODONE) tablet 5 mg  5 mg Oral Q3H PRN    oxyCODONE IR (ROXICODONE) tablet 10 mg  10 mg Oral Q3H PRN    famotidine (PEPCID) tablet 20 mg  20 mg Oral BID    phenol throat spray (CHLORASEPTIC)   Oral PRN    cyclobenzaprine (FLEXERIL) tablet 10 mg  10 mg Oral BID PRN    insulin lispro (HUMALOG) injection   SubCUTAneous AC&HS    glucose chewable tablet 16 g  4 Tablet Oral PRN    glucagon (GLUCAGEN) injection 1 mg  1 mg IntraMUSCular PRN    therapeutic multivitamin (THERAGRAN) tablet 1 Tablet  1 Tablet Oral DAILY    vitamin E-F-F-lutein-minerals (OCUVITE) tablet 1 Tablet  1 Tablet Oral BID       Allergies  No Known Allergies    CBC:  Recent Labs     07/26/22  1438 07/26/22  0455   HGB 9.6* 7.2*   HCT 28.4* 07.5*         Metabolic Panel:  Recent Labs     07/28/22  0250 07/27/22  0521 07/26/22  1406    142 141   K 3.4* 3.2* 3.6   * 109* 108   CO2 27 25 27   BUN 19 15 18   CREA 0.78 0.79 0.93   * 121* 136*   CA 8.4* 8.6 8.2*   MG  --  1.6  --               Assessment and Plan     Dede Barraza is a 76 y.o. male that was admitted for back surgery by ortho and family medicine team was consulted for orthostatic hypotension.        POD 9 of T10-S2 Iliac fixation, L1, L2, L3, L4 Osteotomies, L5 Laminectomy, T10-S2 fusion, open sacroiliac joint.  - Management per Ortho     Orthostatic hypotension: Pt has h/o HTN. Hgb 7.2-->9.6(07/26), recheck ordered. Cardiology following. To be discharged to [address] [place] [address] today for post-op rehab. - Discontinue Norvasc 10 mg daily, Lisinopril 20 mg BID, HCTZ 12.5 mg  - Continue Labetalol 100mg PO BID per Cardiology recs  - Compression socks ordered. - Keep legs elevated at night.  - Discontinue midodrine  - HOLD flomax per Cardiology, consider restarting at half dose if patient has difficulty urinating    Hypokalemia: K+ 3.4 07/28. - Additional 10 mEq K+ repletion this am     Type II DM        Lab Results   Component Value Date/Time     Hemoglobin A1c 6.4 (H) 07/05/2022 02:48 PM   - Re-started on Metformin 500 mg BID.  - Monitor on daily BMP      Pain Management: Per Ortho     DVT Prophylaxis: Per Ortho     Disposition: Per Ortho    Anemia: Per Murphy Mccarthy MD  Family Medicine Resident    Thank you for consulting us in the care of this patient. We will sign off as patient is to be discharged to [address] [place] [address] today. Please contact the Family Medicine Service with any questions. For Billing    No chief complaint on file. Hospital Problems  Date Reviewed: 11/1/2021            Codes Class Noted POA    Orthostatic hypotension ICD-10-CM: I95.1  ICD-9-CM: 458.0  7/25/2022 Unknown        Complication of surgical procedure ICD-10-CM: T81. 9XXA  ICD-9-CM: 998.9  7/19/2022 Unknown        Sagittal plane imbalance ICD-10-CM: M43.8X9  ICD-9-CM: 737.8  7/19/2022 Unknown        Neurogenic claudication due to lumbar spinal stenosis ICD-10-CM: M48.062  ICD-9-CM: 724.03  7/19/2022 Unknown        Left leg weakness ICD-10-CM: R29.898  ICD-9-CM: 729.89  7/19/2022 Unknown        Spondylolisthesis at L4-L5 level ICD-10-CM: M43.16  ICD-9-CM: 756.12  7/19/2022 Unknown        Spondylolisthesis at L2-L3 level ICD-10-CM: M43.16  ICD-9-CM: 756.12  7/19/2022 Unknown Spondylolisthesis at L3-L4 level ICD-10-CM: M43.16  ICD-9-CM: 756.12  7/19/2022 Unknown

## 2022-07-28 NOTE — PROGRESS NOTES
Cardiology Progress Note  Patient: Pako Nails MRN: [id number]     YOB: 1945  Age: 76 y.o. Sex: male      Admit Date: 7/19/2022       Assessment/Plan     1 orthostatic hypotension with supine hypertension:  resumed norvasc 7/27,, added low dose labetalol. Pt unable to get OOB with PT due to orthostasis. Would hold norvasc, inc labetalol, Joobst stockings essentail   2. Hx HTN:  3 Hx DM  4. POD 9 spine surgery:   5 BPH: holding flomax. 6 post op anemia:  7 hypokalemia/hypomagnesemia: check am Mg++, replete K        NP spent __5_ minutes reviewing chart, labs, diagnostics and coordination of care . NP spent __8_ minutes with pt/family in examination and care plan review. HPI     Pako Nails is a 76 y.o.  male  with PMH of diabetes, hypertension, dyslipidemia, DJD who is s/p day 6 from T10-S2 Iliac fixation, L1, L2, L3, L4 Osteotomies, L5 Laminectomy, T10-S2 fusion, open sacroiliac joint. He worked with PT Saturday and noted to be orthostatic.  0940 - 153/65 BP while lying down, 0945 - BP 95/51 while sitting on edge of bed, 0948 - /80 when standing. BP meds were held and midodrine initiated. He did not get up yesterday and today he became diaphoretic and felt like he was going to pass out when he tried to get up to chair. Today when he got up BP dropped from 005 systolic to 442 sitting then 78 standing and he nearly passed out. He became diaphoretic and pale. Surgical compression stocking are in place. He notes he is not eating and drinking very little per his report. Sebastian Gonzalez He is noted to be on flomax daily. Has received 1 un PRBC's post op. The patient denies chest pain, dependent edema,  RINALDI, orthopnea, palpitations, PND, shortness of breath, claudication or syncope.      The patient has been referred to cardiology for on going management of orthostatic hypotension        Review of Symptoms:  Constitutional: positive for fatigue  ENT: negative Respiratory: negative for cough or dyspnea on exertion  Gastrointestinal: negative for vomiting  Genitourinary: + frequency   Musculoskeletal:positive for back pain  Neurological: negative for memory problems  Other systems reviewed and negative except as above. Previous cardiac hx  06/20/22    NUCLEAR CARDIAC STRESS TEST 06/24/2022 6/30/2022    Interpretation Summary  Formatting of this result is different from the original.      ECG: Resting ECG demonstrates normal sinus rhythm. Normal stress myocardial perfusion imaging without ischemia or infarction on pharmacological stress test. Normal left ventricular systolic function with LVEF 55%.   No EKG changes of ischemia on pharmacological stress test.    Signed by: Tino Sue MD on 6/24/2022  2:43 PM    Risk factors: male, HTN, DM     Social History     Tobacco Use    Smoking status: Never    Smokeless tobacco: Never   Substance Use Topics    Alcohol use: Yes     Comment: rare     Family History   Problem Relation Age of Onset    Cancer Mother     Breast Cancer Mother     Colon Cancer Mother     Stroke Father     Prostate Cancer Brother     Deep Vein Thrombosis Neg Hx     Anesth Problems Neg Hx        Current Facility-Administered Medications   Medication Dose Route Frequency    potassium bicarb-citric acid (EFFER-K) tablet 10 mEq  10 mEq Oral ONCE    labetaloL (NORMODYNE) tablet 50 mg  50 mg Oral Q12H    amLODIPine (NORVASC) tablet 10 mg  10 mg Oral DAILY    mirtazapine (REMERON) tablet 7.5 mg  7.5 mg Oral DAILY    [Held by provider] tamsulosin (FLOMAX) capsule 0.4 mg  0.4 mg Oral DAILY WITH DINNER    0.9% sodium chloride infusion 250 mL  250 mL IntraVENous PRN    enoxaparin (LOVENOX) injection 30 mg  30 mg SubCUTAneous Q12H    gabapentin (NEURONTIN) capsule 600 mg  600 mg Oral QHS    [Held by provider] hydroCHLOROthiazide (HYDRODIURIL) tablet 12.5 mg  12.5 mg Oral DAILY    [Held by provider] lisinopriL (PRINIVIL, ZESTRIL) tablet 20 mg  20 mg Oral BID metFORMIN (GLUCOPHAGE) tablet 500 mg  500 mg Oral BID WITH MEALS    sodium chloride (NS) flush 5-40 mL  5-40 mL IntraVENous Q8H    sodium chloride (NS) flush 5-40 mL  5-40 mL IntraVENous PRN    naloxone (NARCAN) injection 0.4 mg  0.4 mg IntraVENous PRN    [Held by provider] senna-docusate (PERICOLACE) 8.6-50 mg per tablet 1 Tablet  1 Tablet Oral BID    [Held by provider] polyethylene glycol (MIRALAX) packet 17 g  17 g Oral DAILY    [Held by provider] bisacodyL (DULCOLAX) suppository 10 mg  10 mg Rectal DAILY PRN    acetaminophen (TYLENOL) tablet 1,000 mg  1,000 mg Oral Q6H    traMADoL (ULTRAM) tablet 50 mg  50 mg Oral Q6H PRN    oxyCODONE IR (ROXICODONE) tablet 5 mg  5 mg Oral Q3H PRN    oxyCODONE IR (ROXICODONE) tablet 10 mg  10 mg Oral Q3H PRN    famotidine (PEPCID) tablet 20 mg  20 mg Oral BID    phenol throat spray (CHLORASEPTIC)   Oral PRN    cyclobenzaprine (FLEXERIL) tablet 10 mg  10 mg Oral BID PRN    insulin lispro (HUMALOG) injection   SubCUTAneous AC&HS    glucose chewable tablet 16 g  4 Tablet Oral PRN    glucagon (GLUCAGEN) injection 1 mg  1 mg IntraMUSCular PRN    therapeutic multivitamin (THERAGRAN) tablet 1 Tablet  1 Tablet Oral DAILY    vitamin S-B-I-lutein-minerals (OCUVITE) tablet 1 Tablet  1 Tablet Oral BID       Objective:     Vitals:    07/27/22 1934 07/27/22 2229 07/27/22 2251 07/28/22 0332   BP: (!) 157/73 116/70  138/74   Pulse: 77 72 70 66   Resp: 22 22 22   Temp: 97.9 °F (36.6 °C) 97.7 °F (36.5 °C)  98.1 °F (36.7 °C)   SpO2: 95% 98%  98%   Weight:       Height:            Intake and Output:  Current Shift: No intake/output data recorded. Last three shifts: No intake/output data recorded. Gen: Well-developed, well-nourished, in no acute distress  Neck: Supple,No JVD, No Carotid Bruit,   Resp: No accessory muscle use, Clear breath sounds, No rales or rhonchi  Card: Regular Rate,  Rhythm,  Normal S1, S2, No murmurs, rubs or gallop.    Abd:  Soft, non-tender, non-distended, BS+  MSK: No cyanosis  Skin: No rashes    Neuro: moving all four extremities , follows commands appropriately  Psych:  Good insight, oriented to person, place , alert, Nml Affect  LE: No edema          TELEMETRY; SR PVC's     Lab/Data Review:  BMP:   Lab Results   Component Value Date/Time     07/28/2022 02:50 AM    K 3.4 (L) 07/28/2022 02:50 AM     (H) 07/28/2022 02:50 AM    CO2 27 07/28/2022 02:50 AM    AGAP 6 07/28/2022 02:50 AM     (H) 07/28/2022 02:50 AM    BUN 19 07/28/2022 02:50 AM    CREA 0.78 07/28/2022 02:50 AM    GFRAA >60 07/28/2022 02:50 AM    GFRNA >60 07/28/2022 02:50 AM     CBC:   No results found for: WBC, HGB, HGBEXT, HCT, HCTEXT, PLT, PLTEXT, HGBEXT, HCTEXT, PLTEXT       Signed By: Phan Crawford NP     July 28, 2022

## 2022-07-28 NOTE — DISCHARGE INSTRUCTIONS
Perla Briceño MD  [address] [place] [address]  Office Phone: [phone]  Lumbar Surgery Discharge Instructions    Activities  You are going home a well person, be as active as possible. Your only exercise should be walking. Start with short frequent walks and increase your walking distance each day. Start with walking three times per day for 5 minutes and increase your distance each day 2-3 minutes. Limit the amount of time you sit to 20-30 minute intervals. Sitting for prolonged periods of time will be uncomfortable for you following your surgery. Do not lift anything over 8 pounds for 10-12 weeks, and do not do any bending or straining. When you are in the bed, you may lay on your back or on either side. Do not lay on your stomach. Continue using your incentive spirometer regularly for deep breathing exercises  You may resume sexual relations 6 weeks after your surgery      Diet  You may resume your normal diet. Be sure to drink plenty of fluids, it is important to keep yourself hydrated. MAKE SURE YOU ARE GETTING GOOD NUTRITION (Lean Protein, Vitamin D AND Calcium)  Avoid alcoholic beverages and ABSOLUTELY NO tobacco products. Tobacco products will interfere with your healing. If you continue to use tobacco, you may end up needing another surgery in the future. Medications  Do not take anti-inflammatory medications or aspirin unless instructed by your physician. Take your pain medication as directed. Do NOT take additional Tylenol if your prescribed pain medication has acetaminophen in it (Endocet/Percocet, Lortab, Norco). It is important to have regular bowel movements. Pain medications may cause constipation. Stool softeners, prune juice, and increasing your water and fiber intake may help in preventing constipation. Do NOT take laxatives if at all possible except in severe situations. It can results in a vicious cycle of constipation and diarrhea.    Do not be alarmed if you still have some of the same symptoms you had prior to surgery. The nerves often require time to heal after the pressure has been relieved. You may experience pain in your shoulders or between your shoulder blades, which is common after this surgery. The level of pain you experience should improve as your body heals. Driving  You may not drive or return to work until instructed by your physician. However, you may ride in the car for short periods of time. Brace  If you have a back brace, you should wear your brace at all times when you are out of bed. Do not wear the brace while in bed or showering. Remember to always wear a cotton t-shirt underneath your brace. Do not bend or twist when your brace is off. Showering  For now, you may shower the front side of your body. Approximately 5-7 days after your surgery, if your incision is not draining, you may begin taking full showers. Your dressing is waterproof. Do not rub or apply lotion or ointments to the incision site. Do not use tub baths, swimming pools or Jacuzzis. Caring for your incision  Keep the Aquacel on until 7 days after discharge. At that point, if the incision is dry and without drainage, you may keep the wound open to air without cover. You may have steri-strips on your incision (small, white pieces of tape). Do not pull the steri-strips; they will fall off on their own after several days. If you have sutures or staples, they will be removed by home health or when you see your physician. Do not rub or apply any lotions or ointments to your incision site. Follow Up  You should already have your follow-up visit scheduled with Dr. Charla Rothman for 2-3 weeks after surgery. Please call the office if there are any questions or concerns regarding your follow-up. Notify your physician if you develop any of the following conditions:  Fever above 101 degrees for 24 hours. Nausea or vomiting. Severe headache.   Inability to urinate. Loss of bowel or bladder control (sudden onset of incontinence). Changes in sensation in your extremities (numbness, tingling, loss of color). Severe pain or pain not relieved by medications. Redness, swelling, or drainage from your incision. Persistent pain in the chest.   Pain in the calf of either leg. Increased weakness (if this is greater than before your surgery). If you have any questions, contact your Orthopaedic Surgeons office.

## 2022-07-28 NOTE — PROGRESS NOTES
Bedside shift change report given to [address] [place] [address] (oncoming nurse) by Elijah DIEZ (offgoing nurse). Report included the following information SBAR, Procedure Summary, Intake/Output, MAR, and Recent Results.

## 2022-07-28 NOTE — PROGRESS NOTES
ORTHOPAEDIC LUMBAR FUSION PROGRESS NOTE    NAME:     Daquan Gregory   :       1945   MRN:       [id number]   DATE:      2022    POD:              9 Days Post-Op  S/P:              Procedure(s):  T10-S2 ILIAC FIXATION, L1, L2, L3, L4 OSTEOTOMIES, L5 LAMINECTOMY, T10-S2 FUSION, OPEN SACROILIAC JOINT FUSION (O-ARM)    SUBJECTIVE:    Reports improvement in preop lower extremity strength  Postop pain controlled  Tolerating PO intake  Ambulation tolerance: 40 feet, up to chair. Passing flatus, has made BM. Voiding clear yellow  Denies nausea/vomiting, headache, chest pain or shortness of breath    Recent Labs     22  0250 22  0521 22  1438   HGB  --   --  9.6*   HCT  --   --  28.4*      < >  --    K 3.4*   < >  --    *   < >  --    CO2 27   < >  --    BUN 19   < >  --    CREA 0.78   < >  --    *   < >  --     < > = values in this interval not displayed. Patient Vitals for the past 12 hrs:   BP Temp Pulse Resp SpO2   22 0332 138/74 98.1 °F (36.7 °C) 66 22 98 %   22 2251 -- -- 70 -- --   22 2229 116/70 97.7 °F (36.5 °C) 72 22 98 %     Exam:  Positive strength/ROM bilat lower ext: main deficit is 4/5 left him flexion/SLR (this continues to improve)  Neuro intact to sensation  Dressings clean and dry  Lower extremities warm and well perfused  Soft, non-tender abdomen    PLAN: 9 Days Post-Op, improved   Continue PO pain medications as needed  Continue SCD's, frequent ambulation, Lovenox daily  > Please offer the patient the opportunity to get up to recliner or go on a walk every other hour when patient not resting. Diet: regular, please offer Ensure with meals. Remeron for appetite. Continue bowel regimen -- may hold stool softeners at patient request.  Continue TLSO orthosis  Continue Vit D3  Covid testing negative  Potassium low, replaced this AM.  Lisinopril/Amlodipine/HCTZ/Flomax held in effort to avoid orthostatic hypotension.  Midodrine added.  Orthostatic hypotension: resumed norvasc 7/27, added low dose labetalol. Pt unable to get OOB with PT due to orthostasis. Joobst stockings ordered, holding flomax. Discontinued midodrine  - HOLD flomax per Cardiology, consider restarting at half dose if patient has difficulty urinating  [facility]     Dot Ruano NP

## 2022-07-28 NOTE — PROGRESS NOTES
Problem: Falls - Risk of  Goal: *Absence of Falls  Description: Document Saroj Love Fall Risk and appropriate interventions in the flowsheet. 7/28/2022 1459 by Velasquez Patino RN  Outcome: Resolved/Met  Note: Fall Risk Interventions:  Mobility Interventions: Bed/chair exit alarm    Mentation Interventions: Bed/chair exit alarm    Medication Interventions: Bed/chair exit alarm    Elimination Interventions: Call light in reach    History of Falls Interventions: Bed/chair exit alarm      7/28/2022 1133 by Velasquez Patino RN  Outcome: Progressing Towards Goal  Note: Fall Risk Interventions:  Mobility Interventions: Bed/chair exit alarm    Mentation Interventions: Bed/chair exit alarm    Medication Interventions: Bed/chair exit alarm    Elimination Interventions: Call light in reach    History of Falls Interventions: Bed/chair exit alarm         Problem: Patient Education: Go to Patient Education Activity  Goal: Patient/Family Education  7/28/2022 1459 by Velasquez Patino RN  Outcome: Resolved/Met  7/28/2022 1133 by Velasquez Patino RN  Outcome: Progressing Towards Goal     Problem: Pressure Injury - Risk of  Goal: *Prevention of pressure injury  Description: Document Weston Scale and appropriate interventions in the flowsheet.   7/28/2022 1459 by Velasquez Patino RN  Outcome: Resolved/Met  Note: Pressure Injury Interventions:  Sensory Interventions: Keep linens dry and wrinkle-free, Maintain/enhance activity level    Moisture Interventions: Absorbent underpads    Activity Interventions: Increase time out of bed    Mobility Interventions: HOB 30 degrees or less    Nutrition Interventions: Offer support with meals,snacks and hydration    Friction and Shear Interventions: HOB 30 degrees or less             7/28/2022 1133 by Velasquez Patino RN  Outcome: Progressing Towards Goal  Note: Pressure Injury Interventions:  Sensory Interventions: Keep linens dry and wrinkle-free, Maintain/enhance activity level    Moisture Interventions: Absorbent underpads    Activity Interventions: Increase time out of bed    Mobility Interventions: HOB 30 degrees or less    Nutrition Interventions: Offer support with meals,snacks and hydration    Friction and Shear Interventions: HOB 30 degrees or less                Problem: Patient Education: Go to Patient Education Activity  Goal: Patient/Family Education  7/28/2022 1459 by Philippe Lemus RN  Outcome: Resolved/Met  7/28/2022 1133 by Philippe Lemus RN  Outcome: Progressing Towards Goal     Problem: Patient Education: Go to Patient Education Activity  Goal: Patient/Family Education  7/28/2022 1459 by Philippe Lemus RN  Outcome: Resolved/Met  7/28/2022 1133 by Philippe Lemus RN  Outcome: Progressing Towards Goal     Problem: Patient Education: Go to Patient Education Activity  Goal: Patient/Family Education  7/28/2022 1459 by Philippe Lemus RN  Outcome: Resolved/Met  7/28/2022 1133 by Philippe Lemus RN  Outcome: Progressing Towards Goal     Problem: Pain  Goal: *Control of Pain  7/28/2022 1459 by Philippe Lemus RN  Outcome: Resolved/Met  7/28/2022 1133 by Philippe Lemus RN  Outcome: Progressing Towards Goal  Goal: *PALLIATIVE CARE:  Alleviation of Pain  7/28/2022 1459 by Philippe Lemus RN  Outcome: Resolved/Met  7/28/2022 1133 by Philippe Lemus RN  Outcome: Progressing Towards Goal     Problem: Patient Education: Go to Patient Education Activity  Goal: Patient/Family Education  7/28/2022 1459 by Philippe Lemus, RN  Outcome: Resolved/Met  7/28/2022 1133 by Philippe Lemus RN  Outcome: Progressing Towards Goal     Problem: Nutrition Deficit  Goal: *Optimize nutritional status  7/28/2022 1459 by Philippe Lemus RN  Outcome: Resolved/Met  7/28/2022 1133 by Philippe Lemus RN  Outcome: Progressing Towards Goal

## 2022-07-28 NOTE — DISCHARGE SUMMARY
Orthopedic Service Discharge Summary    Patient ID:  Jonathon Quiroz  [id number]  male  76 y.o.  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]  Spondylolisthesis of lumbar region [M43.16]  Lumbar radiculitis [M54.16]  Diabetic peripheral neuropathy (HCC) [E11.42]  Progressive focal motor weakness [R53.1]  Spondylolisthesis at L4-L5 level [M43.16]  Spondylolisthesis at L3-L4 level [M43.16]  Spondylolisthesis at L2-L3 level [M43.16]  Postlaminectomy kyphosis [M96.3]  Neurogenic claudication due to lumbar spinal stenosis [M48.062]  Left leg weakness [R29.898]  with pain and limitations of activities of daily living who presents at this time with:  Patient reports axial pain mild, radiating pain left leg  Numbness left leg (diminished distal to knee)  Weakness-profound left hip flexion, left quad, left dorsiflexion 3/5  Assistive device for ambulation walker, can't stand up straight and can't walk without leaning at 45 degree angle on walker. Discussed realistic expectations, will plan for L1-5 lami-fusion, potential for T0-S2 with osteotomies, pending time and blood loss, he understands. He has malalignment, 4 level stenosis, 3 level spondy and postlami kyphosis. Discussed main goal of neurologic decompression and stabilization of stenotic, unstable segments, then pending blood loss, time, would proceed with osteotomies and extended fusion for realignment. If high degree of blood loss or extended time required for adequate decompression/stabilization at stenotic segments, then would abort extended fusion and osteotomies. Patient aware of risks/benefits and wishes to proceed with surgery following conservative management. PMH:   Past Medical History:   Diagnosis Date    Atherosclerosis 02/11/2020    COVID-19 vaccine series completed     Diabetes ([facility] [place] [address]     Fatty liver determined by biopsy 08/2019    Frequent urination at night     History of stress test 06/27/2022    Normal    Hypertension     Insomnia     Kidney stone 2019 & 2021    Obesity, Class I, BMI 30-34.9        Medications upon admission :   Prior to Admission Medications   Prescriptions Last Dose Informant Patient Reported? Taking? amLODIPine (NORVASC) 10 mg tablet 7/19/2022 at 0300  No Yes   Sig: Take 1 Tablet by mouth daily. OFFICE VISIT REQUIRED PRIOR TO ADDITIONAL REFILLS   aspirin delayed-release 81 mg tablet 7/11/2022 at 0640  Yes Yes   Sig: Take 81 mg by mouth every evening. folic acid/multivit-min/lutein (CENTRUM SILVER PO) 7/11/2022 at 0900  Yes No   Sig: Take 1 Tablet by mouth daily. gabapentin (NEURONTIN) 300 mg capsule 7/18/2022 at 0750  Yes No   Sig: Take 600 mg by mouth nightly.    hydroCHLOROthiazide (HYDRODIURIL) 12.5 mg tablet 7/18/2022 at 0750  No No   Sig: Take 1 Tablet by mouth daily. lisinopriL (PRINIVIL, ZESTRIL) 20 mg tablet 7/17/2022 at 0750  Yes No   Sig: Take 20 mg by mouth two (2) times a day. metFORMIN (GLUCOPHAGE) 1,000 mg tablet 7/18/2022  Yes No   Sig: Take 500 mg by mouth two (2) times daily (with meals). simvastatin (ZOCOR) 40 mg tablet 7/18/2022 at 0840  No No   Sig: Take 1 Tablet by mouth every evening. tamsulosin (FLOMAX) 0.4 mg capsule 7/18/2022 at 0640  No No   Sig: Take 2 Capsules by mouth daily (with dinner). vit C/E/Zn/coppr/lutein/zeaxan (PRESERVISION AREDS-2 PO) 7/11/2022 at 0640  Yes No   Sig: Take 1 Capsule by mouth two (2) times a day. Facility-Administered Medications: None     Allergies:  No Known Allergies     Hospital Course: The patient underwent surgery. Complications:  None; patient tolerated the procedure well. Was taken to the PACU in stable condition and then transferred to the ICU for close monitoring. POD1 7/20: He did well overnight, no complications. Pain well controlled. He advanced his diet from clear liquids, full liquids, to regular diet without complication. He has been passing flatus. Seem by PT/OT and was recognized to have issues with dizziness/orthostatic hypotension. Hemovac drain functioning. Reports improvements with pre-operative numbness/tingling/leg weakness. Received 1 unit albumin for symptomatic orthostatic hypotension. POD2 7/21: He did well overnight, no complications. Pain well controlled. He transitioned to regular diet and is tolerating fine. Strength and pain continuing to improve. Monitoring acute blood loss anemia. Patient ended up receiving 1 unit RBC. Recommendation to stay in ICU, attempt OOB. He started lovenox once HV drain was out. POD3 7/22: Back dressing clean, dry, intact. No significant erythema or swelling. Neurovascular exam within normal limits. Post-op pain well controlled with PO medications. SCD's on and functioning. He feels better after 1 unit RBC. Hemoglobin levels rising. He reports increased strength in the left lower extremity and complete resolution of pre-op numbness. [facility] at time of discharge. 5 feet walking tolerance with PT, continued dizziness and orthostatic hypotension. Downgraded to step-down status but remains in physical ICU bed. POD4 7/23: Back dressing clean, dry, intact. No significant erythema or swelling. Neurovascular exam within normal limits. Post-op pain well controlled with PO medications. Hemoglobin continues to rise. BP meds held in effort to avoid orthostatic hypotension. Midodrine added. Hospitalist consult placed for recs on orthostatic hypotension and medication management. 8 feet walking tolerance with PT in AM, 40 feet in PM.    POD5 7/24: Back dressing clean, dry, intact. No significant erythema or swelling. Neurovascular exam within normal limits. Post-op pain well controlled with PO medications. Hemoglobin continues to rise. BP meds held in effort to avoid orthostatic hypotension. Midodrine continued. Compression socks ordered, keep legs elevated at night. Horn catheter removed. POD6 7/25: Back dressing clean, dry, intact. No significant erythema or swelling. Neurovascular exam within normal limits. Post-op pain well controlled with PO medications. Hemoglobin continues to rise. BP meds held in effort to avoid orthostatic hypotension. Midodrine continued. He reports decreased appetite, holding stool softeners. PT/OT has been slowed down by symptomatic hypotension during these sessions -- though he is making progress. Cardiology consult placed. Rec hold BP meds, IVF, continue compression stockings, echo ordered. POD7 7/26: Back dressing clean, dry, intact. No significant erythema or swelling. Neurovascular exam within normal limits. Continued complaint of decreased appetite, Remeron added, Ensure encouraged. K+ low, IV replacement ordered.  H/H showed diluted sample, repeat sample was closer to previous result. Continues to make slow progress with PT, though certainly halted by continued dizziness/orthostatic hypotension. Flomax held along with BP meds. Transferred to physical step down unit. POD8 7/27: Back dressing clean, dry, intact. No significant erythema or swelling. Neurovascular exam within normal limits. Up to chair many times. SCD's on and functioning. Patient states that he feels as though he has turned a corner with his recovery. Doing better with PT/OT. Echo completed, EF 55-60%. Holding midodrine as BP has slowly increased. K+ was replaced again today. Covid test ordered and negative per rehab facility protocol. POD9 7/28: Back dressing clean, dry, intact. No significant erythema or swelling. Neurovascular exam within normal limits. Discontinued norvasc, lisinopril, hctz. Continued labetalol 100mg per Cardiology, increased to BID. Discontinue midodrine. HOLD flomax per Cardiology, consider restarting at half dose if patient has difficulty urinating. K+ replaced orally. Acceptable BP up to 804 systolic. Transferred to [address] [place] [address] rehab. Perioperative Antibiotics:  Ancef     Postoperative Pain Management:  Acetaminophen and Oxycodone, Toradol    DVT Prophylaxis: Lovenox, Stockings, SCD's    Postoperative transfusions:   1 RBC    Post Op complications: post op blood loss anemia, postural (orthostatic hypotension), loss of appetite    Hemoglobin at discharge:    Lab Results   Component Value Date/Time    HGB 9.6 (L) 07/26/2022 02:38 PM    INR 1.0 07/05/2022 02:48 PM     Discharged to:  [facility]    Discharge instructions:  -See Full Summary of discharge instructions attached  -Anticoagulate with Lovenox x10 days, then resume baseline anticoagulation medication.  -Resume pre hospital diet            -Resume home medications   Current Discharge Medication List        START taking these medications    Details   oxyCODONE IR (ROXICODONE) 5 mg immediate release tablet Take 1 Tablet by mouth every four (4) hours as needed for Pain for up to 7 days. Max Daily Amount: 30 mg.  Qty: 30 Tablet, Refills: 0  Start date: 7/28/2022, End date: 8/4/2022    Associated Diagnoses: S/P lumbar fusion      mirtazapine (REMERON) 7.5 mg tablet Take 1 Tablet by mouth in the morning. Qty: 30 Tablet, Refills: 0  Start date: 7/29/2022      labetaloL (NORMODYNE) 100 mg tablet Take 1 Tablet by mouth every twelve (12) hours. Qty: 30 Tablet, Refills: 0  Start date: 7/28/2022      famotidine (PEPCID) 20 mg tablet Take 1 Tablet by mouth two (2) times a day. Qty: 30 Tablet, Refills: 0  Start date: 7/28/2022      enoxaparin (LOVENOX) 30 mg/0.3 mL injection 0.3 mL by SubCUTAneous route every twelve (12) hours. Qty: 10 Each, Refills: 0  Start date: 7/28/2022      cyclobenzaprine (FLEXERIL) 10 mg tablet Take 1 Tablet by mouth two (2) times daily as needed for Muscle Spasm(s). Qty: 30 Tablet, Refills: 0  Start date: 7/28/2022      acetaminophen (TYLENOL) 500 mg tablet Take 2 Tablets by mouth every eight (8) hours for 7 days, THEN 2 Tablets every twelve (12) hours for 7 days, THEN 2 Tablets every eight (8) hours for 5 days. Qty: 100 Tablet, Refills: 0  Start date: 7/28/2022, End date: 8/16/2022    Comments: Pharmacist, Please add the following text to the patient instructions, \"Take as needed for pain in addition to any other medications ordered for pain relief. \"      cholecalciferol (VITAMIN D3) (5000 Units/125 mcg) tab tablet Take 1 Tablet by mouth in the morning. Qty: 90 Tablet, Refills: 1  Start date: 7/29/2022           CONTINUE these medications which have NOT CHANGED    Details   aspirin delayed-release 81 mg tablet Take 81 mg by mouth every evening. folic acid/multivit-min/lutein (CENTRUM SILVER PO) Take 1 Tablet by mouth daily. metFORMIN (GLUCOPHAGE) 1,000 mg tablet Take 500 mg by mouth two (2) times daily (with meals).       gabapentin (NEURONTIN) 300 mg capsule Take 600 mg by mouth nightly. simvastatin (ZOCOR) 40 mg tablet Take 1 Tablet by mouth every evening. Qty: 90 Tablet, Refills: 3      vit C/E/Zn/coppr/lutein/zeaxan (PRESERVISION AREDS-2 PO) Take 1 Capsule by mouth two (2) times a day.            STOP taking these medications       amLODIPine (NORVASC) 10 mg tablet Comments:   Reason for Stopping:         lisinopriL (PRINIVIL, ZESTRIL) 20 mg tablet Comments:   Reason for Stopping:         hydroCHLOROthiazide (HYDRODIURIL) 12.5 mg tablet Comments:   Reason for Stopping:         tamsulosin (FLOMAX) 0.4 mg capsule Comments:   Reason for Stopping:            per medical continuation form  -Discharge activity: Activity as tolerated, No driving while on analgesics, See surgical instructions, and PT/OT per rehab, then home health.  -Ambulate with Walkers, Type: Rolling Walker  -Wound Care Keep wound clean and dry, Reinforce dressing PRN, Ice to area for comfort and As directed  -Follow up in office in next 2 weeks      Signed:  Sea Chavez NP  7/28/2022  8:55 AM        Antionette Escalante MD

## 2022-07-28 NOTE — PROGRESS NOTES
Problem: Falls - Risk of  Goal: *Absence of Falls  Description: Document Tete Victoria Fall Risk and appropriate interventions in the flowsheet. Outcome: Progressing Towards Goal  Note: Fall Risk Interventions:  Mobility Interventions: Bed/chair exit alarm, Communicate number of staff needed for ambulation/transfer, Patient to call before getting OOB, PT Consult for mobility concerns, Utilize walker, cane, or other assistive device, Utilize gait belt for transfers/ambulation    Mentation Interventions: Bed/chair exit alarm    Medication Interventions: Bed/chair exit alarm, Patient to call before getting OOB, Teach patient to arise slowly, Utilize gait belt for transfers/ambulation    Elimination Interventions: Bed/chair exit alarm, Call light in reach, Patient to call for help with toileting needs, Stay With Me (per policy), Toilet paper/wipes in reach, Toileting schedule/hourly rounds, Urinal in reach    History of Falls Interventions: Bed/chair exit alarm         Problem: Patient Education: Go to Patient Education Activity  Goal: Patient/Family Education  Outcome: Progressing Towards Goal     Problem: Pressure Injury - Risk of  Goal: *Prevention of pressure injury  Description: Document Weston Scale and appropriate interventions in the flowsheet. Outcome: Progressing Towards Goal  Note: Pressure Injury Interventions:  Sensory Interventions: Keep linens dry and wrinkle-free, Minimize linen layers    Moisture Interventions: Absorbent underpads    Activity Interventions: Increase time out of bed, Pressure redistribution bed/mattress(bed type), PT/OT evaluation    Mobility Interventions: HOB 30 degrees or less, Pressure redistribution bed/mattress (bed type), PT/OT evaluation, Turn and reposition approx.  every two hours(pillow and wedges)    Nutrition Interventions: Document food/fluid/supplement intake, Discuss nutritional consult with provider, Offer support with meals,snacks and hydration    Friction and Shear Interventions: Foam dressings/transparent film/skin sealants, HOB 30 degrees or less, Lift team/patient mobility team                Problem: Patient Education: Go to Patient Education Activity  Goal: Patient/Family Education  Outcome: Progressing Towards Goal     Problem: Patient Education: Go to Patient Education Activity  Goal: Patient/Family Education  Outcome: Progressing Towards Goal     Problem: Patient Education: Go to Patient Education Activity  Goal: Patient/Family Education  Outcome: Progressing Towards Goal     Problem: Pain  Goal: *Control of Pain  Outcome: Progressing Towards Goal  Goal: *PALLIATIVE CARE:  Alleviation of Pain  Outcome: Progressing Towards Goal     Problem: Patient Education: Go to Patient Education Activity  Goal: Patient/Family Education  Outcome: Progressing Towards Goal     Problem: Nutrition Deficit  Goal: *Optimize nutritional status  Outcome: Progressing Towards Goal

## 2022-07-28 NOTE — PROGRESS NOTES
[facility] and [facility] to [facility].  All questions answered, IVs removed, and all belongings sent with [facility]

## 2022-07-28 NOTE — PROGRESS NOTES
Problem: Falls - Risk of  Goal: *Absence of Falls  Description: Document Ernesto Guerra Fall Risk and appropriate interventions in the flowsheet. Outcome: Progressing Towards Goal  Note: Fall Risk Interventions:  Mobility Interventions: Bed/chair exit alarm    Mentation Interventions: Bed/chair exit alarm    Medication Interventions: Bed/chair exit alarm    Elimination Interventions: Call light in reach    History of Falls Interventions: Bed/chair exit alarm         Problem: Patient Education: Go to Patient Education Activity  Goal: Patient/Family Education  Outcome: Progressing Towards Goal     Problem: Pressure Injury - Risk of  Goal: *Prevention of pressure injury  Description: Document Weston Scale and appropriate interventions in the flowsheet.   Outcome: Progressing Towards Goal  Note: Pressure Injury Interventions:  Sensory Interventions: Keep linens dry and wrinkle-free, Maintain/enhance activity level    Moisture Interventions: Absorbent underpads    Activity Interventions: Increase time out of bed    Mobility Interventions: HOB 30 degrees or less    Nutrition Interventions: Offer support with meals,snacks and hydration    Friction and Shear Interventions: HOB 30 degrees or less                Problem: Patient Education: Go to Patient Education Activity  Goal: Patient/Family Education  Outcome: Progressing Towards Goal     Problem: Patient Education: Go to Patient Education Activity  Goal: Patient/Family Education  Outcome: Progressing Towards Goal     Problem: Patient Education: Go to Patient Education Activity  Goal: Patient/Family Education  Outcome: Progressing Towards Goal     Problem: Pain  Goal: *Control of Pain  Outcome: Progressing Towards Goal  Goal: *PALLIATIVE CARE:  Alleviation of Pain  Outcome: Progressing Towards Goal     Problem: Patient Education: Go to Patient Education Activity  Goal: Patient/Family Education  Outcome: Progressing Towards Goal     Problem: Nutrition Deficit  Goal: *Optimize nutritional status  Outcome: Progressing Towards Goal

## 2022-07-28 NOTE — PROGRESS NOTES
7/28/2022 1:54 PM [facility] has accepted pt for admission today. Pt going to room 2060, accepted by Dr Mary Bryson. Nursing please call report to: [phone]     [facility] scheduled for [facility]PM to transport pt. Pt and pt's RN aware of timing. Ambulance packet on hard chart. 7/28/2022 8:57 AM Discharge order noted. CM called and spoke with Philip Alcantara with [facility] who reported she will follow up with CM once bed assignment and admission time is determined. [facility] in will call. GUI Aranda    Care Management Interventions  PCP Verified by CM:  Yes (Yaneth Friend , last visit   3 months)  Palliative Care Criteria Met (RRAT>21 & CHF Dx)?: No  Mode of Transport at Discharge: Self (Family)  Transition of Care Consult (CM Consult): Acute Rehab  Physical Therapy Consult: Yes  Occupational Therapy Consult: Yes  Support Systems: Spouse/Significant Other  Confirm Follow Up Transport: Family  The Plan for Transition of Care is Related to the Following Treatment Goals : Rehab  The Patient and/or Patient Representative was Provided with a Choice of Provider and Agrees with the Discharge Plan?: Yes  Name of the Patient Representative Who was Provided with a Choice of Provider and Agrees with the Discharge Plan: patient Poly Willoughby  [place] of Choice List was Provided with Basic Dialogue that Supports the Patient's Individualized Plan of Care/Goals, Treatment Preferences and Shares the Quality Data Associated with the Providers?: (S) Yes  Discharge Location  Patient Expects to be Discharged to[de-identified] Rehab hospital/unit acute

## 2022-08-16 ENCOUNTER — DOCUMENTATION ONLY (OUTPATIENT)
Dept: FAMILY MEDICINE CLINIC | Age: 77
End: 2022-08-16

## 2022-08-16 NOTE — PROGRESS NOTES
SE ESTRADA of [facility] home health certification & POC was put on NIKI Mukherjee's desk to process

## 2022-08-22 ENCOUNTER — OFFICE VISIT (OUTPATIENT)
Dept: FAMILY MEDICINE CLINIC | Age: 77
End: 2022-08-22
Payer: MEDICARE

## 2022-08-22 VITALS
HEIGHT: 71 IN | HEART RATE: 85 BPM | DIASTOLIC BLOOD PRESSURE: 72 MMHG | SYSTOLIC BLOOD PRESSURE: 111 MMHG | OXYGEN SATURATION: 98 % | BODY MASS INDEX: 30.52 KG/M2 | WEIGHT: 218 LBS | RESPIRATION RATE: 18 BRPM | TEMPERATURE: 97.9 F

## 2022-08-22 DIAGNOSIS — I10 ESSENTIAL HYPERTENSION: Primary | ICD-10-CM

## 2022-08-22 PROCEDURE — 1101F PT FALLS ASSESS-DOCD LE1/YR: CPT | Performed by: FAMILY MEDICINE

## 2022-08-22 PROCEDURE — G8536 NO DOC ELDER MAL SCRN: HCPCS | Performed by: FAMILY MEDICINE

## 2022-08-22 PROCEDURE — 1123F ACP DISCUSS/DSCN MKR DOCD: CPT | Performed by: FAMILY MEDICINE

## 2022-08-22 PROCEDURE — G8417 CALC BMI ABV UP PARAM F/U: HCPCS | Performed by: FAMILY MEDICINE

## 2022-08-22 PROCEDURE — 99213 OFFICE O/P EST LOW 20 MIN: CPT | Performed by: FAMILY MEDICINE

## 2022-08-22 PROCEDURE — 1111F DSCHRG MED/CURRENT MED MERGE: CPT | Performed by: FAMILY MEDICINE

## 2022-08-22 PROCEDURE — G8510 SCR DEP NEG, NO PLAN REQD: HCPCS | Performed by: FAMILY MEDICINE

## 2022-08-22 PROCEDURE — G8752 SYS BP LESS 140: HCPCS | Performed by: FAMILY MEDICINE

## 2022-08-22 PROCEDURE — G8427 DOCREV CUR MEDS BY ELIG CLIN: HCPCS | Performed by: FAMILY MEDICINE

## 2022-08-22 PROCEDURE — G0463 HOSPITAL OUTPT CLINIC VISIT: HCPCS | Performed by: FAMILY MEDICINE

## 2022-08-22 PROCEDURE — G8754 DIAS BP LESS 90: HCPCS | Performed by: FAMILY MEDICINE

## 2022-08-22 RX ORDER — LISINOPRIL 20 MG/1
1 TABLET ORAL 2 TIMES DAILY
COMMUNITY
Start: 2022-08-20 | End: 2022-09-19 | Stop reason: ALTCHOICE

## 2022-08-22 NOTE — PROGRESS NOTES
Chief Complaint   Patient presents with    Hypertension     Requesting to  be  put back on previous RXs: Amlodipine 10 mg, and HCTZ 12.5 mg    Diabetes     1. Have you been to the ER, urgent care clinic since your last visit? Hospitalized since your last visit? No    2. Have you seen or consulted any other health care providers outside of the [address] [place] [address] since your last visit? Include any pap smears or colon screening. Yes [address]: [facility] [address]    Lab Results   Component Value Date/Time    Microalbumin/Creat ratio (mg/g creat) 10 05/05/2022 11:31 AM    Microalbumin,urine random 1.22 05/05/2022 11:31 AM      Chief Complaint   Patient presents with    Hypertension     Requesting to  be  put back on previous RXs: Amlodipine 10 mg, and HCTZ 12.5 mg    Diabetes     he is a 76y.o. year old male who presents for evaluation. See Diabetic Report Card listed above. Patient Active Problem List    Diagnosis    Orthostatic hypotension    Complication of surgical procedure    Sagittal plane imbalance    Neurogenic claudication due to lumbar spinal stenosis    Left leg weakness    Spondylolisthesis at L4-L5 level    Spondylolisthesis at L2-L3 level    Spondylolisthesis at L3-L4 level    Chronic hepatitis C without hepatic coma (HCC)    Type 2 diabetes mellitus with chronic kidney disease (HCC)    Patellar subluxation, right, subsequent encounter    Type 2 diabetes mellitus    Failed total knee arthroplasty, subsequent encounter    Severe obesity ([facility] [address]       Reviewed PmHx, RxHx, FmHx, SocHx, AllgHx--dated and updated in the chart. Review of Systems - negative except as listed above in the HPI    Objective:     Vitals:    08/22/22 1357   BP: 111/72   Pulse: 85   Resp: 18   Temp: 97.9 °F (36.6 °C)   TempSrc: Oral   SpO2: 98%   Weight: 218 lb (98.9 kg)   Height: 5' 11\" (1.803 m)         Assessment/ Plan:   Diagnoses and all orders for this visit:    1.  Essential hypertension  -at goal  -pt wants to go back on previous rx but does not know current rx or why it was changed in hosp         Lab Results   Component Value Date/Time    Cholesterol, total 156 05/05/2022 11:31 AM    HDL Cholesterol 48 05/05/2022 11:31 AM    LDL, calculated 88.6 05/05/2022 11:31 AM    Triglyceride 97 05/05/2022 11:31 AM    CHOL/HDL Ratio 3.3 05/05/2022 11:31 AM     Lab Results   Component Value Date/Time    Hemoglobin A1c 6.4 (H) 07/05/2022 02:48 PM    Hemoglobin A1c 6.5 (H) 05/05/2022 11:31 AM    Hemoglobin A1c 6.6 (H) 10/27/2021 08:53 AM    Microalbumin/Creat ratio (mg/g creat) 10 05/05/2022 11:31 AM    Microalbumin,urine random 1.22 05/05/2022 11:31 AM    LDL, calculated 88.6 05/05/2022 11:31 AM    Creatinine 0.78 07/28/2022 02:50 AM          Discussed with patient goal of Diabetes to include:  HgA1C <7, LDL cholesterol <100, Blood pressure <140/80. Discussed with patient diet and weight management and to get regular exercise. Recommend yearly eye exams and daily foot care. The patient understands and agrees with the plan. I have discussed the diagnosis with the patient and the intended plan as seen in the above orders. The patient has received an after-visit summary and questions were answered concerning future plans. Medication Side Effects and Warnings were discussed with patient  Patient Labs were reviewed and or requested  Patient Past Records were reviewed and or requested    Jt Edouard M.D. [facility]    There are no Patient Instructions on file for this visit.

## 2022-08-22 NOTE — PROGRESS NOTES
Chief Complaint   Patient presents with    Hypertension     Requesting to  be  put back on previous RXs: Amlodipine 10 mg, and HCTZ 12.5 mg    Diabetes     1. Have you been to the ER, urgent care clinic since your last visit? Hospitalized since your last visit? No    2. Have you seen or consulted any other health care providers outside of the [address] [place] [address] since your last visit? Include any pap smears or colon screening.  Yes Where: [address]: Back surgery

## 2022-08-30 ENCOUNTER — TELEPHONE (OUTPATIENT)
Dept: FAMILY MEDICINE CLINIC | Age: 77
End: 2022-08-30

## 2022-08-30 ENCOUNTER — HOSPITAL ENCOUNTER (EMERGENCY)
Age: 77
Discharge: HOME | End: 2022-08-30
Attending: STUDENT IN AN ORGANIZED HEALTH CARE EDUCATION/TRAINING PROGRAM
Payer: MEDICARE

## 2022-08-30 VITALS
HEIGHT: 72 IN | RESPIRATION RATE: 18 BRPM | BODY MASS INDEX: 29.26 KG/M2 | TEMPERATURE: 98 F | WEIGHT: 216 LBS | OXYGEN SATURATION: 100 % | DIASTOLIC BLOOD PRESSURE: 101 MMHG | HEART RATE: 91 BPM | SYSTOLIC BLOOD PRESSURE: 117 MMHG

## 2022-08-30 DIAGNOSIS — I95.9 HYPOTENSION, UNSPECIFIED HYPOTENSION TYPE: ICD-10-CM

## 2022-08-30 DIAGNOSIS — R42 DIZZINESS: Primary | ICD-10-CM

## 2022-08-30 DIAGNOSIS — E86.0 DEHYDRATION: ICD-10-CM

## 2022-08-30 LAB
ALBUMIN SERPL-MCNC: 3.4 G/DL (ref 3.5–5.2)
ALBUMIN/GLOB SERPL: 1.2 {RATIO} (ref 1.1–2.2)
ALP SERPL-CCNC: 95 U/L (ref 40–129)
ALT SERPL-CCNC: <5 U/L (ref 10–50)
ANION GAP SERPL CALC-SCNC: 13 MMOL/L (ref 5–15)
APPEARANCE UR: CLEAR
AST SERPL-CCNC: 15 U/L (ref 10–50)
BACTERIA URNS QL MICRO: NEGATIVE /HPF
BASOPHILS # BLD: 0.1 K/UL (ref 0–0.1)
BASOPHILS NFR BLD: 1 % (ref 0–1)
BILIRUB SERPL-MCNC: 0.2 MG/DL (ref 0.2–1)
BILIRUB UR QL CFM: NEGATIVE
BUN SERPL-MCNC: 24 MG/DL (ref 8–23)
BUN/CREAT SERPL: 20 (ref 12–20)
CALCIUM SERPL-MCNC: 8.9 MG/DL (ref 8.8–10.2)
CHLORIDE SERPL-SCNC: 101 MMOL/L (ref 98–107)
CO2 SERPL-SCNC: 26 MMOL/L (ref 22–29)
COLOR UR: ABNORMAL
CREAT SERPL-MCNC: 1.18 MG/DL (ref 0.7–1.2)
DIFFERENTIAL METHOD BLD: NORMAL
EOSINOPHIL # BLD: 0.1 K/UL (ref 0–0.4)
EOSINOPHIL NFR BLD: 1 % (ref 0–7)
EPITH CASTS URNS QL MICRO: NORMAL /LPF
ERYTHROCYTE [DISTWIDTH] IN BLOOD BY AUTOMATED COUNT: 13.8 % (ref 11.5–14.5)
GLOBULIN SER CALC-MCNC: 2.9 G/DL (ref 2–4)
GLUCOSE SERPL-MCNC: 110 MG/DL (ref 65–100)
GLUCOSE UR STRIP.AUTO-MCNC: NEGATIVE MG/DL
HCT VFR BLD AUTO: 39.1 % (ref 36.6–50.3)
HGB BLD-MCNC: 12.7 G/DL (ref 12.1–17)
HGB UR QL STRIP: NEGATIVE
IMM GRANULOCYTES # BLD AUTO: 0 K/UL (ref 0–0.04)
IMM GRANULOCYTES NFR BLD AUTO: 0 % (ref 0–0.5)
KETONES UR QL STRIP.AUTO: ABNORMAL MG/DL
LEUKOCYTE ESTERASE UR QL STRIP.AUTO: ABNORMAL
LYMPHOCYTES # BLD: 1.2 K/UL (ref 0.8–3.5)
LYMPHOCYTES NFR BLD: 22 % (ref 12–49)
MAGNESIUM SERPL-MCNC: 1.6 MG/DL (ref 1.6–2.4)
MCH RBC QN AUTO: 29.3 PG (ref 26–34)
MCHC RBC AUTO-ENTMCNC: 32.5 G/DL (ref 30–36.5)
MCV RBC AUTO: 90.3 FL (ref 80–99)
MONOCYTES # BLD: 0.6 K/UL (ref 0–1)
MONOCYTES NFR BLD: 10 % (ref 5–13)
NEUTS SEG # BLD: 3.7 K/UL (ref 1.8–8)
NEUTS SEG NFR BLD: 66 % (ref 32–75)
NITRITE UR QL STRIP.AUTO: NEGATIVE
NRBC # BLD: 0 K/UL (ref 0–0.01)
NRBC BLD-RTO: 0 PER 100 WBC
PH UR STRIP: 6 [PH] (ref 5–8)
PLATELET # BLD AUTO: 259 K/UL (ref 150–400)
PMV BLD AUTO: 10.3 FL (ref 8.9–12.9)
POTASSIUM SERPL-SCNC: 3.5 MMOL/L (ref 3.5–5.1)
PROT SERPL-MCNC: 6.3 G/DL (ref 6.4–8.3)
PROT UR STRIP-MCNC: 30 MG/DL
RBC # BLD AUTO: 4.33 M/UL (ref 4.1–5.7)
RBC #/AREA URNS HPF: NORMAL /HPF (ref 0–5)
SODIUM SERPL-SCNC: 140 MMOL/L (ref 136–145)
SP GR UR REFRACTOMETRY: 1.02 (ref 1–1.03)
TROPONIN I BLD-MCNC: <0.04 NG/ML (ref 0–0.08)
UROBILINOGEN UR QL STRIP.AUTO: 0.2 EU/DL (ref 0.2–1)
WBC # BLD AUTO: 5.6 K/UL (ref 4.1–11.1)
WBC URNS QL MICRO: NORMAL /HPF (ref 0–4)

## 2022-08-30 PROCEDURE — 81001 URINALYSIS AUTO W/SCOPE: CPT

## 2022-08-30 PROCEDURE — 80053 COMPREHEN METABOLIC PANEL: CPT

## 2022-08-30 PROCEDURE — 84484 ASSAY OF TROPONIN QUANT: CPT

## 2022-08-30 PROCEDURE — 96360 HYDRATION IV INFUSION INIT: CPT

## 2022-08-30 PROCEDURE — 83735 ASSAY OF MAGNESIUM: CPT

## 2022-08-30 PROCEDURE — 99284 EMERGENCY DEPT VISIT MOD MDM: CPT

## 2022-08-30 PROCEDURE — 74011250636 HC RX REV CODE- 250/636: Performed by: STUDENT IN AN ORGANIZED HEALTH CARE EDUCATION/TRAINING PROGRAM

## 2022-08-30 PROCEDURE — 93005 ELECTROCARDIOGRAM TRACING: CPT

## 2022-08-30 PROCEDURE — 85025 COMPLETE CBC W/AUTO DIFF WBC: CPT

## 2022-08-30 PROCEDURE — 36415 COLL VENOUS BLD VENIPUNCTURE: CPT

## 2022-08-30 RX ORDER — SODIUM CHLORIDE 9 MG/ML
1000 INJECTION, SOLUTION INTRAVENOUS ONCE
Status: COMPLETED | OUTPATIENT
Start: 2022-08-30 | End: 2022-08-30

## 2022-08-30 RX ADMIN — SODIUM CHLORIDE 1000 ML: 9 INJECTION, SOLUTION INTRAVENOUS at 17:16

## 2022-08-30 NOTE — ED NOTES
Pt was discharged and given instructions by MD. Pt verbalized good understanding of all discharge instructions,prescriptions and F/U care. All questions answered. Pt in stable condition on discharge. Patient was educated on how to manage his low blood pressure and the importance of staying hydrated.

## 2022-08-30 NOTE — TELEPHONE ENCOUNTER
Received triage call on pt from [address] [place] [address] with [facility]. Reports that pt's BP was 102/61 while pt was semi reclined when she arrived. She then took it again while pt was sitting up unsupported and it was 91/54, pt then became very dizzy. Consulted with Dr. Margarito Kapadia and advised Victor Goodell as per Dr. Margarito Kapadia to have the pt hydrate himself well and call back or MyChart him if he continues to have dizziness. Victor Goodell verbalized understanding and stated that she spoke with her company nurse who strongly urged that the pt be seen in the ED which pt refused. Also strongly urged that the pt be seen in the office ASAP for eval.     Scheduled pt appt for eval however Victor Goodell requested that message be sent to [facility] nurse to see if she can make an earlier appt.

## 2022-08-30 NOTE — ED PROVIDER NOTES
The patient is a 64-year-old male history of diabetes, hypertension presenting today secondary to low blood pressures and dizziness. Patient has had issues with orthostatic hypotension for the past month or so after having neck surgery leading to weight loss. He was taken off all of his antihypertensives and then started back on his amlodipine with plan of gradually adding his HCTZ and then lisinopril. Today his physical therapist found that he was more hypotensive than usual so did not do therapy with him. His brother came over and found that he had taken his lisinopril today and HCTZ because he requested it from his nurse. He says that he did this because he wanted to get back on his usual regimen that he was used to and did not want his blood pressure to get out of hand. His brother reports that his pressures were in the 90s to low 100s. Patient felt dizzy with standing. Denies chest pain, shortness of breath, focal weakness or numbness. No other complaints noted at this time.   Family concerned he may be dehydrated as that was what was thought to be the cause of his orthostatic hypotension in the past.       Past Medical History:   Diagnosis Date    Atherosclerosis 02/11/2020    COVID-19 vaccine series completed     Diabetes ([facility] [address]     Fatty liver determined by biopsy 08/2019    Frequent urination at night     History of stress test 06/27/2022    Normal    Hypertension     Insomnia     Kidney stone 2019 & 2021    Obesity, Class I, BMI 30-34.9        Past Surgical History:   Procedure Laterality Date    HX COLONOSCOPY      [facility]    HX KNEE REPLACEMENT Bilateral 2000 & 2013    HX KNEE REPLACEMENT Right 2017 & 11/8/2021    Revision    HX KNEE REPLACEMENT Left 2020    Revision    HX LITHOTRIPSY  2019    IR BX LIVER PERCUTANEOUS  2019    Normal         Family History:   Problem Relation Age of Onset    Cancer Mother     Breast Cancer Mother     Colon Cancer Mother     Stroke Father Prostate Cancer Brother     Deep Vein Thrombosis Neg Hx     Anesth Problems Neg Hx        Social History     Socioeconomic History    Marital status:      Spouse name: Not on file    Number of children: Not on file    Years of education: Not on file    Highest education level: Not on file   Occupational History    Not on file   Tobacco Use    Smoking status: Never    Smokeless tobacco: Never   Vaping Use    Vaping Use: Never used   Substance and Sexual Activity    Alcohol use: Yes     Comment: rare    Drug use: Never    Sexual activity: Not on file   Other Topics Concern    Not on file   Social History Narrative    Not on file     Social Determinants of Health     Financial Resource Strain: Not on file   Food Insecurity: Not on file   Transportation Needs: Not on file   Physical Activity: Not on file   Stress: Not on file   Social Connections: Not on file   Intimate Partner Violence: Not on file   Housing Stability: Not on file         ALLERGIES: Patient has no known allergies. Review of Systems   Constitutional:  Negative for chills and fever. HENT:  Negative for congestion and sore throat. Eyes:  Negative for pain and redness. Respiratory:  Negative for cough and shortness of breath. Cardiovascular:  Negative for chest pain and palpitations. Gastrointestinal:  Negative for abdominal pain, diarrhea, nausea and vomiting. Genitourinary:  Negative for frequency and hematuria. Musculoskeletal:  Negative for back pain and neck pain. Skin:  Negative for rash and wound. Neurological:  Positive for dizziness. Negative for headaches. Hematological:  Does not bruise/bleed easily. Vitals:    08/30/22 1804 08/30/22 1808 08/30/22 1809 08/30/22 1815   BP: 137/64 (!) 146/99  (!) 117/101   Pulse:       Resp:       Temp:       SpO2: 98% 100% 100%    Weight:       Height:                Physical Exam  Vitals and nursing note reviewed. Constitutional:       General: He is not in acute distress. Appearance: He is well-developed. HENT:      Head: Normocephalic and atraumatic. Eyes:      Conjunctiva/sclera: Conjunctivae normal.      Pupils: Pupils are equal, round, and reactive to light. Cardiovascular:      Rate and Rhythm: Normal rate and regular rhythm. Heart sounds: Normal heart sounds. No murmur heard. No friction rub. No gallop. Comments: Equal radial, dp, and pt pulses  Pulmonary:      Effort: Pulmonary effort is normal. No respiratory distress. Breath sounds: Normal breath sounds. No wheezing or rales. Abdominal:      General: Bowel sounds are normal. There is no distension. Palpations: Abdomen is soft. Tenderness: There is no abdominal tenderness. There is no guarding or rebound. Musculoskeletal:         General: Normal range of motion. Cervical back: Normal range of motion and neck supple. Comments: In TLSO brace   Skin:     General: Skin is warm and dry. Capillary Refill: Capillary refill takes less than 2 seconds. Findings: No rash. Neurological:      Mental Status: He is alert and oriented to person, place, and time. MDM     Amount and/or Complexity of Data Reviewed  Clinical lab tests: reviewed  Decide to obtain previous medical records or to obtain history from someone other than the patient: yes      ED Course as of 08/30/22 1850   Tue Aug 30, 2022   1658 EKG time 4:54 PM  Interpreted by me  Normal sinus rhythm rate of 69, normal axis, normal intervals, nonspecific ST-T wave changes without ST elevations or depressions [CC]      ED Course User Index  [CC] Mathew Lopez, DO       Procedures      Work-up shows:  Troponin normal  Slightly elevated creatinine above baseline  No leukocytosis or anemia  Electrolytes acceptable    1 L of IV fluids given    Blood pressure is much improved after IV fluids      The patient is a well-appearing 77-year-old male here today with hypotension.   He has had significant weight loss since his surgery over a month ago, decreasing his need for antihypertensive medications. He was supposed to be slowly adding his medications back however they decided to take all 3 of his medications earlier this morning). Here he was slightly hypotensive in the 90s, improved with IV fluids. Lab work showed slightly elevated creatinine above baseline, likely from some dehydration as he does not eat or drink well. I advised him to not take his antihypertensives until cleared further by his primary care doctor. He was discharged home in stable condition. ICD-10-CM ICD-9-CM    1. Dizziness  R42 780.4       2. Hypotension, unspecified hypotension type  I95.9 458.9       3.  Dehydration  E86.0 276.51         DERICK Lopez,

## 2022-08-30 NOTE — ED TRIAGE NOTES
Pt to ER with concerns of low blood pressure and being lightheaded today. Pt was due to have physical therapy today but they wouldn't work with him due to a bp of 91/54. Pt reports he had major back surgery on 7/19/22 and was taken off his bp meds due to losing weight while in inpatient rehab. His surgeons assistant recently gave him scripts for hctz, lisinopril, and amlodipine and told him to add all three if he started to have high bp readings. Pt took all three today and began to feel dizzy with standing.

## 2022-08-31 ENCOUNTER — TELEPHONE (OUTPATIENT)
Dept: FAMILY MEDICINE CLINIC | Age: 77
End: 2022-08-31

## 2022-08-31 LAB
ATRIAL RATE: 69 BPM
CALCULATED P AXIS, ECG09: 67 DEGREES
CALCULATED R AXIS, ECG10: 13 DEGREES
CALCULATED T AXIS, ECG11: 14 DEGREES
DIAGNOSIS, 93000: NORMAL
P-R INTERVAL, ECG05: 182 MS
Q-T INTERVAL, ECG07: 404 MS
QRS DURATION, ECG06: 94 MS
QTC CALCULATION (BEZET), ECG08: 432 MS
VENTRICULAR RATE, ECG03: 69 BPM

## 2022-08-31 NOTE — TELEPHONE ENCOUNTER
Spoke with pt, informed that at this time the 19 is the soonest apt.  He states that is fine and will call back if anything changes

## 2022-09-01 ENCOUNTER — DOCUMENTATION ONLY (OUTPATIENT)
Dept: FAMILY MEDICINE CLINIC | Age: 77
End: 2022-09-01

## 2022-09-19 ENCOUNTER — OFFICE VISIT (OUTPATIENT)
Dept: FAMILY MEDICINE CLINIC | Age: 77
End: 2022-09-19
Payer: MEDICARE

## 2022-09-19 VITALS
OXYGEN SATURATION: 97 % | HEART RATE: 100 BPM | TEMPERATURE: 99 F | BODY MASS INDEX: 29.26 KG/M2 | HEIGHT: 72 IN | DIASTOLIC BLOOD PRESSURE: 71 MMHG | WEIGHT: 216 LBS | SYSTOLIC BLOOD PRESSURE: 107 MMHG | RESPIRATION RATE: 20 BRPM

## 2022-09-19 DIAGNOSIS — I10 ESSENTIAL HYPERTENSION: Primary | ICD-10-CM

## 2022-09-19 DIAGNOSIS — F33.0 MAJOR DEPRESSIVE DISORDER, RECURRENT, MILD (HCC): ICD-10-CM

## 2022-09-19 DIAGNOSIS — E78.00 HYPERCHOLESTEREMIA: ICD-10-CM

## 2022-09-19 DIAGNOSIS — E11.9 CONTROLLED TYPE 2 DIABETES MELLITUS WITHOUT COMPLICATION, WITHOUT LONG-TERM CURRENT USE OF INSULIN (HCC): ICD-10-CM

## 2022-09-19 DIAGNOSIS — E11.22 TYPE 2 DIABETES MELLITUS WITH STAGE 3 CHRONIC KIDNEY DISEASE, WITHOUT LONG-TERM CURRENT USE OF INSULIN, UNSPECIFIED WHETHER STAGE 3A OR 3B CKD (HCC): ICD-10-CM

## 2022-09-19 DIAGNOSIS — N18.30 TYPE 2 DIABETES MELLITUS WITH STAGE 3 CHRONIC KIDNEY DISEASE, WITHOUT LONG-TERM CURRENT USE OF INSULIN, UNSPECIFIED WHETHER STAGE 3A OR 3B CKD (HCC): ICD-10-CM

## 2022-09-19 PROCEDURE — G8417 CALC BMI ABV UP PARAM F/U: HCPCS | Performed by: NURSE PRACTITIONER

## 2022-09-19 PROCEDURE — G8427 DOCREV CUR MEDS BY ELIG CLIN: HCPCS | Performed by: NURSE PRACTITIONER

## 2022-09-19 PROCEDURE — 1101F PT FALLS ASSESS-DOCD LE1/YR: CPT | Performed by: NURSE PRACTITIONER

## 2022-09-19 PROCEDURE — 1123F ACP DISCUSS/DSCN MKR DOCD: CPT | Performed by: NURSE PRACTITIONER

## 2022-09-19 PROCEDURE — 3044F HG A1C LEVEL LT 7.0%: CPT | Performed by: NURSE PRACTITIONER

## 2022-09-19 PROCEDURE — G0463 HOSPITAL OUTPT CLINIC VISIT: HCPCS | Performed by: NURSE PRACTITIONER

## 2022-09-19 PROCEDURE — G8752 SYS BP LESS 140: HCPCS | Performed by: NURSE PRACTITIONER

## 2022-09-19 PROCEDURE — G8754 DIAS BP LESS 90: HCPCS | Performed by: NURSE PRACTITIONER

## 2022-09-19 PROCEDURE — G8536 NO DOC ELDER MAL SCRN: HCPCS | Performed by: NURSE PRACTITIONER

## 2022-09-19 PROCEDURE — 99214 OFFICE O/P EST MOD 30 MIN: CPT | Performed by: NURSE PRACTITIONER

## 2022-09-19 PROCEDURE — G8432 DEP SCR NOT DOC, RNG: HCPCS | Performed by: NURSE PRACTITIONER

## 2022-09-19 RX ORDER — METFORMIN HYDROCHLORIDE 1000 MG/1
TABLET ORAL
Qty: 30 TABLET | Refills: 5 | COMMUNITY
Start: 2022-09-19

## 2022-09-19 RX ORDER — AMLODIPINE BESYLATE 10 MG/1
10 TABLET ORAL DAILY
COMMUNITY
Start: 2022-09-19

## 2022-09-19 RX ORDER — MIRTAZAPINE 15 MG/1
15 TABLET, FILM COATED ORAL
Qty: 30 TABLET | Refills: 5 | Status: SHIPPED | OUTPATIENT
Start: 2022-09-19 | End: 2022-10-13 | Stop reason: SDUPTHER

## 2022-09-19 RX ORDER — MIRTAZAPINE 15 MG/1
15 TABLET, FILM COATED ORAL
COMMUNITY
Start: 2022-09-19 | End: 2022-09-19 | Stop reason: SDUPTHER

## 2022-09-19 RX ORDER — SENNOSIDES 8.6 MG/1
1 TABLET ORAL 2 TIMES DAILY
Qty: 60 TABLET | Refills: 5 | Status: SHIPPED | OUTPATIENT
Start: 2022-09-19

## 2022-09-19 RX ORDER — SENNOSIDES 8.6 MG/1
1 TABLET ORAL 2 TIMES DAILY
COMMUNITY
Start: 2022-09-19 | End: 2022-09-19 | Stop reason: SDUPTHER

## 2022-09-19 RX ORDER — SENNOSIDES 8.6 MG/1
TABLET ORAL
COMMUNITY
Start: 2022-08-09 | End: 2022-09-19

## 2022-09-19 NOTE — PROGRESS NOTES
HISTORY OF PRESENT ILLNESS  Ton Kolb is a 76 y.o. male. HPI  Patient is here today for follow-up hospitalization for a multilevel spinal fusion. He presents with his brother who was trying to manage his medication. Several of his cardiac meds were stopped due to hypotensive episodes. His medication list was reviewed and reconciled today during the visit. There are several that they need to get refills of. He otherwise feels well has had no pain and is still in a corset. ROS  A comprehensive review of system was obtained and negative except findings in the HPI    Visit Vitals  /71 (BP 1 Location: Left upper arm, BP Patient Position: Sitting)   Pulse 100   Temp 99 °F (37.2 °C) (Oral)   Resp 20   Ht 6' (1.829 m)   Wt 216 lb (98 kg)   SpO2 97%   BMI 29.29 kg/m²     Physical Exam  Vitals and nursing note reviewed. Constitutional:       Appearance: Normal appearance. Cardiovascular:      Rate and Rhythm: Normal rate and regular rhythm. Heart sounds: Normal heart sounds. Pulmonary:      Breath sounds: Normal breath sounds. Musculoskeletal:         General: No swelling. Neurological:      Mental Status: He is alert. ASSESSMENT and PLAN  Encounter Diagnoses   Name Primary? Essential hypertension Yes    Controlled type 2 diabetes mellitus without complication, without long-term current use of insulin (HCC)     Type 2 diabetes mellitus with stage 3 chronic kidney disease, without long-term current use of insulin, unspecified whether stage 3a or 3b CKD (HCC)     Hypercholesteremia     Major depressive disorder, recurrent, mild      Orders Placed This Encounter    amLODIPine (NORVASC) 10 mg tablet    metFORMIN (GLUCOPHAGE) 1,000 mg tablet    mirtazapine (REMERON) 15 mg tablet    senna (SENOKOT) 8.6 mg tablet     Med list corrected  Appt already on schedule for labs    I have discussed the diagnosis with the patient and the intended plan as seen in the above orders.  The patient has received an after-visit summary and questions were answered concerning future plans. Patient conveyed understanding of the plan at the time of the visit.     JUAN Bermeo, ANP  9/19/2022

## 2022-09-19 NOTE — PROGRESS NOTES
Chief Complaint   Patient presents with    Blood Pressure Check     Pt being seen for bp check     1. Have you been to the ER, urgent care clinic since your last visit? Hospitalized since your last visit? No    2. Have you seen or consulted any other health care providers outside of the [address] [place] [address] since your last visit? Include any pap smears or colon screening.  No    Pt has no other concerns

## 2022-10-13 RX ORDER — MIRTAZAPINE 15 MG/1
15 TABLET, FILM COATED ORAL
Qty: 30 TABLET | Refills: 5 | Status: SHIPPED | OUTPATIENT
Start: 2022-10-13

## 2022-11-03 ENCOUNTER — OFFICE VISIT (OUTPATIENT)
Dept: FAMILY MEDICINE CLINIC | Age: 77
End: 2022-11-03
Payer: MEDICARE

## 2022-11-03 VITALS
OXYGEN SATURATION: 97 % | WEIGHT: 214 LBS | HEART RATE: 89 BPM | DIASTOLIC BLOOD PRESSURE: 78 MMHG | SYSTOLIC BLOOD PRESSURE: 146 MMHG | HEIGHT: 72 IN | RESPIRATION RATE: 14 BRPM | BODY MASS INDEX: 28.99 KG/M2 | TEMPERATURE: 98.1 F

## 2022-11-03 DIAGNOSIS — I10 ESSENTIAL HYPERTENSION: Primary | ICD-10-CM

## 2022-11-03 DIAGNOSIS — E11.9 CONTROLLED TYPE 2 DIABETES MELLITUS WITHOUT COMPLICATION, WITHOUT LONG-TERM CURRENT USE OF INSULIN (HCC): ICD-10-CM

## 2022-11-03 DIAGNOSIS — E78.00 HYPERCHOLESTEREMIA: ICD-10-CM

## 2022-11-03 PROCEDURE — 3044F HG A1C LEVEL LT 7.0%: CPT | Performed by: NURSE PRACTITIONER

## 2022-11-03 PROCEDURE — 1123F ACP DISCUSS/DSCN MKR DOCD: CPT | Performed by: NURSE PRACTITIONER

## 2022-11-03 PROCEDURE — 3078F DIAST BP <80 MM HG: CPT | Performed by: NURSE PRACTITIONER

## 2022-11-03 PROCEDURE — G9717 DOC PT DX DEP/BP F/U NT REQ: HCPCS | Performed by: NURSE PRACTITIONER

## 2022-11-03 PROCEDURE — G8417 CALC BMI ABV UP PARAM F/U: HCPCS | Performed by: NURSE PRACTITIONER

## 2022-11-03 PROCEDURE — G0463 HOSPITAL OUTPT CLINIC VISIT: HCPCS | Performed by: NURSE PRACTITIONER

## 2022-11-03 PROCEDURE — G8753 SYS BP > OR = 140: HCPCS | Performed by: NURSE PRACTITIONER

## 2022-11-03 PROCEDURE — 3074F SYST BP LT 130 MM HG: CPT | Performed by: NURSE PRACTITIONER

## 2022-11-03 PROCEDURE — G8427 DOCREV CUR MEDS BY ELIG CLIN: HCPCS | Performed by: NURSE PRACTITIONER

## 2022-11-03 PROCEDURE — 99214 OFFICE O/P EST MOD 30 MIN: CPT | Performed by: NURSE PRACTITIONER

## 2022-11-03 PROCEDURE — G8754 DIAS BP LESS 90: HCPCS | Performed by: NURSE PRACTITIONER

## 2022-11-03 PROCEDURE — 1101F PT FALLS ASSESS-DOCD LE1/YR: CPT | Performed by: NURSE PRACTITIONER

## 2022-11-03 PROCEDURE — G8536 NO DOC ELDER MAL SCRN: HCPCS | Performed by: NURSE PRACTITIONER

## 2022-11-03 NOTE — PROGRESS NOTES
HISTORY OF PRESENT ILLNESS  Lola Serna is a 76 y.o. male. HPI  Cardiovascular Review:  The patient has hypertension and hyperlipidemia. Diet and Lifestyle: generally follows a low fat low cholesterol diet, generally follows a low sodium diet, exercises sporadically, nonsmoker  Home BP Monitoring: is not measured at home. Pertinent ROS: taking medications as instructed, no medication side effects noted, no TIA's, no chest pain on exertion, no dyspnea on exertion, no swelling of ankles. Diabetes Mellitus:  He has diabetes mellitus, and  hyperlipidemia. Diabetic ROS - medication compliance: compliant most of the time, diabetic diet compliance: compliant most of the time, home glucose monitoring: fasting values range 109-119. Lab review: orders written for new lab studies as appropriate; see orders. 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    Neurogenic claudication due to lumbar spinal stenosis 07/19/2022    Left leg weakness 07/19/2022    Spondylolisthesis at L4-L5 level 07/19/2022    Spondylolisthesis at L2-L3 level 07/19/2022    Spondylolisthesis at L3-L4 level 07/19/2022    Chronic hepatitis C without hepatic coma ([facility] [address] 05/05/2022    Type 2 diabetes mellitus with chronic kidney disease ([facility] [address] 05/05/2022    Patellar subluxation, right, subsequent encounter 11/08/2021    Type 2 diabetes mellitus 10/29/2021    Failed total knee arthroplasty, 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 30 Tablet 5    amLODIPine (NORVASC) 10 mg tablet Take 1 Tablet by mouth daily.       metFORMIN (GLUCOPHAGE) 1,000 mg tablet 1/2 tab with breakfast daily 30 Tablet 5    senna (SENOKOT) 8.6 mg tablet Take 1 Tablet by mouth two (2) times a day. 60 Tablet 5    cholecalciferol (VITAMIN D3) (5000 Units/125 mcg) tab tablet Take 1 Tablet by mouth in the morning. 90 Tablet 1    folic acid/multivit-min/lutein (CENTRUM SILVER PO) Take 1 Tablet by mouth daily. simvastatin (ZOCOR) 40 mg tablet Take 1 Tablet by mouth every evening. 90 Tablet 3    vit C/E/Zn/coppr/lutein/zeaxan (PRESERVISION AREDS-2 PO) Take 1 Capsule by mouth two (2) times a day. Family History   Problem Relation Age of Onset    Cancer Mother     Breast Cancer Mother     Colon Cancer Mother     Stroke Father     Prostate Cancer Brother     Deep Vein Thrombosis Neg Hx     Anesth Problems Neg Hx      Social History     Tobacco Use    Smoking status: Never    Smokeless tobacco: Never   Substance Use Topics    Alcohol use: Yes     Comment: rare           ROS  A comprehensive review of system was obtained and negative except findings in the HPI    Visit Vitals  BP (!) 146/78 (BP 1 Location: Right arm, BP Patient Position: Sitting)   Pulse 89   Temp 98.1 °F (36.7 °C) (Oral)   Resp 14   Ht 6' (1.829 m)   Wt 214 lb (97.1 kg)   SpO2 97%   BMI 29.02 kg/m²     Physical Exam  Vitals and nursing note reviewed. Cardiovascular:      Rate and Rhythm: Normal rate and regular rhythm. Heart sounds: Normal heart sounds. Pulmonary:      Breath sounds: Normal breath sounds. ASSESSMENT and PLAN  Encounter Diagnoses   Name Primary? Essential hypertension Yes    Controlled type 2 diabetes mellitus without complication, without long-term current use of insulin ([facility] [address]     Hypercholesteremia      Orders Placed This Encounter    LIPID PANEL    METABOLIC PANEL, COMPREHENSIVE    CBC WITH AUTOMATED DIFF    HEMOGLOBIN A1C WITH EAG     Labs updated today  Follow-up and Dispositions    Return for well exam and labs after 5/5/23. I have discussed the diagnosis with the patient and the intended plan as seen in the above orders.  The patient has received an after-visit summary and questions were answered concerning future plans. Patient conveyed understanding of the plan at the time of the visit.     JUAN White, ANP  11/3/2022

## 2022-11-03 NOTE — PROGRESS NOTES
Chief Complaint   Patient presents with    Diabetes    Follow-up    Labs     Pt being seen for fuv for diabetes  -labs    1. Have you been to the ER, urgent care clinic since your last visit? Hospitalized since your last visit? No    2. Have you seen or consulted any other health care providers outside of the [address] [place] [address] since your last visit? Include any pap smears or colon screening.  No    Pt has no other concerns

## 2022-11-04 LAB
ALBUMIN SERPL-MCNC: 3.4 G/DL (ref 3.5–5)
ALBUMIN/GLOB SERPL: 1 {RATIO} (ref 1.1–2.2)
ALP SERPL-CCNC: 85 U/L (ref 45–117)
ALT SERPL-CCNC: 19 U/L (ref 12–78)
ANION GAP SERPL CALC-SCNC: 6 MMOL/L (ref 5–15)
AST SERPL-CCNC: 8 U/L (ref 15–37)
BASOPHILS # BLD: 0.1 K/UL (ref 0–0.1)
BASOPHILS NFR BLD: 1 % (ref 0–1)
BILIRUB SERPL-MCNC: 0.2 MG/DL (ref 0.2–1)
BUN SERPL-MCNC: 20 MG/DL (ref 6–20)
BUN/CREAT SERPL: 22 (ref 12–20)
CALCIUM SERPL-MCNC: 8.8 MG/DL (ref 8.5–10.1)
CHLORIDE SERPL-SCNC: 110 MMOL/L (ref 97–108)
CHOLEST SERPL-MCNC: 166 MG/DL
CO2 SERPL-SCNC: 28 MMOL/L (ref 21–32)
CREAT SERPL-MCNC: 0.92 MG/DL (ref 0.7–1.3)
DIFFERENTIAL METHOD BLD: ABNORMAL
EOSINOPHIL # BLD: 0.1 K/UL (ref 0–0.4)
EOSINOPHIL NFR BLD: 1 % (ref 0–7)
ERYTHROCYTE [DISTWIDTH] IN BLOOD BY AUTOMATED COUNT: 14.9 % (ref 11.5–14.5)
EST. AVERAGE GLUCOSE BLD GHB EST-MCNC: 128 MG/DL
GLOBULIN SER CALC-MCNC: 3.3 G/DL (ref 2–4)
GLUCOSE SERPL-MCNC: 105 MG/DL (ref 65–100)
HBA1C MFR BLD: 6.1 % (ref 4–5.6)
HCT VFR BLD AUTO: 44.9 % (ref 36.6–50.3)
HDLC SERPL-MCNC: 50 MG/DL
HDLC SERPL: 3.3 {RATIO} (ref 0–5)
HGB BLD-MCNC: 13.5 G/DL (ref 12.1–17)
IMM GRANULOCYTES # BLD AUTO: 0 K/UL (ref 0–0.04)
IMM GRANULOCYTES NFR BLD AUTO: 0 % (ref 0–0.5)
LDLC SERPL CALC-MCNC: 88.4 MG/DL (ref 0–100)
LYMPHOCYTES # BLD: 1.1 K/UL (ref 0.8–3.5)
LYMPHOCYTES NFR BLD: 17 % (ref 12–49)
MCH RBC QN AUTO: 27.5 PG (ref 26–34)
MCHC RBC AUTO-ENTMCNC: 30.1 G/DL (ref 30–36.5)
MCV RBC AUTO: 91.4 FL (ref 80–99)
MONOCYTES # BLD: 0.6 K/UL (ref 0–1)
MONOCYTES NFR BLD: 9 % (ref 5–13)
NEUTS SEG # BLD: 4.6 K/UL (ref 1.8–8)
NEUTS SEG NFR BLD: 72 % (ref 32–75)
NRBC # BLD: 0 K/UL (ref 0–0.01)
NRBC BLD-RTO: 0 PER 100 WBC
PLATELET # BLD AUTO: 241 K/UL (ref 150–400)
PMV BLD AUTO: 11.2 FL (ref 8.9–12.9)
POTASSIUM SERPL-SCNC: 3.9 MMOL/L (ref 3.5–5.1)
PROT SERPL-MCNC: 6.7 G/DL (ref 6.4–8.2)
RBC # BLD AUTO: 4.91 M/UL (ref 4.1–5.7)
SODIUM SERPL-SCNC: 144 MMOL/L (ref 136–145)
TRIGL SERPL-MCNC: 138 MG/DL (ref ?–150)
VLDLC SERPL CALC-MCNC: 27.6 MG/DL
WBC # BLD AUTO: 6.4 K/UL (ref 4.1–11.1)

## 2022-11-10 RX ORDER — AMLODIPINE BESYLATE 10 MG/1
10 TABLET ORAL DAILY
Qty: 90 TABLET | Refills: 3 | Status: SHIPPED | OUTPATIENT
Start: 2022-11-10

## 2022-11-10 RX ORDER — MIRTAZAPINE 15 MG/1
15 TABLET, FILM COATED ORAL
Qty: 90 TABLET | Refills: 1 | Status: SHIPPED | OUTPATIENT
Start: 2022-11-10

## 2022-11-10 RX ORDER — MIRTAZAPINE 15 MG/1
15 TABLET, FILM COATED ORAL
Qty: 30 TABLET | Refills: 5 | Status: SHIPPED | OUTPATIENT
Start: 2022-11-10 | End: 2022-11-10 | Stop reason: SDUPTHER

## 2023-01-12 ENCOUNTER — DOCUMENTATION ONLY (OUTPATIENT)
Dept: FAMILY MEDICINE CLINIC | Age: 78
End: 2023-01-12

## 2023-02-17 ENCOUNTER — DOCUMENTATION ONLY (OUTPATIENT)
Dept: FAMILY MEDICINE CLINIC | Age: 78
End: 2023-02-17

## 2023-02-17 NOTE — PROGRESS NOTES
Called pt to cancel apt due to provider out of office. LVM letting him know we have to r/s apt due to provider on vacation. Also told him on vm we can't schedule for may due to new program and that I would call him when it starts to r/s him.

## 2023-03-09 RX ORDER — SENNOSIDES 8.6 MG/1
TABLET ORAL
Qty: 60 TABLET | Refills: 5 | Status: SHIPPED | OUTPATIENT
Start: 2023-03-09

## 2023-04-27 ENCOUNTER — TELEPHONE (OUTPATIENT)
Dept: FAMILY MEDICINE CLINIC | Age: 78
End: 2023-04-27

## 2023-04-27 NOTE — TELEPHONE ENCOUNTER
Left vm to have pt call office back in re to apt on the 22 of may.  This needs to be a vv or set new day and time

## 2023-05-04 RX ORDER — MIRTAZAPINE 15 MG/1
15 TABLET, FILM COATED ORAL
Qty: 90 TABLET | Refills: 1 | Status: SHIPPED | OUTPATIENT
Start: 2023-05-04

## 2023-05-08 ENCOUNTER — CLINICAL DOCUMENTATION (OUTPATIENT)
Age: 78
End: 2023-05-08

## 2023-05-08 ENCOUNTER — TELEPHONE (OUTPATIENT)
Age: 78
End: 2023-05-08

## 2023-05-09 ENCOUNTER — TELEPHONE (OUTPATIENT)
Age: 78
End: 2023-05-09

## 2023-05-09 NOTE — TELEPHONE ENCOUNTER
Attempted to call pt, no answer left vm that we would not be able to get pt in before his may 17 apt

## 2023-05-09 NOTE — TELEPHONE ENCOUNTER
----- Message from [place] sent at 5/9/2023 10:00 AM EDT -----  Subject: Appointment Request    Reason for Call: Established Patient Appointment needed: Routine Pre-Op    QUESTIONS    Reason for appointment request? Available appointments did not meet   patient need     Additional Information for Provider? Pt is calling to see if he can come   in before May 17th. Pt is having cataract on May 17th, by Dr. Patricia Gamez at [facility], No EKG done yet.  Please call pt back  ---------------------------------------------------------------------------  --------------  Michael Keenan INFO  [phone]; OK to leave message on voicemail  ---------------------------------------------------------------------------  --------------  SCRIPT ANSWERS  COVID Screen: Tay Alvarez

## 2023-05-10 ENCOUNTER — TELEPHONE (OUTPATIENT)
Age: 78
End: 2023-05-10

## 2023-05-10 NOTE — TELEPHONE ENCOUNTER
Attempted to call pt no answer. Left vm to call office back becasue Apt needs to be a vv or changed to a different day and time. Please assist pt please.

## 2023-05-24 ENCOUNTER — TELEPHONE (OUTPATIENT)
Age: 78
End: 2023-05-24

## 2023-05-24 NOTE — TELEPHONE ENCOUNTER
Attempted to call pt no answer someone picked up then the phone hung up.  Just wanted to see if pt had their eye surgery

## 2023-06-07 RX ORDER — SIMVASTATIN 40 MG
TABLET ORAL
Qty: 90 TABLET | Refills: 3 | Status: SHIPPED | OUTPATIENT
Start: 2023-06-07

## 2023-06-26 ENCOUNTER — OFFICE VISIT (OUTPATIENT)
Age: 78
End: 2023-06-26
Payer: MEDICARE

## 2023-06-26 VITALS — BODY MASS INDEX: 31.74 KG/M2 | SYSTOLIC BLOOD PRESSURE: 122 MMHG | WEIGHT: 234 LBS | DIASTOLIC BLOOD PRESSURE: 68 MMHG

## 2023-06-26 DIAGNOSIS — Z00.00 WELL EXAM WITHOUT ABNORMAL FINDINGS OF PATIENT 18 YEARS OF AGE OR OLDER: Primary | ICD-10-CM

## 2023-06-26 DIAGNOSIS — F33.0 MAJOR DEPRESSIVE DISORDER, RECURRENT, MILD (HCC): ICD-10-CM

## 2023-06-26 DIAGNOSIS — I10 ESSENTIAL (PRIMARY) HYPERTENSION: ICD-10-CM

## 2023-06-26 DIAGNOSIS — N18.30 TYPE 2 DIABETES MELLITUS WITH STAGE 3 CHRONIC KIDNEY DISEASE, WITHOUT LONG-TERM CURRENT USE OF INSULIN, UNSPECIFIED WHETHER STAGE 3A OR 3B CKD (HCC): ICD-10-CM

## 2023-06-26 DIAGNOSIS — E78.00 PURE HYPERCHOLESTEROLEMIA, UNSPECIFIED: ICD-10-CM

## 2023-06-26 DIAGNOSIS — N18.30 STAGE 3 CHRONIC KIDNEY DISEASE, UNSPECIFIED WHETHER STAGE 3A OR 3B CKD (HCC): ICD-10-CM

## 2023-06-26 DIAGNOSIS — E11.9 TYPE 2 DIABETES MELLITUS WITHOUT COMPLICATION, WITHOUT LONG-TERM CURRENT USE OF INSULIN (HCC): ICD-10-CM

## 2023-06-26 DIAGNOSIS — E11.22 TYPE 2 DIABETES MELLITUS WITH STAGE 3 CHRONIC KIDNEY DISEASE, WITHOUT LONG-TERM CURRENT USE OF INSULIN, UNSPECIFIED WHETHER STAGE 3A OR 3B CKD (HCC): ICD-10-CM

## 2023-06-26 DIAGNOSIS — Z12.5 SCREENING PSA (PROSTATE SPECIFIC ANTIGEN): ICD-10-CM

## 2023-06-26 DIAGNOSIS — E55.9 VITAMIN D DEFICIENCY: ICD-10-CM

## 2023-06-26 DIAGNOSIS — B18.2 CHRONIC HEPATITIS C WITHOUT HEPATIC COMA (HCC): ICD-10-CM

## 2023-06-26 LAB
ALBUMIN SERPL-MCNC: 3.8 G/DL (ref 3.5–5)
ALBUMIN/GLOB SERPL: 1.2 (ref 1.1–2.2)
ALP SERPL-CCNC: 70 U/L (ref 45–117)
ALT SERPL-CCNC: 17 U/L (ref 12–78)
ANION GAP SERPL CALC-SCNC: 4 MMOL/L (ref 5–15)
AST SERPL-CCNC: 9 U/L (ref 15–37)
BASOPHILS # BLD: 0.1 K/UL (ref 0–0.1)
BASOPHILS NFR BLD: 1 % (ref 0–1)
BILIRUB SERPL-MCNC: 0.5 MG/DL (ref 0.2–1)
BUN SERPL-MCNC: 25 MG/DL (ref 6–20)
BUN/CREAT SERPL: 23 (ref 12–20)
CALCIUM SERPL-MCNC: 9.1 MG/DL (ref 8.5–10.1)
CHLORIDE SERPL-SCNC: 109 MMOL/L (ref 97–108)
CHOLEST SERPL-MCNC: 163 MG/DL
CO2 SERPL-SCNC: 28 MMOL/L (ref 21–32)
CREAT SERPL-MCNC: 1.1 MG/DL (ref 0.7–1.3)
CREAT UR-MCNC: 160 MG/DL
DIFFERENTIAL METHOD BLD: ABNORMAL
EOSINOPHIL # BLD: 0.1 K/UL (ref 0–0.4)
EOSINOPHIL NFR BLD: 1 % (ref 0–7)
ERYTHROCYTE [DISTWIDTH] IN BLOOD BY AUTOMATED COUNT: 15.1 % (ref 11.5–14.5)
EST. AVERAGE GLUCOSE BLD GHB EST-MCNC: 120 MG/DL
GLOBULIN SER CALC-MCNC: 3.2 G/DL (ref 2–4)
GLUCOSE SERPL-MCNC: 121 MG/DL (ref 65–100)
HBA1C MFR BLD: 5.8 % (ref 4–5.6)
HCT VFR BLD AUTO: 42.3 % (ref 36.6–50.3)
HDLC SERPL-MCNC: 57 MG/DL
HDLC SERPL: 2.9 (ref 0–5)
HGB BLD-MCNC: 13.3 G/DL (ref 12.1–17)
IMM GRANULOCYTES # BLD AUTO: 0 K/UL (ref 0–0.04)
IMM GRANULOCYTES NFR BLD AUTO: 0 % (ref 0–0.5)
LDLC SERPL CALC-MCNC: 88.8 MG/DL (ref 0–100)
LYMPHOCYTES # BLD: 1.1 K/UL (ref 0.8–3.5)
LYMPHOCYTES NFR BLD: 18 % (ref 12–49)
MCH RBC QN AUTO: 29.3 PG (ref 26–34)
MCHC RBC AUTO-ENTMCNC: 31.4 G/DL (ref 30–36.5)
MCV RBC AUTO: 93.2 FL (ref 80–99)
MICROALBUMIN UR-MCNC: 4.11 MG/DL
MICROALBUMIN/CREAT UR-RTO: 26 MG/G (ref 0–30)
MONOCYTES # BLD: 0.5 K/UL (ref 0–1)
MONOCYTES NFR BLD: 9 % (ref 5–13)
NEUTS SEG # BLD: 4.3 K/UL (ref 1.8–8)
NEUTS SEG NFR BLD: 71 % (ref 32–75)
NRBC # BLD: 0 K/UL (ref 0–0.01)
NRBC BLD-RTO: 0 PER 100 WBC
PLATELET # BLD AUTO: 188 K/UL (ref 150–400)
PMV BLD AUTO: 11.7 FL (ref 8.9–12.9)
POTASSIUM SERPL-SCNC: 4.2 MMOL/L (ref 3.5–5.1)
PROT SERPL-MCNC: 7 G/DL (ref 6.4–8.2)
PSA SERPL-MCNC: 2.1 NG/ML (ref 0.01–4)
RBC # BLD AUTO: 4.54 M/UL (ref 4.1–5.7)
SODIUM SERPL-SCNC: 141 MMOL/L (ref 136–145)
TRIGL SERPL-MCNC: 86 MG/DL
TSH SERPL DL<=0.05 MIU/L-ACNC: 7.78 UIU/ML (ref 0.36–3.74)
VLDLC SERPL CALC-MCNC: 17.2 MG/DL
WBC # BLD AUTO: 6 K/UL (ref 4.1–11.1)

## 2023-06-26 PROCEDURE — G8417 CALC BMI ABV UP PARAM F/U: HCPCS | Performed by: NURSE PRACTITIONER

## 2023-06-26 PROCEDURE — G8427 DOCREV CUR MEDS BY ELIG CLIN: HCPCS | Performed by: NURSE PRACTITIONER

## 2023-06-26 PROCEDURE — 1123F ACP DISCUSS/DSCN MKR DOCD: CPT | Performed by: NURSE PRACTITIONER

## 2023-06-26 PROCEDURE — 3074F SYST BP LT 130 MM HG: CPT | Performed by: NURSE PRACTITIONER

## 2023-06-26 PROCEDURE — 99214 OFFICE O/P EST MOD 30 MIN: CPT | Performed by: NURSE PRACTITIONER

## 2023-06-26 PROCEDURE — G0439 PPPS, SUBSEQ VISIT: HCPCS | Performed by: NURSE PRACTITIONER

## 2023-06-26 PROCEDURE — 1036F TOBACCO NON-USER: CPT | Performed by: NURSE PRACTITIONER

## 2023-06-26 PROCEDURE — 3078F DIAST BP <80 MM HG: CPT | Performed by: NURSE PRACTITIONER

## 2023-06-26 RX ORDER — MULTIVIT WITH MINERALS/LUTEIN
TABLET ORAL DAILY
COMMUNITY

## 2023-06-26 RX ORDER — LISINOPRIL 20 MG/1
20 TABLET ORAL 2 TIMES DAILY
COMMUNITY
Start: 2021-06-07

## 2023-06-26 SDOH — ECONOMIC STABILITY: FOOD INSECURITY: WITHIN THE PAST 12 MONTHS, YOU WORRIED THAT YOUR FOOD WOULD RUN OUT BEFORE YOU GOT MONEY TO BUY MORE.: NEVER TRUE

## 2023-06-26 SDOH — ECONOMIC STABILITY: INCOME INSECURITY: HOW HARD IS IT FOR YOU TO PAY FOR THE VERY BASICS LIKE FOOD, HOUSING, MEDICAL CARE, AND HEATING?: NOT HARD AT ALL

## 2023-06-26 SDOH — ECONOMIC STABILITY: FOOD INSECURITY: WITHIN THE PAST 12 MONTHS, THE FOOD YOU BOUGHT JUST DIDN'T LAST AND YOU DIDN'T HAVE MONEY TO GET MORE.: NEVER TRUE

## 2023-06-26 SDOH — ECONOMIC STABILITY: HOUSING INSECURITY
IN THE LAST 12 MONTHS, WAS THERE A TIME WHEN YOU DID NOT HAVE A STEADY PLACE TO SLEEP OR SLEPT IN A SHELTER (INCLUDING NOW)?: NO

## 2023-06-26 ASSESSMENT — ANXIETY QUESTIONNAIRES
7. FEELING AFRAID AS IF SOMETHING AWFUL MIGHT HAPPEN: 0
GAD7 TOTAL SCORE: 0
3. WORRYING TOO MUCH ABOUT DIFFERENT THINGS: 0
4. TROUBLE RELAXING: 0
6. BECOMING EASILY ANNOYED OR IRRITABLE: 0
5. BEING SO RESTLESS THAT IT IS HARD TO SIT STILL: 0
1. FEELING NERVOUS, ANXIOUS, OR ON EDGE: 0
2. NOT BEING ABLE TO STOP OR CONTROL WORRYING: 0

## 2023-06-26 ASSESSMENT — PATIENT HEALTH QUESTIONNAIRE - PHQ9
8. MOVING OR SPEAKING SO SLOWLY THAT OTHER PEOPLE COULD HAVE NOTICED. OR THE OPPOSITE, BEING SO FIGETY OR RESTLESS THAT YOU HAVE BEEN MOVING AROUND A LOT MORE THAN USUAL: 0
SUM OF ALL RESPONSES TO PHQ QUESTIONS 1-9: 4
SUM OF ALL RESPONSES TO PHQ QUESTIONS 1-9: 4
2. FEELING DOWN, DEPRESSED OR HOPELESS: 0
9. THOUGHTS THAT YOU WOULD BE BETTER OFF DEAD, OR OF HURTING YOURSELF: 0
1. LITTLE INTEREST OR PLEASURE IN DOING THINGS: 0
SUM OF ALL RESPONSES TO PHQ QUESTIONS 1-9: 4
SUM OF ALL RESPONSES TO PHQ QUESTIONS 1-9: 4
3. TROUBLE FALLING OR STAYING ASLEEP: 1
6. FEELING BAD ABOUT YOURSELF - OR THAT YOU ARE A FAILURE OR HAVE LET YOURSELF OR YOUR FAMILY DOWN: 0
SUM OF ALL RESPONSES TO PHQ9 QUESTIONS 1 & 2: 0
4. FEELING TIRED OR HAVING LITTLE ENERGY: 3
7. TROUBLE CONCENTRATING ON THINGS, SUCH AS READING THE NEWSPAPER OR WATCHING TELEVISION: 0
5. POOR APPETITE OR OVEREATING: 0

## 2023-06-26 ASSESSMENT — LIFESTYLE VARIABLES
HOW MANY STANDARD DRINKS CONTAINING ALCOHOL DO YOU HAVE ON A TYPICAL DAY: 1 OR 2
HOW OFTEN DO YOU HAVE A DRINK CONTAINING ALCOHOL: MONTHLY OR LESS

## 2023-06-27 LAB — 25(OH)D3 SERPL-MCNC: 57.5 NG/ML (ref 30–100)

## 2023-08-22 RX ORDER — SENNOSIDES 8.6 MG
TABLET ORAL
Qty: 60 TABLET | Refills: 5 | Status: SHIPPED | OUTPATIENT
Start: 2023-08-22

## 2023-10-30 RX ORDER — AMLODIPINE BESYLATE 10 MG/1
10 TABLET ORAL DAILY
Qty: 90 TABLET | Refills: 3 | Status: SHIPPED | OUTPATIENT
Start: 2023-10-30

## 2023-11-20 RX ORDER — LISINOPRIL 20 MG/1
20 TABLET ORAL 2 TIMES DAILY
Qty: 180 TABLET | Refills: 3 | Status: SHIPPED | OUTPATIENT
Start: 2023-11-20

## 2023-12-26 ENCOUNTER — OFFICE VISIT (OUTPATIENT)
Age: 78
End: 2023-12-26
Payer: MEDICARE

## 2023-12-26 VITALS
SYSTOLIC BLOOD PRESSURE: 122 MMHG | WEIGHT: 253 LBS | OXYGEN SATURATION: 97 % | DIASTOLIC BLOOD PRESSURE: 72 MMHG | TEMPERATURE: 98.4 F | HEIGHT: 72 IN | HEART RATE: 67 BPM | BODY MASS INDEX: 34.27 KG/M2

## 2023-12-26 DIAGNOSIS — E78.00 PURE HYPERCHOLESTEROLEMIA, UNSPECIFIED: ICD-10-CM

## 2023-12-26 DIAGNOSIS — E55.9 VITAMIN D DEFICIENCY: ICD-10-CM

## 2023-12-26 DIAGNOSIS — I10 ESSENTIAL (PRIMARY) HYPERTENSION: Primary | ICD-10-CM

## 2023-12-26 DIAGNOSIS — E11.9 TYPE 2 DIABETES MELLITUS WITHOUT COMPLICATION, WITHOUT LONG-TERM CURRENT USE OF INSULIN (HCC): ICD-10-CM

## 2023-12-26 LAB
25(OH)D3 SERPL-MCNC: 45.8 NG/ML (ref 30–100)
ALBUMIN SERPL-MCNC: 3.6 G/DL (ref 3.5–5)
ALBUMIN/GLOB SERPL: 1.2 (ref 1.1–2.2)
ALP SERPL-CCNC: 74 U/L (ref 45–117)
ALT SERPL-CCNC: 17 U/L (ref 12–78)
ANION GAP SERPL CALC-SCNC: 3 MMOL/L (ref 5–15)
AST SERPL-CCNC: 10 U/L (ref 15–37)
BASOPHILS # BLD: 0.1 K/UL (ref 0–0.1)
BASOPHILS NFR BLD: 1 % (ref 0–1)
BILIRUB SERPL-MCNC: 0.4 MG/DL (ref 0.2–1)
BUN SERPL-MCNC: 20 MG/DL (ref 6–20)
BUN/CREAT SERPL: 20 (ref 12–20)
CALCIUM SERPL-MCNC: 8.4 MG/DL (ref 8.5–10.1)
CHLORIDE SERPL-SCNC: 109 MMOL/L (ref 97–108)
CHOLEST SERPL-MCNC: 160 MG/DL
CO2 SERPL-SCNC: 29 MMOL/L (ref 21–32)
CREAT SERPL-MCNC: 1.02 MG/DL (ref 0.7–1.3)
CREAT UR-MCNC: 199 MG/DL
DIFFERENTIAL METHOD BLD: NORMAL
EOSINOPHIL # BLD: 0.1 K/UL (ref 0–0.4)
EOSINOPHIL NFR BLD: 1 % (ref 0–7)
ERYTHROCYTE [DISTWIDTH] IN BLOOD BY AUTOMATED COUNT: 13.4 % (ref 11.5–14.5)
EST. AVERAGE GLUCOSE BLD GHB EST-MCNC: 131 MG/DL
GLOBULIN SER CALC-MCNC: 3.1 G/DL (ref 2–4)
GLUCOSE SERPL-MCNC: 128 MG/DL (ref 65–100)
HBA1C MFR BLD: 6.2 % (ref 4–5.6)
HCT VFR BLD AUTO: 42.1 % (ref 36.6–50.3)
HDLC SERPL-MCNC: 48 MG/DL
HDLC SERPL: 3.3 (ref 0–5)
HGB BLD-MCNC: 13.3 G/DL (ref 12.1–17)
IMM GRANULOCYTES # BLD AUTO: 0 K/UL (ref 0–0.04)
IMM GRANULOCYTES NFR BLD AUTO: 0 % (ref 0–0.5)
LDLC SERPL CALC-MCNC: 96.4 MG/DL (ref 0–100)
LYMPHOCYTES # BLD: 1.1 K/UL (ref 0.8–3.5)
LYMPHOCYTES NFR BLD: 19 % (ref 12–49)
MCH RBC QN AUTO: 30 PG (ref 26–34)
MCHC RBC AUTO-ENTMCNC: 31.6 G/DL (ref 30–36.5)
MCV RBC AUTO: 95 FL (ref 80–99)
MICROALBUMIN UR-MCNC: 10 MG/DL
MICROALBUMIN/CREAT UR-RTO: 50 MG/G (ref 0–30)
MONOCYTES # BLD: 0.6 K/UL (ref 0–1)
MONOCYTES NFR BLD: 10 % (ref 5–13)
NEUTS SEG # BLD: 4 K/UL (ref 1.8–8)
NEUTS SEG NFR BLD: 69 % (ref 32–75)
NRBC # BLD: 0 K/UL (ref 0–0.01)
NRBC BLD-RTO: 0 PER 100 WBC
PLATELET # BLD AUTO: 192 K/UL (ref 150–400)
PMV BLD AUTO: 11.6 FL (ref 8.9–12.9)
POTASSIUM SERPL-SCNC: 4.2 MMOL/L (ref 3.5–5.1)
PROT SERPL-MCNC: 6.7 G/DL (ref 6.4–8.2)
RBC # BLD AUTO: 4.43 M/UL (ref 4.1–5.7)
SODIUM SERPL-SCNC: 141 MMOL/L (ref 136–145)
TRIGL SERPL-MCNC: 78 MG/DL
VLDLC SERPL CALC-MCNC: 15.6 MG/DL
WBC # BLD AUTO: 5.9 K/UL (ref 4.1–11.1)

## 2023-12-26 PROCEDURE — 99214 OFFICE O/P EST MOD 30 MIN: CPT | Performed by: NURSE PRACTITIONER

## 2023-12-26 PROCEDURE — 3044F HG A1C LEVEL LT 7.0%: CPT | Performed by: NURSE PRACTITIONER

## 2023-12-26 PROCEDURE — G8427 DOCREV CUR MEDS BY ELIG CLIN: HCPCS | Performed by: NURSE PRACTITIONER

## 2023-12-26 PROCEDURE — 3074F SYST BP LT 130 MM HG: CPT | Performed by: NURSE PRACTITIONER

## 2023-12-26 PROCEDURE — G8417 CALC BMI ABV UP PARAM F/U: HCPCS | Performed by: NURSE PRACTITIONER

## 2023-12-26 PROCEDURE — 3078F DIAST BP <80 MM HG: CPT | Performed by: NURSE PRACTITIONER

## 2023-12-26 PROCEDURE — 1036F TOBACCO NON-USER: CPT | Performed by: NURSE PRACTITIONER

## 2023-12-26 PROCEDURE — 1123F ACP DISCUSS/DSCN MKR DOCD: CPT | Performed by: NURSE PRACTITIONER

## 2023-12-26 PROCEDURE — G8484 FLU IMMUNIZE NO ADMIN: HCPCS | Performed by: NURSE PRACTITIONER

## 2024-01-15 RX ORDER — SENNOSIDES 8.6 MG/1
1 TABLET, COATED ORAL 2 TIMES DAILY
Qty: 60 TABLET | Refills: 5 | Status: SHIPPED | OUTPATIENT
Start: 2024-01-15

## 2024-05-20 RX ORDER — SIMVASTATIN 40 MG
TABLET ORAL
Qty: 90 TABLET | Refills: 3 | Status: SHIPPED | OUTPATIENT
Start: 2024-05-20

## 2024-06-27 ENCOUNTER — OFFICE VISIT (OUTPATIENT)
Age: 79
End: 2024-06-27
Payer: MEDICARE

## 2024-06-27 VITALS
OXYGEN SATURATION: 94 % | TEMPERATURE: 97.9 F | WEIGHT: 255 LBS | SYSTOLIC BLOOD PRESSURE: 134 MMHG | BODY MASS INDEX: 34.54 KG/M2 | HEIGHT: 72 IN | HEART RATE: 78 BPM | RESPIRATION RATE: 16 BRPM | DIASTOLIC BLOOD PRESSURE: 76 MMHG

## 2024-06-27 DIAGNOSIS — I10 ESSENTIAL (PRIMARY) HYPERTENSION: ICD-10-CM

## 2024-06-27 DIAGNOSIS — Z00.00 WELL EXAM WITHOUT ABNORMAL FINDINGS OF PATIENT 18 YEARS OF AGE OR OLDER: Primary | ICD-10-CM

## 2024-06-27 DIAGNOSIS — Z12.5 SCREENING PSA (PROSTATE SPECIFIC ANTIGEN): ICD-10-CM

## 2024-06-27 DIAGNOSIS — F33.0 MAJOR DEPRESSIVE DISORDER, RECURRENT, MILD (HCC): ICD-10-CM

## 2024-06-27 DIAGNOSIS — E11.9 TYPE 2 DIABETES MELLITUS WITHOUT COMPLICATION, WITHOUT LONG-TERM CURRENT USE OF INSULIN (HCC): ICD-10-CM

## 2024-06-27 DIAGNOSIS — B18.2 CHRONIC HEPATITIS C WITHOUT HEPATIC COMA (HCC): ICD-10-CM

## 2024-06-27 DIAGNOSIS — E55.9 VITAMIN D DEFICIENCY: ICD-10-CM

## 2024-06-27 DIAGNOSIS — E78.00 PURE HYPERCHOLESTEROLEMIA, UNSPECIFIED: ICD-10-CM

## 2024-06-27 DIAGNOSIS — E11.22 TYPE 2 DIABETES MELLITUS WITH STAGE 3 CHRONIC KIDNEY DISEASE, WITHOUT LONG-TERM CURRENT USE OF INSULIN, UNSPECIFIED WHETHER STAGE 3A OR 3B CKD (HCC): ICD-10-CM

## 2024-06-27 DIAGNOSIS — Z00.00 WELL EXAM WITHOUT ABNORMAL FINDINGS OF PATIENT 18 YEARS OF AGE OR OLDER: ICD-10-CM

## 2024-06-27 DIAGNOSIS — N18.30 TYPE 2 DIABETES MELLITUS WITH STAGE 3 CHRONIC KIDNEY DISEASE, WITHOUT LONG-TERM CURRENT USE OF INSULIN, UNSPECIFIED WHETHER STAGE 3A OR 3B CKD (HCC): ICD-10-CM

## 2024-06-27 DIAGNOSIS — N18.30 STAGE 3 CHRONIC KIDNEY DISEASE, UNSPECIFIED WHETHER STAGE 3A OR 3B CKD (HCC): ICD-10-CM

## 2024-06-27 PROCEDURE — 3075F SYST BP GE 130 - 139MM HG: CPT | Performed by: NURSE PRACTITIONER

## 2024-06-27 PROCEDURE — G8428 CUR MEDS NOT DOCUMENT: HCPCS | Performed by: NURSE PRACTITIONER

## 2024-06-27 PROCEDURE — G8417 CALC BMI ABV UP PARAM F/U: HCPCS | Performed by: NURSE PRACTITIONER

## 2024-06-27 PROCEDURE — 1123F ACP DISCUSS/DSCN MKR DOCD: CPT | Performed by: NURSE PRACTITIONER

## 2024-06-27 PROCEDURE — 3078F DIAST BP <80 MM HG: CPT | Performed by: NURSE PRACTITIONER

## 2024-06-27 PROCEDURE — 99214 OFFICE O/P EST MOD 30 MIN: CPT | Performed by: NURSE PRACTITIONER

## 2024-06-27 PROCEDURE — G0439 PPPS, SUBSEQ VISIT: HCPCS | Performed by: NURSE PRACTITIONER

## 2024-06-27 PROCEDURE — 1036F TOBACCO NON-USER: CPT | Performed by: NURSE PRACTITIONER

## 2024-06-27 SDOH — ECONOMIC STABILITY: FOOD INSECURITY: WITHIN THE PAST 12 MONTHS, THE FOOD YOU BOUGHT JUST DIDN'T LAST AND YOU DIDN'T HAVE MONEY TO GET MORE.: NEVER TRUE

## 2024-06-27 SDOH — ECONOMIC STABILITY: INCOME INSECURITY: HOW HARD IS IT FOR YOU TO PAY FOR THE VERY BASICS LIKE FOOD, HOUSING, MEDICAL CARE, AND HEATING?: NOT HARD AT ALL

## 2024-06-27 SDOH — ECONOMIC STABILITY: FOOD INSECURITY: WITHIN THE PAST 12 MONTHS, YOU WORRIED THAT YOUR FOOD WOULD RUN OUT BEFORE YOU GOT MONEY TO BUY MORE.: NEVER TRUE

## 2024-06-27 ASSESSMENT — PATIENT HEALTH QUESTIONNAIRE - PHQ9
2. FEELING DOWN, DEPRESSED OR HOPELESS: NOT AT ALL
SUM OF ALL RESPONSES TO PHQ QUESTIONS 1-9: 0
9. THOUGHTS THAT YOU WOULD BE BETTER OFF DEAD, OR OF HURTING YOURSELF: NOT AT ALL
SUM OF ALL RESPONSES TO PHQ QUESTIONS 1-9: 0
SUM OF ALL RESPONSES TO PHQ QUESTIONS 1-9: 0
3. TROUBLE FALLING OR STAYING ASLEEP: NOT AT ALL
7. TROUBLE CONCENTRATING ON THINGS, SUCH AS READING THE NEWSPAPER OR WATCHING TELEVISION: NOT AT ALL
1. LITTLE INTEREST OR PLEASURE IN DOING THINGS: NOT AT ALL
6. FEELING BAD ABOUT YOURSELF - OR THAT YOU ARE A FAILURE OR HAVE LET YOURSELF OR YOUR FAMILY DOWN: NOT AT ALL
8. MOVING OR SPEAKING SO SLOWLY THAT OTHER PEOPLE COULD HAVE NOTICED. OR THE OPPOSITE, BEING SO FIGETY OR RESTLESS THAT YOU HAVE BEEN MOVING AROUND A LOT MORE THAN USUAL: NOT AT ALL
5. POOR APPETITE OR OVEREATING: NOT AT ALL
SUM OF ALL RESPONSES TO PHQ9 QUESTIONS 1 & 2: 0
4. FEELING TIRED OR HAVING LITTLE ENERGY: NOT AT ALL
10. IF YOU CHECKED OFF ANY PROBLEMS, HOW DIFFICULT HAVE THESE PROBLEMS MADE IT FOR YOU TO DO YOUR WORK, TAKE CARE OF THINGS AT HOME, OR GET ALONG WITH OTHER PEOPLE: NOT DIFFICULT AT ALL
SUM OF ALL RESPONSES TO PHQ QUESTIONS 1-9: 0

## 2024-06-27 NOTE — PROGRESS NOTES
Chief Complaint   Patient presents with    Follow-up     labs     Patient presents in office today for 6 month f/u and fasting labs.  No concerns.      \"Have you been to the ER, urgent care clinic since your last visit?  Hospitalized since your last visit?\"    NO    “Have you seen or consulted any other health care providers outside of [facility] since your last visit?”    NO          Click Here for Release of Records Request  
gallops, distal pulses intact, no carotid bruits  Extremities: no cyanosis, clubbing or edema  Musculoskeletal: normal range of motion, no joint swelling, deformity or tenderness  Neurologic: reflexes normal and symmetric, no cranial nerve deficit, gait, coordination and speech normal       No Known Allergies  Prior to Visit Medications    Medication Sig Taking? Authorizing Provider   metFORMIN (GLUCOPHAGE) 1000 MG tablet TAKE 1/2 TABLET BY MOUTH WITH FOOD DAILY Yes Karthik Deutsch MD   simvastatin (ZOCOR) 40 MG tablet TAKE 1 TABLET BY MOUTH EVERY DAY IN THE EVENING Yes Anuradha Meléndez APRN - NP   lisinopril (PRINIVIL;ZESTRIL) 20 MG tablet TAKE 1 TABLET BY MOUTH TWICE A DAY Yes Anuradha Meléndez APRN - NP   amLODIPine (NORVASC) 10 MG tablet TAKE 1 TABLET BY MOUTH EVERY DAY Yes Anuradha Meléndez APRN - NP   Multiple Vitamins-Minerals (CENTRUM SILVER) TABS Take by mouth daily Yes Aftab Sanz MD   Multiple Vitamins-Minerals (PRESERVISION AREDS PO) Take by mouth Yes Aftab Sanz MD   vitamin D3 (CHOLECALCIFEROL) 125 MCG (5000 UT) TABS tablet Take 1 tablet by mouth daily Yes Automatic Reconciliation, Ar       CareTeam (Including outside providers/suppliers regularly involved in providing care):   Patient Care Team:  Anuradha Meléndez APRN - NP as PCP - General  Anuradha Meléndez APRN - NP as PCP - Empaneled Provider     Reviewed and updated this visit:  Tobacco  Allergies  Med Hx  Surg Hx  Soc Hx  Fam Hx       I have discussed the diagnosis with the patient and the intended plan as seen in the above orders. The patient has received an after-visit summary and questions were answered concerning future plans. Patient conveyed understanding of the plan at the time of the visit.    Anuradha Meléndez, MSN, ANP  6/27/2024

## 2024-06-27 NOTE — PATIENT INSTRUCTIONS
always ask your healthcare professional. [employer], [facility] disclaims any warranty or liability for your use of this information.           Starting a Weight Loss Plan: Care Instructions  Overview     It can be a challenge to lose weight. But your doctor can help you make a weight-loss plan that meets your needs.  You don't have to make a lot of big changes at once. A better idea might be to focus on small changes and stick with them. When those changes become habit, you can add a few more changes.  Some people find it helpful to take an exercise or nutrition class. If you have questions, ask your doctor about seeing a registered dietitian or an exercise specialist. You might also think about joining a weight-loss support group.  If you're not ready to make changes right now, try to pick a date in the future. Then make an appointment with your doctor to talk about when and how you'll get started with a plan.  Follow-up care is a key part of your treatment and safety. Be sure to make and go to all appointments, and call your doctor if you are having problems. It's also a good idea to know your test results and keep a list of the medicines you take.  How can you care for yourself at home?  Set realistic goals. Many people expect to lose much more weight than is likely. A weight loss of 5% to 10% of your body weight may be enough to improve your health.  Get family and friends involved to provide support. Talk to them about why you are trying to lose weight, and ask them to help. They can help by participating in exercise and having meals with you, even if they may be eating something different.  Find what works best for you. If you do not have time or do not like to cook, a program that offers meal replacement bars or shakes may be better for you. Or if you like to prepare meals, finding a plan that includes daily menus and recipes may be best.  Ask your doctor about other health professionals who can help you

## 2024-06-28 LAB
25(OH)D3 SERPL-MCNC: 61.3 NG/ML (ref 30–100)
ALBUMIN SERPL-MCNC: 3.8 G/DL (ref 3.5–5)
ALBUMIN/GLOB SERPL: 1.2 (ref 1.1–2.2)
ALP SERPL-CCNC: 80 U/L (ref 45–117)
ALT SERPL-CCNC: 20 U/L (ref 12–78)
ANION GAP SERPL CALC-SCNC: 7 MMOL/L (ref 5–15)
AST SERPL-CCNC: 10 U/L (ref 15–37)
BASOPHILS # BLD: 0.1 K/UL (ref 0–0.1)
BASOPHILS NFR BLD: 1 % (ref 0–1)
BILIRUB SERPL-MCNC: 0.5 MG/DL (ref 0.2–1)
BUN SERPL-MCNC: 26 MG/DL (ref 6–20)
BUN/CREAT SERPL: 24 (ref 12–20)
CALCIUM SERPL-MCNC: 9.1 MG/DL (ref 8.5–10.1)
CHLORIDE SERPL-SCNC: 109 MMOL/L (ref 97–108)
CHOLEST SERPL-MCNC: 158 MG/DL
CO2 SERPL-SCNC: 26 MMOL/L (ref 21–32)
CREAT SERPL-MCNC: 1.07 MG/DL (ref 0.7–1.3)
DIFFERENTIAL METHOD BLD: NORMAL
EOSINOPHIL # BLD: 0.1 K/UL (ref 0–0.4)
EOSINOPHIL NFR BLD: 1 % (ref 0–7)
ERYTHROCYTE [DISTWIDTH] IN BLOOD BY AUTOMATED COUNT: 13.5 % (ref 11.5–14.5)
EST. AVERAGE GLUCOSE BLD GHB EST-MCNC: 134 MG/DL
GLOBULIN SER CALC-MCNC: 3.3 G/DL (ref 2–4)
GLUCOSE SERPL-MCNC: 127 MG/DL (ref 65–100)
HBA1C MFR BLD: 6.3 % (ref 4–5.6)
HCT VFR BLD AUTO: 43.8 % (ref 36.6–50.3)
HDLC SERPL-MCNC: 52 MG/DL
HDLC SERPL: 3 (ref 0–5)
HGB BLD-MCNC: 14.4 G/DL (ref 12.1–17)
IMM GRANULOCYTES # BLD AUTO: 0 K/UL (ref 0–0.04)
IMM GRANULOCYTES NFR BLD AUTO: 0 % (ref 0–0.5)
LDLC SERPL CALC-MCNC: 91.2 MG/DL (ref 0–100)
LYMPHOCYTES # BLD: 1.2 K/UL (ref 0.8–3.5)
LYMPHOCYTES NFR BLD: 19 % (ref 12–49)
MCH RBC QN AUTO: 30.3 PG (ref 26–34)
MCHC RBC AUTO-ENTMCNC: 32.9 G/DL (ref 30–36.5)
MCV RBC AUTO: 92.2 FL (ref 80–99)
MONOCYTES # BLD: 0.6 K/UL (ref 0–1)
MONOCYTES NFR BLD: 10 % (ref 5–13)
NEUTS SEG # BLD: 4.5 K/UL (ref 1.8–8)
NEUTS SEG NFR BLD: 69 % (ref 32–75)
NRBC # BLD: 0 K/UL (ref 0–0.01)
NRBC BLD-RTO: 0 PER 100 WBC
PLATELET # BLD AUTO: 187 K/UL (ref 150–400)
PMV BLD AUTO: 11.3 FL (ref 8.9–12.9)
POTASSIUM SERPL-SCNC: 4.2 MMOL/L (ref 3.5–5.1)
PROT SERPL-MCNC: 7.1 G/DL (ref 6.4–8.2)
PSA SERPL-MCNC: 1.8 NG/ML (ref 0.01–4)
RBC # BLD AUTO: 4.75 M/UL (ref 4.1–5.7)
SODIUM SERPL-SCNC: 142 MMOL/L (ref 136–145)
TRIGL SERPL-MCNC: 74 MG/DL
VLDLC SERPL CALC-MCNC: 14.8 MG/DL
WBC # BLD AUTO: 6.4 K/UL (ref 4.1–11.1)

## 2024-09-30 RX ORDER — SENNOSIDES A AND B 8.6 MG/1
1 TABLET, FILM COATED ORAL DAILY
Qty: 60 TABLET | Refills: 3 | Status: SHIPPED | OUTPATIENT
Start: 2024-09-30

## 2024-10-14 RX ORDER — AMLODIPINE BESYLATE 10 MG/1
10 TABLET ORAL DAILY
Qty: 90 TABLET | Refills: 3 | Status: SHIPPED | OUTPATIENT
Start: 2024-10-14

## 2024-11-03 RX ORDER — LISINOPRIL 20 MG/1
20 TABLET ORAL 2 TIMES DAILY
Qty: 180 TABLET | Refills: 3 | Status: SHIPPED | OUTPATIENT
Start: 2024-11-03

## 2024-12-26 ENCOUNTER — OFFICE VISIT (OUTPATIENT)
Facility: CLINIC | Age: 79
End: 2024-12-26
Payer: MEDICARE

## 2024-12-26 VITALS
DIASTOLIC BLOOD PRESSURE: 62 MMHG | HEART RATE: 68 BPM | RESPIRATION RATE: 17 BRPM | OXYGEN SATURATION: 97 % | BODY MASS INDEX: 33.18 KG/M2 | WEIGHT: 245 LBS | HEIGHT: 72 IN | SYSTOLIC BLOOD PRESSURE: 132 MMHG

## 2024-12-26 DIAGNOSIS — E11.9 TYPE 2 DIABETES MELLITUS WITHOUT COMPLICATION, WITHOUT LONG-TERM CURRENT USE OF INSULIN (HCC): ICD-10-CM

## 2024-12-26 DIAGNOSIS — I10 ESSENTIAL (PRIMARY) HYPERTENSION: ICD-10-CM

## 2024-12-26 DIAGNOSIS — E78.00 PURE HYPERCHOLESTEROLEMIA, UNSPECIFIED: ICD-10-CM

## 2024-12-26 DIAGNOSIS — I10 ESSENTIAL (PRIMARY) HYPERTENSION: Primary | ICD-10-CM

## 2024-12-26 DIAGNOSIS — B35.4 TINEA CORPORIS: ICD-10-CM

## 2024-12-26 DIAGNOSIS — E55.9 VITAMIN D DEFICIENCY: ICD-10-CM

## 2024-12-26 LAB
25(OH)D3 SERPL-MCNC: 68.7 NG/ML (ref 30–100)
ALBUMIN SERPL-MCNC: 3.7 G/DL (ref 3.5–5)
ALBUMIN/GLOB SERPL: 1.2 (ref 1.1–2.2)
ALP SERPL-CCNC: 80 U/L (ref 45–117)
ALT SERPL-CCNC: 17 U/L (ref 12–78)
ANION GAP SERPL CALC-SCNC: 6 MMOL/L (ref 2–12)
AST SERPL-CCNC: 17 U/L (ref 15–37)
BASOPHILS # BLD: 0 K/UL (ref 0–0.1)
BASOPHILS NFR BLD: 1 % (ref 0–1)
BILIRUB SERPL-MCNC: 0.4 MG/DL (ref 0.2–1)
BUN SERPL-MCNC: 21 MG/DL (ref 6–20)
BUN/CREAT SERPL: 21 (ref 12–20)
CALCIUM SERPL-MCNC: 8.8 MG/DL (ref 8.5–10.1)
CHLORIDE SERPL-SCNC: 109 MMOL/L (ref 97–108)
CHOLEST SERPL-MCNC: 168 MG/DL
CO2 SERPL-SCNC: 27 MMOL/L (ref 21–32)
CREAT SERPL-MCNC: 1.02 MG/DL (ref 0.7–1.3)
CREAT UR-MCNC: 186 MG/DL
DIFFERENTIAL METHOD BLD: NORMAL
EOSINOPHIL # BLD: 0.1 K/UL (ref 0–0.4)
EOSINOPHIL NFR BLD: 1 % (ref 0–7)
ERYTHROCYTE [DISTWIDTH] IN BLOOD BY AUTOMATED COUNT: 13.3 % (ref 11.5–14.5)
EST. AVERAGE GLUCOSE BLD GHB EST-MCNC: 131 MG/DL
GLOBULIN SER CALC-MCNC: 3.1 G/DL (ref 2–4)
GLUCOSE SERPL-MCNC: 120 MG/DL (ref 65–100)
HBA1C MFR BLD: 6.2 % (ref 4–5.6)
HCT VFR BLD AUTO: 43.9 % (ref 36.6–50.3)
HDLC SERPL-MCNC: 49 MG/DL
HDLC SERPL: 3.4 (ref 0–5)
HGB BLD-MCNC: 14.4 G/DL (ref 12.1–17)
IMM GRANULOCYTES # BLD AUTO: 0 K/UL (ref 0–0.04)
IMM GRANULOCYTES NFR BLD AUTO: 0 % (ref 0–0.5)
LDLC SERPL CALC-MCNC: 102.4 MG/DL (ref 0–100)
LYMPHOCYTES # BLD: 0.9 K/UL (ref 0.8–3.5)
LYMPHOCYTES NFR BLD: 17 % (ref 12–49)
MCH RBC QN AUTO: 31.1 PG (ref 26–34)
MCHC RBC AUTO-ENTMCNC: 32.8 G/DL (ref 30–36.5)
MCV RBC AUTO: 94.8 FL (ref 80–99)
MICROALBUMIN UR-MCNC: 5.45 MG/DL
MICROALBUMIN/CREAT UR-RTO: 29 MG/G (ref 0–30)
MONOCYTES # BLD: 0.6 K/UL (ref 0–1)
MONOCYTES NFR BLD: 12 % (ref 5–13)
NEUTS SEG # BLD: 3.7 K/UL (ref 1.8–8)
NEUTS SEG NFR BLD: 69 % (ref 32–75)
NRBC # BLD: 0 K/UL (ref 0–0.01)
NRBC BLD-RTO: 0 PER 100 WBC
PLATELET # BLD AUTO: 183 K/UL (ref 150–400)
PMV BLD AUTO: 10.6 FL (ref 8.9–12.9)
POTASSIUM SERPL-SCNC: 4.2 MMOL/L (ref 3.5–5.1)
PROT SERPL-MCNC: 6.8 G/DL (ref 6.4–8.2)
RBC # BLD AUTO: 4.63 M/UL (ref 4.1–5.7)
SODIUM SERPL-SCNC: 142 MMOL/L (ref 136–145)
TRIGL SERPL-MCNC: 83 MG/DL
VLDLC SERPL CALC-MCNC: 16.6 MG/DL
WBC # BLD AUTO: 5.4 K/UL (ref 4.1–11.1)

## 2024-12-26 PROCEDURE — G8484 FLU IMMUNIZE NO ADMIN: HCPCS | Performed by: NURSE PRACTITIONER

## 2024-12-26 PROCEDURE — 3078F DIAST BP <80 MM HG: CPT | Performed by: NURSE PRACTITIONER

## 2024-12-26 PROCEDURE — 99214 OFFICE O/P EST MOD 30 MIN: CPT | Performed by: NURSE PRACTITIONER

## 2024-12-26 PROCEDURE — 1036F TOBACCO NON-USER: CPT | Performed by: NURSE PRACTITIONER

## 2024-12-26 PROCEDURE — 3075F SYST BP GE 130 - 139MM HG: CPT | Performed by: NURSE PRACTITIONER

## 2024-12-26 PROCEDURE — 1123F ACP DISCUSS/DSCN MKR DOCD: CPT | Performed by: NURSE PRACTITIONER

## 2024-12-26 PROCEDURE — G8428 CUR MEDS NOT DOCUMENT: HCPCS | Performed by: NURSE PRACTITIONER

## 2024-12-26 PROCEDURE — 3044F HG A1C LEVEL LT 7.0%: CPT | Performed by: NURSE PRACTITIONER

## 2024-12-26 PROCEDURE — G8417 CALC BMI ABV UP PARAM F/U: HCPCS | Performed by: NURSE PRACTITIONER

## 2024-12-26 RX ORDER — NYSTATIN 100000 U/G
CREAM TOPICAL
Qty: 30 G | Refills: 1 | Status: SHIPPED | OUTPATIENT
Start: 2024-12-26

## 2024-12-26 RX ORDER — ASPIRIN 81 MG/1
TABLET ORAL
COMMUNITY

## 2024-12-26 NOTE — PROGRESS NOTES
Hernandez Emmanuel is a 79 y.o. male , id x 2(name and ). Reviewed record, history, and  medications.    Chief Complaint   Patient presents with    Follow-up     6 month follow up for hypertension    Groin Pain     Patient stated a burning sensation in the groin area         Health Maintenance Due   Topic    Hepatitis A vaccine (1 of 2 - Risk 2-dose series)    DTaP/Tdap/Td vaccine (1 - Tdap)    Shingles vaccine (1 of 2)    Hepatitis B vaccine (1 of 3 - Risk 3-dose series)    Pneumococcal 65+ years Vaccine (2 of 2 - PPSV23 or PCV20)    Respiratory Syncytial Virus (RSV) Pregnant or age 60 yrs+ (1 - 1-dose 75+ series)    Flu vaccine (1)    COVID-19 Vaccine ( -  season)    Diabetic Alb to Cr ratio (uACR) test        Wt Readings from Last 1 Encounters:   24 111.1 kg (245 lb)     BP Readings from Last 3 Encounters:   24 (!) 153/84   24 134/76   23 122/72     Pulse Readings from Last 1 Encounters:   24 68         Patient is currently accompanied by self & I have received verbal consent from Hernandez Emmanuel to discuss any/all medical information while he/she is present in the room.    Home BP cuff present today:  no    Home medication list or bottles present today: no  - All medications were reviewed and updated with the patient today in office.    Forms for completion: no    Chest pain/ Shortness of breath: no    Surgery within the next month:  no      Depression Screening:  :     Depression: Not at risk (2024)    PHQ-2     PHQ-2 Score: 0          Coordination of Care Questionnaire:  :     \"Have you been to the ER, urgent care clinic since your last visit?  Hospitalized since your last visit?\"    NO    “Have you seen or consulted any other health care providers outside of [facility] since your last visit?”    NO    Click Here for Release of Records Request      --Cullen Lopez CMA       ------------------------------------------------   
notes occasional discomfort. He has attempted various topical treatments without success. The sensation is localized to one side and is felt externally.    He has been under the care of a cardiologist, who has prescribed a regimen of low-dose aspirin to be taken three times weekly. Additionally, he is participating in a remote monitoring program for his blood pressure, with recent readings ranging from 130 to 137 systolic and 60 diastolic.    He has been using Voltaren gel for knee pain, which has resulted in significant improvement. He is uncertain if the relief is due to the medication or his gym workouts. He applies the gel to both knees and attends the gym three times weekly.    Supplemental Information  He had a regular follow-up with his knee doctor and was advised to use Voltaren gel. He has been going to the gym again and is unsure if it is the Voltaren or the gym, but his knees are having less pain than he has had in years.    MEDICATIONS  Current: aspirin, Voltaren gel    Review of Systems   A comprehensive review of system was obtained and negative except findings in the HPI      Objective   Blood pressure 132/62, pulse 68, resp. rate 17, height 1.829 m (6'), weight 111.1 kg (245 lb), SpO2 97%.  Physical Exam  Vital Signs  Blood pressure is 132/62. Weight is 245.   Physical Examination:   GENERAL ASSESSMENT: well developed and well nourished  CHEST: normal air exchange, no rales, no rhonchi, no wheezes, respiratory effort normal with no retractions  HEART: regular rate and rhythm, normal S1/S2, no murmurs  SKIN: left groin with red tinea rash that is excoriated in nature        The patient (or guardian, if applicable) and other individuals in attendance with the patient were advised that Artificial Intelligence will be utilized during this visit to record, process the conversation to generate a clinical note and to support improvement of the AI technology. The patient (or guardian, if applicable) and

## 2025-02-07 ENCOUNTER — TELEPHONE (OUTPATIENT)
Facility: CLINIC | Age: 80
End: 2025-02-07

## 2025-02-07 NOTE — TELEPHONE ENCOUNTER
FYI    Patient received call from GI doctor that done his last colonoscopy that it was time to schedule one. Patient states he did have polyps at last colonoscopy.     I advised that after 73, colonoscopies are no longer needed.    Patient would like us to double check to make sure he does not need another.

## 2025-02-07 NOTE — TELEPHONE ENCOUNTER
Patient called in and said that someone reached out to him and said that he should have a coloscopy done. He said that he had one done 5 years ago and if ZHEN Meléndez thinks he should have another one then he will.

## 2025-02-09 NOTE — TELEPHONE ENCOUNTER
I guess if gastro contacted him, they want him to do it again. I can do a referral if needed but honestly really doesn't need it. He can make a decision based on his comfort level. Anuradha

## 2025-03-01 DIAGNOSIS — B35.4 TINEA CORPORIS: ICD-10-CM

## 2025-03-03 RX ORDER — NYSTATIN 100000 U/G
CREAM TOPICAL
Qty: 30 G | Refills: 1 | Status: SHIPPED | OUTPATIENT
Start: 2025-03-03

## 2025-03-03 RX ORDER — SIMVASTATIN 40 MG
TABLET ORAL
Qty: 90 TABLET | Refills: 3 | Status: SHIPPED | OUTPATIENT
Start: 2025-03-03

## 2025-06-30 ENCOUNTER — TELEPHONE (OUTPATIENT)
Facility: CLINIC | Age: 80
End: 2025-06-30

## 2025-06-30 SDOH — HEALTH STABILITY: PHYSICAL HEALTH: ON AVERAGE, HOW MANY DAYS PER WEEK DO YOU ENGAGE IN MODERATE TO STRENUOUS EXERCISE (LIKE A BRISK WALK)?: 0 DAYS

## 2025-06-30 ASSESSMENT — LIFESTYLE VARIABLES
HOW MANY STANDARD DRINKS CONTAINING ALCOHOL DO YOU HAVE ON A TYPICAL DAY: PATIENT DOES NOT DRINK
HOW OFTEN DO YOU HAVE SIX OR MORE DRINKS ON ONE OCCASION: 1
HOW OFTEN DO YOU HAVE A DRINK CONTAINING ALCOHOL: 1
HOW MANY STANDARD DRINKS CONTAINING ALCOHOL DO YOU HAVE ON A TYPICAL DAY: 0
HOW OFTEN DO YOU HAVE A DRINK CONTAINING ALCOHOL: NEVER

## 2025-06-30 ASSESSMENT — PATIENT HEALTH QUESTIONNAIRE - PHQ9
SUM OF ALL RESPONSES TO PHQ QUESTIONS 1-9: 1
2. FEELING DOWN, DEPRESSED OR HOPELESS: SEVERAL DAYS
SUM OF ALL RESPONSES TO PHQ QUESTIONS 1-9: 1
SUM OF ALL RESPONSES TO PHQ QUESTIONS 1-9: 1
1. LITTLE INTEREST OR PLEASURE IN DOING THINGS: NOT AT ALL
SUM OF ALL RESPONSES TO PHQ QUESTIONS 1-9: 1

## 2025-06-30 NOTE — TELEPHONE ENCOUNTER
Attempted to contact patient regarding upcoming Medicare wellness appointment and completion of HRA questionnaire. LVM for patient to please return call at [phone].

## 2025-07-01 ENCOUNTER — OFFICE VISIT (OUTPATIENT)
Facility: CLINIC | Age: 80
End: 2025-07-01
Payer: MEDICARE

## 2025-07-01 VITALS
WEIGHT: 238 LBS | HEIGHT: 72 IN | TEMPERATURE: 98.4 F | BODY MASS INDEX: 32.23 KG/M2 | SYSTOLIC BLOOD PRESSURE: 120 MMHG | DIASTOLIC BLOOD PRESSURE: 68 MMHG | OXYGEN SATURATION: 98 % | HEART RATE: 63 BPM

## 2025-07-01 DIAGNOSIS — E78.00 PURE HYPERCHOLESTEROLEMIA, UNSPECIFIED: ICD-10-CM

## 2025-07-01 DIAGNOSIS — E11.22 TYPE 2 DIABETES MELLITUS WITH STAGE 3 CHRONIC KIDNEY DISEASE, WITHOUT LONG-TERM CURRENT USE OF INSULIN, UNSPECIFIED WHETHER STAGE 3A OR 3B CKD (HCC): ICD-10-CM

## 2025-07-01 DIAGNOSIS — Z12.5 SCREENING PSA (PROSTATE SPECIFIC ANTIGEN): ICD-10-CM

## 2025-07-01 DIAGNOSIS — I10 ESSENTIAL (PRIMARY) HYPERTENSION: ICD-10-CM

## 2025-07-01 DIAGNOSIS — Z00.00 WELL EXAM WITHOUT ABNORMAL FINDINGS OF PATIENT 18 YEARS OF AGE OR OLDER: Primary | ICD-10-CM

## 2025-07-01 DIAGNOSIS — E11.9 TYPE 2 DIABETES MELLITUS WITHOUT COMPLICATION, WITHOUT LONG-TERM CURRENT USE OF INSULIN (HCC): ICD-10-CM

## 2025-07-01 DIAGNOSIS — N18.30 STAGE 3 CHRONIC KIDNEY DISEASE, UNSPECIFIED WHETHER STAGE 3A OR 3B CKD (HCC): ICD-10-CM

## 2025-07-01 DIAGNOSIS — B18.2 CHRONIC HEPATITIS C WITHOUT HEPATIC COMA (HCC): ICD-10-CM

## 2025-07-01 DIAGNOSIS — N18.30 TYPE 2 DIABETES MELLITUS WITH STAGE 3 CHRONIC KIDNEY DISEASE, WITHOUT LONG-TERM CURRENT USE OF INSULIN, UNSPECIFIED WHETHER STAGE 3A OR 3B CKD (HCC): ICD-10-CM

## 2025-07-01 PROCEDURE — 1123F ACP DISCUSS/DSCN MKR DOCD: CPT | Performed by: NURSE PRACTITIONER

## 2025-07-01 PROCEDURE — 3078F DIAST BP <80 MM HG: CPT | Performed by: NURSE PRACTITIONER

## 2025-07-01 PROCEDURE — 99214 OFFICE O/P EST MOD 30 MIN: CPT | Performed by: NURSE PRACTITIONER

## 2025-07-01 PROCEDURE — G0439 PPPS, SUBSEQ VISIT: HCPCS | Performed by: NURSE PRACTITIONER

## 2025-07-01 PROCEDURE — 1159F MED LIST DOCD IN RCRD: CPT | Performed by: NURSE PRACTITIONER

## 2025-07-01 PROCEDURE — G8427 DOCREV CUR MEDS BY ELIG CLIN: HCPCS | Performed by: NURSE PRACTITIONER

## 2025-07-01 PROCEDURE — G8417 CALC BMI ABV UP PARAM F/U: HCPCS | Performed by: NURSE PRACTITIONER

## 2025-07-01 PROCEDURE — 1036F TOBACCO NON-USER: CPT | Performed by: NURSE PRACTITIONER

## 2025-07-01 PROCEDURE — 1125F AMNT PAIN NOTED PAIN PRSNT: CPT | Performed by: NURSE PRACTITIONER

## 2025-07-01 PROCEDURE — 3074F SYST BP LT 130 MM HG: CPT | Performed by: NURSE PRACTITIONER

## 2025-07-01 RX ORDER — SPIRONOLACTONE 25 MG/1
TABLET ORAL
COMMUNITY
Start: 2025-04-09

## 2025-07-01 RX ORDER — METOPROLOL SUCCINATE 25 MG/1
TABLET, EXTENDED RELEASE ORAL
COMMUNITY
Start: 2025-04-09

## 2025-07-01 RX ORDER — AMMONIUM LACTATE 12 G/100G
LOTION TOPICAL
COMMUNITY
Start: 2025-05-10

## 2025-07-01 SDOH — ECONOMIC STABILITY: FOOD INSECURITY: WITHIN THE PAST 12 MONTHS, THE FOOD YOU BOUGHT JUST DIDN'T LAST AND YOU DIDN'T HAVE MONEY TO GET MORE.: NEVER TRUE

## 2025-07-01 SDOH — ECONOMIC STABILITY: FOOD INSECURITY: WITHIN THE PAST 12 MONTHS, YOU WORRIED THAT YOUR FOOD WOULD RUN OUT BEFORE YOU GOT MONEY TO BUY MORE.: NEVER TRUE

## 2025-07-01 NOTE — PROGRESS NOTES
Chief Complaint   Patient presents with    Medicare AWV     \"Have you been to the ER, urgent care clinic since your last visit?  Hospitalized since your last visit?\"    NO    “Have you seen or consulted any other health care providers outside of [facility] since your last visit?”    NO     /68 (BP Site: Left Upper Arm, Patient Position: Sitting)   Pulse 63   Temp 98.4 °F (36.9 °C) (Temporal)   Ht 1.829 m (6')   Wt 108 kg (238 lb)   SpO2 98%   BMI 32.28 kg/m²      PHQ-9 Total Score: (Proxy-Rptd) 1 (6/30/2025 12:55 PM)       Medicare Aw Health Risk Assessment / Depression Screen       Question 6/30/2025 12:55 PM EDT - Filed by Sarah Yen LPN    Fall Risk Screening     Do you feel unsteady or are you worried about falling? yes    Have you fallen 2 or more times in the past year?   yes    Have you had any fall with injury in the past year?   no    Health Risk Assessment / General     In general, how would you say your health is? Good    In the past 7 days, have you experienced any of the following: New or Increased Pain, New or Increased Fatigue, Loneliness, Social Isolation, Stress or Anger? No    Do you have a Living Will? Yes    Health Habits/ Nutrition     On average, how many days per week do you engage in moderate to strenuous exercise (like a brisk walk)? 0 days    On average, how many minutes do you engage in exercise at this level?       Do you eat balanced/healthy meals regularly? No    Have you seen the dentist within the past year? Yes    Hearing / Vision     Have you had an eye exam within the past year? Yes    Do you have difficulty driving, watching TV, or doing any of your daily activities because of your eyesight? Yes    Do you or your family notice any trouble with your hearing that hasn't been managed with hearing aids? No    Safety     Do you have working smoke detectors? Yes    Do you have any tripping hazards - loose or unsecured carpets or rugs? No    Do you have

## 2025-07-02 LAB
ALBUMIN SERPL-MCNC: 4.2 G/DL (ref 3.7–4.7)
ALP SERPL-CCNC: 71 IU/L (ref 44–121)
ALT SERPL-CCNC: 11 IU/L (ref 0–44)
AST SERPL-CCNC: 15 IU/L (ref 0–40)
BASOPHILS # BLD AUTO: 0.1 X10E3/UL (ref 0–0.2)
BASOPHILS NFR BLD AUTO: 1 %
BILIRUB SERPL-MCNC: 0.4 MG/DL (ref 0–1.2)
BUN SERPL-MCNC: 28 MG/DL (ref 8–27)
BUN/CREAT SERPL: 24 (ref 10–24)
CALCIUM SERPL-MCNC: 9.3 MG/DL (ref 8.6–10.2)
CHLORIDE SERPL-SCNC: 108 MMOL/L (ref 96–106)
CHOLEST SERPL-MCNC: 151 MG/DL (ref 100–199)
CO2 SERPL-SCNC: 22 MMOL/L (ref 20–29)
CREAT SERPL-MCNC: 1.17 MG/DL (ref 0.76–1.27)
EGFRCR SERPLBLD CKD-EPI 2021: 62 ML/MIN/1.73
EOSINOPHIL # BLD AUTO: 0.1 X10E3/UL (ref 0–0.4)
EOSINOPHIL NFR BLD AUTO: 1 %
ERYTHROCYTE [DISTWIDTH] IN BLOOD BY AUTOMATED COUNT: 13.1 % (ref 11.6–15.4)
GLOBULIN SER CALC-MCNC: 2.4 G/DL (ref 1.5–4.5)
GLUCOSE SERPL-MCNC: 121 MG/DL (ref 70–99)
HCT VFR BLD AUTO: 45.4 % (ref 37.5–51)
HDLC SERPL-MCNC: 44 MG/DL
HGB BLD-MCNC: 14.7 G/DL (ref 13–17.7)
IMM GRANULOCYTES # BLD AUTO: 0 X10E3/UL (ref 0–0.1)
IMM GRANULOCYTES NFR BLD AUTO: 0 %
LDLC SERPL CALC-MCNC: 91 MG/DL (ref 0–99)
LYMPHOCYTES # BLD AUTO: 1.3 X10E3/UL (ref 0.7–3.1)
LYMPHOCYTES NFR BLD AUTO: 19 %
MCH RBC QN AUTO: 31.3 PG (ref 26.6–33)
MCHC RBC AUTO-ENTMCNC: 32.4 G/DL (ref 31.5–35.7)
MCV RBC AUTO: 97 FL (ref 79–97)
MONOCYTES # BLD AUTO: 0.6 X10E3/UL (ref 0.1–0.9)
MONOCYTES NFR BLD AUTO: 9 %
NEUTROPHILS # BLD AUTO: 4.5 X10E3/UL (ref 1.4–7)
NEUTROPHILS NFR BLD AUTO: 70 %
PLATELET # BLD AUTO: 177 X10E3/UL (ref 150–450)
POTASSIUM SERPL-SCNC: 4.8 MMOL/L (ref 3.5–5.2)
PROT SERPL-MCNC: 6.6 G/DL (ref 6–8.5)
PSA SERPL-MCNC: 1.7 NG/ML (ref 0–4)
RBC # BLD AUTO: 4.69 X10E6/UL (ref 4.14–5.8)
SODIUM SERPL-SCNC: 144 MMOL/L (ref 134–144)
TRIGL SERPL-MCNC: 83 MG/DL (ref 0–149)
VLDLC SERPL CALC-MCNC: 16 MG/DL (ref 5–40)
WBC # BLD AUTO: 6.4 X10E3/UL (ref 3.4–10.8)
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
## 2025-07-07 ENCOUNTER — RESULTS FOLLOW-UP (OUTPATIENT)
Facility: CLINIC | Age: 80
End: 2025-07-07

## 2025-07-08 LAB
EST. AVERAGE GLUCOSE BLD GHB EST-MCNC: 140 MG/DL
HBA1C MFR BLD: 6.5 %HB

## (undated) DEVICE — SUTURE VCRL SZ 2-0 L36IN ABSRB UD L40MM CT 1/2 CIR J957H

## (undated) DEVICE — SOLUTION IRRIG 1000ML STRL H2O USP PLAS POUR BTL

## (undated) DEVICE — DERMABOND SKIN ADH 0.7ML -- DERMABOND ADVANCED 12/BX

## (undated) DEVICE — ALCOHOL RUBBING ISO 16OZ 70%

## (undated) DEVICE — DUAL IRRIGATION ADAPTOR

## (undated) DEVICE — Device

## (undated) DEVICE — SPHERE STEALTH 12PK/TY --

## (undated) DEVICE — PADDING CST 6IN STERILE --

## (undated) DEVICE — NEEDLE HYPO 18GA L1.5IN PNK S STL HUB POLYPR SHLD REG BVL

## (undated) DEVICE — GLOVE SURG SZ 9 L12IN FNGR THK79MIL GRN LTX FREE

## (undated) DEVICE — COVER LT HNDL PLAS RIG 1 PER PK

## (undated) DEVICE — TOOL 14MH30 LEGEND 14CM 3MM: Brand: MIDAS REX ™

## (undated) DEVICE — DRESSING ANTIMIC FOAM OPTIFOAM POSTOP ADH 4X14 IN

## (undated) DEVICE — DRAPE,EXTREMITY,89X128,STERILE: Brand: MEDLINE

## (undated) DEVICE — 3M™ IOBAN™ 2 ANTIMICROBIAL INCISE DRAPE 6648EZ: Brand: IOBAN™ 2

## (undated) DEVICE — GLOVE SURG SZ 85 L12IN FNGR THK79MIL GRN LTX FREE

## (undated) DEVICE — TOWEL SURG W17XL27IN STD BLU COT NONFENESTRATED PREWASHED

## (undated) DEVICE — SPONGE GZ W4XL4IN COT 12 PLY TYP VII WVN C FLD DSGN

## (undated) DEVICE — 1010 S-DRAPE TOWEL DRAPE 10/BX: Brand: STERI-DRAPE™

## (undated) DEVICE — STAPLER SKIN 35CT WD STRL DISP -- MULTIFIRE PREMIUM

## (undated) DEVICE — SWAB CULT DBL W/O CHAR RAYON TIP AMIES GEL CLMN FOR COLL

## (undated) DEVICE — TAPE,ELASTIC,FOAM,CURAD,3"X5.5YD,LF: Brand: CURAD

## (undated) DEVICE — SOLUTION IRRIG 3000ML 0.9% SOD CHL FLX CONT 0797208] ICU MEDICAL INC]

## (undated) DEVICE — PENCIL SMK EVAC L10FT DIA95MM TBNG NONSTICK W ADPT TO 22MM

## (undated) DEVICE — WAX SURG 2.5GM HEMSTAT BNE BEESWAX PARAFFIN ISO PALMITATE

## (undated) DEVICE — ZIMMER® STERILE DISPOSABLE TOURNIQUET CUFF WITH PROTECTIVE SLEEVE AND PLC, DUAL PORT, SINGLE BLADDER, 34 IN. (86 CM)

## (undated) DEVICE — SYR LR LCK 1ML GRAD NSAF 30ML --

## (undated) DEVICE — GLOVE SURG SZ 85 L12IN FNGR ORTHO 126MIL CRM LTX FREE

## (undated) DEVICE — TOBRA BONE BASKET- AUTOGRAFT BONE COLLECTION SYSTEM WITH MESH FILTER AND GRAFT COMPRESSOR: Brand: TOBRA BONE BASKET

## (undated) DEVICE — SEALANT HEMOSTAT W/THROM 8ML -- SURGIFLO MATRIX

## (undated) DEVICE — STERILE-Z SURGICAL PATIENT COVERS CLEAR POLYETHYLENE STERILE UNIVERSAL FIT 10 PER CASE: Brand: STERILE-Z

## (undated) DEVICE — 4.0MM EGG

## (undated) DEVICE — T4 HOOD

## (undated) DEVICE — Device: Brand: JELCO

## (undated) DEVICE — SOLUTION IRRIG 1000ML 0.9% SOD CHL USP POUR PLAS BTL

## (undated) DEVICE — TOTAL TRAY, 16FR 10ML SIL FOLEY, URN: Brand: MEDLINE

## (undated) DEVICE — TIP CLEANER: Brand: VALLEYLAB

## (undated) DEVICE — SUTURE STRATAFIX SYMMETRIC SZ 1 L18IN ABSRB VLT CT1 L36CM SXPP1A404

## (undated) DEVICE — STERILE POLYISOPRENE POWDER-FREE SURGICAL GLOVES: Brand: PROTEXIS

## (undated) DEVICE — BLADE BLUNT 10MM W/TUBE SET NEXUS

## (undated) DEVICE — SUTURE VCRL + SZ 1-0 L36IN ABSRB UD CTX 1/2 CIR TAPR PNT VCP977H

## (undated) DEVICE — TOTAL JOINT-SFMC: Brand: MEDLINE INDUSTRIES, INC.

## (undated) DEVICE — SUTURE ETHBND EXCEL SZ 5 L30IN NONABSORBABLE GRN L40MM V-37 MB66G

## (undated) DEVICE — BONE MARROW KIT ASPIR 11 GA

## (undated) DEVICE — GLOVE SURG SZ 85 L12IN THK75MIL DK GRN LTX FREE

## (undated) DEVICE — SPONGE: SPECIALTY PEANUT XR 100/CS: Brand: MEDICAL ACTION INDUSTRIES

## (undated) DEVICE — DECANTER BAG 9": Brand: MEDLINE INDUSTRIES, INC.

## (undated) DEVICE — HANDPIECE SET WITH COAXIAL HIGH FLOW TIP AND SUCTION TUBE: Brand: INTERPULSE

## (undated) DEVICE — NDL PRT INJ NSAF BLNT 18GX1.5 --

## (undated) DEVICE — REM POLYHESIVE ADULT PATIENT RETURN ELECTRODE: Brand: VALLEYLAB

## (undated) DEVICE — CONTAINER,SPECIMEN,3OZ,OR STRL: Brand: MEDLINE

## (undated) DEVICE — NEURO SPONGES: Brand: DEROYAL

## (undated) DEVICE — (D)PREP SKN CHLRAPRP APPL 26ML -- CONVERT TO ITEM 371833

## (undated) DEVICE — SPONGE GZ W4XL4IN COT RADPQ HIGHLY ABSRB

## (undated) DEVICE — BANDAGE COMPR W6INXL10YD ST M E WHITE/BEIGE

## (undated) DEVICE — GLOVE SURG SZ 7 L12IN FNGR THK79MIL GRN LTX FREE

## (undated) DEVICE — NEEDLE HYPO 22GA L1.5IN BLK POLYPR HUB S STL REG BVL STR

## (undated) DEVICE — GOWN,SIRUS,NONRNF,SETINSLV,2XL,18/CS: Brand: MEDLINE

## (undated) DEVICE — GLOVE ORANGE PI 8 1/2   MSG9085

## (undated) DEVICE — STERILE POLYISOPRENE POWDER-FREE SURGICAL GLOVES WITH EMOLLIENT COATING: Brand: PROTEXIS

## (undated) DEVICE — AEGIS 1" DISK 4MM HOLE, PEEL OPEN: Brand: MEDLINE

## (undated) DEVICE — YANKAUER,OPEN TIP,W/O VENT,STERILE: Brand: MEDLINE INDUSTRIES, INC.

## (undated) DEVICE — COVER,MAYO STAND,STERILE: Brand: MEDLINE

## (undated) DEVICE — PREP KIT PEEL PTCH POVIDONE IOD

## (undated) DEVICE — GLOVE SURG SZ 65 THK91MIL LTX FREE SYN POLYISOPRENE

## (undated) DEVICE — KIT EVAC 0.13IN RECT TB DIA10FR 400CC PVC 3 SPR Y CONN DRN

## (undated) DEVICE — CANISTER, RIGID, 3000CC: Brand: MEDLINE INDUSTRIES, INC.

## (undated) DEVICE — JACKSON TABLE POSITIONER KIT: Brand: MEDLINE INDUSTRIES, INC.

## (undated) DEVICE — INFECTION CONTROL KIT SYS

## (undated) DEVICE — DRAPE,LAP,CHOLE,W/TROUGHS,STERILE: Brand: MEDLINE

## (undated) DEVICE — 3M™ TEGADERM™ TRANSPARENT FILM DRESSING FRAME STYLE, 1626, 4 IN X 4-3/4 IN (10 CM X 12 CM), 50/CT 4CT/CASE: Brand: 3M™ TEGADERM™

## (undated) DEVICE — C-ARM: Brand: UNBRANDED

## (undated) DEVICE — SUTURE MCRYL SZ 3-0 L27IN ABSRB UD L24MM PS-1 3/8 CIR PRIM Y936H

## (undated) DEVICE — POWDER HEMOSTAT GEL 3.0GR -- SURGICEL

## (undated) DEVICE — MARKER,SKIN,WI/RULER AND LABELS: Brand: MEDLINE

## (undated) DEVICE — SOLUTION IRRIG 3000ML 0.9% SOD CHL USP UROMATIC PLAS CONT

## (undated) DEVICE — TRAP,MUCUS SPECIMEN, 80CC: Brand: MEDLINE

## (undated) DEVICE — SUTURE ABSORBABLE 3-0 PS-1 18 IN UD MONOCRYL + STRATAFIX SXMP1B102

## (undated) DEVICE — DRESSING,GAUZE,XEROFORM,CURAD,5"X9",ST: Brand: CURAD

## (undated) DEVICE — LAMINECTOMY-SFMC: Brand: MEDLINE INDUSTRIES, INC.

## (undated) DEVICE — PROBE BALL TIP STIMULATING 25

## (undated) DEVICE — STRIP,CLOSURE,WOUND,MEDI-STRIP,1/2X4: Brand: MEDLINE

## (undated) DEVICE — STRAP,POSITIONING,KNEE/BODY,FOAM,4X60": Brand: MEDLINE

## (undated) DEVICE — STRYKER PERFORMANCE SERIES SAGITTAL BLADE: Brand: STRYKER PERFORMANCE SERIES

## (undated) DEVICE — Z DISCONTINUED USE 2744636  DRESSING AQUACEL 14 IN ALG W3.5XL14IN POLYUR FLM CVR W/ HYDRCOLL

## (undated) DEVICE — CEMENT MIXING SYSTEM WITH FEMORAL BREAKWAY NOZZLE: Brand: REVOLUTION

## (undated) DEVICE — PREMIUM WET SKIN PREP TRAY: Brand: MEDLINE INDUSTRIES, INC.

## (undated) DEVICE — BLADE SAW W9XL25MM THK051MM CUT THK074MM REPL S STL SAG

## (undated) DEVICE — DRAPE C-ARMOUR C-ARM KIT --

## (undated) DEVICE — ELECTRODE BLDE L4IN NONINSULATED EDGE

## (undated) DEVICE — SMOKE EVACUATION PENCIL: Brand: VALLEYLAB

## (undated) DEVICE — 450 ML BOTTLE OF 0.05% CHLORHEXIDINE GLUCONATE IN 99.95% STERILE WATER FOR IRRIGATION, USP AND APPLICATOR.: Brand: IRRISEPT ANTIMICROBIAL WOUND LAVAGE